# Patient Record
Sex: MALE | Race: OTHER | HISPANIC OR LATINO | Employment: FULL TIME | URBAN - METROPOLITAN AREA
[De-identification: names, ages, dates, MRNs, and addresses within clinical notes are randomized per-mention and may not be internally consistent; named-entity substitution may affect disease eponyms.]

---
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
## 2021-09-09 ENCOUNTER — HOSPITAL ENCOUNTER (INPATIENT)
Facility: HOSPITAL | Age: 21
LOS: 6 days | DRG: 473 | End: 2021-09-15
Attending: EMERGENCY MEDICINE | Admitting: SURGERY
Payer: COMMERCIAL

## 2021-09-09 ENCOUNTER — APPOINTMENT (EMERGENCY)
Dept: RADIOLOGY | Facility: HOSPITAL | Age: 21
DRG: 473 | End: 2021-09-09
Payer: COMMERCIAL

## 2021-09-09 ENCOUNTER — ANESTHESIA EVENT (INPATIENT)
Dept: PERIOP | Facility: HOSPITAL | Age: 21
DRG: 473 | End: 2021-09-09
Payer: COMMERCIAL

## 2021-09-09 ENCOUNTER — APPOINTMENT (INPATIENT)
Dept: RADIOLOGY | Facility: HOSPITAL | Age: 21
DRG: 473 | End: 2021-09-09
Payer: COMMERCIAL

## 2021-09-09 ENCOUNTER — ANESTHESIA (INPATIENT)
Dept: PERIOP | Facility: HOSPITAL | Age: 21
DRG: 473 | End: 2021-09-09
Payer: COMMERCIAL

## 2021-09-09 DIAGNOSIS — S14.129A CENTRAL CORD SYNDROME (HCC): ICD-10-CM

## 2021-09-09 DIAGNOSIS — S12.300A C4 CERVICAL FRACTURE (HCC): Primary | ICD-10-CM

## 2021-09-09 DIAGNOSIS — S14.129A CENTRAL CORD SYNDROME, INITIAL ENCOUNTER (HCC): ICD-10-CM

## 2021-09-09 DIAGNOSIS — S12.400A C5 CERVICAL FRACTURE (HCC): ICD-10-CM

## 2021-09-09 DIAGNOSIS — S12.400A CLOSED DISPLACED FRACTURE OF FIFTH CERVICAL VERTEBRA, UNSPECIFIED FRACTURE MORPHOLOGY, INITIAL ENCOUNTER (HCC): ICD-10-CM

## 2021-09-09 LAB
ABO GROUP BLD: NORMAL
ANION GAP SERPL CALCULATED.3IONS-SCNC: 5 MMOL/L (ref 4–13)
ANION GAP SERPL CALCULATED.3IONS-SCNC: 6 MMOL/L (ref 4–13)
APTT PPP: 29 SECONDS (ref 23–37)
ARTIFACT: PRESENT
BASOPHILS # BLD MANUAL: 0 THOUSAND/UL (ref 0–0.1)
BASOPHILS NFR MAR MANUAL: 0 % (ref 0–1)
BLASTS NFR BLD MANUAL: 1 %
BLD GP AB SCN SERPL QL: NEGATIVE
BUN SERPL-MCNC: 13 MG/DL (ref 5–25)
BUN SERPL-MCNC: 15 MG/DL (ref 5–25)
CA-I BLD-SCNC: 1.17 MMOL/L (ref 1.12–1.32)
CALCIUM SERPL-MCNC: 8.8 MG/DL (ref 8.3–10.1)
CALCIUM SERPL-MCNC: 9.1 MG/DL (ref 8.3–10.1)
CHLORIDE SERPL-SCNC: 109 MMOL/L (ref 100–108)
CHLORIDE SERPL-SCNC: 112 MMOL/L (ref 100–108)
CO2 SERPL-SCNC: 23 MMOL/L (ref 21–32)
CO2 SERPL-SCNC: 23 MMOL/L (ref 21–32)
CREAT SERPL-MCNC: 0.71 MG/DL (ref 0.6–1.3)
CREAT SERPL-MCNC: 0.78 MG/DL (ref 0.6–1.3)
EOSINOPHIL # BLD MANUAL: 0.21 THOUSAND/UL (ref 0–0.4)
EOSINOPHIL NFR BLD MANUAL: 4 % (ref 0–6)
ERYTHROCYTE [DISTWIDTH] IN BLOOD BY AUTOMATED COUNT: 12.6 % (ref 11.6–15.1)
ERYTHROCYTE [DISTWIDTH] IN BLOOD BY AUTOMATED COUNT: 12.6 % (ref 11.6–15.1)
GFR SERPL CREATININE-BSD FRML MDRD: 128 ML/MIN/1.73SQ M
GFR SERPL CREATININE-BSD FRML MDRD: 133 ML/MIN/1.73SQ M
GLUCOSE SERPL-MCNC: 131 MG/DL (ref 65–140)
GLUCOSE SERPL-MCNC: 90 MG/DL (ref 65–140)
HCT VFR BLD AUTO: 42.5 % (ref 36.5–49.3)
HCT VFR BLD AUTO: 44.4 % (ref 36.5–49.3)
HGB BLD-MCNC: 14.3 G/DL (ref 12–17)
HGB BLD-MCNC: 15.1 G/DL (ref 12–17)
INR PPP: 1.1 (ref 0.84–1.19)
INR PPP: 1.12 (ref 0.84–1.19)
LYMPHOCYTES # BLD AUTO: 1.55 THOUSAND/UL (ref 0.6–4.47)
LYMPHOCYTES # BLD AUTO: 29 % (ref 14–44)
MAGNESIUM SERPL-MCNC: 2.1 MG/DL (ref 1.6–2.6)
MCH RBC QN AUTO: 29.8 PG (ref 26.8–34.3)
MCH RBC QN AUTO: 30.2 PG (ref 26.8–34.3)
MCHC RBC AUTO-ENTMCNC: 33.6 G/DL (ref 31.4–37.4)
MCHC RBC AUTO-ENTMCNC: 34 G/DL (ref 31.4–37.4)
MCV RBC AUTO: 88 FL (ref 82–98)
MCV RBC AUTO: 90 FL (ref 82–98)
MONOCYTES # BLD AUTO: 0.21 THOUSAND/UL (ref 0–1.22)
MONOCYTES NFR BLD: 4 % (ref 4–12)
NEUTROPHILS # BLD MANUAL: 3.26 THOUSAND/UL (ref 1.85–7.62)
NEUTS BAND NFR BLD MANUAL: 2 % (ref 0–8)
NEUTS SEG NFR BLD AUTO: 59 % (ref 43–75)
NRBC BLD AUTO-RTO: 0 /100 WBCS
PHOSPHATE SERPL-MCNC: 2 MG/DL (ref 2.7–4.5)
PLATELET # BLD AUTO: 118 THOUSANDS/UL (ref 149–390)
PLATELET # BLD AUTO: 119 THOUSANDS/UL (ref 149–390)
PLATELET BLD QL SMEAR: ABNORMAL
PMV BLD AUTO: 10.5 FL (ref 8.9–12.7)
PMV BLD AUTO: 10.7 FL (ref 8.9–12.7)
POLYCHROMASIA BLD QL SMEAR: PRESENT
POTASSIUM SERPL-SCNC: 4 MMOL/L (ref 3.5–5.3)
POTASSIUM SERPL-SCNC: 4 MMOL/L (ref 3.5–5.3)
PROMYELOCYTES NFR BLD MANUAL: 1 % (ref 0–0)
PROTHROMBIN TIME: 13.8 SECONDS (ref 11.6–14.5)
PROTHROMBIN TIME: 14 SECONDS (ref 11.6–14.5)
RBC # BLD AUTO: 4.74 MILLION/UL (ref 3.88–5.62)
RBC # BLD AUTO: 5.06 MILLION/UL (ref 3.88–5.62)
RBC MORPH BLD: PRESENT
RH BLD: POSITIVE
SODIUM SERPL-SCNC: 138 MMOL/L (ref 136–145)
SODIUM SERPL-SCNC: 140 MMOL/L (ref 136–145)
SPECIMEN EXPIRATION DATE: NORMAL
WBC # BLD AUTO: 5.35 THOUSAND/UL (ref 4.31–10.16)
WBC # BLD AUTO: 5.58 THOUSAND/UL (ref 4.31–10.16)

## 2021-09-09 PROCEDURE — 70450 CT HEAD/BRAIN W/O DYE: CPT

## 2021-09-09 PROCEDURE — 85027 COMPLETE CBC AUTOMATED: CPT | Performed by: SURGERY

## 2021-09-09 PROCEDURE — 22842 INSERT SPINE FIXATION DEVICE: CPT | Performed by: NEUROLOGICAL SURGERY

## 2021-09-09 PROCEDURE — 4A1104G MONITORING OF PERIPHERAL NERVOUS ELECTRICAL ACTIVITY, INTRAOPERATIVE, OPEN APPROACH: ICD-10-PCS | Performed by: NEUROLOGICAL SURGERY

## 2021-09-09 PROCEDURE — 72040 X-RAY EXAM NECK SPINE 2-3 VW: CPT

## 2021-09-09 PROCEDURE — 99254 IP/OBS CNSLTJ NEW/EST MOD 60: CPT | Performed by: NEUROLOGICAL SURGERY

## 2021-09-09 PROCEDURE — 99285 EMERGENCY DEPT VISIT HI MDM: CPT

## 2021-09-09 PROCEDURE — 63015 REMOVE SPINE LAMINA >2 CRVCL: CPT | Performed by: NEUROLOGICAL SURGERY

## 2021-09-09 PROCEDURE — 72141 MRI NECK SPINE W/O DYE: CPT

## 2021-09-09 PROCEDURE — 80048 BASIC METABOLIC PNL TOTAL CA: CPT | Performed by: SURGERY

## 2021-09-09 PROCEDURE — 72125 CT NECK SPINE W/O DYE: CPT

## 2021-09-09 PROCEDURE — 85610 PROTHROMBIN TIME: CPT | Performed by: NURSE PRACTITIONER

## 2021-09-09 PROCEDURE — C1713 ANCHOR/SCREW BN/BN,TIS/BN: HCPCS | Performed by: NEUROLOGICAL SURGERY

## 2021-09-09 PROCEDURE — 20930 SP BONE ALGRFT MORSEL ADD-ON: CPT | Performed by: NEUROLOGICAL SURGERY

## 2021-09-09 PROCEDURE — 96374 THER/PROPH/DIAG INJ IV PUSH: CPT

## 2021-09-09 PROCEDURE — G1004 CDSM NDSC: HCPCS

## 2021-09-09 PROCEDURE — 84100 ASSAY OF PHOSPHORUS: CPT | Performed by: SURGERY

## 2021-09-09 PROCEDURE — 80048 BASIC METABOLIC PNL TOTAL CA: CPT | Performed by: EMERGENCY MEDICINE

## 2021-09-09 PROCEDURE — 86901 BLOOD TYPING SEROLOGIC RH(D): CPT | Performed by: EMERGENCY MEDICINE

## 2021-09-09 PROCEDURE — 99024 POSTOP FOLLOW-UP VISIT: CPT | Performed by: NEUROLOGICAL SURGERY

## 2021-09-09 PROCEDURE — 85027 COMPLETE CBC AUTOMATED: CPT | Performed by: EMERGENCY MEDICINE

## 2021-09-09 PROCEDURE — 85730 THROMBOPLASTIN TIME PARTIAL: CPT | Performed by: NURSE PRACTITIONER

## 2021-09-09 PROCEDURE — 86900 BLOOD TYPING SEROLOGIC ABO: CPT | Performed by: EMERGENCY MEDICINE

## 2021-09-09 PROCEDURE — 82330 ASSAY OF CALCIUM: CPT | Performed by: SURGERY

## 2021-09-09 PROCEDURE — 73130 X-RAY EXAM OF HAND: CPT

## 2021-09-09 PROCEDURE — 22614 ARTHRD PST TQ 1NTRSPC EA ADD: CPT | Performed by: NEUROLOGICAL SURGERY

## 2021-09-09 PROCEDURE — 99291 CRITICAL CARE FIRST HOUR: CPT | Performed by: SURGERY

## 2021-09-09 PROCEDURE — 86850 RBC ANTIBODY SCREEN: CPT | Performed by: EMERGENCY MEDICINE

## 2021-09-09 PROCEDURE — 85610 PROTHROMBIN TIME: CPT | Performed by: SURGERY

## 2021-09-09 PROCEDURE — 99285 EMERGENCY DEPT VISIT HI MDM: CPT | Performed by: EMERGENCY MEDICINE

## 2021-09-09 PROCEDURE — 96375 TX/PRO/DX INJ NEW DRUG ADDON: CPT

## 2021-09-09 PROCEDURE — 36415 COLL VENOUS BLD VENIPUNCTURE: CPT | Performed by: EMERGENCY MEDICINE

## 2021-09-09 PROCEDURE — 0RG407J FUSION OF CERVICOTHORACIC VERTEBRAL JOINT WITH AUTOLOGOUS TISSUE SUBSTITUTE, POSTERIOR APPROACH, ANTERIOR COLUMN, OPEN APPROACH: ICD-10-PCS | Performed by: NEUROLOGICAL SURGERY

## 2021-09-09 PROCEDURE — 85007 BL SMEAR W/DIFF WBC COUNT: CPT | Performed by: EMERGENCY MEDICINE

## 2021-09-09 PROCEDURE — 83735 ASSAY OF MAGNESIUM: CPT | Performed by: SURGERY

## 2021-09-09 PROCEDURE — 22600 ARTHRD PST TQ 1NTRSPC CRV: CPT | Performed by: NEUROLOGICAL SURGERY

## 2021-09-09 PROCEDURE — C1781 MESH (IMPLANTABLE): HCPCS | Performed by: NEUROLOGICAL SURGERY

## 2021-09-09 PROCEDURE — 20936 SP BONE AGRFT LOCAL ADD-ON: CPT | Performed by: NEUROLOGICAL SURGERY

## 2021-09-09 DEVICE — DBM T45010 10CC PASTE GRAFTON PLUS
Type: IMPLANTABLE DEVICE | Site: POSTERIOR CERVICAL | Status: FUNCTIONAL
Brand: GRAFTON®AND GRAFTON PLUS®DEMINERALIZED BONE MATRIX (DBM)

## 2021-09-09 DEVICE — SET SCREW 3600215 M6 SETSCREW
Type: IMPLANTABLE DEVICE | Site: SPINE THORACIC | Status: FUNCTIONAL
Brand: INFINITY™ OCCIPITOCERVICAL UPPER THORACIC SYSTEM

## 2021-09-09 DEVICE — MULTI AXIAL SCREW 3603514 3.5 X 14MM
Type: IMPLANTABLE DEVICE | Site: POSTERIOR CERVICAL | Status: FUNCTIONAL
Brand: INFINITY™ OCCIPITOCERVICAL UPPER THORACIC SYSTEM

## 2021-09-09 DEVICE — ROD 7753795 PRE-CUT 3.5MM X 95MM
Type: IMPLANTABLE DEVICE | Site: POSTERIOR CERVICAL | Status: FUNCTIONAL
Brand: VERTEX® RECONSTRUCTION SYSTEM

## 2021-09-09 RX ORDER — METHOCARBAMOL 750 MG/1
750 TABLET, FILM COATED ORAL EVERY 6 HOURS SCHEDULED
Status: DISCONTINUED | OUTPATIENT
Start: 2021-09-10 | End: 2021-09-15 | Stop reason: HOSPADM

## 2021-09-09 RX ORDER — CEFAZOLIN SODIUM 1 G/50ML
1000 SOLUTION INTRAVENOUS EVERY 8 HOURS
Status: COMPLETED | OUTPATIENT
Start: 2021-09-10 | End: 2021-09-10

## 2021-09-09 RX ORDER — SODIUM CHLORIDE, SODIUM LACTATE, POTASSIUM CHLORIDE, CALCIUM CHLORIDE 600; 310; 30; 20 MG/100ML; MG/100ML; MG/100ML; MG/100ML
INJECTION, SOLUTION INTRAVENOUS CONTINUOUS PRN
Status: DISCONTINUED | OUTPATIENT
Start: 2021-09-09 | End: 2021-09-09

## 2021-09-09 RX ORDER — MAGNESIUM HYDROXIDE 1200 MG/15ML
LIQUID ORAL AS NEEDED
Status: DISCONTINUED | OUTPATIENT
Start: 2021-09-09 | End: 2021-09-09 | Stop reason: HOSPADM

## 2021-09-09 RX ORDER — MIDAZOLAM HYDROCHLORIDE 2 MG/2ML
INJECTION, SOLUTION INTRAMUSCULAR; INTRAVENOUS AS NEEDED
Status: DISCONTINUED | OUTPATIENT
Start: 2021-09-09 | End: 2021-09-09

## 2021-09-09 RX ORDER — OXYCODONE HYDROCHLORIDE 10 MG/1
10 TABLET ORAL EVERY 4 HOURS PRN
Status: DISCONTINUED | OUTPATIENT
Start: 2021-09-09 | End: 2021-09-15 | Stop reason: HOSPADM

## 2021-09-09 RX ORDER — HYDROMORPHONE HCL/PF 1 MG/ML
1 SYRINGE (ML) INJECTION EVERY 2 HOUR PRN
Status: DISCONTINUED | OUTPATIENT
Start: 2021-09-09 | End: 2021-09-11

## 2021-09-09 RX ORDER — GLYCOPYRROLATE 0.2 MG/ML
INJECTION INTRAMUSCULAR; INTRAVENOUS AS NEEDED
Status: DISCONTINUED | OUTPATIENT
Start: 2021-09-09 | End: 2021-09-09

## 2021-09-09 RX ORDER — LABETALOL 20 MG/4 ML (5 MG/ML) INTRAVENOUS SYRINGE
10 EVERY 4 HOURS PRN
Status: DISCONTINUED | OUTPATIENT
Start: 2021-09-09 | End: 2021-09-09

## 2021-09-09 RX ORDER — OXYCODONE HYDROCHLORIDE 5 MG/1
5 TABLET ORAL EVERY 4 HOURS PRN
Status: DISCONTINUED | OUTPATIENT
Start: 2021-09-09 | End: 2021-09-15 | Stop reason: HOSPADM

## 2021-09-09 RX ORDER — SODIUM CHLORIDE 9 MG/ML
125 INJECTION, SOLUTION INTRAVENOUS CONTINUOUS
Status: DISCONTINUED | OUTPATIENT
Start: 2021-09-09 | End: 2021-09-09

## 2021-09-09 RX ORDER — CEFAZOLIN SODIUM 1 G/50ML
1000 SOLUTION INTRAVENOUS ONCE
Status: CANCELLED | OUTPATIENT
Start: 2021-09-09 | End: 2021-09-09

## 2021-09-09 RX ORDER — ACETAMINOPHEN 325 MG/1
975 TABLET ORAL EVERY 8 HOURS SCHEDULED
Status: DISCONTINUED | OUTPATIENT
Start: 2021-09-09 | End: 2021-09-15 | Stop reason: HOSPADM

## 2021-09-09 RX ORDER — HYDROMORPHONE HCL/PF 1 MG/ML
0.5 SYRINGE (ML) INJECTION EVERY 2 HOUR PRN
Status: DISCONTINUED | OUTPATIENT
Start: 2021-09-09 | End: 2021-09-11

## 2021-09-09 RX ORDER — CALCIUM CARBONATE 200(500)MG
1000 TABLET,CHEWABLE ORAL DAILY PRN
Status: DISCONTINUED | OUTPATIENT
Start: 2021-09-09 | End: 2021-09-15 | Stop reason: HOSPADM

## 2021-09-09 RX ORDER — AMOXICILLIN 250 MG
2 CAPSULE ORAL 2 TIMES DAILY
Status: DISCONTINUED | OUTPATIENT
Start: 2021-09-10 | End: 2021-09-09

## 2021-09-09 RX ORDER — LIDOCAINE HYDROCHLORIDE 10 MG/ML
INJECTION, SOLUTION EPIDURAL; INFILTRATION; INTRACAUDAL; PERINEURAL AS NEEDED
Status: DISCONTINUED | OUTPATIENT
Start: 2021-09-09 | End: 2021-09-09

## 2021-09-09 RX ORDER — HYDROMORPHONE HCL/PF 1 MG/ML
SYRINGE (ML) INJECTION AS NEEDED
Status: DISCONTINUED | OUTPATIENT
Start: 2021-09-09 | End: 2021-09-09

## 2021-09-09 RX ORDER — PROPOFOL 10 MG/ML
INJECTION, EMULSION INTRAVENOUS CONTINUOUS PRN
Status: DISCONTINUED | OUTPATIENT
Start: 2021-09-09 | End: 2021-09-09

## 2021-09-09 RX ORDER — GABAPENTIN 100 MG/1
100 CAPSULE ORAL 3 TIMES DAILY
Status: DISCONTINUED | OUTPATIENT
Start: 2021-09-09 | End: 2021-09-09

## 2021-09-09 RX ORDER — KETAMINE HCL IN NACL, ISO-OSM 100MG/10ML
SYRINGE (ML) INJECTION AS NEEDED
Status: DISCONTINUED | OUTPATIENT
Start: 2021-09-09 | End: 2021-09-09

## 2021-09-09 RX ORDER — BUPIVACAINE HYDROCHLORIDE AND EPINEPHRINE 5; 5 MG/ML; UG/ML
INJECTION, SOLUTION EPIDURAL; INTRACAUDAL; PERINEURAL AS NEEDED
Status: DISCONTINUED | OUTPATIENT
Start: 2021-09-09 | End: 2021-09-09 | Stop reason: HOSPADM

## 2021-09-09 RX ORDER — DEXAMETHASONE SODIUM PHOSPHATE 4 MG/ML
4 INJECTION, SOLUTION INTRA-ARTICULAR; INTRALESIONAL; INTRAMUSCULAR; INTRAVENOUS; SOFT TISSUE EVERY 8 HOURS SCHEDULED
Status: DISCONTINUED | OUTPATIENT
Start: 2021-09-09 | End: 2021-09-10

## 2021-09-09 RX ORDER — SODIUM CHLORIDE, SODIUM GLUCONATE, SODIUM ACETATE, POTASSIUM CHLORIDE, MAGNESIUM CHLORIDE, SODIUM PHOSPHATE, DIBASIC, AND POTASSIUM PHOSPHATE .53; .5; .37; .037; .03; .012; .00082 G/100ML; G/100ML; G/100ML; G/100ML; G/100ML; G/100ML; G/100ML
125 INJECTION, SOLUTION INTRAVENOUS CONTINUOUS
Status: DISCONTINUED | OUTPATIENT
Start: 2021-09-09 | End: 2021-09-10

## 2021-09-09 RX ORDER — DEXAMETHASONE SODIUM PHOSPHATE 10 MG/ML
INJECTION, SOLUTION INTRAMUSCULAR; INTRAVENOUS AS NEEDED
Status: DISCONTINUED | OUTPATIENT
Start: 2021-09-09 | End: 2021-09-09

## 2021-09-09 RX ORDER — AMOXICILLIN 250 MG
2 CAPSULE ORAL DAILY
Status: DISCONTINUED | OUTPATIENT
Start: 2021-09-10 | End: 2021-09-13

## 2021-09-09 RX ORDER — SODIUM CHLORIDE 9 MG/ML
INJECTION, SOLUTION INTRAVENOUS CONTINUOUS PRN
Status: DISCONTINUED | OUTPATIENT
Start: 2021-09-09 | End: 2021-09-09

## 2021-09-09 RX ORDER — ONDANSETRON 2 MG/ML
4 INJECTION INTRAMUSCULAR; INTRAVENOUS EVERY 4 HOURS PRN
Status: DISCONTINUED | OUTPATIENT
Start: 2021-09-09 | End: 2021-09-15 | Stop reason: HOSPADM

## 2021-09-09 RX ORDER — DEXAMETHASONE SODIUM PHOSPHATE 4 MG/ML
10 INJECTION, SOLUTION INTRA-ARTICULAR; INTRALESIONAL; INTRAMUSCULAR; INTRAVENOUS; SOFT TISSUE ONCE
Status: DISCONTINUED | OUTPATIENT
Start: 2021-09-09 | End: 2021-09-09

## 2021-09-09 RX ORDER — BISACODYL 10 MG
10 SUPPOSITORY, RECTAL RECTAL DAILY PRN
Status: DISCONTINUED | OUTPATIENT
Start: 2021-09-09 | End: 2021-09-10

## 2021-09-09 RX ORDER — GABAPENTIN 300 MG/1
300 CAPSULE ORAL 3 TIMES DAILY
Status: DISCONTINUED | OUTPATIENT
Start: 2021-09-10 | End: 2021-09-15 | Stop reason: HOSPADM

## 2021-09-09 RX ORDER — HYDROMORPHONE HCL/PF 1 MG/ML
1 SYRINGE (ML) INJECTION ONCE
Status: COMPLETED | OUTPATIENT
Start: 2021-09-09 | End: 2021-09-09

## 2021-09-09 RX ORDER — ONDANSETRON 2 MG/ML
INJECTION INTRAMUSCULAR; INTRAVENOUS AS NEEDED
Status: DISCONTINUED | OUTPATIENT
Start: 2021-09-09 | End: 2021-09-09

## 2021-09-09 RX ORDER — VANCOMYCIN HYDROCHLORIDE 1 G/20ML
INJECTION, POWDER, LYOPHILIZED, FOR SOLUTION INTRAVENOUS AS NEEDED
Status: DISCONTINUED | OUTPATIENT
Start: 2021-09-09 | End: 2021-09-09 | Stop reason: HOSPADM

## 2021-09-09 RX ORDER — CEFAZOLIN SODIUM 1 G/3ML
INJECTION, POWDER, FOR SOLUTION INTRAMUSCULAR; INTRAVENOUS AS NEEDED
Status: DISCONTINUED | OUTPATIENT
Start: 2021-09-09 | End: 2021-09-09

## 2021-09-09 RX ORDER — KETOROLAC TROMETHAMINE 30 MG/ML
15 INJECTION, SOLUTION INTRAMUSCULAR; INTRAVENOUS ONCE
Status: COMPLETED | OUTPATIENT
Start: 2021-09-09 | End: 2021-09-09

## 2021-09-09 RX ORDER — PROPOFOL 10 MG/ML
INJECTION, EMULSION INTRAVENOUS AS NEEDED
Status: DISCONTINUED | OUTPATIENT
Start: 2021-09-09 | End: 2021-09-09

## 2021-09-09 RX ORDER — SUCCINYLCHOLINE/SOD CL,ISO/PF 100 MG/5ML
SYRINGE (ML) INTRAVENOUS AS NEEDED
Status: DISCONTINUED | OUTPATIENT
Start: 2021-09-09 | End: 2021-09-09

## 2021-09-09 RX ORDER — SODIUM CHLORIDE 9 MG/ML
125 INJECTION, SOLUTION INTRAVENOUS CONTINUOUS
Status: CANCELLED | OUTPATIENT
Start: 2021-09-09

## 2021-09-09 RX ORDER — HYDROMORPHONE HCL/PF 1 MG/ML
0.5 SYRINGE (ML) INJECTION
Status: DISCONTINUED | OUTPATIENT
Start: 2021-09-09 | End: 2021-09-13

## 2021-09-09 RX ORDER — CHLORHEXIDINE GLUCONATE 0.12 MG/ML
15 RINSE ORAL ONCE
Status: CANCELLED | OUTPATIENT
Start: 2021-09-09 | End: 2021-09-09

## 2021-09-09 RX ORDER — LABETALOL 20 MG/4 ML (5 MG/ML) INTRAVENOUS SYRINGE
10 EVERY 4 HOURS PRN
Status: DISCONTINUED | OUTPATIENT
Start: 2021-09-09 | End: 2021-09-10

## 2021-09-09 RX ADMIN — PROPOFOL 200 MG: 10 INJECTION, EMULSION INTRAVENOUS at 16:49

## 2021-09-09 RX ADMIN — OXYCODONE HYDROCHLORIDE 5 MG: 5 TABLET ORAL at 23:47

## 2021-09-09 RX ADMIN — METHOCARBAMOL TABLETS 750 MG: 750 TABLET, COATED ORAL at 23:28

## 2021-09-09 RX ADMIN — PROPOFOL 100 MCG/KG/MIN: 10 INJECTION, EMULSION INTRAVENOUS at 16:49

## 2021-09-09 RX ADMIN — CEFAZOLIN SODIUM 1000 MG: 1 INJECTION, POWDER, FOR SOLUTION INTRAMUSCULAR; INTRAVENOUS at 21:38

## 2021-09-09 RX ADMIN — Medication 50 MG: at 16:48

## 2021-09-09 RX ADMIN — MIDAZOLAM 1 MG: 1 INJECTION INTRAMUSCULAR; INTRAVENOUS at 18:33

## 2021-09-09 RX ADMIN — HYDROMORPHONE HYDROCHLORIDE 1 MG: 1 INJECTION, SOLUTION INTRAMUSCULAR; INTRAVENOUS; SUBCUTANEOUS at 12:20

## 2021-09-09 RX ADMIN — LIDOCAINE HYDROCHLORIDE 100 MG: 10 INJECTION, SOLUTION EPIDURAL; INFILTRATION; INTRACAUDAL; PERINEURAL at 16:49

## 2021-09-09 RX ADMIN — Medication 100 MG: at 16:49

## 2021-09-09 RX ADMIN — REMIFENTANIL HYDROCHLORIDE 0.2 MCG/KG/MIN: 1 INJECTION, POWDER, LYOPHILIZED, FOR SOLUTION INTRAVENOUS at 16:49

## 2021-09-09 RX ADMIN — DEXAMETHASONE SODIUM PHOSPHATE 10 MG: 10 INJECTION, SOLUTION INTRAMUSCULAR; INTRAVENOUS at 16:59

## 2021-09-09 RX ADMIN — ACETAMINOPHEN 975 MG: 325 TABLET, FILM COATED ORAL at 23:03

## 2021-09-09 RX ADMIN — DEXAMETHASONE SODIUM PHOSPHATE 4 MG: 4 INJECTION INTRA-ARTICULAR; INTRALESIONAL; INTRAMUSCULAR; INTRAVENOUS; SOFT TISSUE at 23:03

## 2021-09-09 RX ADMIN — HYDROMORPHONE HYDROCHLORIDE 1 MG: 1 INJECTION, SOLUTION INTRAMUSCULAR; INTRAVENOUS; SUBCUTANEOUS at 17:00

## 2021-09-09 RX ADMIN — GABAPENTIN 100 MG: 100 CAPSULE ORAL at 23:03

## 2021-09-09 RX ADMIN — SODIUM CHLORIDE, SODIUM GLUCONATE, SODIUM ACETATE, POTASSIUM CHLORIDE, MAGNESIUM CHLORIDE, SODIUM PHOSPHATE, DIBASIC, AND POTASSIUM PHOSPHATE 125 ML/HR: .53; .5; .37; .037; .03; .012; .00082 INJECTION, SOLUTION INTRAVENOUS at 23:03

## 2021-09-09 RX ADMIN — SODIUM CHLORIDE, SODIUM LACTATE, POTASSIUM CHLORIDE, AND CALCIUM CHLORIDE: .6; .31; .03; .02 INJECTION, SOLUTION INTRAVENOUS at 16:41

## 2021-09-09 RX ADMIN — MIDAZOLAM 1 MG: 1 INJECTION INTRAMUSCULAR; INTRAVENOUS at 21:00

## 2021-09-09 RX ADMIN — GLYCOPYRROLATE 0.2 MG: 0.2 INJECTION, SOLUTION INTRAMUSCULAR; INTRAVENOUS at 17:48

## 2021-09-09 RX ADMIN — SODIUM CHLORIDE: 0.9 INJECTION, SOLUTION INTRAVENOUS at 16:53

## 2021-09-09 RX ADMIN — PHENYLEPHRINE HYDROCHLORIDE 50 MCG/MIN: 10 INJECTION INTRAVENOUS at 17:06

## 2021-09-09 RX ADMIN — HYDROMORPHONE HYDROCHLORIDE 1 MG: 1 INJECTION, SOLUTION INTRAMUSCULAR; INTRAVENOUS; SUBCUTANEOUS at 16:45

## 2021-09-09 RX ADMIN — KETOROLAC TROMETHAMINE 15 MG: 30 INJECTION, SOLUTION INTRAMUSCULAR; INTRAVENOUS at 11:21

## 2021-09-09 RX ADMIN — SODIUM CHLORIDE, SODIUM LACTATE, POTASSIUM CHLORIDE, AND CALCIUM CHLORIDE: .6; .31; .03; .02 INJECTION, SOLUTION INTRAVENOUS at 19:09

## 2021-09-09 RX ADMIN — ONDANSETRON 4 MG: 2 INJECTION INTRAMUSCULAR; INTRAVENOUS at 22:09

## 2021-09-09 RX ADMIN — MIDAZOLAM 4 MG: 1 INJECTION INTRAMUSCULAR; INTRAVENOUS at 16:41

## 2021-09-09 RX ADMIN — CEFAZOLIN SODIUM 1000 MG: 1 INJECTION, POWDER, FOR SOLUTION INTRAMUSCULAR; INTRAVENOUS at 17:39

## 2021-09-09 NOTE — ANESTHESIA PREPROCEDURE EVALUATION
Procedure:  C4-6 POSTERIOR CERVICAL SPINE DECOMPRESSION WITH C3-T1 FUSION   (Bilateral Spine Cervical)    Relevant Problems   No relevant active problems        1  Spinal cord injury - acute mid cervical C5 and below  2  Status post fall forward onto head coming out of shower  3  Likely hyperflexion injury  4  Immediate quadriparesis with return of function in proximal arms and legs, over 5 to 15 minutes, with continuing improvement with residual impaired motor movements of hands C7-C8 T1 dermatomes  5  C5 burst fracture with loss of height and mild retropulsion with bilateral laminar fracture of C5 left greater than right  6  C5 pedicles look intact  7  C5-6 capsular stretch diastasis left greater than right  8  Straightening of cervical lordosis  9  Reversal of lordosis C5 region with slight retropulsion inferior aspect posterior vertebral body of C5  10  Initially unable to move, lying face down  11  Then slow return of function proximal arms over 5 to 10 minutes  12  Residual weakness elbow extension 1/5 and long finger extensors and intrinsics bilaterally  13  Then improving return of function lower extremities 4/5  14  Lower extremity hyperteonoa  15  Preserved JPS fingers with good localization all fingers  16  No pinprick sensory level C 567 T1 down  17  Intact perineal sensation  Physical Exam    Airway    Mallampati score: II  TM Distance: >3 FB       Dental   No notable dental hx     Cardiovascular      Pulmonary      Other Findings  cspine in place; Anesthesia Plan  ASA Score- 1 Emergent    Anesthesia Type- general with ASA Monitors  Additional Monitors: arterial line  Airway Plan: ETT  Comment:  JASMIN Ye , have personally seen and evaluated the patient prior to anesthetic care  I have reviewed the pre-anesthetic record, and other medical records if appropriate to the anesthetic care    If a CRNA is involved in the case, I have reviewed the CRNA assessment, if present, and agree  Risks/benefits and alternatives discussed with patient including possible PONV, sore throat, and possibility of rare anesthetic and surgical emergencies  ASLEEP VIDEOSCOPIC INTUBATION WITH INLINE STABILIZATION  MAP GOAL 85  Plan Factors-Exercise tolerance (METS): >4 METS  Chart reviewed  EKG reviewed  Imaging results reviewed  Existing labs reviewed  Patient summary reviewed  Patient is not a current smoker  Patient not instructed to abstain from smoking on day of procedure  Induction- intravenous  Postoperative Plan- Plan for postoperative opioid use  Planned trial extubation    Informed Consent- Anesthetic plan and risks discussed with patient  I personally reviewed this patient with the CRNA  Discussed and agreed on the Anesthesia Plan with the CRNA  Dale Ambriz

## 2021-09-09 NOTE — CONSULTS
[address] 2000, 24 y o  male MRN: [id number]  Unit/Bed#: ED 25 Encounter: [id number]  Primary Care Provider: No primary care provider on file  Date and time admitted to hospital: 9/9/2021 11:01 AM    Consult was completed on 09/09/2021 at 12:40 p m  Inpatient consult to Neurosurgery  Consult performed by: DANGELO Thomas  Consult ordered by: Wolfgang Brand MD          C5 cervical fracture [facility])  Assessment & Plan  C5 burst fracture with mild retropulsion and bilateral laminar fractures and C5-6 capsular stretch diastasis  Possible spinal cord and ligamentous injury  · Status post slip after getting out of the shower with positive head strike, likely hyperflexion injury  Patient states he was unable to move for about 10 minutes and then was able to move slightly  No paresthesias noted  · Patient with significant bilateral tricep and  weakness  Imaging:    CT cervical spine wo 09/09/2021:C5 compression fracture with involvement of bilateral laminas  The C5 vertebral body is fractured and comminuted fashion involving both sagittal and coronal plane  4 mm retropulsion noted of the inferior aspect of the posterior vertebral body narrowing the spinal canal     Plan:  · Imaging reviewed with patient and significant other in detail  · Ordered Vista collar to be worn at all times  · Ordered stat MRI cervical spine to rule out possible cord or ligamentous injury  · Medical management pain control per primary team  · Recommend MAPs above 85  · Keep patient NPO  · Ordered updated coags  · Patient will likely need surgical intervention for spine stabilization  Will have further plan once MRI cervical spine completed  · DVT PPX: SCDs    Neurosurgery will continue to follow closely, MRI cervical spine pending, call with any further questions or concerns  History of Present Illness     HPI: Duglas Jauregui is a 24 y o  male with no significant past medical history  Patient presented to the ED after sustaining a fall after he got out of the shower landing head first on carpet  He denies LOC  He does report after he initially fell he was unable to move his extremities and felt as though there was a heavy blanket over his body  Patient states he was unable to move extremities for roughly 10 minutes and then he started to regain function in his arms first than legs  Patient was complaining of neck pain  CT cervical spine demonstrated C5 burst fracture with loss of height and mild retropulsion with bilateral laminar fractures  Also C5-6 capsular stretch diastasis bilaterally  Likely hyperflexion injury  With possible spinal cord and ligamentous injury  Patient states he continues to regain function in his legs and arms with improvement in his neck pain  He denies any paresthesias  Patient states after he slipped and fell he called out for his brother who carried him to his bed and called EMS  Patient with field collar in place, rates his neck pain 5/10 with no radiation  Patient also reports some pain in his knuckles when fingers stretched out  He denies any headaches, dizziness, blurry vision, chest pain, shortness of breath, abdominal pain, nausea, vomiting, diarrhea, no problems with bowel or bladder, no new weakness or numbness/tingling  Imaging discussed in detail by myself as well as with Dr Lacho Rivero  Ordered stat MRI cervical spine to rule out possible cord or ligamentous injury given patient's exam   Patient will likely need neurosurgical intervention for spine stabilization  Review of Systems   Constitutional: Positive for activity change  Negative for chills and fever  HENT: Negative for tinnitus and trouble swallowing  Eyes: Negative for visual disturbance  Respiratory: Negative for shortness of breath  Cardiovascular: Negative for chest pain     Gastrointestinal: Negative for abdominal pain, constipation, diarrhea, nausea and vomiting  Genitourinary: Negative for difficulty urinating  Musculoskeletal: Positive for neck pain  Negative for back pain  Neurological: Positive for weakness  Negative for dizziness, speech difficulty, light-headedness, numbness and headaches  Historical Information   History reviewed  No pertinent past medical history  History reviewed  No pertinent surgical history  Social History     Substance and Sexual Activity   Alcohol Use No     Social History     Substance and Sexual Activity   Drug Use No     Social History     Tobacco Use   Smoking Status Never Smoker   Smokeless Tobacco Never Used     History reviewed  No pertinent family history  Meds/Allergies   all current active meds have been reviewed and current meds:   No current facility-administered medications for this encounter  No Known Allergies    Objective   I/O     None          Physical Exam  Vitals reviewed  Constitutional:       General: He is awake  He is not in acute distress  Appearance: Normal appearance  He is not ill-appearing  Interventions: Cervical collar in place  Comments: Field collar in place   HENT:      Head: Normocephalic and atraumatic  Eyes:      Extraocular Movements: Extraocular movements intact and EOM normal       Conjunctiva/sclera: Conjunctivae normal       Pupils: Pupils are equal, round, and reactive to light  Neck:      Comments: Field collar in place, no tenderness to palpation  Cardiovascular:      Rate and Rhythm: Normal rate  Pulmonary:      Effort: Pulmonary effort is normal  No respiratory distress  Chest:      Chest wall: No tenderness  Abdominal:      General: There is no distension  Palpations: Abdomen is soft  Tenderness: There is no abdominal tenderness  Musculoskeletal:      Cervical back: No tenderness  No spinous process tenderness or muscular tenderness        Comments: Moving all extremities lower extremities more than upper extremities  Skin:     General: Skin is warm and dry  Neurological:      Mental Status: He is alert and oriented to person, place, and time  Deep Tendon Reflexes:      Reflex Scores:       Tricep reflexes are 2+ on the right side and 2+ on the left side  Bicep reflexes are 2+ on the right side and 2+ on the left side  Brachioradialis reflexes are 2+ on the right side and 2+ on the left side  Patellar reflexes are 2+ on the right side and 2+ on the left side  Psychiatric:         Attention and Perception: Attention and perception normal          Mood and Affect: Mood and affect normal          Speech: Speech normal          Behavior: Behavior normal  Behavior is cooperative  Thought Content: Thought content normal          Cognition and Memory: Cognition and memory normal          Judgment: Judgment normal        Neurologic Exam     Mental Status   Oriented to person, place, and time  Follows 2 step commands  Attention: normal  Concentration: normal    Speech: speech is normal   Level of consciousness: alert  Knowledge: good  Able to perform simple calculations  Able to name object  Able to repeat  Normal comprehension  Cranial Nerves     CN III, IV, VI   Pupils are equal, round, and reactive to light  Extraocular motions are normal    CN III: no CN III palsy  CN VI: no CN VI palsy  Nystagmus: none   Diplopia: none  Conjugate gaze: present    CN V   Facial sensation intact  CN VII   Facial expression full, symmetric       CN VIII   CN VIII normal    Hearing: intact    CN IX, X   CN IX normal      CN XI   CN XI normal      CN XII   CN XII normal      Motor Exam   Muscle bulk: normal  Overall muscle tone: normal  Right arm pronator drift: absent  Left arm pronator drift: absentUEs:    Bilateral deltoids 5/5    Bilateral finger extensors 0/5    Bilateral biceps 4/5    Bilateral triceps 1/5    Bilateral  3/5    LEs:    Bilateral hip flexion 4/5    Bilateral knee flexion/extension 4/5    L DF/PF 5/5 and R DF/PF 4-/5     Sensory Exam   Light touch normal    Proprioception normal    Right arm pinprick: normal  Left arm pinprick: normal  JPS and DST intact     Gait, Coordination, and Reflexes     Tremor   Resting tremor: absent  Intention tremor: absent  Action tremor: absent    Reflexes   Right brachioradialis: 2+  Left brachioradialis: 2+  Right biceps: 2+  Left biceps: 2+  Right triceps: 2+  Left triceps: 2+  Right patellar: 2+  Left patellar: 2+  Right Montgomery: absent  Left Montgomery: absent  Right ankle clonus: absent  Left ankle clonus: absent      Vitals:Blood pressure 115/56, pulse 61, temperature 97 9 °F (36 6 °C), temperature source Oral, resp  rate 16, height 5' 10" (1 778 m), weight 79 4 kg (175 lb), SpO2 98 %  ,Body mass index is 25 11 kg/m²  Lab Results:   Results from last 7 days   Lab Units 09/09/21  1159   WBC Thousand/uL 5 35   HEMOGLOBIN g/dL 15 1   HEMATOCRIT % 44 4   PLATELETS Thousands/uL 118*   MONO PCT % 4     Results from last 7 days   Lab Units 09/09/21  1159   POTASSIUM mmol/L 4 0   CHLORIDE mmol/L 109*   CO2 mmol/L 23   BUN mg/dL 15   CREATININE mg/dL 0 71   CALCIUM mg/dL 9 1             Results from last 7 days   Lab Units 09/09/21  1429   INR  1 10   PTT seconds 29     No results found for: TROPONINT  ABG:No results found for: PHART, YPZ2NCI, PO2ART, JXP5KRJ, Y4IKIRFY, BEART, SOURCE    Imaging Studies: I have personally reviewed pertinent reports  and I have personally reviewed pertinent films in PACS    EKG, Pathology, and Other Studies: I have personally reviewed pertinent reports        VTE Prophylaxis: Sequential compression device Devonte Self)     Code Status: No Order  Advance Directive and Living Will:      Power of :    POLST:

## 2021-09-09 NOTE — TREATMENT PLAN
Treatment plan    9/9/2021    Chandan Thompson    Age 24 years    Date of birth 02/05/1991    Patient location [facility] ED 25    Seen in company with the Jocelin Velasquez  Patient's girlfriend Cindy Bro with him    Diagnoses:  1  Spinal cord injury - acute mid cervical C5 and below  2  Status post fall forward onto head coming out of shower  3  Likely hyperflexion injury  4  Immediate quadriparesis with return of function in proximal arms and legs, over 5 to 15 minutes, with continuing improvement with residual impaired motor movements of hands C7-C8 T1 dermatomes  5  C5 burst fracture with loss of height and mild retropulsion with bilateral laminar fracture of C5 left greater than right  6  C5 pedicles look intact  7  C5-6 capsular stretch diastasis left greater than right  8  Straightening of cervical lordosis  9  Reversal of lordosis C5 region with slight retropulsion inferior aspect posterior vertebral body of C5  10  Initially unable to move, lying face down  11  Then slow return of function proximal arms over 5 to 10 minutes  12  Residual weakness elbow extension 1/5 and long finger extensors and intrinsics bilaterally  13  Then improving return of function lower extremities 4/5  14  Lower extremity hyperteonoa  15  Preserved JPS fingers with good localization all fingers  16  No pinprick sensory level C 567 T1 down  17  Intact perineal sensation    Plan:  1  Maintain neutral alignment cervical collar  2  Aim to maintain M AP 85 mmHg  above 85mmhg  3  (currently auto mapping 83 mm Hg)  4  MRI cervical spine and upper thoracic spine  5  Keep NPO  6  Labs including coags and type and hold  7  SCDs  8  Likely OR for ORIF decompression spine stabilization - 360 procedure versus posterior procedure T BD    PMH:  Previously well    Active in many sports in high school  Lives with his parents in their house  Fourteen stairs up to second floor  Works in [address] last 2 year s    Meds: None    Exam:  Awake alert interactive  C-collar in place  Complaining of neck pain deeply    Speech is fluent  GCS 15  EOM full  Is 3 mm 2 mm briskly  Facial sensation is normal  Facial symmetry equal some    In the upper extremities:  · Good power in shoulder movements in abduction 5/5 abduction flexion and extension  · Good pure biceps function grade 4/5 bilateral  · 3+ pronation and supination  · Grade 3 to 3+ wrist dorsiflexion  · Weak long finger extensors  0/5  · Fingers held flexed with weak flexors 2 to 3/5  · No active intrinsic movements bilaterally    In the lower extremities  · Hypertonia  ·  Good proximal power 4 to 4+/5  · Knee flexion extension grade 4/54  · Ankle dorsiflexion plantar flexion 5-out of 5 on the left and grade 4 to 4 to 4+ 5 on the right    DTRs biceps grade 1 triceps absent and supinator 1-  Brisk knee reflexes grade 2  No clonus    Sensory examination shows good appreciation of pinprick C4 down all dermatomes in the upper extremities including see 678 and T1    Intact truncal pinprick from T2 down bilaterally through all lumbar dermatomes and perineal dermatomes scrotum and base of penis sharp pinprick appreciated    Intact joint position at index fingers and helices    Preserved localization all fingers both hands    Impression:  70-year-old young man previously fit status post stumble fall after getting out of the bathroom and transitioning from bathroom into whole carpeted hallway  Says he landed on his head    Immediate neck pain andwas unable to move his arms or legs and felt numb and tingly down his whole body  No LOC no headache  Complaining of neck pain    Noticed improvement in his arms proximally shoulders then elbows but not in finger  Aware soon afterwards 05-  10 minutes return of function of movements in his legs    Doubt out for his brother who was in the building  He came and helped him turn over and by that time had some truncal function    He then set him up   And took him lifting under his arms into his over bedroom  Brother called 911  Presented to [address]    While in ER he thinks better power returning in the lower extremity    Imaging reviewed by me later with Dr Boyd Singer from neuro Radiology    Imaging as above  · CT head 9/9/2021  · No intracranial hemorrhage  · Parenchyma looks normal  · Ventricles normal and symmetrical balanced for his age    Summary  50-year-old previously fit young man who sustained spinal cord injury after likely hyperflexion mechanism falling onto his head coming out of the bathroom this morning getting ready for work    Immediately quadriparetic with loss of function in arms and legs with tingling down his whole body  Started to regain some proximal upper extremity function over the a few min  Continue to improve over 10 15 minutes with return of function in the lower extremity    Remains with residual weakness in the the forearms with week elbow extension some weakness 2 to 3/5 wrist dorsiflexion and has profound weakness in long finger extensors and intrinsics and weak  in the hand  No actual sensory level to pinprick  Retains JPS in fingers and toes and localizes all fingers bilaterally    He has sustained 3 column injury with possible ligamentous disruption and likely intrinsic spinal cord injury affecting C6-7 8 T1 myotomes    I discussed with Trauma team    I discussed need for MRI cervical spine upper thoracic spine and likely need to proceed to surgery today with Mr Hyde  himself and soon after with his mother and father  Contact number for his mother, Chirag Alberto is [address]  number for his girlfriend Shannan Merchant is [phone]    Presentation imaging discussed with my colleague Dr Annie Sanches     9/9/2021 2:00 PM    Christine Adan MD, Attending Neurological Surgeon

## 2021-09-09 NOTE — PROGRESS NOTES
09/09/21 1800   Clinical Encounter Type   Visited With Patient   Routine Visit Introduction   Surgical Visit Pre-op   Referral From Nurse   Referral To    Jew Encounters   Jew Needs Prayer

## 2021-09-09 NOTE — ANESTHESIA PROCEDURE NOTES
Arterial Line Insertion  Performed by: Sanjay Villafana MD  Authorized by: Sanjay Villafana MD   Consent: Verbal consent obtained  Risks and benefits: risks, benefits and alternatives were discussed  Consent given by: patient  Patient understanding: patient states understanding of the procedure being performed  Patient consent: the patient's understanding of the procedure matches consent given  Procedure consent: procedure consent matches procedure scheduled  Relevant documents: relevant documents present and verified  Test results: test results available and properly labeled  Required items: required blood products, implants, devices, and special equipment available  Patient identity confirmed: verbally with patient, arm band and provided demographic data  Time out: Immediately prior to procedure a "time out" was called to verify the correct patient, procedure, equipment, support staff and site/side marked as required  Preparation: Patient was prepped and draped in the usual sterile fashion    Indications: hemodynamic monitoring  Orientation:  Right  Location: radial artery  Procedure Details:  Chris's test normal: yes  Needle gauge: 20  Seldinger technique: Seldinger technique used  Number of attempts: 1    Post-procedure:  Post-procedure: dressing applied  Patient tolerance: Patient tolerated the procedure well with no immediate complications and patient tolerated the procedure well with no immediate complications

## 2021-09-09 NOTE — ASSESSMENT & PLAN NOTE
C5 burst fracture with mild retropulsion and bilateral laminar fractures and C5-6 capsular stretch diastasis  Possible spinal cord and ligamentous injury  · Status post slip after getting out of the shower with positive head strike, likely hyperflexion injury  Patient states he was unable to move for about 10 minutes and then was able to move slightly  No paresthesias noted  · Patient with significant bilateral tricep and  weakness  Imaging:    CT cervical spine wo 09/09/2021:C5 compression fracture with involvement of bilateral laminas  The C5 vertebral body is fractured and comminuted fashion involving both sagittal and coronal plane  4 mm retropulsion noted of the inferior aspect of the posterior vertebral body narrowing the spinal canal     Plan:  · Imaging reviewed with patient and significant other in detail  · Ordered Vista collar to be worn at all times  · Ordered stat MRI cervical spine to rule out possible cord or ligamentous injury  · Medical management pain control per primary team  · Recommend MAPs above 85  · Keep patient NPO  · Ordered updated coags  · Patient will likely need surgical intervention for spine stabilization  Will have further plan once MRI cervical spine completed  · DVT PPX: SCDs    Neurosurgery will continue to follow closely, MRI cervical spine pending, call with any further questions or concerns

## 2021-09-09 NOTE — RESTORATIVE TECHNICIAN NOTE
Restorative Technician Note      Patient Name: Jarod العلي     Restorative Tech Visit Date: 09/09/21  Note Type: Bracing, Initial consult  Patient Position Upon Consult: Supine  Brace Applied: [address] Set (adjusted to a level 3; calos collar and replacement pads left at bedside)  Patient Position When Brace Applied: Supine  Education Provided: Yes;Family or social support of family present for education by provider  Patient Position at End of Consult: Supine; All needs within reach  Nurse Communication: Nurse aware of consult, application of brace      Please call [facility] at extension [phone] with any questions or adjustments      MAYRA Hilliard

## 2021-09-09 NOTE — H&P
H&P Exam - [facility] Harpreet 24 y o  male MRN: [id number]  Unit/Bed#: ED 25 Encounter: [id number]      -------------------------------------------------------------------------------------------------------------  Chief Complaint: fall, cspine injury  History of Present Illness     Paige Aguilar is a 24 y o  male who presents status post fall  Patient states he fell earlier today while using the shower  States he slipped on the tile fell on his head  He states he hit the top of the head  He denies any loss of consciousness however after the fall patient was unable to move for approximately 15 minutes  He called for his brother who helped transfer him to the bed and called 911  Eventually he regained motor function is  Currently complains of neck pain  Also has pain in his distal bilateral fingers and toes  He states they feel cold and complains of tingling although denies any numbness  He denies any chest pain, did complain of mild shortness of breath and resolved  Emergency room the patient stable vital signs  Laboratory evaluation was largely unremarkable  CT head without acute intracranial abnormality  CT C-spine with comminuted C5 compression fracture involving the bilateral laminae  A 4 mm retropulsion her in the inferior aspect of the id is vertebral body narrowing the spinal canal   MRI cervical spine was also obtained demonstrating comminuted fractures of the C5 vertebrae with bony retropulsion and cord compression with cord edema/contusion  There is also edema and fractures of the posterior elements of C5      Past medical history:  Denies  Past surgical history:  Denies  Medications:  Denies    History obtained from the patient   -------------------------------------------------------------------------------------------------------------  Assessment and Plan:    Neuro:    Diagnosis:  Comminuted fracture of C5 vertebrae with bony retropulsion and cord compression  o Plan:  Neurosurgery consult  o [place] collar to warrant all times  o Maintain maps greater than 85  o NPO  o Plan to take urgently to OR with Neurosurgery team  o Hold chemical DVT prophylaxis  o Map goal greater than 85   Diagnosis:  Acute pain status post fall with C5 fracture  o Plan:  Dilaudid p r n  CV:    Keep maps above 85  o Plan:  Gianfranco-Synephrine as needed      Pulm:   Diagnosis:  No acute issues      GI:    Diagnosis:  No acute issues, NPO for OR      :    Diagnosis:  No acute issues   Trend I/O      F/E/N:    F:  No acute issues   E:  Trend electrolytes replaced as needed   N:  NPO in anticipation for OR      Heme/Onc:    Diagnosis:  No acute issues   Trend hemoglobin, transfuse for hemoglobin less than 7   SCDs for DVT prophylaxis, hold chemical prophylaxis in setting of neurosurgery intervention      Endo:    No acute issues      ID:    No acute issues      MSK/Skin:    Diagnosis:  C5 fracture  o Plan:  See above, maintain cervical collar  o PT OT when appropriate      Disposition: Admit to Critical Care   Code Status: No Order  --------------------------------------------------------------------------------------------------------------  Review of Systems   All other systems reviewed and are negative  A 12-point, complete review of systems was reviewed and negative except as stated above     Physical Exam  Constitutional:       General: He is not in acute distress  Appearance: Normal appearance  He is not ill-appearing or toxic-appearing  HENT:      Head: Normocephalic and atraumatic  Neck:      Comments: In C collar  Cardiovascular:      Rate and Rhythm: Normal rate and regular rhythm  Pulses: Normal pulses  Heart sounds: No murmur heard  Pulmonary:      Effort: Pulmonary effort is normal  No respiratory distress  Abdominal:      General: Abdomen is flat  There is no distension  Palpations: Abdomen is soft  Tenderness:  There is no abdominal tenderness  Musculoskeletal:      Cervical back: Neck supple  Comments: Normal mid range of motion, weakness to  strength bilaterally  Arm flexion extension 5/5 strength bilaterally  Decreased sensation cold temperature to toes  Questionable decreased sensation to pain at the fingertips all the patient states is decreased sensation to pain at baseline  Normal reflexes upper lower extremities    Lower extremity flexion extension of knee and hip 5/5 bilaterally  Skin:     General: Skin is warm  Capillary Refill: Capillary refill takes less than 2 seconds  Neurological:      General: No focal deficit present  Mental Status: He is alert and oriented to person, place, and time  Psychiatric:         Mood and Affect: Mood normal          Behavior: Behavior normal        --------------------------------------------------------------------------------------------------------------  Vitals:   Vitals:    09/09/21 1310 09/09/21 1415 09/09/21 1426 09/09/21 1530   BP: 117/60 115/56 115/56 129/58   BP Location:    Right arm   Pulse: 61 60 61 60   Resp: 18 18 16 16   Temp:       TempSrc:       SpO2: 98% 99% 98% 97%   Weight:       Height:         Temp  Min: 97 9 °F (36 6 °C)  Max: 97 9 °F (36 6 °C)  IBW (Ideal Body Weight): 73 kg  Height: 5' 10" (177 8 cm)  Body mass index is 25 11 kg/m²    N/A    Laboratory and Diagnostics:  Results from last 7 days   Lab Units 09/09/21  1159   WBC Thousand/uL 5 35   HEMOGLOBIN g/dL 15 1   HEMATOCRIT % 44 4   PLATELETS Thousands/uL 118*   BANDS PCT % 2   MONO PCT % 4     Results from last 7 days   Lab Units 09/09/21  1159   SODIUM mmol/L 138   POTASSIUM mmol/L 4 0   CHLORIDE mmol/L 109*   CO2 mmol/L 23   ANION GAP mmol/L 6   BUN mg/dL 15   CREATININE mg/dL 0 71   CALCIUM mg/dL 9 1   GLUCOSE RANDOM mg/dL 90          Results from last 7 days   Lab Units 09/09/21  1429   INR  1 10   PTT seconds 29              ABG:    VBG:          Micro:        EKG: n/a  Imaging: I have personally reviewed pertinent reports  Historical Information   History reviewed  No pertinent past medical history  History reviewed  No pertinent surgical history  Social History   Social History     Substance and Sexual Activity   Alcohol Use No     Social History     Substance and Sexual Activity   Drug Use No     Social History     Tobacco Use   Smoking Status Never Smoker   Smokeless Tobacco Never Used       History reviewed  No pertinent family history  I have reviewed this patient's family history and commented on sigificant items within the HPI      Medications:  No current facility-administered medications for this encounter  Home medications:  None     Allergies:  No Known Allergies  ------------------------------------------------------------------------------------------------------------  Advance Directive and Living Will:      Power of :    POLST:    ------------------------------------------------------------------------------------------------------------  Anticipated Length of Stay is > 2 midnights    Care Time Delivered:   Upon my evaluation, this patient had a high probability of imminent or life-threatening deterioration due to Spinal cord injury, which required my direct attention, intervention, and personal management  I have personally provided Thirty minutes (4:00  to 4:30) of critical care time, exclusive of procedures, teaching, family meetings, and any prior time recorded by providers other than myself  David Vann,         Portions of the record may have been created with voice recognition software  Occasional wrong word or "sound a like" substitutions may have occurred due to the inherent limitations of voice recognition software    Read the chart carefully and recognize, using context, where substitutions have occurred

## 2021-09-09 NOTE — ED PROVIDER NOTES
Emergency Department Note- Lary Mccollum 24 y o  male MRN: [id number]    Unit/Bed#: ED 25 Encounter: [id number]        History of Present Illness   HPI:  Lary Mccollum is a 24 y o  male who presents with slip and fall  Patient states he was getting ready for work and slipped outside the bathtub and landed face 1st hitting his head  Patient had no LOC but does state that he was unable to move his arms or legs and felt numb and tingly down his whole body  Patient currently complaining of some neck pain but no headache  Patient currently states he is regaining function of his legs and arms but he still has pain with movement of his left hand  Patient with no numbness tingling currently in his upper or lower extremities  Patient with no mid or low back pain no pelvic pain no chest pain or shortness of breath no abdominal pain  This was clearly a mechanical fall there was no preceding palpitations  Patient called his brother who called EMS due to the patient having difficulty moving his arms and legs  REVIEW OF SYSTEMS    Constitutional: Negative for chills, fatigue and fever  HENT: Negative for ear pain, sore throat and trouble swallowing  Eyes: Negative for photophobia, pain and visual disturbance  Respiratory: Negative for cough, chest tightness and shortness of breath  Cardiovascular: Negative for chest pain and palpitations  Gastrointestinal: Negative for abdominal pain, constipation, diarrhea, nausea and vomiting  Genitourinary: Negative for dysuria, flank pain, frequency and hematuria  Musculoskeletal: Negative for back pain and positive neck pain  Skin: Negative for color change and rash  Neurological: Negative for dizziness, weakness, light-headedness and headaches  positive weakness in arms and legs now improving  Psychiatric/Behavioral: Negative for confusion  The patient is not nervous/anxious      All systems reviewed and negative except as noted above or in HPI Historical Information   History reviewed  No pertinent past medical history  History reviewed  No pertinent surgical history  Social History   Social History     Substance and Sexual Activity   Alcohol Use No     Social History     Substance and Sexual Activity   Drug Use No     Social History     Tobacco Use   Smoking Status Never Smoker   Smokeless Tobacco Never Used     Family History: History reviewed  No pertinent family history  Meds/Allergies   (Not in a hospital admission)    No Known Allergies    Objective   Vitals: Blood pressure 123/64, pulse 65, temperature 97 9 °F (36 6 °C), temperature source Oral, resp  rate 18, height 5' 10" (1 778 m), weight 79 4 kg (175 lb), SpO2 99 %  PHYSICAL EXAM     General Appearance: alert and oriented, nad, non toxic appearing  Skin:  Warm, dry, intact  HEENT: atraumatic, normocephalic, eomi, perll   Neck: Supple, no JVD, no lymphadenopathy, trachea midline, no bruit there is cervical tenderness in the midline on palpation  Cardiac: rrr, no murmurs, rub, gallops  Pulmonary: lungs cta, no wheezes, rales, rhonchi  Gastrointestinal: abdomen soft nontender, good bs, no mass or bruits, no cva tenderness  Extremities: no pedal edema, good pulses, no calf tenderness, no clubbing, no cyanosis left hand with tenderness along the metacarpophalangeal joints and decreased movement due to pain  Neuro:  no focal motor or sensory deficits, cn intact patient currently with no neurologic deficits no sensory or motor deficits noted  Psych:  Normal mood and affect, normal judgement and insight      Lab Results: Lab Results: I have personally reviewed pertinent lab results  Imaging: I have personally reviewed pertinent reports  EKG, Pathology, and Other Studies: I have personally reviewed pertinent films in PACS    Assessment/Plan     ED Medical Decision Making:  Patient likely with a stinger type injury    His symptoms are vastly improved at this point with no neuro deficits noted  We will obtain a CT head and C-spine as well as left hand x-ray and give the patient pain medications and reexamine his neurologic status  Portions of the record may have been created with voice recognition software  Occasional wrong word or "sound a like" substitutions may have occurred due to the inherent limitations of voice recognition software  Read the chart carefully and recognize, using context, where substitutions have occurred  Tim Mcgowan MD  09/09/21 1121    Patient noted to have C5 tear drop and vertebral body fractures on CT scan  Will check lab work and preop labs  Trauma was consulted and will see the patient in the emergency department  The patient and his family were informed of my wet read       Tim Mcgowan MD  09/09/21 1146       Tim Mcgowan MD  09/09/21 1220

## 2021-09-09 NOTE — H&P
H&P Exam - Trauma   Yossi Wu 24 y o  male MRN: [id number]  Unit/Bed#: OR POOL Encounter: [id number]    Assessment/Plan   Trauma Alert: Evaluation  Model of Arrival: Ambulance  Trauma Team: Attending Dr Matheus Nolen, Residents Dr Estrada Abrams and Fellow Dr Donal Chaves  Consultants: Neurosurgery: Dr Polanco Lips:  Comminuted C5 vertebral body fracture with retropulsion    Trauma Plan:  - patient evaluated in the ER on arrival  - CT head and cervical spine, x-rays right elbow and hand obtained in ER prior to trauma team evaluation  - CT head negative for acute injury  - CT c-spine: C5 compression fracture with involvement of bilateral laminas  The C5 vertebral body is fractured and comminuted fashion involving both sagittal and coronal plane  4 mm retropulsion noted of the inferior aspect of the posterior vertebral body narrowing the spinal canal   - cervical collar in place  - neurosurgery consulted  - stat MRI cervical spine ordered  - per neurosurgery, plan to take to OR, admit to surgical ICU for MAP pushes postoperatively    Chief Complaint:  Neck pain, bilateral upper extremity weakness    History of Present Illness   HPI:  Yossi Wu is a 24 y o  male who presents with C5 vertebral body fracture with retropulsion after a slip and fall at home today  The patient was finishing showering when he stepped outside of the bathtub and slipped and fell, hitting his face and that his head  No LOC, however patient notes that he was initially unable to move his arms or legs after the fall and felt tingling paresthesias and numbness diffusely  Patient's brother had to help him the ground and they called EMS for transport to the hospital   Patient reported that he was regaining function of his legs and arms but still has difficulty with movements of his left hand  While in the ER the patient developed worsening motor dysfunction of his bilateral hands    CT cervical spine showed C5 fracture with retropulsion  Neurosurgery consulted and patient was sent for stat MRI  Mechanism:Fall    Review of Systems   Constitutional: Negative for chills and fever  HENT: Negative for congestion, rhinorrhea and sore throat  Respiratory: Negative for cough and shortness of breath  Cardiovascular: Negative for chest pain and palpitations  Gastrointestinal: Negative for abdominal pain, diarrhea, nausea and vomiting  Genitourinary: Negative for dysuria and hematuria  Musculoskeletal: Positive for neck pain  Negative for back pain  Neurological: Positive for weakness  Negative for light-headedness, numbness and headaches  All other systems reviewed and are negative  12-point, complete review of systems was reviewed and negative except as stated above  Historical Information   History is unobtainable from the patient due to N/A  Efforts to obtain history included the following sources: Patient and medical records    History reviewed  No pertinent past medical history  History reviewed  No pertinent surgical history    Social History   Social History     Substance and Sexual Activity   Alcohol Use No     Social History     Substance and Sexual Activity   Drug Use No     Social History     Tobacco Use   Smoking Status Never Smoker   Smokeless Tobacco Never Used     E-Cigarette/Vaping    E-Cigarette Use Never User      E-Cigarette/Vaping Substances     Immunization History   Administered Date(s) Administered    DTaP 5 1999, 1999, 1999, 08/24/2000, 10/17/2003    Hep B, adult 1999, 1999, 1999    Hib (PRP-OMP) 1999, 1999, 1999, 08/24/2000    IPV 08/24/2000, 10/17/2003    MMR 02/17/2000, 10/17/2003    Meningococcal, Unknown Serogroups 08/16/2010, 02/25/2015    OPV 1999, 1999    Pneumococcal Conjugate PCV 7 08/24/2000    Rotavirus Monovalent 1999    Tdap 08/16/2010    Tuberculin Skin Test-PPD Intradermal 08/16/2010    Varicella 02/17/2000, 07/09/2008     Last Tetanus: 2010  Family History: Non-contributory  Unable to obtain/limited by N/A      Meds/Allergies   all current active meds have been reviewed, current meds:   Current Facility-Administered Medications   Medication Dose Route Frequency    bupivacaine-epinephrine (PF) (MARCAINE/EPINEPHRINE PF) 0 5 %-1:200000 injection    PRN    [MAR Hold] dexamethasone (DECADRON) injection 10 mg  10 mg Intravenous Once    HYDROmorphone (DILAUDID) injection 0 5 mg  0 5 mg Intravenous Q2H PRN    Or    HYDROmorphone (DILAUDID) injection 1 mg  1 mg Intravenous Q2H PRN     Facility-Administered Medications Ordered in Other Encounters   Medication Dose Route Frequency    ceFAZolin (ANCEF) injection   Intravenous PRN    dexamethasone (PF) (DECADRON) injection   Intravenous PRN    glycopyrrolate (ROBINUL) injection   Intravenous PRN    HYDROmorphone (DILAUDID) injection   Intravenous PRN    Ketamine HCl   Intravenous PRN    lactated ringers infusion   Intravenous Continuous PRN    lidocaine (PF) (XYLOCAINE-MPF) 1 % injection   Intravenous PRN    midazolam (VERSED) injection   Intravenous PRN    phenylephrine (PAULA-SYNEPHRINE) 50 mg (STANDARD CONCENTRATION) in sodium chloride 0 9% 250 mL   Intravenous Continuous PRN    phenylephrine bolus from bag   Intravenous PRN    propofol (DIPRIVAN) 1000 mg in 100 mL infusion (premix)   Intravenous Continuous PRN    propofol (DIPRIVAN) 200 MG/20ML bolus injection   Intravenous PRN    remifentanil (ULTIVA) 50 mcg/mL in sodium chloride 0 9 % 20 mL   Intravenous Continuous PRN    sodium chloride 0 9 % infusion   Intravenous Continuous PRN    Succinylcholine Chloride 100 mg/5 mL syringe   Intravenous PRN    and PTA meds:   None       No Known Allergies      PHYSICAL EXAM    PE limited by: N/A    Objective   Vitals:   First set: Temperature: 97 9 °F (36 6 °C) (09/09/21 1106)  Pulse: 65 (09/09/21 1106)  Respirations: 18 (09/09/21 1106)  Blood Pressure: 123/64 (09/09/21 1106)    Primary Survey:   (A) Airway:  Intact, patent  (B) Breathing:  Equal breath sounds bilaterally, symmetric chest rise  (C) Circulation: Pulses:   normal  (D) Disabliity:  GCS Total:  15  (E) Expose:  Completed    Secondary Survey: (Click on Physical Exam tab above)  Physical Exam  Vitals and nursing note reviewed  Constitutional:       General: He is not in acute distress  Appearance: Normal appearance  He is normal weight  HENT:      Head: Normocephalic and atraumatic  Right Ear: External ear normal       Left Ear: External ear normal       Nose: Nose normal       Mouth/Throat:      Mouth: Mucous membranes are moist       Pharynx: Oropharynx is clear  No oropharyngeal exudate or posterior oropharyngeal erythema  Eyes:      Extraocular Movements: Extraocular movements intact  Conjunctiva/sclera: Conjunctivae normal       Pupils: Pupils are equal, round, and reactive to light  Neck:      Comments: C-collar in place  Midline cervical spine tenderness to palpation  Cardiovascular:      Rate and Rhythm: Normal rate and regular rhythm  Pulses: Normal pulses  Heart sounds: Normal heart sounds  Pulmonary:      Effort: Pulmonary effort is normal  No respiratory distress  Breath sounds: Normal breath sounds  No wheezing or rales  Abdominal:      General: Abdomen is flat  Bowel sounds are normal  There is no distension  Palpations: Abdomen is soft  Tenderness: There is no abdominal tenderness  There is no right CVA tenderness, left CVA tenderness or guarding  Musculoskeletal:         General: No swelling  Comments: Midline cervical spine tenderness to palpation  No thoracic, lumbar, sacral spine tenderness to palpation  No step-offs  Pelvis stable to compression bilaterally  Skin:     General: Skin is warm and dry  Neurological:      Mental Status: He is alert and oriented to person, place, and time  Cranial Nerves:  No cranial nerve deficit  Sensory: No sensory deficit  Comments: Awake, A&O x3  No facial asymmetry  Decreased  strength and finger extension in the bilateral upper extremities, 3/5 in the left upper extremity and 4/5 in the right upper extremity  Bilateral lower extremity strength 5/5 dorsiflexion pedal flexion  4/5 strength biceps extension and flexion bilaterally  Sensation grossly intact and symmetric in bilateral upper and lower distal extremities  Invasive Devices     Peripheral Intravenous Line            Peripheral IV 09/09/21 Left Antecubital <1 day    Peripheral IV 09/09/21 Right Forearm <1 day          Arterial Line            Arterial Line 09/09/21 Right Radial <1 day          Drain            Urethral Catheter Latex 16 Fr  <1 day          Airway            ETT  Hi-Lo; Cuffed;Oral 8 mm <1 day                Lab Results:   BMP/CMP:   Lab Results   Component Value Date    SODIUM 138 09/09/2021    K 4 0 09/09/2021     (H) 09/09/2021    CO2 23 09/09/2021    BUN 15 09/09/2021    CREATININE 0 71 09/09/2021    CALCIUM 9 1 09/09/2021    EGFR 133 09/09/2021   , CBC:   Lab Results   Component Value Date    WBC 5 35 09/09/2021    HGB 15 1 09/09/2021    HCT 44 4 09/09/2021    MCV 88 09/09/2021     (L) 09/09/2021    MCH 29 8 09/09/2021    MCHC 34 0 09/09/2021    RDW 12 6 09/09/2021    MPV 10 7 09/09/2021    NRBC 0 09/09/2021    and Coagulation:   Lab Results   Component Value Date    INR 1 10 09/09/2021     Imaging/EKG Studies: Results: I have personally reviewed pertinent reports      Other Studies: N/A    Code Status: No Order  Advance Directive and Living Will:      Power of :    POLST:

## 2021-09-10 LAB
ANION GAP SERPL CALCULATED.3IONS-SCNC: 5 MMOL/L (ref 4–13)
BASOPHILS # BLD AUTO: 0 THOUSANDS/ΜL (ref 0–0.1)
BASOPHILS NFR BLD AUTO: 0 % (ref 0–1)
BUN SERPL-MCNC: 12 MG/DL (ref 5–25)
CALCIUM SERPL-MCNC: 8.3 MG/DL (ref 8.3–10.1)
CHLORIDE SERPL-SCNC: 109 MMOL/L (ref 100–108)
CO2 SERPL-SCNC: 24 MMOL/L (ref 21–32)
CREAT SERPL-MCNC: 0.59 MG/DL (ref 0.6–1.3)
EOSINOPHIL # BLD AUTO: 0 THOUSAND/ΜL (ref 0–0.61)
EOSINOPHIL NFR BLD AUTO: 0 % (ref 0–6)
ERYTHROCYTE [DISTWIDTH] IN BLOOD BY AUTOMATED COUNT: 12.7 % (ref 11.6–15.1)
GFR SERPL CREATININE-BSD FRML MDRD: 144 ML/MIN/1.73SQ M
GLUCOSE SERPL-MCNC: 121 MG/DL (ref 65–140)
HCT VFR BLD AUTO: 39 % (ref 36.5–49.3)
HGB BLD-MCNC: 13.1 G/DL (ref 12–17)
IMM GRANULOCYTES # BLD AUTO: 0.05 THOUSAND/UL (ref 0–0.2)
IMM GRANULOCYTES NFR BLD AUTO: 1 % (ref 0–2)
INR PPP: 1.18 (ref 0.84–1.19)
LYMPHOCYTES # BLD AUTO: 0.73 THOUSANDS/ΜL (ref 0.6–4.47)
LYMPHOCYTES NFR BLD AUTO: 8 % (ref 14–44)
MAGNESIUM SERPL-MCNC: 2.3 MG/DL (ref 1.6–2.6)
MCH RBC QN AUTO: 30 PG (ref 26.8–34.3)
MCHC RBC AUTO-ENTMCNC: 33.6 G/DL (ref 31.4–37.4)
MCV RBC AUTO: 89 FL (ref 82–98)
MONOCYTES # BLD AUTO: 0.26 THOUSAND/ΜL (ref 0.17–1.22)
MONOCYTES NFR BLD AUTO: 3 % (ref 4–12)
NEUTROPHILS # BLD AUTO: 8.27 THOUSANDS/ΜL (ref 1.85–7.62)
NEUTS SEG NFR BLD AUTO: 88 % (ref 43–75)
NRBC BLD AUTO-RTO: 0 /100 WBCS
PHOSPHATE SERPL-MCNC: 3.5 MG/DL (ref 2.7–4.5)
PLATELET # BLD AUTO: 117 THOUSANDS/UL (ref 149–390)
PMV BLD AUTO: 10.7 FL (ref 8.9–12.7)
POTASSIUM SERPL-SCNC: 3.9 MMOL/L (ref 3.5–5.3)
PROTHROMBIN TIME: 14.5 SECONDS (ref 11.6–14.5)
RBC # BLD AUTO: 4.37 MILLION/UL (ref 3.88–5.62)
SODIUM SERPL-SCNC: 138 MMOL/L (ref 136–145)
WBC # BLD AUTO: 9.31 THOUSAND/UL (ref 4.31–10.16)

## 2021-09-10 PROCEDURE — 97163 PT EVAL HIGH COMPLEX 45 MIN: CPT

## 2021-09-10 PROCEDURE — 94760 N-INVAS EAR/PLS OXIMETRY 1: CPT

## 2021-09-10 PROCEDURE — 83735 ASSAY OF MAGNESIUM: CPT | Performed by: PHYSICIAN ASSISTANT

## 2021-09-10 PROCEDURE — 85610 PROTHROMBIN TIME: CPT | Performed by: PHYSICIAN ASSISTANT

## 2021-09-10 PROCEDURE — NC001 PR NO CHARGE: Performed by: STUDENT IN AN ORGANIZED HEALTH CARE EDUCATION/TRAINING PROGRAM

## 2021-09-10 PROCEDURE — 99291 CRITICAL CARE FIRST HOUR: CPT | Performed by: EMERGENCY MEDICINE

## 2021-09-10 PROCEDURE — 85025 COMPLETE CBC W/AUTO DIFF WBC: CPT | Performed by: PHYSICIAN ASSISTANT

## 2021-09-10 PROCEDURE — 84100 ASSAY OF PHOSPHORUS: CPT | Performed by: PHYSICIAN ASSISTANT

## 2021-09-10 PROCEDURE — 90715 TDAP VACCINE 7 YRS/> IM: CPT

## 2021-09-10 PROCEDURE — 80048 BASIC METABOLIC PNL TOTAL CA: CPT | Performed by: PHYSICIAN ASSISTANT

## 2021-09-10 PROCEDURE — 99024 POSTOP FOLLOW-UP VISIT: CPT | Performed by: PHYSICIAN ASSISTANT

## 2021-09-10 PROCEDURE — 97167 OT EVAL HIGH COMPLEX 60 MIN: CPT

## 2021-09-10 RX ORDER — LANOLIN ALCOHOL/MO/W.PET/CERES
3 CREAM (GRAM) TOPICAL
Status: DISCONTINUED | OUTPATIENT
Start: 2021-09-10 | End: 2021-09-15 | Stop reason: HOSPADM

## 2021-09-10 RX ORDER — BISACODYL 10 MG
10 SUPPOSITORY, RECTAL RECTAL DAILY PRN
Status: DISCONTINUED | OUTPATIENT
Start: 2021-09-10 | End: 2021-09-15 | Stop reason: HOSPADM

## 2021-09-10 RX ADMIN — ACETAMINOPHEN 975 MG: 325 TABLET, FILM COATED ORAL at 14:31

## 2021-09-10 RX ADMIN — Medication 3 MG: at 01:46

## 2021-09-10 RX ADMIN — GABAPENTIN 300 MG: 300 CAPSULE ORAL at 09:35

## 2021-09-10 RX ADMIN — CEFAZOLIN SODIUM 1000 MG: 1 SOLUTION INTRAVENOUS at 12:43

## 2021-09-10 RX ADMIN — ACETAMINOPHEN 975 MG: 325 TABLET, FILM COATED ORAL at 05:00

## 2021-09-10 RX ADMIN — ACETAMINOPHEN 975 MG: 325 TABLET, FILM COATED ORAL at 21:31

## 2021-09-10 RX ADMIN — DEXAMETHASONE SODIUM PHOSPHATE 4 MG: 4 INJECTION INTRA-ARTICULAR; INTRALESIONAL; INTRAMUSCULAR; INTRAVENOUS; SOFT TISSUE at 05:00

## 2021-09-10 RX ADMIN — GABAPENTIN 300 MG: 300 CAPSULE ORAL at 21:31

## 2021-09-10 RX ADMIN — OXYCODONE HYDROCHLORIDE 5 MG: 5 TABLET ORAL at 11:00

## 2021-09-10 RX ADMIN — SODIUM CHLORIDE, SODIUM GLUCONATE, SODIUM ACETATE, POTASSIUM CHLORIDE, MAGNESIUM CHLORIDE, SODIUM PHOSPHATE, DIBASIC, AND POTASSIUM PHOSPHATE 125 ML/HR: .53; .5; .37; .037; .03; .012; .00082 INJECTION, SOLUTION INTRAVENOUS at 05:40

## 2021-09-10 RX ADMIN — TETANUS TOXOID, REDUCED DIPHTHERIA TOXOID AND ACELLULAR PERTUSSIS VACCINE, ADSORBED 0.5 ML: 5; 2.5; 8; 8; 2.5 SUSPENSION INTRAMUSCULAR at 16:17

## 2021-09-10 RX ADMIN — METHOCARBAMOL TABLETS 750 MG: 750 TABLET, COATED ORAL at 05:00

## 2021-09-10 RX ADMIN — METHOCARBAMOL TABLETS 750 MG: 750 TABLET, COATED ORAL at 12:39

## 2021-09-10 RX ADMIN — CEFAZOLIN SODIUM 1000 MG: 1 SOLUTION INTRAVENOUS at 03:13

## 2021-09-10 RX ADMIN — GABAPENTIN 300 MG: 300 CAPSULE ORAL at 16:16

## 2021-09-10 RX ADMIN — DOCUSATE SODIUM AND SENNOSIDES 2 TABLET: 8.6; 5 TABLET ORAL at 09:35

## 2021-09-10 RX ADMIN — METHOCARBAMOL TABLETS 750 MG: 750 TABLET, COATED ORAL at 17:09

## 2021-09-10 RX ADMIN — OXYCODONE HYDROCHLORIDE 5 MG: 5 TABLET ORAL at 21:31

## 2021-09-10 RX ADMIN — HYDROMORPHONE HYDROCHLORIDE 0.5 MG: 1 INJECTION, SOLUTION INTRAMUSCULAR; INTRAVENOUS; SUBCUTANEOUS at 00:37

## 2021-09-10 NOTE — ASSESSMENT & PLAN NOTE
C5 burst fracture with mild retropulsion and bilateral laminar fractures and C5-6 capsular stretch diastasis  Possible spinal cord and ligamentous injury  · Status post slip after getting out of the shower with positive head strike, likely hyperflexion injury  Patient states he was unable to move for about 10 minutes and then was able to move slightly  No paresthesias noted  · Patient with significant bilateral tricep and  weakness  Imaging:  · MRI cervical spine 09/09/2021:  Comminuted fracture of the C5 vertebrae with bony retropulsion and cord compression cord edema/contusion  Edema in fracture of posterior elements of C5 indicative of a 3 column injury  Cord edema extending from C4-6 without large epidural hematoma her obvious cord hemorrhage  Plan:  · Imaging reviewed with patient and significant other in detail  · Ordered [place] collar to be worn at all times x6 weeks  · Medical management pain control per primary team  · Recommend MAPs above 85 >7 days   · Patient should be automapping >24 hours prior to leaving the ICU  · Steroids dc'ed per trauma  · DVT PPX: SCDs  Cleared for DVT ppx tomorrow morning  · PENELOPE drain to full suction  maintain at this time  175cc/24 hours  · PT/OT evaluation  · Pain control and medical management per trauma team      Neurosurgery will continue to follow  Call with any further questions or concerns

## 2021-09-10 NOTE — RESPIRATORY THERAPY NOTE
RT Protocol Note  Mattie Otto 24 y o  male MRN: [id number]  Unit/Bed#: ICU 05 Encounter: [id number]    Assessment    Principal Problem:    C5 cervical fracture ([address] )  Active Problems:    Central cord syndrome [facility])      Home Pulmonary Medications:  NA       History reviewed  No pertinent past medical history  Social History     Socioeconomic History    Marital status: Single     Spouse name: None    Number of children: None    Years of education: None    Highest education level: None   Occupational History    None   Tobacco Use    Smoking status: Never Smoker    Smokeless tobacco: Never Used   Vaping Use    Vaping Use: Never used   Substance and Sexual Activity    Alcohol use: No    Drug use: No    Sexual activity: None   Other Topics Concern    None   Social History Narrative    None     Social Determinants of Health     Financial Resource Strain:     Difficulty of Paying Living Expenses:    Food Insecurity:     Worried About Running Out of Food in the Last Year:     Ran Out of Food in the Last Year:    Transportation Needs:     Lack of Transportation (Medical):      Lack of Transportation (Non-Medical):    Physical Activity:     Days of Exercise per Week:     Minutes of Exercise per Session:    Stress:     Feeling of Stress :    Social Connections:     Frequency of Communication with Friends and Family:     Frequency of Social Gatherings with Friends and Family:     Attends [employer] Services:     Active Member of Clubs or Organizations:     Attends Club or Organization Meetings:     Marital Status:    Intimate Partner Violence:     Fear of Current or Ex-Partner:     Emotionally Abused:     Physically Abused:     Sexually Abused:        Subjective         Objective    Physical Exam:   Assessment Type: (P) Assess only  General Appearance: (P) Awake, Alert  Respiratory Pattern: (P) Normal  Chest Assessment: (P) Chest expansion symmetrical  Bilateral Breath Sounds: (P) Clear    Vitals:  Blood pressure 119/62, pulse 88, temperature 98 6 °F (37 °C), temperature source Oral, resp  rate 22, height 5' 10" (1 778 m), weight 76 4 kg (168 lb 6 9 oz), SpO2 (P) 98 %  Imaging and other studies: I have personally reviewed pertinent reports  Plan    Respiratory Plan: No distress/Pulmonary history, Discontinue Protocol        Resp Comments: (P) Ptis a 24 yr old admitted s/p fall with C5 fx  Pt was evalualted per respiratory protocol  Breathe sounds are clear  No respiratory distress noted at this time  Pt denies any pulmonary history  No home respiratory medications used  Respiratory protocol discontinued

## 2021-09-10 NOTE — PROGRESS NOTES
[address] [facility]  Progress Note - Rebeca Sandifer 2000, 24 y o  male MRN: [id number]  Unit/Bed#: ICU 05 Encounter: [id number]  Primary Care Provider: No primary care provider on file  Date and time admitted to hospital: 9/9/2021 11:01 AM    * C5 cervical fracture (HCC)  Assessment & Plan  C5 burst fracture with mild retropulsion and bilateral laminar fractures and C5-6 capsular stretch diastasis  Possible spinal cord and ligamentous injury  · Status post slip after getting out of the shower with positive head strike, likely hyperflexion injury  Patient states he was unable to move for about 10 minutes and then was able to move slightly  No paresthesias noted  · Patient with significant bilateral tricep and  weakness  Imaging:  · MRI cervical spine 09/09/2021:  Comminuted fracture of the C5 vertebrae with bony retropulsion and cord compression cord edema/contusion  Edema in fracture of posterior elements of C5 indicative of a 3 column injury  Cord edema extending from C4-6 without large epidural hematoma her obvious cord hemorrhage  Plan:  · Imaging reviewed with patient and significant other in detail  · Ordered Vista collar to be worn at all times x6 weeks  · Medical management pain control per primary team  · Recommend MAPs above 85 >7 days   · Patient should be automapping >24 hours prior to leaving the ICU  · Steroids dc'ed per trauma  · DVT PPX: SCDs  Cleared for DVT ppx tomorrow morning  · PENELOPE drain to full suction  maintain at this time  175cc/24 hours  · PT/OT evaluation  · Pain control and medical management per trauma team      Neurosurgery will continue to follow  Call with any further questions or concerns  Central cord syndrome [facility])  Assessment & Plan  · See plan above       Subjective/Objective   Chief Complaint:  Weakness    Subjective:  Patient states overall he feels like he is doing this morning    He states that his strength is marginally better than it was yesterday when he presented  He still has some paresthesias/dysesthetic pains  He continues to move in uses arms as possible to try to regain some strength  He is eager to get out of bed with therapy  Arce was removed this morning  Rounds completed with RN Mitchell Trujillo  Objective:  Lying in bed in no acute distress  I/O       09/08 0701 - 09/09 0700 09/09 0701 - 09/10 0700 09/10 0701 - 09/11 0700    P  O   460 300    I V  (mL/kg)  3268 8 (42 8) 431 3 (5 6)    IV Piggyback  50     Total Intake(mL/kg)  3778 8 (49 5) 731 3 (9 6)    Urine (mL/kg/hr)  2550 1100 (3 9)    Drains  175     Blood  150     Total Output  2875 1100    Net  +903 8 -368 8                 Invasive Devices     Peripheral Intravenous Line            Peripheral IV 09/09/21 Left Antecubital 1 day    Peripheral IV 09/09/21 Right Forearm <1 day          Arterial Line            Arterial Line 09/09/21 Right Radial <1 day          Drain            Closed/Suction Drain Posterior; Left Neck Bulb <1 day                Physical Exam:  Vitals: Blood pressure 120/65, pulse 62, temperature 98 6 °F (37 °C), temperature source Oral, resp  rate 20, height 5' 10" (1 778 m), weight 76 4 kg (168 lb 6 9 oz), SpO2 99 %  ,Body mass index is 24 17 kg/m²  Hemodynamic Monitoring: MAP: Arterial Line MAP (mmHg): 90 mmHg     General appearance: alert, appears stated age, cooperative and no distress  Head: Normocephalic, without obvious abnormality, atraumatic  Eyes:  Tracking appropriately across room  Neck: supple, symmetrical, trachea midline  Mild tenderness in the paraspinal musculature  PENELOPE drain in place  Dressing intact    Back: no kyphosis present  Lungs: non labored breathing  Heart: regular heart rate  Neurologic:   Mental status: Alert, oriented x3, thought content appropriate  Cranial nerves: grossly intact (Cranial nerves II-XII)  Sensory: normal to light touch in bilateral upper extremities and bilateral lower extremities  Subjective paresthesias/dysesthetic pains  Motor:     Motor Findings  Strength: Right  Strength: Left    Deltoid  4+/5  4+/5    Biceps  4+/5  4+/5    Triceps  4-/5  4-/5    Wrist Extensors  4+/5  4+/5    Hand Intrinsics  2/5  3/5    Iliospoas  4+/5  4+/5    Quadriceps  4+/5  4+/5    Tibialis anterior  4+/5  4+/5    Gastroc soleus  4+/5  4+/5    WF 2/5  Reflexes: 2+ and symmetric  No mann's  Coordination: finger to nose normal bilaterally, no drift bilaterally    Lab Results:  Results from last 7 days   Lab Units 09/10/21  0451 09/09/21  2253 09/09/21  1159   WBC Thousand/uL 9 31 5 58 5 35   HEMOGLOBIN g/dL 13 1 14 3 15 1   HEMATOCRIT % 39 0 42 5 44 4   PLATELETS Thousands/uL 117* 119* 118*   NEUTROS PCT % 88*  --   --    MONOS PCT % 3*  --   --    MONO PCT %  --   --  4     Results from last 7 days   Lab Units 09/10/21  0451 09/09/21  2253 09/09/21  1159   POTASSIUM mmol/L 3 9 4 0 4 0   CHLORIDE mmol/L 109* 112* 109*   CO2 mmol/L 24 23 23   BUN mg/dL 12 13 15   CREATININE mg/dL 0 59* 0 78 0 71   CALCIUM mg/dL 8 3 8 8 9 1     Results from last 7 days   Lab Units 09/10/21  0451 09/09/21  2253   MAGNESIUM mg/dL 2 3 2 1     Results from last 7 days   Lab Units 09/10/21  0451 09/09/21  2253   PHOSPHORUS mg/dL 3 5 2 0*     Results from last 7 days   Lab Units 09/10/21  0451 09/09/21 2253 09/09/21  1429   INR  1 18 1 12 1 10   PTT seconds  --   --  29     No results found for: TROPONINT  ABG:No results found for: PHART, ADW7FDG, PO2ART, WOD1HZB, N5HKJFWS, BEART, SOURCE    Imaging Studies: I have personally reviewed pertinent reports  and I have personally reviewed pertinent films in PACS  XR hand 3+ views LEFT    Result Date: 9/9/2021  Impression: No acute osseous abnormality  Workstation performed: [id number]     CT head without contrast    Result Date: 9/9/2021  Impression: No acute intracranial abnormality   Workstation performed: [id number]     CT spine cervical without contrast    Result Date: 9/9/2021  Impression: C5 compression fracture with involvement of bilateral laminas  The C5 vertebral body is fractured and comminuted fashion involving both sagittal and coronal plane  4 mm retropulsion noted of the inferior aspect of the posterior vertebral body narrowing  the spinal canal   I personally discussed this study with Rima Baca on 9/9/2021 at 12:19 PM   Workstation performed: [id number]     MRI cervical spine wo contrast    Result Date: 9/9/2021  Impression: 1  Comminuted fractures of the C5 vertebra with bony retropulsion and cord compression with cord edema/cord contusion  There is also edema and fractures of the posterior elements at the C5 level as described on the recent CT scan indicative of a 3 column injury  2   Cord edema extending from the C4-C6 levels without large epidural hematoma or obvious cord hemorrhage  I personally discussed this study with Dr Arpit Gallardo on 9/9/2021 at 3:27 PM  Workstation performed: [id number]       EKG, Pathology, and Other Studies: I have personally reviewed pertinent reports        VTE Pharmacologic Prophylaxis: hold until tomorrow am     VTE Mechanical Prophylaxis: sequential compression device

## 2021-09-10 NOTE — PROGRESS NOTES
Postop check  Patient is now s/p posterior C4-6 decompression with C3-T1 fusion, recovering well and neurologically stable (patient reports his hands "feel better")  Vitals:    09/09/21 1415 09/09/21 1426 09/09/21 1530 09/09/21 2300   BP: 115/56 115/56 129/58 140/68   BP Location:   Right arm    Pulse: 60 61 60 74   Resp: 18 16 16 20   Temp:    98 6 °F (37 °C)   TempSrc:    Oral   SpO2: 99% 98% 97% 98%   Weight:       Height:           Neurologic exam  Alert, waking up from anesthesia, oriented X 3  5/5 in all extremities except bilateral triceps 3/5, hand  and wrist flexion/extension 2/5  Numbness/tingling in bilateral hands unchanged per patient   Dressing CDI  Drain on full suction with bloody drainage  Rigid c-collar in place     Assessment  Patient is a 24 y o  male with h/o traumatic C5 fracture with 3-column injury and cervical spinal cord edema, predominantly at C4-6, is now s/p posterior C4-6 decompression with C3-T1 fusion  Plan  -Neurologically stable, continue to monitor in ICU  -SBP<160, normal MAP goal since s/p decompression   -Postop labs pending  -Decadron 4TID for cord edema, with SSI and PPI   -Remain in rigid c-collar for 6 weeks  -Upright XR when able  -Monitor drain output (may be high initially due to Vancomycin powder)   -Advance diet as tolerated with IV fluids for now  -Arce in place, may remove on POD 1  -Okay for DVT prophylaxis on POD 2  -PT/OT  -Appreciate ICU care     Patient's mother was updated via telephone  Kelle COOPER    Neurosurgeon

## 2021-09-10 NOTE — PROGRESS NOTES
Daily Progress Note - Critical Care   Zhao Thompson 24 y o  male MRN: [id number]  Unit/Bed#: ICU 05 Encounter: [id number]        ----------------------------------------------------------------------------------------  HPI/24hr events:  OR yesterday with Neurosurgery for posterior cervical decompression and fusion    ---------------------------------------------------------------------------------------  SUBJECTIVE  C/o neck discomfort, tingling b/l fingers     Review of Systems  Review of systems was reviewed and negative unless stated above in HPI/24-hour events   ---------------------------------------------------------------------------------------  Assessment and Plan:    Neuro:    Diagnosis: C5 fracture with cord compression  o Plan: Status post posterior C4-6 decompression with C3-T1 fusion  o PENELOPE to full suction  o Ancef for surgical prophylaxis  o Follow-up upright x-rays when able  o Decadron per Neurosurgery  o Neurosurgery following  o Q 1 hour neuro assessments  o Maintain cervical collar   Diagnosis:  Acute pain  o Plan:  Scheduled Tylenol, gabapentin, Robaxin  o P r n  Oxycodone and Dilaudid      CV:    Diagnosis:  No acute issues  o Plan:  Monitor on tele    Pulm:   Diagnosis:  Respiratory insufficiency  o Plan: Tolerating 2 L nasal cannula postop, wean as tolerated  o Encourage IS        GI:    Diagnosis:  Risk for opiate induced constipation  o Plan:  Maintain bowel regimen      :    Diagnosis:  No acute issues  o Plan:   Arce in place, consider voiding trial today  o Strict I&Os      F/E/N:    Plan: isolyte, D/C today when tolerating oral diet   Replete lytes as needed   NPO      Heme/Onc:    Diagnosis:  DVT prophylaxis  o Plan:  Hold chemical prophylaxis until cleared by Neurosurgery  o Maintain SCDs      Endo:    Diagnosis:  No acute issues      ID:    Diagnosis:  No acute issues  o Plan:  Trend fever curve and WBC      MSK/Skin:    Diagnosis:  Ambulatory dysfunction  o Plan: PT/OT when able    Patient appropriate for transfer out of the ICU today?: No  Disposition: Continue Critical Care   Code Status: Level 1 - Full Code  ---------------------------------------------------------------------------------------  ICU CORE MEASURES    Prophylaxis   VTE Pharmacologic Prophylaxis: Pharmacologic VTE Prophylaxis contraindicated due to neurosurg   VTE Mechanical Prophylaxis: sequential compression device  Stress Ulcer Prophylaxis: Prophylaxis Not Indicated     ABCDE Protocol (if indicated)  Plan to perform spontaneous awakening trial today? Not applicable  Plan to perform spontaneous breathing trial today? Not applicable  Obvious barriers to extubation? Not applicable  CAM-ICU: Negative    Invasive Devices Review  Invasive Devices     Peripheral Intravenous Line            Peripheral IV 21 Left Antecubital <1 day    Peripheral IV 21 Right Forearm <1 day          Arterial Line            Arterial Line 21 Right Radial <1 day          Drain            Closed/Suction Drain Posterior; Left Neck Bulb <1 day    Urethral Catheter Latex 16 Fr  <1 day              Can any invasive devices be discontinued today? No  ---------------------------------------------------------------------------------------  OBJECTIVE    Vitals   Vitals:    09/10/21 0034 09/10/21 0106 09/10/21 0206 09/10/21 0306   BP:  137/86 118/66 125/61   Pulse:  94 68 64   Resp:  21 19 13   Temp:       TempSrc:       SpO2: 98% 99% 98% 97%   Weight:       Height:         Temp (24hrs), Av 3 °F (36 8 °C), Min:97 9 °F (36 6 °C), Max:98 6 °F (37 °C)  Current: Temperature: 98 6 °F (37 °C)  HR: 64  BP: 125/61  RR: 13  SpO2: 97    Respiratory:  SpO2: SpO2: 97 %       Invasive/non-invasive ventilation settings   Respiratory    Lab Data (Last 4 hours)    None         O2/Vent Data (Last 4 hours)    None                Physical Exam  HENT:      Head: Normocephalic        Right Ear: External ear normal       Left Ear: External ear normal       Nose: Nose normal       Mouth/Throat:      Mouth: Mucous membranes are moist    Eyes:      Pupils: Pupils are equal, round, and reactive to light  Neck:      Comments: Cervical collar in place  Dressing CDI   PENELOPE in place   Cardiovascular:      Rate and Rhythm: Normal rate  Pulses: Normal pulses  Pulmonary:      Effort: Pulmonary effort is normal    Abdominal:      General: Bowel sounds are normal  There is no distension  Palpations: Abdomen is soft  Tenderness: There is no abdominal tenderness  Genitourinary:     Comments: Arce in place  Musculoskeletal:      Right lower leg: No edema  Left lower leg: No edema  Comments: Bilateral deltoids 5/5  Left finger extensors 2/5  Right finger extensors 0/5  BLE 5/5, sensation intact    Skin:     General: Skin is warm and dry  Capillary Refill: Capillary refill takes less than 2 seconds  Neurological:      Mental Status: He is oriented to person, place, and time  Sensory: Sensory deficit present  Motor: Weakness present     Psychiatric:      Comments: Appears anxious         Laboratory and Diagnostics:  Results from last 7 days   Lab Units 09/09/21 2253 09/09/21  1159   WBC Thousand/uL 5 58 5 35   HEMOGLOBIN g/dL 14 3 15 1   HEMATOCRIT % 42 5 44 4   PLATELETS Thousands/uL 119* 118*   BANDS PCT %  --  2   MONO PCT %  --  4     Results from last 7 days   Lab Units 09/09/21  2253 09/09/21  1159   SODIUM mmol/L 140 138   POTASSIUM mmol/L 4 0 4 0   CHLORIDE mmol/L 112* 109*   CO2 mmol/L 23 23   ANION GAP mmol/L 5 6   BUN mg/dL 13 15   CREATININE mg/dL 0 78 0 71   CALCIUM mg/dL 8 8 9 1   GLUCOSE RANDOM mg/dL 131 90     Results from last 7 days   Lab Units 09/09/21  2253   MAGNESIUM mg/dL 2 1   PHOSPHORUS mg/dL 2 0*      Results from last 7 days   Lab Units 09/09/21  2253 09/09/21  1429   INR  1 12 1 10   PTT seconds  --  29              ABG:    VBG:          Micro        EKG:   Imaging:  I have personally reviewed pertinent reports  Intake and Output  I/O       09/08 0701 - 09/09 0700 09/09 0701 - 09/10 0700    P  O   460    I V  (mL/kg)  2768 8 (36 2)    Total Intake(mL/kg)  3228 8 (42 3)    Urine (mL/kg/hr)  2000    Drains  140    Blood  150    Total Output  2290    Net  +938 8                Height and Weights   Height: 5' 10" (177 8 cm)  IBW (Ideal Body Weight): 73 kg  Body mass index is 24 17 kg/m²  Weight (last 2 days)     Date/Time   Weight    09/09/21 2300   76 4 (168 43)    09/09/21 1106   79 4 (175)                Nutrition       Diet Orders   (From admission, onward)             Start     Ordered    09/09/21 2248  Diet Regular; Regular House  Diet effective now     Question Answer Comment   Diet Type Regular    Regular Regular House    RD to adjust diet per protocol?  Yes        09/09/21 2247                  Active Medications  Scheduled Meds:  Current Facility-Administered Medications   Medication Dose Route Frequency Provider Last Rate    acetaminophen  975 mg Oral Q8H [facility] Cynthia Gio, PA-C      bisacodyl  10 mg Rectal Daily PRN [place] Ply, PA-C      calcium carbonate  1,000 mg Oral Daily PRN Cynthia Bimarvelo, PA-C      cefazolin  1,000 mg Intravenous Q8H Cynthia ROBB Powers-C Stopped (09/10/21 0345)    dexamethasone  4 mg Intravenous Q8H [facility] Cynthia Biundo, PA-C      gabapentin  300 mg Oral TID DANGELO Low      HYDROmorphone  0 5 mg Intravenous Q2H PRN [place] Ply, PA-C      Or    HYDROmorphone  1 mg Intravenous Q2H PRN [place] Ply, PA-C      HYDROmorphone  0 5 mg Intravenous Q3H PRN Cynthia Binisreen, PA-C      Labetalol HCl  10 mg Intravenous Q4H PRN DANGELO Low      magnesium hydroxide  30 mL Oral Daily PRN Cynthia Bimarvelo, PA-C      melatonin  3 mg Oral HS DANGELO Low      methocarbamol  750 mg Oral Q6H [facility] Cynthia Bimarvelo, PA-C      multi-electrolyte  125 mL/hr Intravenous Continuous [place] Ply, PA-C 125 mL/hr (09/09/21 2303)    ondansetron  4 mg Intravenous Q4H PRN Cynthia Powers PA-C      oxyCODONE  10 mg Oral Q4H PRN Cynthia ANDRY Powers      oxyCODONE  5 mg Oral Q4H PRN Cynthia ANDRY Powers      senna-docusate sodium  2 tablet Oral Daily Cynthia Powers PA-C       Continuous Infusions:  multi-electrolyte, 125 mL/hr, Last Rate: 125 mL/hr (09/09/21 2303)      PRN Meds:   bisacodyl, 10 mg, Daily PRN  calcium carbonate, 1,000 mg, Daily PRN  HYDROmorphone, 0 5 mg, Q2H PRN   Or  HYDROmorphone, 1 mg, Q2H PRN  HYDROmorphone, 0 5 mg, Q3H PRN  Labetalol HCl, 10 mg, Q4H PRN  magnesium hydroxide, 30 mL, Daily PRN  ondansetron, 4 mg, Q4H PRN  oxyCODONE, 10 mg, Q4H PRN  oxyCODONE, 5 mg, Q4H PRN        Allergies   No Known Allergies  ---------------------------------------------------------------------------------------  Advance Directive and Living Will:      Power of :    POLST:    ---------------------------------------------------------------------------------------  Care Time Delivered:   No Critical Care time spent     Rafael DANGELO Simons      Portions of the record may have been created with voice recognition software  Occasional wrong word or "sound a like" substitutions may have occurred due to the inherent limitations of voice recognition software    Read the chart carefully and recognize, using context, where substitutions have occurred

## 2021-09-10 NOTE — PLAN OF CARE
Problem: Potential for Falls  Goal: Patient will remain free of falls  Description: INTERVENTIONS:  - Educate patient/family on patient safety including physical limitations  - Instruct patient to call for assistance with activity   - Consult OT/PT to assist with strengthening/mobility   - Keep Call bell within reach  - Keep bed low and locked with side rails adjusted as appropriate  - Keep care items and personal belongings within reach  - Initiate and maintain comfort rounds  - Make Fall Risk Sign visible to staff  - Offer Toileting every 2 Hours, in advance of need  - Initiate/Maintain bed/chair   alarm  Problem: MOBILITY - ADULT  Goal: Maintain or return to baseline ADL function  Description: INTERVENTIONS:  -  Assess patient's ability to carry out ADLs; assess patient's baseline for ADL function and identify physical deficits which impact ability to perform ADLs (bathing, care of mouth/teeth, toileting, grooming, dressing, etc )  - Assess/evaluate cause of self-care deficits   - Assess range of motion  - Assess patient's mobility; develop plan if impaired  - Assess patient's need for assistive devices and provide as appropriate  - Encourage maximum independence but intervene and supervise when necessary  - Involve family in performance of ADLs  - Assess for home care needs following discharge   - Consider OT consult to assist with ADL evaluation and planning for discharge  - Provide patient education as appropriate  Outcome: Progressing  Goal: Maintains/Returns to pre admission functional level  Description: INTERVENTIONS:  - Perform BMAT or MOVE assessment daily    - Set and communicate daily mobility goal to care team and patient/family/caregiver     - Collaborate with rehabilitation services on mobility goals if consulted  Problem: Prexisting or High Potential for Compromised Skin Integrity  Goal: Skin integrity is maintained or improved  Description: INTERVENTIONS:  - Identify patients at risk for skin breakdown  - Assess and monitor skin integrity  - Assess and monitor nutrition and hydration status  - Monitor labs   - Assess for incontinence   - Turn and reposition patient  - Assist with mobility/ambulation  - Relieve pressure over bony prominences  - Avoid friction and shearing  - Provide appropriate hygiene as needed including keeping skin clean and dry  - Evaluate need for skin moisturizer/barrier cream  - Collaborate with interdisciplinary team   - Patient/family teaching  - Consider wound care consult   Outcome: Progressing     Problem: PAIN - ADULT  Goal: Verbalizes/displays adequate comfort level or baseline comfort level  Description: Interventions:  - Encourage patient to monitor pain and request assistance  - Assess pain using appropriate pain scale  - Administer analgesics based on type and severity of pain and evaluate response  - Implement non-pharmacological measures as appropriate and evaluate response  - Consider cultural and social influences on pain and pain management  - Notify physician/advanced practitioner if interventions unsuccessful or patient reports new pain  Outcome: Progressing     Problem: INFECTION - ADULT  Goal: Absence or prevention of progression during hospitalization  Description: INTERVENTIONS:  - Assess and monitor for signs and symptoms of infection  - Monitor lab/diagnostic results  - Monitor all insertion sites, i e  indwelling lines, tubes, and drains  - Monitor endotracheal if appropriate and nasal secretions for changes in amount and color  - [place] appropriate cooling/warming therapies per order  - Administer medications as ordered  - Instruct and encourage patient and family to use good hand hygiene technique  - Identify and instruct in appropriate isolation precautions for identified infection/condition  Outcome: Progressing  Goal: Absence of fever/infection during neutropenic period  Description: INTERVENTIONS:  - Monitor WBC    Outcome: Progressing     Problem: SAFETY ADULT  Goal: Patient will remain free of falls  Description: INTERVENTIONS:  - Educate patient/family on patient safety including physical limitations  - Instruct patient to call for assistance with activity   - Consult OT/PT to assist with strengthening/mobility   - Keep Call bell within reach  - Keep bed low and locked with side rails adjusted as appropriate  - Keep care items and personal belongings within reach  - Initiate and maintain comfort rounds  - Make Fall Risk Sign visible to staff  - Apply yellow socks and bracelet for high fall risk patients  - Consider moving patient to room near nurses station  Outcome: Progressing  Goal: Maintain or return to baseline ADL function  Description: INTERVENTIONS:  -  Assess patient's ability to carry out ADLs; assess patient's baseline for ADL function and identify physical deficits which impact ability to perform ADLs (bathing, care of mouth/teeth, toileting, grooming, dressing, etc )  - Assess/evaluate cause of self-care deficits   - Assess range of motion  - Assess patient's mobility; develop plan if impaired  - Assess patient's need for assistive devices and provide as appropriate  - Encourage maximum independence but intervene and supervise when necessary  - Involve family in performance of ADLs  - Assess for home care needs following discharge   - Consider OT consult to assist with ADL evaluation and planning for discharge  - Provide patient education as appropriate  Outcome: Progressing  Problem: DISCHARGE PLANNING  Goal: Discharge to home or other facility with appropriate resources  Description: INTERVENTIONS:  - Identify barriers to discharge w/patient and caregiver  - Arrange for needed discharge resources and transportation as appropriate  - Identify discharge learning needs (meds, wound care, etc )  - Arrange for interpretive services to assist at discharge as needed  - Refer to Case Management Department for coordinating discharge planning if the patient needs post-hospital services based on physician/advanced practitioner order or complex needs related to functional status, cognitive ability, or social support system  Outcome: Progressing     Problem: Knowledge Deficit  Goal: Patient/family/caregiver demonstrates understanding of disease process, treatment plan, medications, and discharge instructions  Description: Complete learning assessment and assess knowledge base    Interventions:  - Provide teaching at level of understanding  - Provide teaching via preferred learning methods  Outcome: Progressing        - Record patient progress and toleration of activity level   Outcome: Progressing     - Apply yellow socks and bracelet for high fall risk patients  - Consider moving patient to room near nurses station  Outcome: Progressing

## 2021-09-10 NOTE — ANESTHESIA POSTPROCEDURE EVALUATION
Post-Op Assessment Note    CV Status:  Stable    Pain management: adequate     Mental Status:  Alert and awake   Hydration Status:  Stable   PONV Controlled:  Controlled   Airway Patency:  Patent      Post Op Vitals Reviewed: Yes      Staff: Anesthesiologist, CRNA         No complications documented      BP   172/78   Temp      Pulse  89   Resp   18   SpO2   98

## 2021-09-10 NOTE — CASE MANAGEMENT
Pt is not a bundle  Pt is not a 30 Day Readmission    CM met with pt and his parents to discuss the role of CM  Pt lives with his mother and father (both work) in a 2 story home which has 3STE and 14 steps inside  Pt's bedroom and bathroom (tub/shower with standard toilet) are on the 2nd floor  Pt drives, works as a , and reports being fully independent for all ADL/IADLs  Pt's had 1 fall in the last 6 months  Pt enjoys writing music, playing video games, and drawing  Pt owns no DME  Pt doesn't have a living will  Pt's pharmacy is [facility] [address]  Pt reports no hx of mental health, substance abuse, IP rehab, or VNA  CM will appreciate therapy recommendations when appropriate  CM reviewed d/c planning process including the following: identifying help at home, patient preference for d/c planning needs, Discharge Lounge, [facility] Meds to Bed program, availability of treatment team to discuss questions or concerns patient and/or family may have regarding understanding medications and recognizing signs and symptoms once discharged  CM also encouraged patient to follow up with all recommended appointments after discharge  Patient advised of importance for patient and family to participate in managing patients medical well being

## 2021-09-10 NOTE — OP NOTE
PATIENT NAME: Lary Mccollum  MEDICAL RECORD NUMBER: [id number]    DATE: 9/9/2021    Incision/Procedure Start Time: 6PM  Incision Close/Procedure End Time: 10PM    Surgeon(s)/Proceduralist(s) and Assistant(s):      * Soto Dubose MD - Primary    OPERATION:   1  Posterior C4-6 decompressive laminectomies  2  Posterior C3-T1 instrumented fusion with autograft and allograft bone  3  Use of neuromonitoring    ANESTHESIA: General endotracheal anesthesia     PRE-OPERATIVE DIAGNOSIS: Traumatic C5 fracture    POST-OPERATIVE DIAGNOSIS: Traumatic C5 fracture    CONSENT: The patient provided full informed consent after discussion of the risks, benefits, alternatives, indications, and contraindications and agreed to proceed  Consent is documented separately  Parents also agreed to proceed  OPERATIVE INDICATION: The patient is a 19-year-old male with no past medical history who slipped and fell when getting out of the shower, sustaining a 3 column injury at C5 and instability  Immediately after the fall, he could not move any of his limbs  Gradually upon arrival in the ED, he regained full strength of his legs and proximal arms but remained weak (2/5) in wrist flexion/extension and hand  with numbness/tingling  MRI/CT showed C5 fracture with posterior ligamentous injury and bilateral laminar fractures and significant spinal cord edema predominantly at C4-6 levels  After discussion with the patient and his parents, we decided to proceed with emergent neurosurgical intervention - posterior decompression and fixation  OPERATIVE FINDINGS:  1  C5 (as well as adjacent levels C6 and C7) unstable with posterior element injuries and instability, added complexity of placement of instrumentation due to post-traumatic inflammation   2  Dura underlying C4-6 levels appeared inflamed and prone to bleed with engorged veins diffusely; dura remained intact   3   Placement and trajectories of instrumentation satisfactory per intraoperative fluoroscopy  4  No change from baseline (pre-flip) neuromonitoring during the entire case including at the end     OPERATIVE PROCEDURE:  Prior to induction of anesthesia, an audible huddle was performed confirming site of operation, planned operation, need for antibiotics, and other anticipated needs with the patient, surgical, nursing, and anesthesia teams  All agreed to proceed  Prior to flipping the patient into prone position, baseline neuromonitoring signals were obtained  Patient was pinned with Rivas pins and positioned prone, maintaining spine precautions  All pressure points were verified to be padded appropriately  Prior to starting the operation the surgical team paused and performed an audible timeout  The surgical, nursing, and anesthesia personnel agreed with the procedure to be performed  The surgical region was prepped and draped in the usual sterile fashion  A midline incision along the cervical spine was made using a # 10 blade scalpel  Hemostasis was achieved using electrocautery  Using a combination of monopolar electrocautery and blunt dissection, the subcutaneous tissue and fascia were incised  The musculture was dissected free of the osseous structures using blunt dissection and monopolar electrocautery  Hemostasis was achieved  C5 lamina and spinous process were identified with two instruments and confirmed utilizing intraoperative x-ray  This level was confirmed with a radiologist     First we proceeded with placement of cervical spine lateral mass screws  Using [employer] high-speed air drill, I placed 3 5 X 14 mm screws at C3, C4, C6 levels bilaterally  The level of fracture (C5) did not have ideal bone quality and integrity for placement of lateral mass screws, so this level was skipped  The  holes that were drilled were further advanced with the powered drill  The holes were sounded, I encountered no breach of bone on all sides   These lateral mass screws were all placed and confirmed to be in satisfactory locations with intraoperative fluoroscopy  Then using anatomical landmarks, I placed pedicle screws (5 5 X 25 mm) bilaterally at T1, with adequate bony purchase  Neuromonitoring remained at baseline throughout the instrumentation  The following implants were utilized:  Implant Name Type Inv  Item Serial No   Lot No  LRB No  Used Action   SCREW MULTI-AXLE 5 5 X 25MM INFINITY - [id number]  SCREW MULTI-AXLE 5 5 X 25MM INFINITY  [employer] [id number]  2 Implanted   SCREW 3 5 X 14MM INFINITY - [id number]  SCREW 3 5 X 14MM INFINITY  [employer]   6 Implanted   SCREW SET M6 - [id number]  SCREW SET M6  [employer]   6 Implanted   SCREW SET M6 - [id number]  SCREW SET M6  [employer]   2 Implanted   CLAU PLUS DBM 10ML - [id number]  CLAU PLUS DBM 10ML [id number] [employer]   1 Implanted   NEW SPINAL 3 5 X 95MM - [id number]  NEW SPINAL 3 5 X 95MM  [employer]   2 Implanted   CLAU STRIP 8MM X 1 X 10CM - [id number]  CLAU STRIP 8MM X 1 X 10CM [id number] [employer]   1 Implanted       At this time, I proceeded with decompressive laminectomies at C4-6 levels- using Leksell rongeurs, Kerrison rongeurs, and high-speed Midas drill  The decompression was challenging due to increased inflammation and presence of copious amount of engorged veins diffusely at/near the level of trauma/injury  Extreme caution was taken to make sure the dura and spinal cord remained intact and not manipulated aggressively  Neuromonitoring remained at baseline throughout the decompression  After adequate decompression, confirmed with the [facility] probe repeatedly, I placed the rods bilaterally, placed the screw caps, and final tightened the screws without difficulty   Final x-ray confirmed satisfactory instrumentation with adequate restoration of lordosis  Prior to closure, hemostasis was achieved  The wound was copiously irrigated  The facet joints were decorticated, and autograft and allograft bone/putty matrix was placed to promote bony arthrodesis  All retracting devices were removed from the wound  All temporary implants were removed from the wound  Vancomycin powder and subfascial drain were placed in the wound  Prior to closure, surgical count was correct and verified x 1  The wound was closed in the usual three-layer fashion  Fascia and deep dermal sutures were placed  Skin was reapproximated using staples  Drain and "naomie" (for future removal) sutures were placed at the drain exit site on the left shoulder region  The wound was then dressed with Silver-impregnated dressing  At the end of the case, a sign out was performed whereby the anesthesia, surgical, and nursing teams re-confirmed the operation performed, any blood products to be distributed, specimens to be sent, or equipment issues  All parties agreed  CONTRIBUTION:   Dr Oscar Mccullough performed the entire surgery  POSITION: Prone     COUNT:   Prior to closure, surgical count was correct and verified x 1  At the end of the case, surgical count was correct and verified x 2      ESTIMATED BLOOD LOSS: 150 cc    ANTIBIOTICS: Administered within 1 hour prior to incision and re-dosed after 3 hours     COMPLICATIONS: None    DRAINS: 1 subfascial drain    SPECIMENS/BIOPSY: None    CULTURES: None     Ana COOPER

## 2021-09-10 NOTE — OCCUPATIONAL THERAPY NOTE
Occupational Therapy Evaluation     Patient Name: Alexey Crawford  KTEGI'X Date: 9/10/2021  Problem List  Principal Problem:    C5 cervical fracture [facility])  Active Problems:    Central cord syndrome [facility])    Past Medical History  History reviewed  No pertinent past medical history  Past Surgical History  Past Surgical History:   Procedure Laterality Date    DECOMPRESSION SPINE CERVICAL POSTERIOR Bilateral 9/9/2021    Procedure: C4-6 POSTERIOR CERVICAL SPINE DECOMPRESSION WITH C3-T1 FUSION  ;  Surgeon: Kell Mcgowan MD;  [facility];  Service: Neurosurgery             09/10/21 0954   OT Last Visit   OT Visit Date 09/10/21   Note Type   Note type Evaluation   Restrictions/Precautions   Weight Bearing Precautions Per Order No   Braces or Orthoses C/S Collar   Other Precautions Bed Alarm; Chair Alarm;Multiple lines;Telemetry; Fall Risk;Pain;Spinal precautions   Pain Assessment   Pain Assessment Tool FLACC   Pain Score   (said no pain; but grimaced w/ mvmt)   Pain Location/Orientation Location: Neck   Pain Rating: FLACC (Rest) - Face 1   Pain Rating: FLACC (Rest) - Legs 0   Pain Rating: FLACC (Rest) - Activity 0   Pain Rating: FLACC (Rest) - Cry 0   Pain Rating: FLACC (Rest) - Consolability 0   Score: FLACC (Rest) 1   Pain Rating: FLACC (Activity) - Face 1   Pain Rating: FLACC (Activity) - Legs 0   Pain Rating: FLACC (Activity) - Activity 0   Pain Rating: FLACC (Activity) - Cry 1   Pain Rating: FLACC (Activity) - Consolability 0   Score: FLACC (Activity) 2   Home Living   Type of Home House   Home Layout Two level;Bed/bath upstairs  (2 AURELIO; 14 steps to 2nd floor)   Bathroom Shower/Tub Tub/shower unit   Bathroom Toilet Standard   Bathroom Equipment Other (Comment)  (denies any)   Home Equipment Other (Comment)  (denies any)   Prior Function   Level of [place] Independent with ADLs and functional mobility   Lives With Other (Comment)  (parents)   Receives Help From Family   ADL Assistance Independent IADLs Independent   Falls in the last 6 months 0   Vocational Full time employment   Lifestyle   Autonomy I w/ ADLS/IADLS, transfers and functional mobility PTA   Reciprocal Relationships Pt lives w/ his parents who both work   Service to [employer] Pt works full time as a  @ [address] Enjoys video games, writing music and drawing   Psychosocial   Psychosocial (WDL) [address]  4  Minimal Assistance   Grooming Assistance 3  Moderate Assistance   [address] 3  Moderate Assistance   LB [address] 4  [address] 3  Moderate Assistance   [address] 4  [address]  3  Moderate Assistance   Functional Assistance 3  Moderate Assistance   Functional Deficit Steadying;Verbal cueing;Supervision/safety; Increased time to complete  (Ax2)   Bed Mobility   Supine to Sit 4  Minimal assistance   Additional items Assist x 1;HOB elevated; Increased time required;Verbal cues;LE management   Sit to Supine Unable to assess   Additional Comments Pt sat EOB w/ Fair sitting balance/trunk control   Transfers   Sit to Stand 4  Minimal assistance   Additional items Assist x 2; Increased time required;Verbal cues   Stand to Sit 4  Minimal assistance   Additional items Assist x 2; Increased time required;Verbal cues   Additional Comments HHA used into standing, B/L knee block provided; VC for safety/proper technique   Functional Mobility   Functional Mobility 3  Moderate assistance   Additional Comments Pt took few small steps from EOB to chair w/ Mod A x2; HHA used   B/L Knee buckling occured; pt needs VC for safety and assist for steadying balance   Additional items Hand hold assistance   Balance   Static Sitting Fair   Dynamic Sitting Fair -   Static Standing Poor   Dynamic Standing Poor   Ambulatory Poor   Activity Tolerance   Activity Tolerance Patient limited by fatigue;Patient limited by pain Medical Staff Made Aware PT, Herbert Montoya   Nurse Made Aware yes, Starr   RUE Assessment   RUE Assessment X   RUE Strength   RUE Overall Strength Deficits; Other (Comment)  (grossly 3/5)   R Shoulder Flexion 3/5   R Elbow Flexion 4-/5   R Elbow Extension 2+/5   R Wrist Flexion 3+/5   R Wrist Extension 3-/5   LUE Assessment   LUE Assessment X   LUE Strength   LUE Overall Strength Deficits  (grossly 3/5)   L Shoulder Flexion 3/5   L Elbow Flexion 4/5   L Elbow Extension 3-/5   L Wrist Flexion 4-/5   L Wrist Extension 3/5   Hand Function   Gross Motor Coordination Impaired   Fine Motor Coordination Impaired   Sensation   Light Touch Partial deficits in the RUE;Partial deficits in the LUE;Partial deficits in the RLE;Partial deficits in the LLE  (numbness in hands and feet)   Proprioception   Proprioception Partial deficits in the RUE;Partial deficits in the LUE;Partial deficits in the RLE;Partial deficits in the LLE   Vision-Basic Assessment   Current Vision No visual deficits   Vision - Complex Assessment   Ocular Range of Motion [facility]   Perception   Inattention/Neglect Appears intact   Cognition   Overall Cognitive Status WFL   Arousal/Participation Responsive; Cooperative   Attention Within functional limits   Orientation Level Oriented X4   Memory Within functional limits   Following Commands Follows all commands and directions without difficulty   Comments Pt is pleasant and cooperative   Assessment   Limitation Decreased ADL status; Decreased UE strength;Decreased UE ROM; Decreased Safe judgement during ADL;Decreased cognition;Decreased endurance;Decreased sensation;Decreased fine motor control;Decreased self-care trans;Decreased high-level ADLs   Prognosis Fair   Assessment Pt is a 23 y/o male seen for OT eval s/p adm to [facility] w/ C5 vertebral body fx w/ retropulsion s/p slip in fall @ home  Pt is s/p C4-6 posterior cervical spine decompression w/ C3-T1 fusion  Pt has a C/S collar and active spinal precautions   Pt  has no past medical history on file  Pt with active OT orders and up with assistance  orders  Pt lives with his parents in 2 SH, 2 AURELIO, 14 steps inside to 2nd floor bed/bath  Pt was I w/  ADLS and IADLS, drove, & required no use of DME PTA  Pt is currently demonstrating the following occupational deficits: Min A UB ADLS, Mod A LB ADLS, Min A bed mobility, Min A x2 STS transfers and Mod A x2 functional mobility w/ HHA, B/L knee block  These deficits that are impacting pt's baseline areas of occupation are a result of the following impairments: pain, endurance, activity tolerance, functional mobility, forward functional reach, balance, trunk control, functional standing tolerance, decreased I w/ ADLS/IADLS, strength, ROM and sensation deficits  The following Occupational Performance Areas to address include: eating, grooming, bathing/shower, toilet hygiene, dressing, health maintenance, functional mobility, community mobility, clothing management, household maintenance and job performance/volunteering  Recommend inpatient rehab  upon D/C  Pt to continue to benefit from acute immediate OT services to address the following goals 3-5x/week to  w/in 10-14 days:    Goals   Patient Goals to get stronger and return to work   LTG Time Frame 10-14   Long Term Goal #1 see below listed goals   Plan   Treatment Interventions ADL retraining;Functional transfer training;UE strengthening/ROM; Endurance training;Cognitive reorientation;Patient/family training;Equipment evaluation/education; Compensatory technique education; Fine motor coordination activities;Continued evaluation; Energy conservation; Activityengagement   Goal Expiration Date 21   OT Frequency 3-5x/wk   Recommendation   Recommendation Physiatry Consult   OT Discharge Recommendation Post acute rehabilitation services   OT - OK to Discharge Yes  (when medically stable)   Additional Comments  The patient's raw score on the AM-PAC Daily Activity inpatient short form is 13, standardized score is 32 03, less than 39 4  Patients at this level are likely to benefit from discharge to post-acute rehabilitation services  Please refer to the recommendation of the Occupational Therapist for safe discharge planning  Additional Comments 2 Pt seen as a co-evaluation due to the patient's co-morbidities, clinically unstable presentation, and present impairments which are a regression from the patient's baseline     AM-PAC Daily Activity Inpatient   Lower Body Dressing 2   Bathing 2   Toileting 2   Upper Body Dressing 2   Grooming 2   Eating 3   Daily Activity Raw Score 13   Daily Activity Standardized Score (Calc for Raw Score >=11) 32 03   AM-PAC Applied Cognition Inpatient   Following a Speech/Presentation 4   Understanding Ordinary Conversation 4   Taking Medications 4   Remembering Where Things Are Placed or Put Away 4   Remembering List of 4-5 Errands 4   Taking Care of Complicated Tasks 4   Applied Cognition Raw Score 24   Applied Cognition Standardized Score 62 21          GOALS    1) Pt will improve functional mobility to Min A w/ use of DME as needed to increase engagement in meaningful tasks  2) Pt will complete supine to sit transfer with S using B/L UEs to initiate bed mobility   3) Pt will tolerate sitting at EOB 20 minutes with S assist and stable vital signs, as prerequisite for further engagement in ADLS   4) Pt will complete grooming task with S assist and increased time to increase independence in functional tasks  5) Pt will increase B/L UE ROM 1/2 MMT to increase functional UB use during ADLS   6) Pt will complete UB ADLS with S and use of AD/DME as needed to increase independence in functional tasks  7) Pt will complete LB ADLS with Min A and use of AD/DME as needed to increase independence in functional tasks  8) Pt will complete sit to stand transfer / sit pivot transfer / stand pivot transfer with S assist on/off all ADL surfaces   9) Pt will follow 100% simple one step verbal commands and be A/Ox4 consistently t/o use of external environmental cues to increase awareness for functional tasks  10) Pt will demonstrate 100% carryover of spinal precautions and E C  techniques t/o fx'l I/ADL/ leisure tasks w/o cues s/p skilled education  11) Pt will improve dynamic standing balance to G for 20 mins w/ S assist, DME as needed, and no more than 1 loss of balance, to increase engagement in functional standing ADL/IADL tasks    Page Eugenio MS, OTR/L

## 2021-09-10 NOTE — PHYSICAL THERAPY NOTE
Physical Therapy Evaluation     Patient's Name: Nhi Collazo    Admitting Diagnosis  Injury [T14 90XA]  C4 cervical fracture ([facility] ) [R53 [id number]]  Closed displaced fracture of fifth cervical vertebra, unspecified fracture morphology, initial encounter ([facility] ) [S12 400A]    Problem List  Patient Active Problem List   Diagnosis    [facility]    [facility])       Past Medical History  History reviewed  No pertinent past medical history      Past Surgical History  Past Surgical History:   Procedure Laterality Date    DECOMPRESSION SPINE CERVICAL POSTERIOR Bilateral 9/9/2021    Procedure: C4-6 POSTERIOR CERVICAL SPINE DECOMPRESSION WITH C3-T1 FUSION  ;  Surgeon: Royal Mack MD;  [facility];  Service: Neurosurgery        09/10/21 0953   PT Last Visit   PT Visit Date 09/10/21   Note Type   Note type Evaluation   Pain Assessment   Pain Assessment Tool FLACC   Pain Rating: FLACC (Rest) - Face 1   Pain Rating: FLACC (Rest) - Legs 0   Pain Rating: FLACC (Rest) - Activity 0   Pain Rating: FLACC (Rest) - Cry 0   Pain Rating: FLACC (Rest) - Consolability 0   Score: FLACC (Rest) 1   Pain Rating: FLACC (Activity) - Face 1   Pain Rating: FLACC (Activity) - Legs 0   Pain Rating: FLACC (Activity) - Activity 0   Pain Rating: FLACC (Activity) - Cry 1   Pain Rating: FLACC (Activity) - Consolability 0   Score: FLACC (Activity) 2   Home Living   Type of Home House   Home Layout Two level;Bed/bath upstairs   Bathroom Shower/Tub Tub/shower unit   Bathroom Toilet Standard   Prior Function   Level of [place] Independent with ADLs and functional mobility   Lives With Other (Comment)  (parents and brother)   Receives Help From Family   ADL Assistance Independent   IADLs Independent   Falls in the last 6 months 0   Vocational Full time employment  ( for [employer])   Restrictions/Precautions   [place] Bearing Precautions Per Order No   Braces or Orthoses C/S Collar   Other Precautions Chair Alarm; Bed Alarm;Multiple lines;Telemetry;O2;Fall Risk;Pain;Spinal precautions   Cognition   Orientation Level Oriented X4   RLE Assessment   RLE Assessment WFL   LLE Assessment   LLE Assessment WFL   Coordination   Movements are Fluid and Coordinated 0   Coordination and Movement Description noted knee buckling with decreased sensation and proproception   Bed Mobility   Supine to Sit 4  Minimal assistance   Additional items Assist x 1;HOB elevated   Sit to Supine Unable to assess   Transfers   Sit to Stand 4  Minimal assistance   Additional items Assist x 2; Increased time required   Stand to Sit 4  Minimal assistance   Additional items Assist x 2; Increased time required   Ambulation/Elevation   Gait pattern Excessively slow; Shuffling;Decreased foot clearance;R Knee Monika;L Knee Monika   Gait Assistance 4  Minimal assist   Additional items Assist x 1   Assistive Device Other (Comment)  (HHA)   Distance 4'   Balance   Static Sitting Fair   Dynamic Sitting Fair -   Static Standing Poor   Dynamic Standing Poor   Ambulatory Poor   Endurance Deficit   Endurance Deficit Yes   Endurance Deficit Description limited by fatigue, sensation, knee buckling   Activity Tolerance   Activity Tolerance Patient limited by fatigue;Patient limited by pain   Medical Staff Made Aware OT   Nurse Made Aware yes   Assessment   Prognosis Guarded   Problem List Decreased strength;Decreased range of motion;Decreased endurance; Impaired balance;Decreased mobility; Decreased coordination;Decreased safety awareness;Decreased cognition;Pain;Orthopedic restrictions; Impaired sensation   Assessment Pt is 24 y o  male seen for PT evaluation s/p admit to [facility] on 9/9/2021 w/ C5 cervical fracture (HCC) s/p C3-T1 PCDF  PT consulted to assess pt's functional mobility and d/c needs  Order placed for PT eval and tx, w/ up w/ A order   PTA, pt was requiring A for mobility, ambulates community distances and elevations, lives in multi-level home and works full time  Personal factors affecting pt at time of IE include: inaccessible home environment, ambulating w/ assistive device, inability to ambulate household distances, limited home support, decreased initiation and engagement, impulsivity, inability to perform current job functions, unable to perform physical activity, inability to perform IADLs and inability to perform ADLs  Please find objective findings from PT assessment regarding body systems outlined above with impairments and limitations including weakness, impaired balance, decreased endurance, impaired coordination, gait deviations, pain, decreased activity tolerance, decreased functional mobility tolerance, altered sensation, decreased safety awareness, fall risk and orthopedic restrictions  Noted good B/L LE strength  B/L knee buckling in standing with decreased strength and proprioception  Poor ambulation tolerance with increased fatigue  The following objective measures performed on IE also reveal limitations: The patient's AM-PAC Basic Mobility Inpatient Short Form Raw Score is 12, Standardized Score is 32 23  A standardized score less than 42 9 suggests the patient may benefit from discharge to post-acute rehabilitation services  Please also refer to the recommendation of the Physical Therapist for safe discharge planning  Pt's clinical presentation is currently unstable/unpredictable seen in pt's presentation of critical care monitoring  Pt to benefit from continued PT tx to address deficits as defined above and maximize level of functional independent mobility and consistency  From PT/mobility standpoint, recommendation at time of d/c would be post acute rehabilitation services pending progress in order to facilitate return to PLOF  Barriers to Discharge Inaccessible home environment;Decreased caregiver support   Goals   Patient Goals To get stronger   STG Expiration Date 09/22/21   Short Term Goal #1 1   Complete bed mobility and transfers I to decrease need for caregiver in home  2  Ambulate 300' I to complete household and community mobility without A  3  Improve dynamic balance to good to decrease need for UE support during ambulation  4  Be educated & demonstate 12 steps to be able to enter home without A  5  I with spinal precautions   Plan   Treatment/Interventions OT; Spoke to nursing;Spoke to case management;Gait training;Bed mobility; Patient/family training; Endurance training;LE strengthening/ROM; Functional transfer training   PT Frequency Other (Comment)  (3-6x/wk)   Recommendation   PT Discharge Recommendation Post acute rehabilitation services   PT - OK to Discharge Yes   AM-PAC Basic Mobility Inpatient   Turning in Bed Without Bedrails 3   Lying on Back to Sitting on Edge of Flat Bed 3   Moving Bed to Chair 2   Standing Up From Chair 2   Walk in Room 1   Climb 3-5 Stairs 1   Basic Mobility Inpatient Raw Score 12   Basic Mobility Standardized Score 32 23           Gennaro Victor, PT

## 2021-09-10 NOTE — UTILIZATION REVIEW
Initial Clinical Review    Admission: Date/Time/Statement:   Admission Orders (From admission, onward)     Ordered        09/09/21 1536  Inpatient Admission  Once                   Orders Placed This Encounter   Procedures    Inpatient Admission     Standing Status:   Standing     Number of Occurrences:   1     Order Specific Question:   Level of Care     Answer:   Critical Care [15]     Order Specific Question:   Bed Type     Answer:   Trauma [7]     Order Specific Question:   Estimated length of stay     Answer:   More than 2 Midnights     Order Specific Question:   Certification     Answer:   I certify that inpatient services are medically necessary for this patient for a duration of greater than two midnights  See H&P and MD Progress Notes for additional information about the patient's course of treatment  ED Arrival Information     Expected Arrival Acuity    - 9/9/2021 10:59 Urgent         Means of arrival Escorted by Service Admission type    Ambulance [facility] Trauma Urgent         Arrival complaint    injury from fall        Chief Complaint   Patient presents with   Macel Jax Fall     Patient reports that he slipped and tripped coming out of the shower today; denies LOC but did strike his head; at first states that he was having trouble moving his arms and legs but is now having an easier time with movement and       Initial Presentation:   24 y o  male who presents with slip and fall  Patient states he was getting ready for work and slipped outside the bathtub and landed face 1st hitting his head  Pt with immediate quadriparesis with return of function in proximal arms and legs, over 5 to 15 minutes, with continuing improvement with residual impaired motor movements of hands C7-C8 T1 dermatomes   Remains with residual weakness in the the forearms with week elbow extension some weakness 2 to 3/5 wrist dorsiflexion and has profound weakness in long finger extensors and intrinsics and weak  in the hand  Admitted to inpatient status for C5 cervical fx  Neurosurgery consulted  Per neurosurgery:  MRI cervical spine which demonstrates comminuted fractures of C5 vertebra with bony retropulsion and cord compression with cord edema/cord contusion  Also edema and fractures of posterior elements of C5 indicative of a 3 column injury  Cord edema extending from C4-6  Plan for C4-6 decompression and C3-T1 fusion posteriorly  To OR for:  1  Posterior C4-6 decompressive laminectomies  2  Posterior C3-T1 instrumented fusion with autograft and allograft bone  3  Use of neuromonitoring    OPERATIVE FINDINGS:  1  C5 (as well as adjacent levels C6 and C7) unstable with posterior element injuries and instability, added complexity of placement of instrumentation due to post-traumatic inflammation   2  Dura underlying C4-6 levels appeared inflamed and prone to bleed with engorged veins diffusely; dura remained intact   3  Placement and trajectories of instrumentation satisfactory per intraoperative fluoroscopy  4  No change from baseline (pre-flip) neuromonitoring during the entire case including at the end     Date: 9/10/21 Day 2:   Hemodynamic Monitoring: MAP: Arterial Line MAP (mmHg): 90 mmHg  POD#1: posterior C4-6 decompression with C3-T1 fusion  Pt states that his strength is marginally better than it was yesterday, still has some paresthesias/dysesthetic pains  Arce was removed this morning  Recommend MAPs above 85 >7 days: Patient should be automapping >24 hours prior to leaving the ICU  Steroids dc'ed per trauma  DVT PPX: SCDs  Cleared for DVT ppx tomorrow morning  PENELOPE drain to full suction  maintain at this time  175cc/24 hours  PT/OT evaluation    Pain control and medical management per trauma team    Neurologic:   Mental status: Alert, oriented x3, thought content appropriate  Cranial nerves: grossly intact (Cranial nerves II-XII)  Sensory: normal to light touch in bilateral upper extremities and bilateral lower extremities  Subjective paresthesias/dysesthetic pains  Motor:      Motor Findings  Strength: Right  Strength: Left    Deltoid  4+/5  4+/5    Biceps  4+/5  4+/5    Triceps  4-/5  4-/5    Wrist Extensors  4+/5  4+/5    Hand Intrinsics  2/5  3/5    Iliospoas  4+/5  4+/5    Quadriceps  4+/5  4+/5    Tibialis anterior  4+/5  4+/5    Gastroc soleus  4+/5  4+/5    WF 2/5  Reflexes: 2+ and symmetric  No mann's     Coordination: finger to nose normal bilaterally, no drift bilaterally      ED Triage Vitals [09/09/21 1106]   Temperature Pulse Respirations Blood Pressure SpO2   97 9 °F (36 6 °C) 65 18 123/64 99 %      Temp Source Heart Rate Source Patient Position - Orthostatic VS BP Location FiO2 (%)   Oral Monitor Lying Right arm --      Pain Score       6          Wt Readings from Last 1 Encounters:   09/09/21 76 4 kg (168 lb 6 9 oz)     Additional Vital Signs:   09/10/21 0206  --  68  19  118/66  83  142/66  86 mmHg  98 %  --  --   09/10/21 0106  --  94  21  137/86  111  152/70  92 mmHg  99 %  --  --   09/10/21 0034  --  --  --  --  --  --  --  98 %  --  --   09/10/21 0006  --  60  16  125/64  92  148/62  82 mmHg  99 %  --  --   09/09/21 2330  --  88  22  119/62  80  162/72  94 mmHg  98 %  --  --   09/09/21 2300  98 6 °F (37 °C)  74  20  140/68  91  168/64   90 mmHg  98 %  --  --   Arterial Line BP: using cuff pressure at 09/09/21 2300   09/09/21 1530  --  60  16  129/58  83  --  --  97 %  None (Room air)  Lying       Pertinent Labs/Diagnostic Test Results:   Results from last 7 days   Lab Units 09/10/21  0451 09/09/21  2253 09/09/21  1159   WBC Thousand/uL 9 31 5 58 5 35   HEMOGLOBIN g/dL 13 1 14 3 15 1   HEMATOCRIT % 39 0 42 5 44 4   PLATELETS Thousands/uL 117* 119* 118*   NEUTROS ABS Thousands/µL 8 27*  --   --    BANDS PCT %  --   --  2     Results from last 7 days   Lab Units 09/10/21  0451 09/09/21  2253 09/09/21  1159   SODIUM mmol/L 138 140 138   POTASSIUM mmol/L 3 9 4 0 4 0   CHLORIDE mmol/L 109* 112* 109*   CO2 mmol/L 24 23 23   ANION GAP mmol/L 5 5 6   BUN mg/dL 12 13 15   CREATININE mg/dL 0 59* 0 78 0 71   EGFR ml/min/1 73sq m 144 128 133   CALCIUM mg/dL 8 3 8 8 9 1   CALCIUM, IONIZED mmol/L  --  1 17  --    MAGNESIUM mg/dL 2 3 2 1  --    PHOSPHORUS mg/dL 3 5 2 0*  --      Results from last 7 days   Lab Units 09/10/21  0451 09/09/21  2253 09/09/21  1159   GLUCOSE RANDOM mg/dL 121 131 90     Results from last 7 days   Lab Units 09/10/21  0451 09/09/21  2253 09/09/21  1429   PROTIME seconds 14 5 14 0 13 8   INR  1 18 1 12 1 10   PTT seconds  --   --  29 9/9  CT head=  No acute intracranial abnormality  CT cervical spine=  C5 compression fracture with involvement of bilateral laminas  The C5 vertebral body is fractured and comminuted fashion involving both sagittal and coronal plane  4 mm retropulsion noted of the inferior aspect of the posterior vertebral body narrowing   the spinal canal   Xray L hand=  No acute osseous abnormality  MRI cervical spine=  1  Comminuted fractures of the C5 vertebra with bony retropulsion and cord compression with cord edema/cord contusion  There is also edema and fractures of the posterior elements at the C5 level as described on the recent CT scan indicative of a 3   column injury  2   Cord edema extending from the C4-C6 levels without large epidural hematoma or obvious cord hemorrhage  ED Treatment:   Medication Administration from 09/09/2021 1059 to 09/09/2021 1604       Date/Time Order Dose Route Action     09/09/2021 1121 ketorolac (TORADOL) injection 15 mg 15 mg Intravenous Given     09/09/2021 1220 HYDROmorphone (DILAUDID) injection 1 mg 1 mg Intravenous Given        History reviewed  No pertinent past medical history    Present on Admission:   C5 cervical fracture ([facility] [address] )   Central cord syndrome [facility])    Admitting Diagnosis: Injury [T14 90XA]  C4 cervical fracture ([facility] [address] ) [S12 300A]  Closed displaced fracture of fifth cervical vertebra, unspecified fracture morphology, initial encounter ([facility] [address] ) [S12 400A]  Age/Sex: 24 y o  male  Admission Orders:  Consult neurosurgery  Arce cath  Nursing dysphagia assessment  Scd/foot pumps  Neuro checks q2h  Pt/ot eval & tx  Drain to full suction  A-line care & monitoring    Scheduled Medications:  acetaminophen, 975 mg, Oral, Q8H ROCÍO  cefazolin, 1,000 mg, Intravenous, Q8H  gabapentin, 300 mg, Oral, TID  melatonin, 3 mg, Oral, HS  methocarbamol, 750 mg, Oral, Q6H ROCÍO  senna-docusate sodium, 2 tablet, Oral, Daily    Continuous IV Infusions:  multi-electrolyte, 125 mL/hr, Intravenous, Continuous    PRN Meds:  bisacodyl, 10 mg, Rectal, Daily PRN  calcium carbonate, 1,000 mg, Oral, Daily PRN  HYDROmorphone, 0 5 mg, Intravenous, Q2H PRN   Or  HYDROmorphone, 1 mg, Intravenous, Q2H PRN  HYDROmorphone, 0 5 mg, Intravenous, Q3H PRN  Labetalol HCl, 10 mg, Intravenous, Q4H PRN  magnesium hydroxide, 30 mL, Oral, Daily PRN  ondansetron, 4 mg, Intravenous, Q4H PRN  oxyCODONE, 10 mg, Oral, Q4H PRN  oxyCODONE, 5 mg, Oral, Q4H PRN    Network Utilization Review Department  ATTENTION: Please call with any questions or concerns to [phone] and carefully listen to the prompts so that you are directed to the right person  All voicemails are confidential   Alva Sutton all requests for admission clinical reviews, approved or denied determinations and any other requests to dedicated fax number below belonging to the campus where the patient is receiving treatment   List of dedicated fax numbers for the Facilities:  [address] DENIALS (Administrative/Medical Necessity) [phone]   [address] [place] [address] (Maternity/NICU/Pediatrics) 261 [facility] [address] [address] [facility] Yvette Alvarez [phone] [id number] [facility] [place] [address] [address]al [address] [place] [address] [phone]

## 2021-09-10 NOTE — RESTORATIVE TECHNICIAN NOTE
Restorative Technician Note      Patient Name: Jackelin Lim     Note Type: Bracing, Follow-up fitting  Patient Position Upon Consult: Supine  Brace Applied: [place] Collar Set (Molded B/L sides and flares and adjusted anterior chin plate to level 3 for optimal fit/comfort )  Additional Brace Ordered: No  Patient Position When Brace Applied: Supine  Bracing Recommendations: None  Education Provided: Yes (Provided and reviewed all instructions for [employer] [facility] [employer]  Pt states he has no further questions at this time  Please contact [phone] regarding bracing instructions/adjustments  Thank you!)  Patient Position at End of Consult: Supine; All needs within reach  Nurse Communication: Nurse aware of consult, application of brace    Philip BLACKBURN, Restorative Technician, [employer]

## 2021-09-10 NOTE — PLAN OF CARE
Problem: PHYSICAL THERAPY ADULT  Goal: Performs mobility at highest level of function for planned discharge setting  See evaluation for individualized goals  Description: Treatment/Interventions: OT, Spoke to nursing, Spoke to case management, Gait training, Bed mobility, Patient/family training, Endurance training, LE strengthening/ROM, Functional transfer training          See flowsheet documentation for full assessment, interventions and recommendations  Note: Prognosis: Guarded  Problem List: Decreased strength, Decreased range of motion, Decreased endurance, Impaired balance, Decreased mobility, Decreased coordination, Decreased safety awareness, Decreased cognition, Pain, Orthopedic restrictions, Impaired sensation  Assessment: Pt is 24 y o  male seen for PT evaluation s/p admit to [facility] on 9/9/2021 w/ C5 cervical fracture (HCC) s/p C3-T1 PCDF  PT consulted to assess pt's functional mobility and d/c needs  Order placed for PT eval and tx, w/ up w/ A order  PTA, pt was requiring A for mobility, ambulates community distances and elevations, lives in multi-level home and works full time  Personal factors affecting pt at time of IE include: inaccessible home environment, ambulating w/ assistive device, inability to ambulate household distances, limited home support, decreased initiation and engagement, impulsivity, inability to perform current job functions, unable to perform physical activity, inability to perform IADLs and inability to perform ADLs  Please find objective findings from PT assessment regarding body systems outlined above with impairments and limitations including weakness, impaired balance, decreased endurance, impaired coordination, gait deviations, pain, decreased activity tolerance, decreased functional mobility tolerance, altered sensation, decreased safety awareness, fall risk and orthopedic restrictions  Noted good B/L LE strength   B/L knee buckling in standing with decreased strength and proprioception  Poor ambulation tolerance with increased fatigue  The following objective measures performed on IE also reveal limitations: The patient's AM-PAC Basic Mobility Inpatient Short Form Raw Score is 12, Standardized Score is 32 23  A standardized score less than 42 9 suggests the patient may benefit from discharge to post-acute rehabilitation services  Please also refer to the recommendation of the Physical Therapist for safe discharge planning  Pt's clinical presentation is currently unstable/unpredictable seen in pt's presentation of critical care monitoring  Pt to benefit from continued PT tx to address deficits as defined above and maximize level of functional independent mobility and consistency  From PT/mobility standpoint, recommendation at time of d/c would be post acute rehabilitation services pending progress in order to facilitate return to PLOF  Barriers to Discharge: Inaccessible home environment, Decreased caregiver support        PT Discharge Recommendation: Post acute rehabilitation services     PT - OK to Discharge: Yes    See flowsheet documentation for full assessment

## 2021-09-10 NOTE — PLAN OF CARE
Problem: OCCUPATIONAL THERAPY ADULT  Goal: Performs self-care activities at highest level of function for planned discharge setting  See evaluation for individualized goals  Description: Treatment Interventions: ADL retraining, Functional transfer training, UE strengthening/ROM, Endurance training, Cognitive reorientation, Patient/family training, Equipment evaluation/education, Compensatory technique education, Fine motor coordination activities, Continued evaluation, Energy conservation, Activityengagement          See flowsheet documentation for full assessment, interventions and recommendations  9/10/2021 1435 by Russel Sparrow OT  Note: Limitation: Decreased ADL status, Decreased UE strength, Decreased UE ROM, Decreased Safe judgement during ADL, Decreased cognition, Decreased endurance, Decreased sensation, Decreased fine motor control, Decreased self-care trans, Decreased high-level ADLs  Prognosis: Fair  Assessment: Pt is a 23 y/o male seen for OT eval s/p adm to [facility] w/ C5 vertebral body fx w/ retropulsion s/p slip in fall @ home  Pt is s/p C4-6 posterior cervical spine decompression w/ C3-T1 fusion  Pt has a C/S collar and active spinal precautions  Pt  has no past medical history on file  Pt with active OT orders and up with assistance  orders  Pt lives with his parents in 2 SH, 2 [address], 14 steps inside to 2nd floor bed/bath  Pt was I w/  ADLS and IADLS, drove, & required no use of DME PTA  Pt is currently demonstrating the following occupational deficits: Min A UB ADLS, Mod A LB ADLS, Min A bed mobility, Min A x2 STS transfers and Mod A x2 functional mobility w/ HHA, B/L knee block  These deficits that are impacting pt's baseline areas of occupation are a result of the following impairments: pain, endurance, activity tolerance, functional mobility, forward functional reach, balance, trunk control, functional standing tolerance, decreased I w/ ADLS/IADLS, strength, ROM and sensation deficits  The following Occupational Performance Areas to address include: eating, grooming, bathing/shower, toilet hygiene, dressing, health maintenance, functional mobility, community mobility, clothing management, household maintenance and job performance/volunteering  Recommend inpatient rehab  upon D/C  Pt to continue to benefit from acute immediate OT services to address the following goals 3-5x/week to  w/in 10-14 days:   Recommendation: Physiatry Consult  OT Discharge Recommendation: Post acute rehabilitation services  OT - OK to Discharge: Yes (when medically stable)    9/10/2021 1435 by Kailash Head OT  Note: Limitation: Decreased ADL status, Decreased UE strength, Decreased UE ROM, Decreased Safe judgement during ADL, Decreased cognition, Decreased endurance, Decreased sensation, Decreased fine motor control, Decreased self-care trans, Decreased high-level ADLs  Prognosis: Fair  Assessment: Pt is a 23 y/o male seen for OT eval s/p adm to [facility] w/ C5 vertebral body fx w/ retropulsion s/p slip in fall @ home  Pt is s/p C4-6 posterior cervical spine decompression w/ C3-T1 fusion  Pt has a C/S collar and active spinal precautions  Pt  has no past medical history on file  Pt with active OT orders and up with assistance  orders  Pt lives with his parents in 2 [address], 2 [address], 14 steps inside to 2nd floor bed/bath  Pt was I w/  ADLS and IADLS, drove, & required no use of DME PTA  Pt is currently demonstrating the following occupational deficits: Min A UB ADLS, Mod A LB ADLS, Min A bed mobility, Min A x2 STS transfers and Mod A x2 functional mobility w/ HHA, B/L knee block  These deficits that are impacting pt's baseline areas of occupation are a result of the following impairments: pain, endurance, activity tolerance, functional mobility, forward functional reach, balance, trunk control, functional standing tolerance, decreased I w/ ADLS/IADLS, strength, ROM and sensation deficits  The following Occupational Performance Areas to address include: eating, grooming, bathing/shower, toilet hygiene, dressing, health maintenance, functional mobility, community mobility, clothing management, household maintenance and job performance/volunteering  Recommend inpatient rehab  upon D/C   Pt to continue to benefit from acute immediate OT services to address the following goals 3-5x/week to  w/in 10-14 days:   Recommendation: [address]  OT Discharge Recommendation: Post acute rehabilitation services  OT - OK to Discharge: Yes (when medically stable)     Laura Charles MS, OTR/L

## 2021-09-11 ENCOUNTER — APPOINTMENT (INPATIENT)
Dept: RADIOLOGY | Facility: HOSPITAL | Age: 21
DRG: 473 | End: 2021-09-11
Payer: COMMERCIAL

## 2021-09-11 PROBLEM — G89.11 ACUTE PAIN DUE TO TRAUMA: Status: ACTIVE | Noted: 2021-09-11
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
## 2021-09-11 LAB
ANION GAP SERPL CALCULATED.3IONS-SCNC: 4 MMOL/L (ref 4–13)
BUN SERPL-MCNC: 15 MG/DL (ref 5–25)
CALCIUM SERPL-MCNC: 8.5 MG/DL (ref 8.3–10.1)
CHLORIDE SERPL-SCNC: 107 MMOL/L (ref 100–108)
CO2 SERPL-SCNC: 29 MMOL/L (ref 21–32)
CREAT SERPL-MCNC: 0.69 MG/DL (ref 0.6–1.3)
ERYTHROCYTE [DISTWIDTH] IN BLOOD BY AUTOMATED COUNT: 13.1 % (ref 11.6–15.1)
GFR SERPL CREATININE-BSD FRML MDRD: 135 ML/MIN/1.73SQ M
GLUCOSE SERPL-MCNC: 88 MG/DL (ref 65–140)
HCT VFR BLD AUTO: 38.2 % (ref 36.5–49.3)
HGB BLD-MCNC: 12.8 G/DL (ref 12–17)
MCH RBC QN AUTO: 30.4 PG (ref 26.8–34.3)
MCHC RBC AUTO-ENTMCNC: 33.5 G/DL (ref 31.4–37.4)
MCV RBC AUTO: 91 FL (ref 82–98)
PLATELET # BLD AUTO: 108 THOUSANDS/UL (ref 149–390)
PMV BLD AUTO: 10.7 FL (ref 8.9–12.7)
POTASSIUM SERPL-SCNC: 3.7 MMOL/L (ref 3.5–5.3)
RBC # BLD AUTO: 4.21 MILLION/UL (ref 3.88–5.62)
SODIUM SERPL-SCNC: 140 MMOL/L (ref 136–145)
WBC # BLD AUTO: 8.62 THOUSAND/UL (ref 4.31–10.16)

## 2021-09-11 PROCEDURE — 80048 BASIC METABOLIC PNL TOTAL CA: CPT | Performed by: REGISTERED NURSE

## 2021-09-11 PROCEDURE — 99233 SBSQ HOSP IP/OBS HIGH 50: CPT | Performed by: SURGERY

## 2021-09-11 PROCEDURE — 94760 N-INVAS EAR/PLS OXIMETRY 1: CPT

## 2021-09-11 PROCEDURE — 72040 X-RAY EXAM NECK SPINE 2-3 VW: CPT

## 2021-09-11 PROCEDURE — 99024 POSTOP FOLLOW-UP VISIT: CPT | Performed by: PHYSICIAN ASSISTANT

## 2021-09-11 PROCEDURE — 85027 COMPLETE CBC AUTOMATED: CPT | Performed by: REGISTERED NURSE

## 2021-09-11 RX ORDER — HEPARIN SODIUM 5000 [USP'U]/ML
5000 INJECTION, SOLUTION INTRAVENOUS; SUBCUTANEOUS EVERY 8 HOURS SCHEDULED
Status: DISCONTINUED | OUTPATIENT
Start: 2021-09-11 | End: 2021-09-11

## 2021-09-11 RX ORDER — PANTOPRAZOLE SODIUM 40 MG/1
40 TABLET, DELAYED RELEASE ORAL
Status: DISCONTINUED | OUTPATIENT
Start: 2021-09-12 | End: 2021-09-11

## 2021-09-11 RX ADMIN — DOCUSATE SODIUM AND SENNOSIDES 2 TABLET: 8.6; 5 TABLET ORAL at 08:27

## 2021-09-11 RX ADMIN — GABAPENTIN 300 MG: 300 CAPSULE ORAL at 20:57

## 2021-09-11 RX ADMIN — ENOXAPARIN SODIUM 30 MG: 30 INJECTION SUBCUTANEOUS at 17:17

## 2021-09-11 RX ADMIN — OXYCODONE HYDROCHLORIDE 10 MG: 10 TABLET ORAL at 15:27

## 2021-09-11 RX ADMIN — Medication 3 MG: at 20:58

## 2021-09-11 RX ADMIN — METHOCARBAMOL TABLETS 750 MG: 750 TABLET, COATED ORAL at 00:41

## 2021-09-11 RX ADMIN — ACETAMINOPHEN 975 MG: 325 TABLET, FILM COATED ORAL at 13:00

## 2021-09-11 RX ADMIN — METHOCARBAMOL TABLETS 750 MG: 750 TABLET, COATED ORAL at 11:25

## 2021-09-11 RX ADMIN — HYDROMORPHONE HYDROCHLORIDE 1 MG: 1 INJECTION, SOLUTION INTRAMUSCULAR; INTRAVENOUS; SUBCUTANEOUS at 05:53

## 2021-09-11 RX ADMIN — GABAPENTIN 300 MG: 300 CAPSULE ORAL at 15:27

## 2021-09-11 RX ADMIN — HYDROMORPHONE HYDROCHLORIDE 0.5 MG: 1 INJECTION, SOLUTION INTRAMUSCULAR; INTRAVENOUS; SUBCUTANEOUS at 17:17

## 2021-09-11 RX ADMIN — GABAPENTIN 300 MG: 300 CAPSULE ORAL at 08:27

## 2021-09-11 RX ADMIN — ACETAMINOPHEN 975 MG: 325 TABLET, FILM COATED ORAL at 05:39

## 2021-09-11 RX ADMIN — OXYCODONE HYDROCHLORIDE 10 MG: 10 TABLET ORAL at 05:40

## 2021-09-11 RX ADMIN — ACETAMINOPHEN 975 MG: 325 TABLET, FILM COATED ORAL at 20:58

## 2021-09-11 RX ADMIN — HYDROMORPHONE HYDROCHLORIDE 1 MG: 1 INJECTION, SOLUTION INTRAMUSCULAR; INTRAVENOUS; SUBCUTANEOUS at 09:16

## 2021-09-11 RX ADMIN — HYDROMORPHONE HYDROCHLORIDE 0.5 MG: 1 INJECTION, SOLUTION INTRAMUSCULAR; INTRAVENOUS; SUBCUTANEOUS at 13:34

## 2021-09-11 RX ADMIN — OXYCODONE HYDROCHLORIDE 10 MG: 10 TABLET ORAL at 11:25

## 2021-09-11 RX ADMIN — METHOCARBAMOL TABLETS 750 MG: 750 TABLET, COATED ORAL at 17:17

## 2021-09-11 NOTE — ASSESSMENT & PLAN NOTE
- sustaining the below stated injuries  - PT/OT recommending acute spinal cord rehab, patient in agreement  - CM following to find accepting facility

## 2021-09-11 NOTE — ASSESSMENT & PLAN NOTE
· Imaging reviewed with patient and significant other in detail  · Ordered Vista collar to be worn at all times x6 weeks  · Medical management pain control per primary team  · Recommend MAPs above 85 >7 days   ? Patient should be automapping >24 hours prior to leaving the ICU  · Steroids dc'ed per trauma  · DVT PPX: SCDs  Cleared for DVT ppx tomorrow morning  · PENELOPE drain to full suction  maintain at this time  175cc/24 hours  · PT/OT evaluation      · Pain control and medical management per primary team

## 2021-09-11 NOTE — ASSESSMENT & PLAN NOTE
- Acute pain secondary to traumatic injuries  - Continue multi modal analgesic regimen  - Bowel regimen as long as using opioids   - Continue to monitor pain and adjust regimen as indicated

## 2021-09-11 NOTE — PROGRESS NOTES
[address] [facility]  Progress Note - Jarod العلي 2000, 24 y o  male MRN: [id number]  Unit/Bed#: [facility] 631-01 Encounter: [id number]  Primary Care Provider: No primary care provider on file  Date and time admitted to hospital: 9/9/2021 11:01 AM    Fall  Assessment & Plan  - sustaining the below stated injuries  - PT/OT recommending acute spinal cord rehab, patient in agreement  - CM following to find accepting facility    * C5 cervical fracture [facility])  Assessment & Plan  · Patient is s/p C4-C6 decompressive laminectomies, C3-T1 fusion on 9/9/21  · Upright x-rays completed in VISTA collar on 9/11  · Maintain VISTA collar and cervical spine precautions  · Multi modal analgesic regimen  · Continue neuro checks  Exam improving, continues to have exam findings c/w central cord syndrome, bilateral  and tricep strength weakness and decreased sensation in the C8 dermatome bilaterally  · Recommend MAPs above 85 >7 days  Patient is auto- mapping at this time  · DVT PPX: SCDs  Lovenox started on 9/11/21  · PENELOPE drain per neurosurgery, at full suction and to remain at this time  · PT/OT evaluation appreciated, recommending SCI rehab which patient and family are in agreement with  CM assisting with referrals  · Patient will require outpatient follow-up with Neurosurgery in 2 weeks for postop check  Central cord syndrome [facility])  Assessment & Plan  See plan for C5 cervical fracture  Acute pain due to trauma  Assessment & Plan  - Acute pain secondary to traumatic injuries  - Continue multi modal analgesic regimen  - Bowel regimen as long as using opioids   - Continue to monitor pain and adjust regimen as indicated  Disposition: continue stepdown 2 level care, placement pending at SCI rehab      SUBJECTIVE:  Chief Complaint: "I'm feeling well"    Subjective:   Patient is very pleasant and has a positive attitude    He states that he is feeling very well and has been trying to exercise his arms and hands to regain function as much as possible  He states that he has no pain unless does not receive the pain medications for very long time  He does feel that they are working well  He is very motivated to go to rehab to regain strength and function  He is tolerating a diet and sleeping well  He has no new complaints at this time  Both parents are at bedside and updated        OBJECTIVE:     Meds/Allergies     Current Facility-Administered Medications:     acetaminophen (TYLENOL) tablet 975 mg, 975 mg, Oral, Q8H ROCÍO, DANGELO Bond, 975 mg at 09/11/21 1300    bisacodyl (DULCOLAX) rectal suppository 10 mg, 10 mg, Rectal, Daily PRN, DANGELO Brown    calcium carbonate (TUMS) chewable tablet 1,000 mg, 1,000 mg, Oral, Daily PRN, DANGELO Bond    enoxaparin (LOVENOX) subcutaneous injection 30 mg, 30 mg, Subcutaneous, Q12H, Nimo Rodrigues PA-C    gabapentin (NEURONTIN) capsule 300 mg, 300 mg, Oral, TID, Lisa Sierra Medal DREW PadronNP, 300 mg at 09/11/21 1527    HYDROmorphone (DILAUDID) injection 0 5 mg, 0 5 mg, Intravenous, Q3H PRN, DANGELO Brown, 0 5 mg at 09/11/21 1334    magnesium hydroxide (MILK OF MAGNESIA) oral suspension 30 mL, 30 mL, Oral, Daily PRN, DANGELO Bond    melatonin tablet 3 mg, 3 mg, Oral, HS, DREW BondNP, 3 mg at 09/10/21 0146    methocarbamol (ROBAXIN) tablet 750 mg, 750 mg, Oral, Q6H ROCÍO, DREW BondNP, 750 mg at 09/11/21 1125    ondansetron (ZOFRAN) injection 4 mg, 4 mg, Intravenous, Q4H PRN, Lisa Sierra Medal DANGELO Padron    oxyCODONE (ROXICODONE) immediate release tablet 10 mg, 10 mg, Oral, Q4H PRN, DANGELO Bond, 10 mg at 09/11/21 1527    oxyCODONE (ROXICODONE) IR tablet 5 mg, 5 mg, Oral, Q4H PRN, DANGELO Brown, 5 mg at 09/10/21 2131    senna-docusate sodium (SENOKOT S) 8 6-50 mg per tablet 2 tablet, 2 tablet, Oral, Daily, Bertell Quant Bekesy, CRNP, 2 tablet at 09/11/21 0827     Vitals:   Vitals:    09/11/21 1521   BP: 122/77   Pulse: 71   Resp: 18   Temp: 99 3 °F (37 4 °C)   SpO2: 99%       Intake/Output:  I/O       09/09 0701 - 09/10 0700 09/10 0701 - 09/11 0700 09/11 0701 - 09/12 0700    P  O  460 540 360    I V  (mL/kg) 3268 8 (42 8) 431 3 (5 6)     NG/GT  0     IV Piggyback 50 50     Total Intake(mL/kg) 3778 8 (49 5) 1021 3 (13 4) 360 (4 7)    Urine (mL/kg/hr) 2550 2650 (1 4) 0 (0)    Drains 175 200 60    Blood 150      Total Output 2875 2850 60    Net +903 8 -1828 8 +300           Unmeasured Urine Occurrence   3 x           Nutrition/GI Proph/Bowel Reg:   Regular    Physical Exam:   GENERAL APPEARANCE:  No acute distress  NEURO:  GCS 15; + strengths 4/5 bilaterally with decreased strength in the hand in the median and ulnar nerve distribution,  Bilateral triceps strength 4/5, 4+ out of 5 strength bilateral biceps and 5/5 strength triceps  He has decreased sensation in the C8 dermatome in the bilateral hands  Strength is 5/5 throughout in bilateral lower extremities  He does have decreased sensation in the left foot up to the ankle laterally  Sensation is intact throughout the bilateral lower extremities otherwise  HEENT:   Normocephalic, atraumatic  CV:  Regular rate and rhythm, no murmurs gallops or rubs  LUNGS:  Clear auscultation bilaterally  GI:  Soft, nontender, nondistended  :  +voiding  MSK:  No edema, contusions or deformity; +[place] collar is in place  SKIN:  Pink, warm, dry    Invasive Devices     Peripheral Intravenous Line            Peripheral IV 09/11/21 Right Forearm <1 day          Drain            Closed/Suction Drain Posterior; Left Neck Bulb 1 day                 Lab Results:   Results: I have personally reviewed pertinent reports   , BMP/CMP:   Lab Results   Component Value Date    SODIUM 140 09/11/2021    K 3 7 09/11/2021     09/11/2021    CO2 29 09/11/2021    BUN 15 09/11/2021 CREATININE 0 69 09/11/2021    CALCIUM 8 5 09/11/2021    EGFR 135 09/11/2021    and CBC:   Lab Results   Component Value Date    WBC 8 62 09/11/2021    HGB 12 8 09/11/2021    HCT 38 2 09/11/2021    MCV 91 09/11/2021     (L) 09/11/2021    MCH 30 4 09/11/2021    MCHC 33 5 09/11/2021    RDW 13 1 09/11/2021    MPV 10 7 09/11/2021     Imaging/EKG Studies: Results: I have personally reviewed pertinent reports      Other Studies: no new  VTE Prophylaxis: Sequential compression device (Venodyne)  and Enoxaparin (Lovenox)

## 2021-09-11 NOTE — RESPIRATORY THERAPY NOTE
RT Protocol Note  Altagracia Thompson 24 y o  male MRN: [id number]  Unit/Bed#: [facility] 631-01 Encounter: [id number]    Assessment    Principal Problem:    C5 cervical fracture [facility])  Active Problems:    Central cord syndrome [facility])    Fall    Acute pain due to trauma      Home Pulmonary Medications:  na  History reviewed  No pertinent past medical history  Social History     Socioeconomic History    Marital status: Single     Spouse name: None    Number of children: None    Years of education: None    Highest education level: None   Occupational History    None   Tobacco Use    Smoking status: Never Smoker    Smokeless tobacco: Never Used   Vaping Use    Vaping Use: Never used   Substance and Sexual Activity    Alcohol use: No    Drug use: No    Sexual activity: None   Other Topics Concern    None   Social History Narrative    None     Social Determinants of Health     Financial Resource Strain:     Difficulty of Paying Living Expenses:    Food Insecurity:     Worried About Running Out of Food in the Last Year:     Ran Out of Food in the Last Year:    Transportation Needs:     Lack of Transportation (Medical):      Lack of Transportation (Non-Medical):    Physical Activity:     Days of Exercise per Week:     Minutes of Exercise per Session:    Stress:     Feeling of Stress :    Social Connections:     Frequency of Communication with Friends and Family:     Frequency of Social Gatherings with Friends and Family:     Attends Confucianism Services:     Active Member of Clubs or Organizations:     Attends Club or Organization Meetings:     Marital Status:    Intimate Partner Violence:     Fear of Current or Ex-Partner:     Emotionally Abused:     Physically Abused:     Sexually Abused:        Subjective         Objective    Physical Exam:   Assessment Type: Assess only  General Appearance: Alert, Awake  Respiratory Pattern: Normal  Chest Assessment: Chest expansion symmetrical  Bilateral Breath Sounds: Clear  Cough: None  O2 Device: RA    Vitals:  Blood pressure 122/77, pulse 71, temperature 99 3 °F (37 4 °C), resp  rate 18, height 5' 10" (1 778 m), weight 76 4 kg (168 lb 6 9 oz), SpO2 99 %  Imaging and other studies: I have personally reviewed pertinent reports  O2 Device: RA     Plan    Respiratory Plan: No distress/Pulmonary history, Discontinue Protocol  Airway Clearance Plan: Incentive Spirometer     Resp Comments: Pt  evaluated at bedside per Airway Clearance protocol  Pt  admitted S/P Fall with C4-6 decompressive laminectomies and C3-T1 fusion  Pt  has clear breath sounds bilaterally and does not have any Pulmonary Hx  Pt  instructed on IS and demonstrated use of the device very well  Will continue IS W/A as ordered per Airway Clearance protocol

## 2021-09-11 NOTE — PROGRESS NOTES
[address] [facility]  Progress Note - Viki Aj 2000, 24 y o  male MRN: [id number]  Unit/Bed#: [facility] 631-01 Encounter: [id number]  Primary Care Provider: No primary care provider on file  Date and time admitted to hospital: 9/9/2021 11:01 AM    * C5 cervical fracture (HCC)  Assessment & Plan  POD#2 DECOMPRESSION SPINE CERVICAL POSTERIOR C4-C6 with posterolateral instrumented fusion C3-T1 and with arthrodesis    C5 burst fracture with mild retropulsion and bilateral laminar fractures and C5-6 capsular stretch diastasis  Possible spinal cord and ligamentous injury  · Status post slip after getting out of the shower with positive head strike, likely hyperflexion injury  Patient states he was unable to move for about 10 minutes and then was able to move slightly  No paresthesias noted  · Patient with significant bilateral tricep and  weakness    Imaging:  · MRI cervical spine 09/09/2021:  Comminuted fracture of the C5 vertebrae with bony retropulsion and cord compression cord edema/contusion  Edema in fracture of posterior elements of C5 indicative of a 3 column injury  Cord edema extending from C4-6 without large epidural hematoma her obvious cord hemorrhage  · Upright XR cervical spine ordered and pending to assess spinal alignment - discussed with RN to attempt these today    Plan:  · Continue to monitor neurological exam  · [place] collar at all times x 6 weeks postoperatively, [place] collar for showering  · PENELOPE drain to full suction  Serosanguineous output  200 cc in 24 hours  Remains in at full suction  · Medical management and pain control per primary team  · Recommend MAPs above 85 >7 days, patient currently on automapping on the leonila  · Steroids dc'ed per trauma  · DVT PPX: SCDs  Okay for pharmacological DVT prophylaxis from Neurosurgery standpoint  · Mobilize as tolerated with assistance, PT/OT evaluation    Neurosurgery will continue to follow   Call with any further questions or concerns  Central cord syndrome [facility])  Assessment & Plan  · See plan above       Subjective/Objective   Chief Complaint: "I am getting better"    Subjective:  Patient states overall he is doing well  Has 4-5/10 neck pain  States his hand feels sore and he would like some pain medication for this so that he may begin mobilizing his hands better  He is doing exercises in bed that he was taught by physical therapy to try and regain some of his strength as he does have a 1week-old  son and he is very motivated to get better so he may hold him  He has numbness in his bilateral toes but states the right is improving  He has some dysesthetic burning pain in the left hand  Continues with  and triceps weakness however he states his  is improving left more than right as he is left-hand dominant  He is urinating well  Passing gas but no bowel movement  Objective:  Lying in bed, collar in place, NAD    I/O       09/09 0701 - 09/10 0700 09/10 0701 - 09/11 0700 09/11 0701 - 09/12 0700    P  O  460 540     I V  (mL/kg) 3268 8 (42 8) 431 3 (5 6)     NG/GT  0     IV Piggyback 50 50     Total Intake(mL/kg) 3778 8 (49 5) 1021 3 (13 4)     Urine (mL/kg/hr) 2550 2650 (1 4)     Drains 175 200     Blood 150      Total Output 2875 2850     Net +903 8 -1828 8                  Invasive Devices     Peripheral Intravenous Line            Peripheral IV 21 Right Forearm <1 day          Drain            Closed/Suction Drain Posterior; Left Neck Bulb 1 day                Physical Exam:  Vitals: Blood pressure 122/82, pulse 58, temperature 98 9 °F (37 2 °C), resp  rate 16, height 5' 10" (1 778 m), weight 76 4 kg (168 lb 6 9 oz), SpO2 97 %  ,Body mass index is 24 17 kg/m²        General appearance: alert, appears stated age, cooperative and no distress  Head: Normocephalic, without obvious abnormality, atraumatic  Eyes:  Conjugate gaze  Neck: supple, symmetrical, trachea midline, [place] collar in place, dressing over incision intact, incision appears clean dry intact, PENELOPE drain to full suction with serosanguineous output  Lungs: non labored breathing  Heart: regular heart rate  Neurologic:   Mental status: Alert, oriented x3, follows commands, thought content appropriate  Cranial nerves: grossly intact (Cranial nerves II-XII)  Sensory:  Some dysesthetic pain in the left hand with movement, some numbness in bilateral toes more so on the left  Motor: moving all extremities   BLE:  5/5 throughout   RUE:  4/5 , 4-4+/5 bicep, 3-4-/5 tricep, 4-4+/5 deltoid   LUE:  4/5 , 4-4+/5 bicep, 3-4-/5 tricep, 4/5 deltoid  Reflexes:  No Hitesh bilaterally, few beats clonus left foot  Coordination: finger to nose normal bilaterally, no drift bilaterally      Lab Results:  Results from last 7 days   Lab Units 09/11/21  0510 09/10/21  0451 09/09/21 2253 09/09/21  1159   WBC Thousand/uL 8 62 9 31 5 58 5 35   HEMOGLOBIN g/dL 12 8 13 1 14 3 15 1   HEMATOCRIT % 38 2 39 0 42 5 44 4   PLATELETS Thousands/uL 108* 117* 119* 118*   NEUTROS PCT %  --  88*  --   --    MONOS PCT %  --  3*  --   --    MONO PCT %  --   --   --  4     Results from last 7 days   Lab Units 09/11/21  0510 09/10/21  0451 09/09/21  2253   POTASSIUM mmol/L 3 7 3 9 4 0   CHLORIDE mmol/L 107 109* 112*   CO2 mmol/L 29 24 23   BUN mg/dL 15 12 13   CREATININE mg/dL 0 69 0 59* 0 78   CALCIUM mg/dL 8 5 8 3 8 8     Results from last 7 days   Lab Units 09/10/21  0451 09/09/21  2253   MAGNESIUM mg/dL 2 3 2 1     Results from last 7 days   Lab Units 09/10/21  0451 09/09/21  2253   PHOSPHORUS mg/dL 3 5 2 0*     Results from last 7 days   Lab Units 09/10/21  0451 09/09/21 2253 09/09/21  1429   INR  1 18 1 12 1 10   PTT seconds  --   --  29       Imaging Studies: I have personally reviewed pertinent reports     and I have personally reviewed pertinent films in PACS    XR spine cervical 2 or 3 vw injury    Result Date: 9/10/2021  Impression: Fluoroscopic guidance provided for procedure guidance  Please refer to the separate procedure notes for additional details  Workstation performed: [id number]     XR hand 3+ views LEFT    Result Date: 9/9/2021  Impression: No acute osseous abnormality  Workstation performed: [id number]     CT head without contrast    Result Date: 9/9/2021  Impression: No acute intracranial abnormality  Workstation performed: [id number]     CT spine cervical without contrast    Result Date: 9/9/2021  Impression: C5 compression fracture with involvement of bilateral laminas  The C5 vertebral body is fractured and comminuted fashion involving both sagittal and coronal plane  4 mm retropulsion noted of the inferior aspect of the posterior vertebral body narrowing  the spinal canal   I personally discussed this study with Sally Estrada on 9/9/2021 at 12:19 PM   Workstation performed: [id number]     MRI cervical spine wo contrast    Result Date: 9/9/2021  Impression: 1  Comminuted fractures of the C5 vertebra with bony retropulsion and cord compression with cord edema/cord contusion  There is also edema and fractures of the posterior elements at the C5 level as described on the recent CT scan indicative of a 3 column injury  2   Cord edema extending from the C4-C6 levels without large epidural hematoma or obvious cord hemorrhage  I personally discussed this study with Dr Evan Peralta on 9/9/2021 at 3:27 PM  Workstation performed: [id number]       EKG, Pathology, and Other Studies: I have personally reviewed pertinent reports        VTE Pharmacologic Prophylaxis: none ordered    VTE Mechanical Prophylaxis: sequential compression device

## 2021-09-11 NOTE — PROGRESS NOTES
[address] [facility]  Progress Note - Nhi Collazo 2000, 24 y o  male MRN: [id number]  Unit/Bed#: ICU 05 Encounter: [id number]  Primary Care Provider: No primary care provider on file  Date and time admitted to hospital: 9/9/2021 11:01 AM    Central cord syndrome [facility])  Assessment & Plan  See plan for C5 cervical fracture  * C5 cervical fracture [facility])  Assessment & Plan  · Imaging reviewed with patient and significant other in detail  · Ordered [place] collar to be worn at all times x6 weeks  · Medical management pain control per primary team  · Recommend MAPs above 85 >7 days   ? Patient should be automapping >24 hours prior to leaving the ICU  · Steroids dc'ed per trauma  · DVT PPX: SCDs  Cleared for DVT ppx tomorrow morning  · PENELOPE drain to full suction  maintain at this time  175cc/24 hours  · PT/OT evaluation  · Pain control and medical management per primary team      Transfer Note - Francesco Mullins 24 y o  male MRN: [id number]  Unit/Bed#: ICU 05 Encounter: [id number]        ----------------------------------------------------------------------------------------  HPI/24hr events: Nhi Collazo is a 24 y o  male who presents status post fall  Patient states he fell earlier today while using the shower, hit his head on the tile  No LOC, however after the fall patient was unable to move for approximately 15 minutes  He called for his brother who helped transfer him to the bed and called 911  Eventually he regained motor function is  Also has pain in his distal bilateral fingers and toes  He states they feel cold and complains of tingling although denies any numbness      He denies any chest pain, did complain of mild shortness of breath and resolved      Emergency room the patient stable vital signs  Laboratory evaluation was largely unremarkable  CT head without acute intracranial abnormality    CT C-spine with comminuted C5 compression fracture involving the bilateral laminae  A 4 mm retropulsion her in the inferior aspect of the vertebral body narrowing the spinal canal   MRI cervical spine was also obtained demonstrating comminuted fractures of the C5 vertebrae with bony retropulsion and cord compression with cord edema/contusion  There was also edema and fractures of the posterior elements of C5  C4-6 decompressive laminectomies and C3-Q1 fusion was performed on 9/9  His care was continued in the ICU for frequent neuro checks  He auto-MAP-ed to >85 for 24 hours  At the time of transfer, his MAP was 87 on blood pressure cuff     ---------------------------------------------------------------------------------------  SUBJECTIVE  "I'm fine "     Review of Systems  Review of systems was reviewed and negative unless stated above in HPI/24-hour events   ---------------------------------------------------------------------------------------  Assessment and Plan:    Neuro:   · C5 fracture with cord compression  ? Status post posterior C4-6 decompression with C3-T1 fusion on 9/9  ? PENELOPE to full suction  ? Ancef for surgical prophylaxis  ? Follow-up upright x-rays when able  ? Decadron stopped  ? Neurosurgery following  ? Q4 hr neuro checks, or per unit protocol  ? Maintain cervical collar  · Acute pain  ? Scheduled Tylenol, gabapentin, Robaxin  ? P r n  Oxycodone and Dilaudid        CV:   · Maintain MAP>85 per neurosurg  ? Maintain MAP>85 for 7 days for neuro surgery     Pulm:  · No active concerns           GI:   · Diagnosis:  Risk for opiate induced constipation  ? Plan:  Maintain bowel regimen        :   · No active concerns         F/E/N:   · Plan: isolyte, D/C today when tolerating oral diet  · Replete lytes as needed  · NPO        Heme/Onc:   · DVT prophylaxis  ? Plan:  Hold chemical prophylaxis until cleared by Neurosurgery  ?  Maintain SCDs        Endo:   · No acute issues        ID:   · No acute issues          MSK/Skin: · Ambulatory dysfunction  ? PT/OT when able    Patient appropriate for transfer out of the ICU today?: Patient does not meet criteria for referral to the ICU Follow-Up Clinic; referral has not been made  Disposition: Transfer to [facility]  Code Status: Level 1 - Full Code  ---------------------------------------------------------------------------------------  ICU CORE MEASURES    Prophylaxis   VTE Pharmacologic Prophylaxis: Pharmacologic VTE Prophylaxis contraindicated due to spinal surgery  VTE Mechanical Prophylaxis: sequential compression device  Stress Ulcer Prophylaxis: Prophylaxis Not Indicated     ABCDE Protocol (if indicated)  Plan to perform spontaneous awakening trial today? Not applicable  Plan to perform spontaneous breathing trial today? Not applicable  Obvious barriers to extubation? Not applicable  CAM-ICU: Negative    Invasive Devices Review  Invasive Devices     Peripheral Intravenous Line            Peripheral IV 21 Right Forearm 1 day          Arterial Line            Arterial Line 21 Right Radial 1 day          Drain            Closed/Suction Drain Posterior; Left Neck Bulb 1 day              Can any invasive devices be discontinued today? Not applicable  ---------------------------------------------------------------------------------------  OBJECTIVE    Vitals   Vitals:    09/10/21 1700 09/10/21 1800 09/10/21 1900 09/10/21 2000   BP:       Pulse: 60 68 72 64   Resp: 21 (!) 25 22 17   Temp:    98 5 °F (36 9 °C)   TempSrc:    Oral   SpO2: 97%   98%   Weight:       Height:         Temp (24hrs), Av 6 °F (37 °C), Min:98 5 °F (36 9 °C), Max:98 6 °F (37 °C)  Current: Temperature: 98 5 °F (36 9 °C)  HR: 59  BP: 126/61  RR: 14  SpO2: 100         Invasive/non-invasive ventilation settings   Respiratory    Lab Data (Last 4 hours)    None         O2/Vent Data (Last 4 hours)    None                Physical Exam  Vitals and nursing note reviewed     Constitutional:       General: He is not in acute distress  HENT:      Head: Normocephalic and atraumatic  Eyes:      Pupils: Pupils are equal, round, and reactive to light  Neck:      Comments: In cervical spine collar  Cardiovascular:      Rate and Rhythm: Normal rate and regular rhythm  Pulmonary:      Effort: Pulmonary effort is normal  No respiratory distress  Abdominal:      General: There is no distension  Musculoskeletal:         General: No deformity  Right lower leg: No edema  Left lower leg: No edema  Skin:     General: Skin is warm and dry  Capillary Refill: Capillary refill takes less than 2 seconds  Neurological:      General: No focal deficit present  Mental Status: He is alert  Comments: AOx3  Cranial nerves grossly intact  RUE hand  2/5, otherwise extremities 5/5 strength  Paresthesias in right foot  Sensation intact otherwise  Laboratory and Diagnostics:  Results from last 7 days   Lab Units 09/10/21  0451 09/09/21  2253 09/09/21  1159   WBC Thousand/uL 9 31 5 58 5 35   HEMOGLOBIN g/dL 13 1 14 3 15 1   HEMATOCRIT % 39 0 42 5 44 4   PLATELETS Thousands/uL 117* 119* 118*   NEUTROS PCT % 88*  --   --    BANDS PCT %  --   --  2   MONOS PCT % 3*  --   --    MONO PCT %  --   --  4     Results from last 7 days   Lab Units 09/10/21  0451 09/09/21  2253 09/09/21  1159   SODIUM mmol/L 138 140 138   POTASSIUM mmol/L 3 9 4 0 4 0   CHLORIDE mmol/L 109* 112* 109*   CO2 mmol/L 24 23 23   ANION GAP mmol/L 5 5 6   BUN mg/dL 12 13 15   CREATININE mg/dL 0 59* 0 78 0 71   CALCIUM mg/dL 8 3 8 8 9 1   GLUCOSE RANDOM mg/dL 121 131 90     Results from last 7 days   Lab Units 09/10/21  0451 09/09/21  2253   MAGNESIUM mg/dL 2 3 2 1   PHOSPHORUS mg/dL 3 5 2 0*      Results from last 7 days   Lab Units 09/10/21  0451 09/09/21  2253 09/09/21  1429   INR  1 18 1 12 1 10   PTT seconds  --   --  29        Intake and Output  I/O       09/09 0701 - 09/10 0700 09/10 0701 - 09/11 0700    P  O  460 540    I V  (mL/kg) 3268 8 (42 8) 431 3 (5 6)    NG/GT  0    IV Piggyback 50 50    Total Intake(mL/kg) 3778 8 (49 5) 1021 3 (13 4)    Urine (mL/kg/hr) 2550 2650 (2 2)    Drains 175 150    Blood 150     Total Output 2875 2800    Net +903 8 -1778 8                Height and Weights   Height: 5' 10" (177 8 cm)  IBW (Ideal Body Weight): 73 kg  Body mass index is 24 17 kg/m²  Weight (last 2 days)     Date/Time   Weight    09/09/21 2300   76 4 (168 43)    09/09/21 1106   79 4 (175)                Nutrition       Diet Orders   (From admission, onward)             Start     Ordered    09/09/21 2248  Diet Regular; Regular House  Diet effective now     Question Answer Comment   Diet Type Regular    Regular Regular House    RD to adjust diet per protocol?  Yes        09/09/21 2247                  Active Medications  Scheduled Meds:  Current Facility-Administered Medications   Medication Dose Route Frequency Provider Last Rate    acetaminophen  975 mg Oral Q8H [address] Cynthia Biundo, PA-C      bisacodyl  10 mg Rectal Daily PRN Melvia Larger, PA-C      calcium carbonate  1,000 mg Oral Daily PRN Cynthia Biundo, PA-C      gabapentin  300 mg Oral TID DANGELO King      HYDROmorphone  0 5 mg Intravenous Q2H PRN Melvia Larger, PA-C      Or    HYDROmorphone  1 mg Intravenous Q2H PRN Melvia Larger, PA-C      HYDROmorphone  0 5 mg Intravenous Q3H PRN Cynthia Biundo, PA-C      magnesium hydroxide  30 mL Oral Daily PRN Cynthia Biundo, PA-C      melatonin  3 mg Oral HS DANGELO King      methocarbamol  750 mg Oral Q6H [address] Cynthia Biundo, PA-C      ondansetron  4 mg Intravenous Q4H PRN Cynthia Biundo, PA-C      oxyCODONE  10 mg Oral Q4H PRN Cynthia Biundo, PA-C      oxyCODONE  5 mg Oral Q4H PRN Cynthia Biundo, PA-C      senna-docusate sodium  2 tablet Oral Daily Cynthia Biundo, PA-C       Continuous Infusions:     PRN Meds:   bisacodyl, 10 mg, Daily PRN  calcium carbonate, 1,000 mg, Daily PRN  HYDROmorphone, 0 5 mg, Q2H PRN   Or  HYDROmorphone, 1 mg, Q2H PRN  HYDROmorphone, 0 5 mg, Q3H PRN  magnesium hydroxide, 30 mL, Daily PRN  ondansetron, 4 mg, Q4H PRN  oxyCODONE, 10 mg, Q4H PRN  oxyCODONE, 5 mg, Q4H PRN        Allergies   No Known Allergies  ---------------------------------------------------------------------------------------  Advance Directive and Living Will:      Power of :    POLST:    ---------------------------------------------------------------------------------------  Care Time Delivered:   No Critical Care time spent     Gary Strong MD      Portions of the record may have been created with voice recognition software  Occasional wrong word or "sound a like" substitutions may have occurred due to the inherent limitations of voice recognition software    Read the chart carefully and recognize, using context, where substitutions have occurred

## 2021-09-11 NOTE — ASSESSMENT & PLAN NOTE
POD#2 DECOMPRESSION SPINE CERVICAL POSTERIOR C4-C6 with posterolateral instrumented fusion C3-T1 and with arthrodesis    C5 burst fracture with mild retropulsion and bilateral laminar fractures and C5-6 capsular stretch diastasis  Possible spinal cord and ligamentous injury  · Status post slip after getting out of the shower with positive head strike, likely hyperflexion injury  Patient states he was unable to move for about 10 minutes and then was able to move slightly  No paresthesias noted  · Patient with significant bilateral tricep and  weakness    Imaging:  · MRI cervical spine 09/09/2021:  Comminuted fracture of the C5 vertebrae with bony retropulsion and cord compression cord edema/contusion  Edema in fracture of posterior elements of C5 indicative of a 3 column injury  Cord edema extending from C4-6 without large epidural hematoma her obvious cord hemorrhage  · Upright XR cervical spine ordered and pending to assess spinal alignment - discussed with RN to attempt these today    Plan:  · Continue to monitor neurological exam  · [place] collar at all times x 6 weeks postoperatively, [place] collar for showering  · PENELOPE drain to full suction  Serosanguineous output  200 cc in 24 hours  Remains in at full suction  · Medical management and pain control per primary team  · Recommend MAPs above 85 >7 days, patient currently on automapping on the leonila  · Steroids dc'ed per trauma  · DVT PPX: SCDs  Okay for pharmacological DVT prophylaxis from Neurosurgery standpoint  · Mobilize as tolerated with assistance, PT/OT evaluation    Neurosurgery will continue to follow  Call with any further questions or concerns

## 2021-09-11 NOTE — PLAN OF CARE
Problem: Potential for Falls  Goal: Patient will remain free of falls  Description: INTERVENTIONS:  - Educate patient/family on patient safety including physical limitations  - Instruct patient to call for assistance with activity   - Consult OT/PT to assist with strengthening/mobility   - Keep Call bell within reach  - Keep bed low and locked with side rails adjusted as appropriate  - Keep care items and personal belongings within reach  - Initiate and maintain comfort rounds  - Make Fall Risk Sign visible to staff  - Offer Toileting every  Hours, in advance of need  - Initiate/Maintain alarm  - Obtain necessary fall risk management equipment:   - Apply yellow socks and bracelet for high fall risk patients  - Consider moving patient to room near nurses station  Outcome: Progressing     Problem: MOBILITY - ADULT  Goal: Maintain or return to baseline ADL function  Description: INTERVENTIONS:  -  Assess patient's ability to carry out ADLs; assess patient's baseline for ADL function and identify physical deficits which impact ability to perform ADLs (bathing, care of mouth/teeth, toileting, grooming, dressing, etc )  - Assess/evaluate cause of self-care deficits   - Assess range of motion  - Assess patient's mobility; develop plan if impaired  - Assess patient's need for assistive devices and provide as appropriate  - Encourage maximum independence but intervene and supervise when necessary  - Involve family in performance of ADLs  - Assess for home care needs following discharge   - Consider OT consult to assist with ADL evaluation and planning for discharge  - Provide patient education as appropriate  Outcome: Progressing  Goal: Maintains/Returns to pre admission functional level  Description: INTERVENTIONS:  - Perform BMAT or MOVE assessment daily    - Set and communicate daily mobility goal to care team and patient/family/caregiver     - Collaborate with rehabilitation services on mobility goals if consulted  - Perform Range of Motion  times a day  - Reposition patient every  hours    - Dangle patient  times a day  - Stand patient  times a day  - Ambulate patient  times a day  - Out of bed to chair  times a day   - Out of bed for meals  times a day  - Out of bed for toileting  - Record patient progress and toleration of activity level   Outcome: Progressing     Problem: Prexisting or High Potential for Compromised Skin Integrity  Goal: Skin integrity is maintained or improved  Description: INTERVENTIONS:  - Identify patients at risk for skin breakdown  - Assess and monitor skin integrity  - Assess and monitor nutrition and hydration status  - Monitor labs   - Assess for incontinence   - Turn and reposition patient  - Assist with mobility/ambulation  - Relieve pressure over bony prominences  - Avoid friction and shearing  - Provide appropriate hygiene as needed including keeping skin clean and dry  - Evaluate need for skin moisturizer/barrier cream  - Collaborate with interdisciplinary team   - Patient/family teaching  - Consider wound care consult   Outcome: Progressing     Problem: PAIN - ADULT  Goal: Verbalizes/displays adequate comfort level or baseline comfort level  Description: Interventions:  - Encourage patient to monitor pain and request assistance  - Assess pain using appropriate pain scale  - Administer analgesics based on type and severity of pain and evaluate response  - Implement non-pharmacological measures as appropriate and evaluate response  - Consider cultural and social influences on pain and pain management  - Notify physician/advanced practitioner if interventions unsuccessful or patient reports new pain  Outcome: Progressing     Problem: INFECTION - ADULT  Goal: Absence or prevention of progression during hospitalization  Description: INTERVENTIONS:  - Assess and monitor for signs and symptoms of infection  - Monitor lab/diagnostic results  - Monitor all insertion sites, i e  indwelling lines, tubes, and drains  - Monitor endotracheal if appropriate and nasal secretions for changes in amount and color  - [place] appropriate cooling/warming therapies per order  - Administer medications as ordered  - Instruct and encourage patient and family to use good hand hygiene technique  - Identify and instruct in appropriate isolation precautions for identified infection/condition  Outcome: Progressing  Goal: Absence of fever/infection during neutropenic period  Description: INTERVENTIONS:  - Monitor WBC    Outcome: Progressing     Problem: SAFETY ADULT  Goal: Patient will remain free of falls  Description: INTERVENTIONS:  - Educate patient/family on patient safety including physical limitations  - Instruct patient to call for assistance with activity   - Consult OT/PT to assist with strengthening/mobility   - Keep Call bell within reach  - Keep bed low and locked with side rails adjusted as appropriate  - Keep care items and personal belongings within reach  - Initiate and maintain comfort rounds  - Make Fall Risk Sign visible to staff  - Offer Toileting every  Hours, in advance of need  - Initiate/Maintain alarm  - Obtain necessary fall risk management equipment:   - Apply yellow socks and bracelet for high fall risk patients  - Consider moving patient to room near nurses station  Outcome: Progressing  Goal: Maintain or return to baseline ADL function  Description: INTERVENTIONS:  -  Assess patient's ability to carry out ADLs; assess patient's baseline for ADL function and identify physical deficits which impact ability to perform ADLs (bathing, care of mouth/teeth, toileting, grooming, dressing, etc )  - Assess/evaluate cause of self-care deficits   - Assess range of motion  - Assess patient's mobility; develop plan if impaired  - Assess patient's need for assistive devices and provide as appropriate  - Encourage maximum independence but intervene and supervise when necessary  - Involve family in performance of ADLs  - Assess for home care needs following discharge   - Consider OT consult to assist with ADL evaluation and planning for discharge  - Provide patient education as appropriate  Outcome: Progressing  Goal: Maintains/Returns to pre admission functional level  Description: INTERVENTIONS:  - Perform BMAT or MOVE assessment daily    - Set and communicate daily mobility goal to care team and patient/family/caregiver  - Collaborate with rehabilitation services on mobility goals if consulted  - Perform Range of Motion  times a day  - Reposition patient every  hours  - Dangle patient  times a day  - Stand patient  times a day  - Ambulate patient  times a day  - Out of bed to chair  times a day   - Out of bed for meals times a day  - Out of bed for toileting  - Record patient progress and toleration of activity level   Outcome: Progressing     Problem: DISCHARGE PLANNING  Goal: Discharge to home or other facility with appropriate resources  Description: INTERVENTIONS:  - Identify barriers to discharge w/patient and caregiver  - Arrange for needed discharge resources and transportation as appropriate  - Identify discharge learning needs (meds, wound care, etc )  - Arrange for interpretive services to assist at discharge as needed  - Refer to Case Management Department for coordinating discharge planning if the patient needs post-hospital services based on physician/advanced practitioner order or complex needs related to functional status, cognitive ability, or social support system  Outcome: Progressing     Problem: Knowledge Deficit  Goal: Patient/family/caregiver demonstrates understanding of disease process, treatment plan, medications, and discharge instructions  Description: Complete learning assessment and assess knowledge base    Interventions:  - Provide teaching at level of understanding  - Provide teaching via preferred learning methods  Outcome: Progressing

## 2021-09-12 PROCEDURE — 99024 POSTOP FOLLOW-UP VISIT: CPT | Performed by: PHYSICIAN ASSISTANT

## 2021-09-12 PROCEDURE — 99232 SBSQ HOSP IP/OBS MODERATE 35: CPT | Performed by: SURGERY

## 2021-09-12 RX ADMIN — HYDROMORPHONE HYDROCHLORIDE 0.5 MG: 1 INJECTION, SOLUTION INTRAMUSCULAR; INTRAVENOUS; SUBCUTANEOUS at 13:21

## 2021-09-12 RX ADMIN — ACETAMINOPHEN 975 MG: 325 TABLET, FILM COATED ORAL at 21:38

## 2021-09-12 RX ADMIN — OXYCODONE HYDROCHLORIDE 5 MG: 5 TABLET ORAL at 09:33

## 2021-09-12 RX ADMIN — METHOCARBAMOL TABLETS 750 MG: 750 TABLET, COATED ORAL at 00:11

## 2021-09-12 RX ADMIN — METHOCARBAMOL TABLETS 750 MG: 750 TABLET, COATED ORAL at 13:21

## 2021-09-12 RX ADMIN — OXYCODONE HYDROCHLORIDE 10 MG: 10 TABLET ORAL at 16:35

## 2021-09-12 RX ADMIN — ACETAMINOPHEN 975 MG: 325 TABLET, FILM COATED ORAL at 13:21

## 2021-09-12 RX ADMIN — OXYCODONE HYDROCHLORIDE 10 MG: 10 TABLET ORAL at 21:38

## 2021-09-12 RX ADMIN — GABAPENTIN 300 MG: 300 CAPSULE ORAL at 09:35

## 2021-09-12 RX ADMIN — METHOCARBAMOL TABLETS 750 MG: 750 TABLET, COATED ORAL at 18:06

## 2021-09-12 RX ADMIN — OXYCODONE HYDROCHLORIDE 10 MG: 10 TABLET ORAL at 03:23

## 2021-09-12 RX ADMIN — HYDROMORPHONE HYDROCHLORIDE 0.5 MG: 1 INJECTION, SOLUTION INTRAMUSCULAR; INTRAVENOUS; SUBCUTANEOUS at 18:06

## 2021-09-12 RX ADMIN — GABAPENTIN 300 MG: 300 CAPSULE ORAL at 16:34

## 2021-09-12 RX ADMIN — ACETAMINOPHEN 975 MG: 325 TABLET, FILM COATED ORAL at 05:26

## 2021-09-12 RX ADMIN — ENOXAPARIN SODIUM 30 MG: 30 INJECTION SUBCUTANEOUS at 05:26

## 2021-09-12 RX ADMIN — ENOXAPARIN SODIUM 30 MG: 30 INJECTION SUBCUTANEOUS at 18:06

## 2021-09-12 RX ADMIN — METHOCARBAMOL TABLETS 750 MG: 750 TABLET, COATED ORAL at 05:26

## 2021-09-12 RX ADMIN — Medication 3 MG: at 21:38

## 2021-09-12 RX ADMIN — GABAPENTIN 300 MG: 300 CAPSULE ORAL at 21:38

## 2021-09-12 NOTE — ASSESSMENT & PLAN NOTE
POD#3 DECOMPRESSION SPINE CERVICAL POSTERIOR C4-C6 with posterolateral instrumented fusion C3-T1 and with arthrodesis    C5 burst fracture with mild retropulsion and bilateral laminar fractures and C5-6 capsular stretch diastasis  Possible spinal cord and ligamentous injury  · Status post slip after getting out of the shower with positive head strike, likely hyperflexion injury  Patient states he was unable to move for about 10 minutes and then was able to move slightly  No paresthesias noted  · Patient with significant bilateral tricep and  weakness    Imaging:  · Upright cervical spine x-rays 09/11/2021:  Official read pending however per my interpretation hardware appears intact with stable alignment    Plan:  · Continue to monitor neurological exam   Continues to make small improvements in regards to strength and sensation each day  Incision clean dry intact with PENELOPE drain still in place  · [employer] collar at all times x 6 weeks postoperatively, [place] collar for showering  · PENELOPE drain in place  Serosanguineous output  140 cc in 24 hours  Remains in but will change to thumb print for an anticipated removal tomorrow  · Medical management and pain control per primary team  · Recommend MAPs above 85 >7 days, patient currently on automapping on the floor  · Steroids dc'ed per trauma  · DVT PPX: SCDs  Lovenox  · Mobilize as tolerated with assistance, PT/OT evaluation    Neurosurgery will continue to follow  Call with any further questions or concerns

## 2021-09-12 NOTE — ASSESSMENT & PLAN NOTE
· Patient is s/p C4-C6 decompressive laminectomies, C3-T1 fusion on 9/9/21  · Upright x-rays completed in [facility] collar on 9/11  · Maintain [place] collar and cervical spine precautions  · Multi modal analgesic regimen  · Continue neuro checks  Exam improving, continues to have exam findings c/w central cord syndrome, bilateral  and tricep strength weakness and decreased sensation in the C8 dermatome bilaterally  · Recommend MAPs above 85 >7 days  Patient is auto- mapping at this time  · DVT PPX: SCDs  Lovenox started on 9/11/21  · PENELOPE drain per neurosurgery, at full suction and to remain at this time  · Bowel regimen added  Patient is voiding  · PT/OT evaluation appreciated, recommending SCI rehab which patient and family are in agreement with  CM assisting with referrals  PM&R consult placed  · Patient will require outpatient follow-up with Neurosurgery in 2 weeks for postop check

## 2021-09-12 NOTE — PROGRESS NOTES
[facility] [facility]  Progress Note - Jarod العلي 2000, 24 y o  male MRN: [id number]  Unit/Bed#: [facility] 631-01 Encounter: [id number]  Primary Care Provider: No primary care provider on file  Date and time admitted to hospital: 9/9/2021 11:01 AM    Fall  Assessment & Plan  - sustaining the below stated injuries  - PT/OT recommending acute spinal cord rehab, patient in agreement  - CM following to find accepting facility    * C5 cervical fracture [facility])  Assessment & Plan  · Patient is s/p C4-C6 decompressive laminectomies, C3-T1 fusion on 9/9/21  · Upright x-rays completed in VISTA collar on 9/11  · Maintain VISTA collar and cervical spine precautions  · Multi modal analgesic regimen  · Continue neuro checks  Exam improving, continues to have exam findings c/w central cord syndrome, bilateral  and tricep strength weakness and decreased sensation in the C8 dermatome bilaterally  · Recommend MAPs above 85 >7 days  Patient is auto- mapping at this time  · DVT PPX: SCDs  Lovenox started on 9/11/21  · PENELOPE drain per neurosurgery, at full suction and to remain at this time  · Bowel regimen added  Patient is voiding  · PT/OT evaluation appreciated, recommending SCI rehab which patient and family are in agreement with  CM assisting with referrals  PM&R consult placed  · Patient will require outpatient follow-up with Neurosurgery in 2 weeks for postop check  Central cord syndrome [facility])  Assessment & Plan  See plan for C5 cervical fracture  Acute pain due to trauma  Assessment & Plan  - Acute pain secondary to traumatic injuries  - Continue multi modal analgesic regimen  - Bowel regimen as long as using opioids   - Continue to monitor pain and adjust regimen as indicated  Disposition: continue med-surg status, placement pending at SCI rehab  PM&R consult placed         SUBJECTIVE:  Chief Complaint:  I am feeling good    Subjective:  Patient states his pain is controlled on the pain regimen he is on  He feels that the most discomfort he has is in his bilateral hands after he has been asleep all night  He states that when he wakes up his hands feel stiff because he has not been moving them  During the day when he is moving and exercising more his hands and arms feel better  He states his neck pain is controlled  He is voiding without difficulty but has not had a bowel movement yet  He is motivated to talk to Case Management about rehab placement  Father is at bedside and updated on plan        OBJECTIVE:     Meds/Allergies     Current Facility-Administered Medications:     acetaminophen (TYLENOL) tablet 975 mg, 975 mg, Oral, Q8H ROCÍO, DANGELO Bond, 975 mg at 09/12/21 0526    bisacodyl (DULCOLAX) rectal suppository 10 mg, 10 mg, Rectal, Daily PRN, DANGELO Batista    calcium carbonate (TUMS) chewable tablet 1,000 mg, 1,000 mg, Oral, Daily PRN, DANGELO Batista    enoxaparin (LOVENOX) subcutaneous injection 30 mg, 30 mg, Subcutaneous, Q12H, Nimo Da Silva PA-C, 30 mg at 09/12/21 9926    gabapentin (NEURONTIN) capsule 300 mg, 300 mg, Oral, TID, DANGELO Kulkarni, 300 mg at 09/12/21 0935    HYDROmorphone (DILAUDID) injection 0 5 mg, 0 5 mg, Intravenous, Q3H PRN, DANGELO Batista, 0 5 mg at 09/11/21 1717    magnesium hydroxide (MILK OF MAGNESIA) oral suspension 30 mL, 30 mL, Oral, Daily PRN, DANGELO Bond    melatonin tablet 3 mg, 3 mg, Oral, HS, DANGELO Bond, 3 mg at 09/11/21 2058    methocarbamol (ROBAXIN) tablet 750 mg, 750 mg, Oral, Q6H ROCÍO, DANGELO Bond, 750 mg at 09/12/21 0526    ondansetron (ZOFRAN) injection 4 mg, 4 mg, Intravenous, Q4H PRN, DANGELO Bond    oxyCODONE (ROXICODONE) immediate release tablet 10 mg, 10 mg, Oral, Q4H PRN, DANGELO Bond, 10 mg at 09/12/21 0323    oxyCODONE (ROXICODONE) IR tablet 5 mg, 5 mg, Oral, Q4H PRN, iLsa Stewart Bekesy, CRNP, 5 mg at 09/12/21 0933    senna-docusate sodium (SENOKOT S) 8 6-50 mg per tablet 2 tablet, 2 tablet, Oral, Daily, Lisa Contreras, CRNP, 2 tablet at 09/11/21 0827     Vitals:   Vitals:    09/12/21 0755   BP: 130/79   Pulse: 69   Resp: 17   Temp: 98 1 °F (36 7 °C)   SpO2: 98%       Intake/Output:  I/O       09/10 0701 - 09/11 0700 09/11 0701 - 09/12 0700 09/12 0701 - 09/13 0700    P  O  540 360     I V  (mL/kg) 431 3 (5 6)      NG/GT 0      IV Piggyback 50      Total Intake(mL/kg) 1021 3 (13 4) 360 (4 7)     Urine (mL/kg/hr) 3150 (1 7) 1700 (0 9) 300 (1)    Drains 200 140 30    Blood       Total Output 3350 1840 330    Net -2328 8 -1480 -330           Unmeasured Urine Occurrence  3 x 1 x           Nutrition/GI Proph/Bowel Reg:  Regular    Physical Exam:   GENERAL APPEARANCE:  No acute distress  NEURO:  GCS 15, + strengths 4/5 bilaterally with decreased strength in the hand in the median and ulnar nerve distribution,  Bilateral triceps strength 4/5, 4+ out of 5 strength bilateral biceps and 5/5 strength triceps  He has decreased sensation in the C8 dermatome in the bilateral hands  Strength is 5/5 throughout in bilateral lower extremities  He does have decreased sensation in the left foot up to the ankle laterally  Sensation is intact throughout the bilateral lower extremities otherwise  HEENT:  Normocephalic; +Vista collar in place; +PENELOPE drain with serosanguineous drainage intact  CV:  Regular rate and rhythm, no murmurs gallops or rubs  LUNGS:  Clear to auscultation bilaterally  GI:  Soft, nontender, nondistended  :  Voiding  MSK:  Moving all extremities equally bilaterally  SKIN:  Pink, warm, dry    Invasive Devices     Peripheral Intravenous Line            Peripheral IV 09/11/21 Right Forearm 1 day          Drain            Closed/Suction Drain Posterior; Left Neck Bulb 2 days                 Lab Results: Results: I have personally reviewed pertinent reports   , BMP/CMP: No results found for: SODIUM, K, CL, CO2, ANIONGAP, BUN, CREATININE, GLUCOSE, CALCIUM, AST, ALT, ALKPHOS, PROT, BILITOT, EGFR and CBC: No results found for: WBC, HGB, HCT, MCV, PLT, ADJUSTEDWBC, MCH, MCHC, RDW, MPV, NRBC  Imaging/EKG Studies: Results: I have personally reviewed pertinent reports      Other Studies:  No new  VTE Prophylaxis: Sequential compression device (Venodyne)  and Enoxaparin (Lovenox)

## 2021-09-12 NOTE — CASE MANAGEMENT
CM met with pt and additional family members at bedside in order to discuss therapy recommendations  Therapy recommendations discussed  A post acute care recommendation was made by your care team for Acute Rehab  Discussed [place] of Choice with patient  List of facilities given to patient via in person  patient aware the list is custom filtered for them by [facility] and that [facility] post acute providers are designated  Pt and family requested referral to [facility]-for spinal cord rehab  CM placed referral for same via [facility] as requested  Pt reports he will reivew list and provide additional back up choices to CM if [facility] cannot accept  CM department will continue to follow

## 2021-09-12 NOTE — PROGRESS NOTES
Pt bladder scanned at [facility], pt stated that he doesn't have to void at this moment and is not in the mood to get straight cath

## 2021-09-12 NOTE — PROGRESS NOTES
[address] [facility]  Progress Note - Duglas Jauregui 2000, 24 y o  male MRN: [id number]  Unit/Bed#: [facility] 631-01 Encounter: [id number]  Primary Care Provider: No primary care provider on file  Date and time admitted to hospital: 9/9/2021 11:01 AM    * C5 cervical fracture (HCC)  Assessment & Plan  POD#3 DECOMPRESSION SPINE CERVICAL POSTERIOR C4-C6 with posterolateral instrumented fusion C3-T1 and with arthrodesis    C5 burst fracture with mild retropulsion and bilateral laminar fractures and C5-6 capsular stretch diastasis  Possible spinal cord and ligamentous injury  · Status post slip after getting out of the shower with positive head strike, likely hyperflexion injury  Patient states he was unable to move for about 10 minutes and then was able to move slightly  No paresthesias noted  · Patient with significant bilateral tricep and  weakness    Imaging:  · Upright cervical spine x-rays 09/11/2021:  Official read pending however per my interpretation hardware appears intact with stable alignment    Plan:  · Continue to monitor neurological exam   Continues to make small improvements in regards to strength and sensation each day  Incision clean dry intact with PENELOPE drain still in place  · [employer] collar at all times x 6 weeks postoperatively, [place] collar for showering  · PENELOPE drain in place  Serosanguineous output  140 cc in 24 hours  Remains in but will change to thumb print for an anticipated removal tomorrow  · Medical management and pain control per primary team  · Recommend MAPs above 85 >7 days, patient currently on automapping on the floor  · Steroids dc'ed per trauma  · DVT PPX: SCDs  Lovenox  · Mobilize as tolerated with assistance, PT/OT evaluation    Neurosurgery will continue to follow  Call with any further questions or concerns      Fall  Assessment & Plan  See plan above    Central cord syndrome [facility])  Assessment & Plan  · See plan above Subjective/Objective   Chief Complaint: "I feel okay"     Subjective:  Patient reports continued small improvements in strength and sensation each day  He is very motivated to do as much as he can to regain his strength and sensation and has been working periodically through the day on mobilization activities while in bed  He continues to wear collar at all times  Has about 5/10 incisional neck pain for which medication is helping  He continues to have some weakness in his distal bilateral upper extremities but is able to open and close his hand slowly  He states he does occasionally have a burning sensation in his left hand if he does not move it for some time but he is usually able to work this out with mobilization  He has full strength in his lower extremities and states that the numbness in his toes is slowly resolving, stating he has more sensation in his big toes bilaterally  He is urinating per baseline  Passing gas but no bowel movement as he has not been eating much  He would like to have another stress ball to help with hand mobilization  No new or worsening symptoms  Objective:  Lying in bed, collar in place, NAD    I/O       09/10 0701 - 09/11 0700 09/11 0701 - 09/12 0700 09/12 0701 - 09/13 0700    P  O  540 360     I V  (mL/kg) 431 3 (5 6)      NG/GT 0      IV Piggyback 50      Total Intake(mL/kg) 1021 3 (13 4) 360 (4 7)     Urine (mL/kg/hr) 3150 (1 7) 1700 (0 9) 300 (1)    Drains 200 140 30    Blood       Total Output 3350 1840 330    Net -2328 8 -1480 -330           Unmeasured Urine Occurrence  3 x 1 x          Invasive Devices     Peripheral Intravenous Line            Peripheral IV 09/11/21 Right Forearm 1 day          Drain            Closed/Suction Drain Posterior; Left Neck Bulb 2 days                Physical Exam:  Vitals: Blood pressure 130/79, pulse 69, temperature 98 1 °F (36 7 °C), resp  rate 17, height 5' 10" (1 778 m), weight 76 4 kg (168 lb 6 9 oz), SpO2 98 %  ,Body mass index is 24 17 kg/m²        General appearance: alert, appears stated age, cooperative and no distress  Head: Normocephalic, without obvious abnormality, atraumatic  Eyes:  Conjugate gaze  Neck: supple, symmetrical, trachea midline, [place] collar in place, dressing in place is intact, incision clean dry intact, PENELOPE drain with minimal serosanguineous output noted in balled, will change to thumb print  Lungs: non labored breathing  Heart: regular heart rate  Neurologic:   Mental status: Alert, oriented x3, follows commands, thought content appropriate  Cranial nerves: grossly intact (Cranial nerves II-XII)  Sensory:  Normal to light touch in bilateral upper extremities, mild numbness to light touch in bilateral toes with better sensation today  Motor: moving all extremities with continued slight weakness in hands and triceps but slowly improving, strength 5/5 throughout except as noted:   BUE:  4/5 , 3-4-/5 I/O, 4/5 tricep, 5/5 bicep, 4+/5 deltoid due to pain  Coordination: finger to nose abnormal bilaterally with dysmetria noted but able to self correct, no drift bilaterally      Lab Results:  Results from last 7 days   Lab Units 09/11/21  0510 09/10/21  0451 09/09/21  2253 09/09/21  1159   WBC Thousand/uL 8 62 9 31 5 58 5 35   HEMOGLOBIN g/dL 12 8 13 1 14 3 15 1   HEMATOCRIT % 38 2 39 0 42 5 44 4   PLATELETS Thousands/uL 108* 117* 119* 118*   NEUTROS PCT %  --  88*  --   --    MONOS PCT %  --  3*  --   --    MONO PCT %  --   --   --  4     Results from last 7 days   Lab Units 09/11/21  0510 09/10/21  0451 09/09/21  2253   POTASSIUM mmol/L 3 7 3 9 4 0   CHLORIDE mmol/L 107 109* 112*   CO2 mmol/L 29 24 23   BUN mg/dL 15 12 13   CREATININE mg/dL 0 69 0 59* 0 78   CALCIUM mg/dL 8 5 8 3 8 8     Results from last 7 days   Lab Units 09/10/21  0451 09/09/21  2253   MAGNESIUM mg/dL 2 3 2 1     Results from last 7 days   Lab Units 09/10/21  0451 09/09/21  2253   PHOSPHORUS mg/dL 3 5 2 0*     Results from last 7 days   Lab Units 09/10/21  0451 09/09/21  2253 09/09/21  1429   INR  1 18 1 12 1 10   PTT seconds  --   --  29       Imaging Studies: I have personally reviewed pertinent reports  and I have personally reviewed pertinent films in PACS    XR spine cervical 2 or 3 vw injury    Result Date: 9/10/2021  Impression: Fluoroscopic guidance provided for procedure guidance  Please refer to the separate procedure notes for additional details  Workstation performed: [id number]     XR hand 3+ views LEFT    Result Date: 9/9/2021  Impression: No acute osseous abnormality  Workstation performed: [id number]     CT head without contrast    Result Date: 9/9/2021  Impression: No acute intracranial abnormality  Workstation performed: [id number]     CT spine cervical without contrast    Result Date: 9/9/2021  Impression: C5 compression fracture with involvement of bilateral laminas  The C5 vertebral body is fractured and comminuted fashion involving both sagittal and coronal plane  4 mm retropulsion noted of the inferior aspect of the posterior vertebral body narrowing  the spinal canal   I personally discussed this study with Tamar Marrero on 9/9/2021 at 12:19 PM   Workstation performed: [id number]     MRI cervical spine wo contrast    Result Date: 9/9/2021  Impression: 1  Comminuted fractures of the C5 vertebra with bony retropulsion and cord compression with cord edema/cord contusion  There is also edema and fractures of the posterior elements at the C5 level as described on the recent CT scan indicative of a 3 column injury  2   Cord edema extending from the C4-C6 levels without large epidural hematoma or obvious cord hemorrhage  I personally discussed this study with Dr Mortimer Christine on 9/9/2021 at 3:27 PM  Workstation performed: [id number]       EKG, Pathology, and Other Studies: I have personally reviewed pertinent reports        VTE Pharmacologic Prophylaxis: Enoxaparin (Lovenox)    VTE Mechanical Prophylaxis: sequential compression device

## 2021-09-13 ENCOUNTER — TELEPHONE (OUTPATIENT)
Dept: NEUROSURGERY | Facility: CLINIC | Age: 21
End: 2021-09-13

## 2021-09-13 PROCEDURE — 99233 SBSQ HOSP IP/OBS HIGH 50: CPT | Performed by: EMERGENCY MEDICINE

## 2021-09-13 PROCEDURE — NC001 PR NO CHARGE: Performed by: NURSE PRACTITIONER

## 2021-09-13 PROCEDURE — 99024 POSTOP FOLLOW-UP VISIT: CPT | Performed by: NEUROLOGICAL SURGERY

## 2021-09-13 PROCEDURE — 99254 IP/OBS CNSLTJ NEW/EST MOD 60: CPT | Performed by: NURSE PRACTITIONER

## 2021-09-13 PROCEDURE — 99024 POSTOP FOLLOW-UP VISIT: CPT | Performed by: PHYSICIAN ASSISTANT

## 2021-09-13 PROCEDURE — 94760 N-INVAS EAR/PLS OXIMETRY 1: CPT

## 2021-09-13 RX ORDER — DOCUSATE SODIUM 100 MG/1
100 CAPSULE, LIQUID FILLED ORAL 2 TIMES DAILY
Status: DISCONTINUED | OUTPATIENT
Start: 2021-09-13 | End: 2021-09-15 | Stop reason: HOSPADM

## 2021-09-13 RX ORDER — POLYETHYLENE GLYCOL 3350 17 G/17G
17 POWDER, FOR SOLUTION ORAL DAILY
Status: DISCONTINUED | OUTPATIENT
Start: 2021-09-13 | End: 2021-09-15 | Stop reason: HOSPADM

## 2021-09-13 RX ORDER — HYDROMORPHONE HCL/PF 1 MG/ML
0.5 SYRINGE (ML) INJECTION EVERY 6 HOURS PRN
Status: DISCONTINUED | OUTPATIENT
Start: 2021-09-13 | End: 2021-09-15 | Stop reason: HOSPADM

## 2021-09-13 RX ORDER — SENNOSIDES 8.6 MG
2 TABLET ORAL
Status: DISCONTINUED | OUTPATIENT
Start: 2021-09-13 | End: 2021-09-15 | Stop reason: HOSPADM

## 2021-09-13 RX ADMIN — GABAPENTIN 300 MG: 300 CAPSULE ORAL at 16:18

## 2021-09-13 RX ADMIN — METHOCARBAMOL TABLETS 750 MG: 750 TABLET, COATED ORAL at 17:27

## 2021-09-13 RX ADMIN — METHOCARBAMOL TABLETS 750 MG: 750 TABLET, COATED ORAL at 12:06

## 2021-09-13 RX ADMIN — STANDARDIZED SENNA CONCENTRATE 17.2 MG: 8.6 TABLET ORAL at 21:56

## 2021-09-13 RX ADMIN — OXYCODONE HYDROCHLORIDE 10 MG: 10 TABLET ORAL at 13:36

## 2021-09-13 RX ADMIN — HYDROMORPHONE HYDROCHLORIDE 0.5 MG: 1 INJECTION, SOLUTION INTRAMUSCULAR; INTRAVENOUS; SUBCUTANEOUS at 21:58

## 2021-09-13 RX ADMIN — HYDROMORPHONE HYDROCHLORIDE 0.5 MG: 1 INJECTION, SOLUTION INTRAMUSCULAR; INTRAVENOUS; SUBCUTANEOUS at 12:06

## 2021-09-13 RX ADMIN — OXYCODONE HYDROCHLORIDE 5 MG: 5 TABLET ORAL at 18:27

## 2021-09-13 RX ADMIN — GABAPENTIN 300 MG: 300 CAPSULE ORAL at 21:57

## 2021-09-13 RX ADMIN — OXYCODONE HYDROCHLORIDE 10 MG: 10 TABLET ORAL at 08:54

## 2021-09-13 RX ADMIN — DOCUSATE SODIUM 100 MG: 100 CAPSULE, LIQUID FILLED ORAL at 17:27

## 2021-09-13 RX ADMIN — Medication 3 MG: at 21:58

## 2021-09-13 RX ADMIN — ACETAMINOPHEN 975 MG: 325 TABLET, FILM COATED ORAL at 21:55

## 2021-09-13 RX ADMIN — ACETAMINOPHEN 975 MG: 325 TABLET, FILM COATED ORAL at 13:13

## 2021-09-13 RX ADMIN — METHOCARBAMOL TABLETS 750 MG: 750 TABLET, COATED ORAL at 00:02

## 2021-09-13 RX ADMIN — ACETAMINOPHEN 975 MG: 325 TABLET, FILM COATED ORAL at 05:07

## 2021-09-13 RX ADMIN — METHOCARBAMOL TABLETS 750 MG: 750 TABLET, COATED ORAL at 23:34

## 2021-09-13 RX ADMIN — GABAPENTIN 300 MG: 300 CAPSULE ORAL at 08:53

## 2021-09-13 RX ADMIN — OXYCODONE HYDROCHLORIDE 5 MG: 5 TABLET ORAL at 04:09

## 2021-09-13 RX ADMIN — POLYETHYLENE GLYCOL 3350 17 G: 17 POWDER, FOR SOLUTION ORAL at 12:06

## 2021-09-13 RX ADMIN — METHOCARBAMOL TABLETS 750 MG: 750 TABLET, COATED ORAL at 05:07

## 2021-09-13 NOTE — ASSESSMENT & PLAN NOTE
· Patient is s/p C4-C6 decompressive laminectomies, C3-T1 fusion on 9/9/21  · Upright x-rays completed in VISTA collar on 9/11  · Maintain VISTA collar and cervical spine precautions  · Multi modal analgesic regimen  · Continue neuro checks  Exam improving, continues to have exam findings c/w central cord syndrome, bilateral  and tricep strength weakness and decreased sensation in the C8 dermatome bilaterally  · Recommend MAPs above 85 >7 days  Patient is auto- mapping at this time  · DVT PPX: SCDs  Lovenox started on 9/11/21  · PENELOPE drain removed 9/13  · Bowel regimen added  Patient is voiding  · PT/OT evaluation appreciated, recommending SCI rehab which patient and family are in agreement with  CM assisting with referrals  PM&R consult placed  · Patient will require outpatient follow-up with Neurosurgery in 2 weeks for postop check

## 2021-09-13 NOTE — CONSULTS
PHYSICAL MEDICINE AND REHABILITATION CONSULT NOTE  Merissa Bedoya 24 y o  male MRN: [id number]  Unit/Bed#: [facility] 631-01 Encounter: [id number]    Requested by (Physician/Service): Marget Duane, DO  Reason for Consultation:  Assessment of rehabilitation needs    Assessment:  Rehabilitation Diagnosis:    C5 cervical fracture s/p decompression/fusion   Impaired mobility and self care    Recommendations:  Rehabilitation Plan:   Continue PT/OT while on acute care   The patient is a good candidate for acute inpatient rehabilitation  He would be a candidate for either general acute or dedicated spinal cord  [facility] is 1st choice and [facility] 2nd choice   Covid-19 Testing: [facility] rehabilitation units require testing within 48 hours of potential admission for those UNVACCINATED  *Re-testing is NOT required for patients recovering from COVID-19 infection if isolation has been discontinued per CDC criteria  Medical Co-morbidities Plan:  · C5 cervical fracture  · Central cord syndrome   · Acute traumatic pain   · Neuropathic pain  · Constipation  · DVT ppx: Lovenox and SCD    Thank you for this consultation  Do not hesitate to contact service with further questions  DANGELO Castro  PM&R    History of Present Illness: Merissa Bedoya is a 24 y o  male who presented to the [address] on 9/9/21 after falling while getting out of the shower with head strike  He initially could not move all 4 extremities for about 10 minutes  CT of the cervical spine showed C5 burst fracture with loss of height and mild retropulsion with bilateral laminar fractures  Also C5-C6 capsular stretch diastasis bilaterally likely hyperflexion injury with possible spinal cord and ligamentous injury  He underwent posterior cervical decompression C4-C6 with posterolateral instrumented fusion C3-T1 and with arthrodesis  He currently POD #3   He is to remain in an [address] collar at all times 6 weeks postoperatively, [place] collar for showering  PM& are consulted for rehabilitation recommendations  The patient was seen in his room with family at bedside  He is reporting much improvement in his strength  He occasional burning in his heel when he puts weight on it  Bilateral hand numbness and tingling are much improved since surgery  He was able to walk several feet today past 7 rooms with therapy  He is urinating and passing gas but has not has a bowel movement since Thursday  Review of Systems: 10 point ROS negative except for what is noted in HPI    Function:  Prior level of function and living situation:  The patient lives in a two level home with bed/bath upstairs  There are 2 AURELIO and 14 steps to the 2nd floor  He lives with his mother and father who both work  He was independent for ADLs and working full time  Current level of function:  Physical Therapy:  Minimal assist for bed mobility, transfers and ambulation going 4 feet  Occupational Therapy:  Minimal assistance for eating, LB bathing/dressing, moderate assistance for grooming, UB bathing/dressing    Physical Exam:  /77   Pulse 74   Temp 99 1 °F (37 3 °C)   Resp 17   Ht 5' 10" (1 778 m)   Wt 76 4 kg (168 lb 6 9 oz)   SpO2 99%   BMI 24 17 kg/m²        Intake/Output Summary (Last 24 hours) at 9/13/2021 1416  Last data filed at 9/13/2021 1130  Gross per 24 hour   Intake --   Output 1305 ml   Net -1305 ml       Body mass index is 24 17 kg/m²  Physical Exam  HENT:      Head: Normocephalic and atraumatic  Neck:      Comments: VISTA collar in place   Pulmonary:      Effort: Pulmonary effort is normal    Musculoskeletal:      Comments: RUE: SAB 5/5, EE/EF 4/5, FF 3+/5  LUE: SAB 5/5, EE/EF 4/5, FF 4/5  Bilateral LE: HF, KE/KF 5/5   Neurological:      Mental Status: He is alert and oriented to person, place, and time     Psychiatric:         Mood and Affect: Mood normal         Social History: Social History     Socioeconomic History    Marital status: Single     Spouse name: None    Number of children: None    Years of education: None    Highest education level: None   Occupational History    None   Tobacco Use    Smoking status: Never Smoker    Smokeless tobacco: Never Used   Vaping Use    Vaping Use: Never used   Substance and Sexual Activity    Alcohol use: No    Drug use: No    Sexual activity: None   Other Topics Concern    None   Social History Narrative    None     Social Determinants of Health     Financial Resource Strain:     Difficulty of Paying Living Expenses:    Food Insecurity:     Worried About Running Out of Food in the Last Year:     Ran Out of Food in the Last Year:    Transportation Needs:     Lack of Transportation (Medical):  Lack of Transportation (Non-Medical):    Physical Activity:     Days of Exercise per Week:     Minutes of Exercise per Session:    Stress:     Feeling of Stress :    Social Connections:     Frequency of Communication with Friends and Family:     Frequency of Social Gatherings with Friends and Family:     Attends Buddhism Services:     Active Member of Clubs or Organizations:     Attends Club or Organization Meetings:     Marital Status:    Intimate Partner Violence:     Fear of Current or Ex-Partner:     Emotionally Abused:     Physically Abused:     Sexually Abused:         Family History:    History reviewed  No pertinent family history        Medications:     Current Facility-Administered Medications:     acetaminophen (TYLENOL) tablet 975 mg, 975 mg, Oral, Q8H ROCÍO, DANGELO Bond, 975 mg at 09/13/21 1313    bisacodyl (DULCOLAX) rectal suppository 10 mg, 10 mg, Rectal, Daily PRN, DANGELO Champagne    calcium carbonate (TUMS) chewable tablet 1,000 mg, 1,000 mg, Oral, Daily PRN, DANGELO Champagne    gabapentin (NEURONTIN) capsule 300 mg, 300 mg, Oral, TID, DANGELO Schulte, 300 mg at 09/13/21 0853    HYDROmorphone (DILAUDID) injection 0 5 mg, 0 5 mg, Intravenous, Q6H PRN, Kenji Ashley PA-C, 0 5 mg at 09/13/21 1206    magnesium hydroxide (MILK OF MAGNESIA) oral suspension 30 mL, 30 mL, Oral, Daily PRN, Kenji Ashley PA-C    melatonin tablet 3 mg, 3 mg, Oral, HS, DANGELO Bond, 3 mg at 09/12/21 2138    methocarbamol (ROBAXIN) tablet 750 mg, 750 mg, Oral, Q6H ROCÍO, DANGELO Bond, 750 mg at 09/13/21 1206    ondansetron (ZOFRAN) injection 4 mg, 4 mg, Intravenous, Q4H PRN, DANGELO Kulkarni    oxyCODONE (ROXICODONE) immediate release tablet 10 mg, 10 mg, Oral, Q4H PRN, DANGELO Bond, 10 mg at 09/13/21 1336    oxyCODONE (ROXICODONE) IR tablet 5 mg, 5 mg, Oral, Q4H PRN, DANGELO Bond, 5 mg at 09/13/21 0409    polyethylene glycol (MIRALAX) packet 17 g, 17 g, Oral, Daily, Nimo Da Silva PA-C, 17 g at 09/13/21 1206    senna-docusate sodium (SENOKOT S) 8 6-50 mg per tablet 2 tablet, 2 tablet, Oral, Daily, DANGELO Bauman, 2 tablet at 09/11/21 0827    Past Medical History:     History reviewed  No pertinent past medical history       Past Surgical History:     Past Surgical History:   Procedure Laterality Date    DECOMPRESSION SPINE CERVICAL POSTERIOR Bilateral 9/9/2021    Procedure: C4-6 POSTERIOR CERVICAL SPINE DECOMPRESSION WITH C3-T1 FUSION  ;  Surgeon: Kendy Kwong MD;  Location: [facility] OR;  Service: Neurosurgery         Allergies:     No Known Allergies        LABORATORY RESULTS:      Lab Results   Component Value Date    HGB 12 8 09/11/2021    HCT 38 2 09/11/2021    WBC 8 62 09/11/2021     Lab Results   Component Value Date    BUN 15 09/11/2021    K 3 7 09/11/2021     09/11/2021    CREATININE 0 69 09/11/2021     Lab Results   Component Value Date    PROTIME 14 5 09/10/2021    INR 1 18 09/10/2021        DIAGNOSTIC STUDIES: Reviewed  XR cervical spine 2 or 3 views    Result Date: 9/12/2021  Impression: Expected postop appearance of C4-C6 decompressive laminectomies, and posterior cervical fusion of C3-T1 for fixation of a C5 fracture  Workstation performed: [id number]     XR spine cervical 2 or 3 vw injury    Result Date: 9/10/2021  Impression: Fluoroscopic guidance provided for procedure guidance  Please refer to the separate procedure notes for additional details  Workstation performed: [id number]     XR hand 3+ views LEFT    Result Date: 9/9/2021  Impression: No acute osseous abnormality  Workstation performed: [id number]     CT head without contrast    Result Date: 9/9/2021  Impression: No acute intracranial abnormality  Workstation performed: [id number]     CT spine cervical without contrast    Result Date: 9/9/2021  Impression: C5 compression fracture with involvement of bilateral laminas  The C5 vertebral body is fractured and comminuted fashion involving both sagittal and coronal plane  4 mm retropulsion noted of the inferior aspect of the posterior vertebral body narrowing  the spinal canal   I personally discussed this study with Johnnie Gaffney on 9/9/2021 at 12:19 PM   Workstation performed: [id number]     MRI cervical spine wo contrast    Result Date: 9/9/2021  Impression: 1  Comminuted fractures of the C5 vertebra with bony retropulsion and cord compression with cord edema/cord contusion  There is also edema and fractures of the posterior elements at the C5 level as described on the recent CT scan indicative of a 3 column injury  2   Cord edema extending from the C4-C6 levels without large epidural hematoma or obvious cord hemorrhage    I personally discussed this study with Dr Melchor Snyder on 9/9/2021 at 3:27 PM  Workstation performed: [id number]

## 2021-09-13 NOTE — PLAN OF CARE
Problem: Potential for Falls  Goal: Patient will remain free of falls  Description: INTERVENTIONS:  - Educate patient/family on patient safety including physical limitations  - Instruct patient to call for assistance with activity   - Consult OT/PT to assist with strengthening/mobility   - Keep Call bell within reach  - Keep bed low and locked with side rails adjusted as appropriate  - Keep care items and personal belongings within reach  - Initiate and maintain comfort rounds  - Make Fall Risk Sign visible to staff  - Offer Toileting   - Initiate/Maintain alarm  - Obtain necessary fall risk management equipment  - Apply yellow socks and bracelet for high fall risk patients  - Consider moving patient to room near nurses station  Outcome: Progressing     Problem: MOBILITY - ADULT  Goal: Maintain or return to baseline ADL function  Description: INTERVENTIONS:  -  Assess patient's ability to carry out ADLs; assess patient's baseline for ADL function and identify physical deficits which impact ability to perform ADLs (bathing, care of mouth/teeth, toileting, grooming, dressing, etc )  - Assess/evaluate cause of self-care deficits   - Assess range of motion  - Assess patient's mobility; develop plan if impaired  - Assess patient's need for assistive devices and provide as appropriate  - Encourage maximum independence but intervene and supervise when necessary  - Involve family in performance of ADLs  - Assess for home care needs following discharge   - Consider OT consult to assist with ADL evaluation and planning for discharge  - Provide patient education as appropriate  Outcome: Progressing  Goal: Maintains/Returns to pre admission functional level  Description: INTERVENTIONS:  - Perform BMAT or MOVE assessment daily    - Set and communicate daily mobility goal to care team and patient/family/caregiver     - Collaborate with rehabilitation services on mobility goals if consulted  - Perform Range of Motion   - Reposition patient   - Dangle patient   - Stand patient   - Ambulate patient   - Out of bed to chair    - Out of bed for meals   - Out of bed for toileting  - Record patient progress and toleration of activity level   Outcome: Progressing     Problem: Prexisting or High Potential for Compromised Skin Integrity  Goal: Skin integrity is maintained or improved  Description: INTERVENTIONS:  - Identify patients at risk for skin breakdown  - Assess and monitor skin integrity  - Assess and monitor nutrition and hydration status  - Monitor labs   - Assess for incontinence   - Turn and reposition patient  - Assist with mobility/ambulation  - Relieve pressure over bony prominences  - Avoid friction and shearing  - Provide appropriate hygiene as needed including keeping skin clean and dry  - Evaluate need for skin moisturizer/barrier cream  - Collaborate with interdisciplinary team   - Patient/family teaching  - Consider wound care consult   Outcome: Progressing     Problem: PAIN - ADULT  Goal: Verbalizes/displays adequate comfort level or baseline comfort level  Description: Interventions:  - Encourage patient to monitor pain and request assistance  - Assess pain using appropriate pain scale  - Administer analgesics based on type and severity of pain and evaluate response  - Implement non-pharmacological measures as appropriate and evaluate response  - Consider cultural and social influences on pain and pain management  - Notify physician/advanced practitioner if interventions unsuccessful or patient reports new pain  Outcome: Progressing     Problem: INFECTION - ADULT  Goal: Absence or prevention of progression during hospitalization  Description: INTERVENTIONS:  - Assess and monitor for signs and symptoms of infection  - Monitor lab/diagnostic results  - Monitor all insertion sites, i e  indwelling lines, tubes, and drains  - Monitor endotracheal if appropriate and nasal secretions for changes in amount and color  - [place] appropriate cooling/warming therapies per order  - Administer medications as ordered  - Instruct and encourage patient and family to use good hand hygiene technique  - Identify and instruct in appropriate isolation precautions for identified infection/condition  Outcome: Progressing  Goal: Absence of fever/infection during neutropenic period  Description: INTERVENTIONS:  - Monitor WBC    Outcome: Progressing     Problem: INFECTION - ADULT  Goal: Absence or prevention of progression during hospitalization  Description: INTERVENTIONS:  - Assess and monitor for signs and symptoms of infection  - Monitor lab/diagnostic results  - Monitor all insertion sites, i e  indwelling lines, tubes, and drains  - Monitor endotracheal if appropriate and nasal secretions for changes in amount and color  - [place] appropriate cooling/warming therapies per order  - Administer medications as ordered  - Instruct and encourage patient and family to use good hand hygiene technique  - Identify and instruct in appropriate isolation precautions for identified infection/condition  Outcome: Progressing  Goal: Absence of fever/infection during neutropenic period  Description: INTERVENTIONS:  - Monitor WBC    Outcome: Progressing     Problem: SAFETY ADULT  Goal: Patient will remain free of falls  Description: INTERVENTIONS:  - Educate patient/family on patient safety including physical limitations  - Instruct patient to call for assistance with activity   - Consult OT/PT to assist with strengthening/mobility   - Keep Call bell within reach  - Keep bed low and locked with side rails adjusted as appropriate  - Keep care items and personal belongings within reach  - Initiate and maintain comfort rounds  - Make Fall Risk Sign visible to staff  - Offer Toileting every 6 Hours, in advance of need  - Initiate/Maintain alarm  - Obtain necessary fall risk management equipment  - Apply yellow socks and bracelet for high fall risk patients  - Consider moving patient to room near nurses station  Outcome: Progressing  Goal: Maintain or return to baseline ADL function  Description: INTERVENTIONS:  -  Assess patient's ability to carry out ADLs; assess patient's baseline for ADL function and identify physical deficits which impact ability to perform ADLs (bathing, care of mouth/teeth, toileting, grooming, dressing, etc )  - Assess/evaluate cause of self-care deficits   - Assess range of motion  - Assess patient's mobility; develop plan if impaired  - Assess patient's need for assistive devices and provide as appropriate  - Encourage maximum independence but intervene and supervise when necessary  - Involve family in performance of ADLs  - Assess for home care needs following discharge   - Consider OT consult to assist with ADL evaluation and planning for discharge  - Provide patient education as appropriate  Outcome: Progressing  Goal: Maintains/Returns to pre admission functional level  Description: INTERVENTIONS:  - Perform BMAT or MOVE assessment daily    - Set and communicate daily mobility goal to care team and patient/family/caregiver     - Collaborate with rehabilitation services on mobility goals if consulted  - Perform Range of Motion   - Reposition patient   - Dangle patient   - Stand patient   - Ambulate patient   - Out of bed to chair    - Out of bed for meals   - Out of bed for toileting  - Record patient progress and toleration of activity level   Outcome: Progressing     Problem: DISCHARGE PLANNING  Goal: Discharge to home or other facility with appropriate resources  Description: INTERVENTIONS:  - Identify barriers to discharge w/patient and caregiver  - Arrange for needed discharge resources and transportation as appropriate  - Identify discharge learning needs (meds, wound care, etc )  - Arrange for interpretive services to assist at discharge as needed  - Refer to Case Management Department for coordinating discharge planning if the patient needs post-hospital services based on physician/advanced practitioner order or complex needs related to functional status, cognitive ability, or social support system  Outcome: Progressing     Problem: Knowledge Deficit  Goal: Patient/family/caregiver demonstrates understanding of disease process, treatment plan, medications, and discharge instructions  Description: Complete learning assessment and assess knowledge base  Interventions:  - Provide teaching at level of understanding  - Provide teaching via preferred learning methods  Outcome: Progressing     Problem: Nutrition/Hydration-ADULT  Goal: Nutrient/Hydration intake appropriate for improving, restoring or maintaining nutritional needs  Description: Monitor and assess patient's nutrition/hydration status for malnutrition  Collaborate with interdisciplinary team and initiate plan and interventions as ordered  Monitor patient's weight and dietary intake as ordered or per policy  Utilize nutrition screening tool and intervene as necessary  Determine patient's food preferences and provide high-protein, high-caloric foods as appropriate       INTERVENTIONS:  - Monitor oral intake, urinary output, labs, and treatment plans  - Assess nutrition and hydration status and recommend course of action  - Evaluate amount of meals eaten  - Assist patient with eating if necessary   - Allow adequate time for meals  - Recommend/ encourage appropriate diets, oral nutritional supplements, and vitamin/mineral supplements  - Order, calculate, and assess calorie counts as needed  - Recommend, monitor, and adjust tube feedings and TPN/PPN based on assessed needs  - Assess need for intravenous fluids  - Provide specific nutrition/hydration education as appropriate  - Include patient/family/caregiver in decisions related to nutrition  Outcome: Progressing

## 2021-09-13 NOTE — ASSESSMENT & PLAN NOTE
POD# 4 DECOMPRESSION SPINE CERVICAL POSTERIOR C4-C6 with posterolateral instrumented fusion C3-T1 and with arthrodesis    C5 burst fracture with mild retropulsion and bilateral laminar fractures and C5-6 capsular stretch diastasis  Possible spinal cord and ligamentous injury  · Status post slip after getting out of the shower with positive head strike, likely hyperflexion injury  Patient states he was unable to move for about 10 minutes and then was able to move slightly  No paresthesias noted  · Patient with significant bilateral tricep and  weakness    Imaging:  · Upright cervical spine x-rays 09/11/2021: hardware appears intact with stable alignment of posterio decompression C4-6 and hardware fixation C3-T1  Plan:  · Continue to monitor neurological exam   · [place] collar at all times x 6 weeks postoperatively, [place] collar for showering  · PENELOPE drain removed 9/13/2021  Patient tolerated well with no active drainage from drain site  Suture tied in place  · Medical management and pain control per primary team  · Recommend MAPs above 85 >7 days, patient currently on automapping on the floor  · Steroids dc'ed per trauma  · DVT PPX: SCDs  Cleared to resume pharmacologic DVT prophylaxis from neurosurgical standpoint  · Mobilize as tolerated with assistance, PT/OT evaluation    Neurosurgery will sign off and follow up in 2 weeks for incision check and 6 weeks for post operative appointment with repeat x-rays  Call with any further questions or concerns  Detail Level: Detailed X Size Of Lesion In Cm (Optional): 0 Incorporate Mauc In Note: Yes Name Of The Referring Provider For Procedure: Dr. Galarza Body Location Override (Optional - Billing Will Still Be Based On Selected Body Map Location If Applicable): Left nasal sidewall Size Of Lesion: -

## 2021-09-13 NOTE — TELEPHONE ENCOUNTER
21- PT DISCHARGED TO [facility] [phone]    9/15/21- PT IN HOSPITAL    21-  [facility]    21-  [facility]  2WK POV SCHEDULED 21  6WK POV SCHEDULED 10/22/21        ----- Message from Hadley Gomez PA-C sent at 2021  2:25 PM EDT -----  Regardin and 6 week POV  2 week nursing visit for incision check and 6 week POV with Dr Carolann Jacobson   Surgery was 21

## 2021-09-13 NOTE — PROGRESS NOTES
Patient due to void at approximately 1730: Patient has asked for extra time to try and void on his own rather than straight catheterization  Dr Dario Whiting is aware, and the patient is allowed to take some time and try and void  Following Nurse aware

## 2021-09-13 NOTE — ASSESSMENT & PLAN NOTE
- Acute pain secondary to traumatic injuries  - Continue multi modal analgesic regimen  Discontinue IV dilaudid on 9/14    - Bowel regimen as long as using opioids   - Continue to monitor pain and adjust regimen as indicated

## 2021-09-13 NOTE — PROGRESS NOTES
[facility]  Progress Note - Rebeca Sandifer 2000, 24 y o  male MRN: [id number]  Unit/Bed#: [facility] 631-01 Encounter: [id number]  Primary Care Provider: No primary care provider on file  Date and time admitted to hospital: 9/9/2021 11:01 AM    * C5 cervical fracture (HCC)  Assessment & Plan  POD# 4 DECOMPRESSION SPINE CERVICAL POSTERIOR C4-C6 with posterolateral instrumented fusion C3-T1 and with arthrodesis    C5 burst fracture with mild retropulsion and bilateral laminar fractures and C5-6 capsular stretch diastasis  Possible spinal cord and ligamentous injury  · Status post slip after getting out of the shower with positive head strike, likely hyperflexion injury  Patient states he was unable to move for about 10 minutes and then was able to move slightly  No paresthesias noted  · Patient with significant bilateral tricep and  weakness    Imaging:  · Upright cervical spine x-rays 09/11/2021: hardware appears intact with stable alignment of posterio decompression C4-6 and hardware fixation C3-T1  Plan:  · Continue to monitor neurological exam   · [place] collar at all times x 6 weeks postoperatively, [place] collar for showering  · PENELOPE drain removed 9/13/2021  Patient tolerated well with no active drainage from drain site  Suture tied in place  · Medical management and pain control per primary team  · Recommend MAPs above 85 >7 days, patient currently on automapping on the floor  · Steroids dc'ed per trauma  · DVT PPX: SCDs  Cleared to resume pharmacologic DVT prophylaxis from neurosurgical standpoint  · Mobilize as tolerated with assistance, PT/OT evaluation    Neurosurgery will sign off and follow up in 2 weeks for incision check and 6 weeks for post operative appointment with repeat x-rays  Call with any further questions or concerns      Fall  Assessment & Plan  · See plan above    Central cord syndrome [facility])  Assessment & Plan  · See plan above Subjective/Objective   Chief Complaint: "im getting better"     Subjective:  Patient continues to make subjective and objective improvements in bilateral upper extremity strength  Upon entering the room patient was seen using his phone and holding his arms antigravity  Patient is right-hand  strength continues to improve  Still with some triceps and wrist flexion weakness but overall doing  He remains in good spirits  PENELOPE drain was removed and patient tolerated well  Addressed all questions or concerns  Patient's mother at the bedside  Trauma team updated  Objective: laying in bed in NAD  I/O       09/11 0701 - 09/12 0700 09/12 0701 - 09/13 0700 09/13 0701 - 09/14 0700    P  O  360 465     I V  (mL/kg)       NG/GT       IV Piggyback       Total Intake(mL/kg) 360 (4 7) 465 (6 1)     Urine (mL/kg/hr) 1700 (0 9) 1510 (0 8) 455 (0 8)    Drains 140 70     Stool  0     Total Output 1840 1580 455    Net -1480 -1115 -455           Unmeasured Urine Occurrence 3 x 2 x     Unmeasured Stool Occurrence  0 x           Invasive Devices     Peripheral Intravenous Line            Peripheral IV 09/11/21 Right Forearm 2 days          Drain            Closed/Suction Drain Posterior; Left Neck Bulb 3 days                Physical Exam:  Vitals: Blood pressure 138/77, pulse 74, temperature 99 1 °F (37 3 °C), resp  rate 17, height 5' 10" (1 778 m), weight 76 4 kg (168 lb 6 9 oz), SpO2 99 %  ,Body mass index is 24 17 kg/m²  General appearance: alert, appears stated age, cooperative and no distress  Head: Normocephalic, without obvious abnormality, atraumatic  Eyes:  Tracking appropriately across room  Neck: supple, symmetrical, trachea midline  Cervical collar in place  Incision appears clean dry and intact  Healing well  No significant erythema or ecchymosis  PENELOPE drain removed and suture tied in place  Back: no kyphosis present     Lungs: non labored breathing  Heart: regular heart rate  Neurologic:   Mental status: Alert, oriented x3, thought content appropriate  Cranial nerves: grossly intact (Cranial nerves II-XII)  Sensory: normal to light touch  Motor:     Motor Findings  Strength: Right  Strength: Left    Deltoid  4+/5  4+/5    Biceps  5/5  5/5    Triceps  4-/5  4-/5    Wrist Extensors   Wrist Flexion 4+/5  4-/5  4+/5   4-/5   Hand Intrinsics  4-/5  4-/5    Iliospoas  5/5  5/5    Quadriceps  5/5  5/5    Tibialis anterior  5/5  5/5    Gastroc soleus  5/5  5/5      Reflexes: 2+ and symmetric  No clonus  Lab Results:  Results from last 7 days   Lab Units 09/11/21  0510 09/10/21  0451 09/09/21  2253 09/09/21  1159   WBC Thousand/uL 8 62 9 31 5 58 5 35   HEMOGLOBIN g/dL 12 8 13 1 14 3 15 1   HEMATOCRIT % 38 2 39 0 42 5 44 4   PLATELETS Thousands/uL 108* 117* 119* 118*   NEUTROS PCT %  --  88*  --   --    MONOS PCT %  --  3*  --   --    MONO PCT %  --   --   --  4     Results from last 7 days   Lab Units 09/11/21  0510 09/10/21  0451 09/09/21  2253   POTASSIUM mmol/L 3 7 3 9 4 0   CHLORIDE mmol/L 107 109* 112*   CO2 mmol/L 29 24 23   BUN mg/dL 15 12 13   CREATININE mg/dL 0 69 0 59* 0 78   CALCIUM mg/dL 8 5 8 3 8 8     Results from last 7 days   Lab Units 09/10/21  0451 09/09/21  2253   MAGNESIUM mg/dL 2 3 2 1     Results from last 7 days   Lab Units 09/10/21  0451 09/09/21  2253   PHOSPHORUS mg/dL 3 5 2 0*     Results from last 7 days   Lab Units 09/10/21  0451 09/09/21  2253 09/09/21  1429   INR  1 18 1 12 1 10   PTT seconds  --   --  29     No results found for: TROPONINT  ABG:No results found for: PHART, PUW3YZA, PO2ART, HGM9VFX, Q0GELLOT, BEART, SOURCE    Imaging Studies: I have personally reviewed pertinent reports  and I have personally reviewed pertinent films in PACS  XR cervical spine 2 or 3 views    Result Date: 9/12/2021  Impression: Expected postop appearance of C4-C6 decompressive laminectomies, and posterior cervical fusion of C3-T1 for fixation of a C5 fracture   Workstation performed: [id number] XR spine cervical 2 or 3 vw injury    Result Date: 9/10/2021  Impression: Fluoroscopic guidance provided for procedure guidance  Please refer to the separate procedure notes for additional details  Workstation performed: [id number]     XR hand 3+ views LEFT    Result Date: 9/9/2021  Impression: No acute osseous abnormality  Workstation performed: [id number]     CT head without contrast    Result Date: 9/9/2021  Impression: No acute intracranial abnormality  Workstation performed: [id number]     CT spine cervical without contrast    Result Date: 9/9/2021  Impression: C5 compression fracture with involvement of bilateral laminas  The C5 vertebral body is fractured and comminuted fashion involving both sagittal and coronal plane  4 mm retropulsion noted of the inferior aspect of the posterior vertebral body narrowing  the spinal canal   I personally discussed this study with Rebeca Lei on 9/9/2021 at 12:19 PM   Workstation performed: [id number]     MRI cervical spine wo contrast    Result Date: 9/9/2021  Impression: 1  Comminuted fractures of the C5 vertebra with bony retropulsion and cord compression with cord edema/cord contusion  There is also edema and fractures of the posterior elements at the C5 level as described on the recent CT scan indicative of a 3 column injury  2   Cord edema extending from the C4-C6 levels without large epidural hematoma or obvious cord hemorrhage  I personally discussed this study with Dr Apolinar Elizabeth on 9/9/2021 at 3:27 PM  Workstation performed: [id number]       EKG, Pathology, and Other Studies: I have personally reviewed pertinent reports  VTE Pharmacologic Prophylaxis: Cleared to resume DVT ppx at this time from neurosurgical standpoint       VTE Mechanical Prophylaxis: sequential compression device

## 2021-09-13 NOTE — PROGRESS NOTES
[address] [facility]  Progress Note - Chandan Thompson 2000, 24 y o  male MRN: [id number]  Unit/Bed#: [facility] 631-01 Encounter: [id number]  Primary Care Provider: No primary care provider on file  Date and time admitted to hospital: 9/9/2021 11:01 AM    Fall  Assessment & Plan  - sustaining the below stated injuries  - PT/OT recommending acute spinal cord rehab, patient in agreement  - CM following to find accepting facility    * C5 cervical fracture [facility])  Assessment & Plan  · Patient is s/p C4-C6 decompressive laminectomies, C3-T1 fusion on 9/9/21  · Upright x-rays completed in VISTA collar on 9/11  · Maintain [place] collar and cervical spine precautions  · Multi modal analgesic regimen  · Continue neuro checks  Exam improving, continues to have exam findings c/w central cord syndrome, bilateral  and tricep strength weakness and decreased sensation in the C8 dermatome bilaterally  · Recommend MAPs above 85 >7 days  Patient is auto- mapping at this time  · DVT PPX: SCDs  Lovenox started on 9/11/21  · PENELOPE drain removed 9/13  · Bowel regimen added  Patient is voiding  · PT/OT evaluation appreciated, recommending SCI rehab which patient and family are in agreement with  CM assisting with referrals  PM&R consult placed  · Patient will require outpatient follow-up with Neurosurgery in 2 weeks for postop check  Central cord syndrome [facility])  Assessment & Plan  See plan for C5 cervical fracture  Acute pain due to trauma  Assessment & Plan  - Acute pain secondary to traumatic injuries  - Continue multi modal analgesic regimen  Discontinue IV dilaudid on 9/14    - Bowel regimen as long as using opioids   - Continue to monitor pain and adjust regimen as indicated  Disposition: decrease level of care to med-surg, placement pending at inpatient rehab  Referral sent to [facility]         SUBJECTIVE:  Chief Complaint: "I'm feeling well"    Subjective: Patient states his pain is controlled on his current regimen  He notes that his hand does feel stiff if he is not using them overnight  He is motivated to go to inpatient rehab and is very hopeful that he can get in to [facility]  Would like to work with physical therapy today        OBJECTIVE:     Meds/Allergies     Current Facility-Administered Medications:     acetaminophen (TYLENOL) tablet 975 mg, 975 mg, Oral, Q8H ROCÍO, DANGELO Bond, 975 mg at 09/13/21 1313    bisacodyl (DULCOLAX) rectal suppository 10 mg, 10 mg, Rectal, Daily PRN, DANGELO Lloyd    calcium carbonate (TUMS) chewable tablet 1,000 mg, 1,000 mg, Oral, Daily PRN, DANGELO Lloyd    gabapentin (NEURONTIN) capsule 300 mg, 300 mg, Oral, TID, DANGELO Rausch, 300 mg at 09/13/21 0853    HYDROmorphone (DILAUDID) injection 0 5 mg, 0 5 mg, Intravenous, Q6H PRN, Ziggy Fatima PA-C, 0 5 mg at 09/13/21 1206    magnesium hydroxide (MILK OF MAGNESIA) oral suspension 30 mL, 30 mL, Oral, Daily PRN, Ziggy Fatima PA-C    melatonin tablet 3 mg, 3 mg, Oral, HS, DANGELO Bond, 3 mg at 09/12/21 2138    methocarbamol (ROBAXIN) tablet 750 mg, 750 mg, Oral, Q6H ROCÍO, DANGELO Bond, 750 mg at 09/13/21 1206    ondansetron (ZOFRAN) injection 4 mg, 4 mg, Intravenous, Q4H PRN, DANGELO Rausch    oxyCODONE (ROXICODONE) immediate release tablet 10 mg, 10 mg, Oral, Q4H PRN, DANGELO Bond, 10 mg at 09/13/21 0854    oxyCODONE (ROXICODONE) IR tablet 5 mg, 5 mg, Oral, Q4H PRN, DANGELO Lloyd, 5 mg at 09/13/21 0409    polyethylene glycol (MIRALAX) packet 17 g, 17 g, Oral, Daily, Nimo Da Silva PA-C, 17 g at 09/13/21 1206    senna-docusate sodium (SENOKOT S) 8 6-50 mg per tablet 2 tablet, 2 tablet, Oral, Daily, DANGELO Bond, 2 tablet at 09/11/21 0827     Vitals:   Vitals:    09/13/21 1116   BP: 138/77   Pulse: 74   Resp: 17 Temp: 99 1 °F (37 3 °C)   SpO2: 99%       Intake/Output:  I/O       09/11 0701 - 09/12 0700 09/12 0701 - 09/13 0700    P  O  360 465    Total Intake(mL/kg) 360 (4 7) 465 (6 1)    Urine (mL/kg/hr) 1700 (0 9) 1100 (0 6)    Drains 140 70    Stool  0    Total Output 1840 1170    Net -1480 -705          Unmeasured Urine Occurrence 3 x 2 x    Unmeasured Stool Occurrence  0 x           Nutrition/GI Proph/Bowel Reg: Regular    Physical Exam:   GENERAL APPEARANCE:  No acute distress  NEURO:  GCS 15;  + strengths 4+/5 bilaterally with decreased strength in the hand in the median and ulnar nerve distribution, however this is improving   Bilateral triceps strength 4/5, 4+ out of 5 strength bilateral biceps and 5/5 strength in the deltoids   He has decreased sensation in the C8 dermatome in the bilateral hands   Strength is 5/5 throughout in bilateral lower extremities   He does have decreased sensation in the left foot up to the ankle laterally   Sensation is intact throughout the bilateral lower   HEENT:  Normocephalic, atraumatic; +[place] collar in place  CV:  Regular rate and rhythm, no murmurs gallops or rubs  LUNGS:  Clear to auscultation bilaterally  GI:  Soft, nontender, nondistended  :  Voiding  MSK:  No edema, contusions or deformities  SKIN:  Pink, warm, dry    Invasive Devices     Peripheral Intravenous Line            Peripheral IV 09/11/21 Right Forearm 2 days          Drain            Closed/Suction Drain Posterior; Left Neck Bulb 3 days                 Lab Results: Results: I have personally reviewed pertinent reports   , BMP/CMP: No results found for: SODIUM, K, CL, CO2, ANIONGAP, BUN, CREATININE, GLUCOSE, CALCIUM, AST, ALT, ALKPHOS, PROT, BILITOT, EGFR and CBC: No results found for: WBC, HGB, HCT, MCV, PLT, ADJUSTEDWBC, MCH, MCHC, RDW, MPV, NRBC  Imaging/EKG Studies: Results: I have personally reviewed pertinent reports      Other Studies: no new  VTE Prophylaxis: Sequential compression device (Venodyne) and Enoxaparin (Lovenox)

## 2021-09-13 NOTE — PROGRESS NOTES
Neurosurgery progress note    Patient in good spirits, excited about improving BUE strength, now able to use his phone and grasp objects firmly with bilateral hands  He also reports improved gait imbalance  He is very motivated to continue PT in rehab  Vitals:    09/13/21 0302 09/13/21 0606 09/13/21 0719 09/13/21 1116   BP: 137/78 138/78  138/77   Pulse: 56 58  74   Resp:  18 17 17   Temp: 99 1 °F (37 3 °C) 99 1 °F (37 3 °C)  99 1 °F (37 3 °C)   TempSrc:       SpO2: 99% 98% 99% 99%   Weight:       Height:           Neurologic exam  Alert, engaged and conversant  5/5 in all extremities except bilateral triceps 4-/5, wrist flexion 4-/5, wrist extension 3/5, HI 4-/5 (significantly improving postop)   Numbness/tingling in bilateral hands now intermittent (not constant), improving also   Rigid c-collar in place     Assessment  Patient is a 24 y o  male with h/o traumatic C5 fracture with 3-column injury and cervical spinal cord edema, predominantly at C4-6, is now s/p posterior C4-6 decompression with C3-T1 fusion  Plan  -Neurologic exam improving significantly postop  -Adequate pain control  -Drain removed   -Postop XR reviewed, satisfactory   -Continue aggressive PT/OT  -Dispo: AR, appreciate case management and Trauma team     Benjamin COOPER    Neurosurgeon

## 2021-09-13 NOTE — PLAN OF CARE
Problem: Potential for Falls  Goal: Patient will remain free of falls  Description: INTERVENTIONS:  - Educate patient/family on patient safety including physical limitations  - Instruct patient to call for assistance with activity   - Consult OT/PT to assist with strengthening/mobility   - Keep Call bell within reach  - Keep bed low and locked with side rails adjusted as appropriate  - Keep care items and personal belongings within reach  - Initiate and maintain comfort rounds  - Make Fall Risk Sign visible to staff  - Offer Toileting every 2 Hours, in advance of need  - Initiate/Maintain bed alarm  - Apply yellow socks and bracelet for high fall risk patients  - Consider moving patient to room near nurses station  Outcome: Progressing     Problem: MOBILITY - ADULT  Goal: Maintain or return to baseline ADL function  Description: INTERVENTIONS:  -  Assess patient's ability to carry out ADLs; assess patient's baseline for ADL function and identify physical deficits which impact ability to perform ADLs (bathing, care of mouth/teeth, toileting, grooming, dressing, etc )  - Assess/evaluate cause of self-care deficits   - Assess range of motion  - Assess patient's mobility; develop plan if impaired  - Assess patient's need for assistive devices and provide as appropriate  - Encourage maximum independence but intervene and supervise when necessary  - Involve family in performance of ADLs  - Assess for home care needs following discharge   - Consider OT consult to assist with ADL evaluation and planning for discharge  - Provide patient education as appropriate  Outcome: Progressing  Goal: Maintains/Returns to pre admission functional level  Description: INTERVENTIONS:  - Perform BMAT or MOVE assessment daily    - Set and communicate daily mobility goal to care team and patient/family/caregiver  - Collaborate with rehabilitation services on mobility goals if consulted  - Perform Range of Motion 3 times a day    - Reposition patient every 2 hours    - Dangle patient 3 times a day  - Stand patient 3 times a day  - Ambulate patient 3 times a day  - Out of bed to chair 3 times a day   - Out of bed for meals 3 times a day  - Out of bed for toileting  - Record patient progress and toleration of activity level   Outcome: Progressing     Problem: Prexisting or High Potential for Compromised Skin Integrity  Goal: Skin integrity is maintained or improved  Description: INTERVENTIONS:  - Identify patients at risk for skin breakdown  - Assess and monitor skin integrity  - Assess and monitor nutrition and hydration status  - Monitor labs   - Assess for incontinence   - Turn and reposition patient  - Assist with mobility/ambulation  - Relieve pressure over bony prominences  - Avoid friction and shearing  - Provide appropriate hygiene as needed including keeping skin clean and dry  - Evaluate need for skin moisturizer/barrier cream  - Collaborate with interdisciplinary team   - Patient/family teaching  - Consider wound care consult   Outcome: Progressing     Problem: PAIN - ADULT  Goal: Verbalizes/displays adequate comfort level or baseline comfort level  Description: Interventions:  - Encourage patient to monitor pain and request assistance  - Assess pain using appropriate pain scale  - Administer analgesics based on type and severity of pain and evaluate response  - Implement non-pharmacological measures as appropriate and evaluate response  - Consider cultural and social influences on pain and pain management  - Notify physician/advanced practitioner if interventions unsuccessful or patient reports new pain  Outcome: Progressing     Problem: INFECTION - ADULT  Goal: Absence or prevention of progression during hospitalization  Description: INTERVENTIONS:  - Assess and monitor for signs and symptoms of infection  - Monitor lab/diagnostic results  - Monitor all insertion sites, i e  indwelling lines, tubes, and drains  - Monitor endotracheal if appropriate and nasal secretions for changes in amount and color  - [place] appropriate cooling/warming therapies per order  - Administer medications as ordered  - Instruct and encourage patient and family to use good hand hygiene technique  - Identify and instruct in appropriate isolation precautions for identified infection/condition  Outcome: Progressing  Goal: Absence of fever/infection during neutropenic period  Description: INTERVENTIONS:  - Monitor WBC    Outcome: Progressing     Problem: SAFETY ADULT  Goal: Patient will remain free of falls  Description: INTERVENTIONS:  - Educate patient/family on patient safety including physical limitations  - Instruct patient to call for assistance with activity   - Consult OT/PT to assist with strengthening/mobility   - Keep Call bell within reach  - Keep bed low and locked with side rails adjusted as appropriate  - Keep care items and personal belongings within reach  - Initiate and maintain comfort rounds  - Make Fall Risk Sign visible to staff  - Offer Toileting every 2 Hours, in advance of need  - Initiate/Maintain bed alarm  - Apply yellow socks and bracelet for high fall risk patients  - Consider moving patient to room near nurses station  Outcome: Progressing  Goal: Maintain or return to baseline ADL function  Description: INTERVENTIONS:  -  Assess patient's ability to carry out ADLs; assess patient's baseline for ADL function and identify physical deficits which impact ability to perform ADLs (bathing, care of mouth/teeth, toileting, grooming, dressing, etc )  - Assess/evaluate cause of self-care deficits   - Assess range of motion  - Assess patient's mobility; develop plan if impaired  - Assess patient's need for assistive devices and provide as appropriate  - Encourage maximum independence but intervene and supervise when necessary  - Involve family in performance of ADLs  - Assess for home care needs following discharge   - Consider OT consult to assist with ADL evaluation and planning for discharge  - Provide patient education as appropriate  Outcome: Progressing  Goal: Maintains/Returns to pre admission functional level  Description: INTERVENTIONS:  - Perform BMAT or MOVE assessment daily    - Set and communicate daily mobility goal to care team and patient/family/caregiver  - Collaborate with rehabilitation services on mobility goals if consulted  - Perform Range of Motion 3 times a day  - Reposition patient every 2 hours  - Dangle patient 3 times a day  - Stand patient 3 times a day  - Ambulate patient 3 times a day  - Out of bed to chair 3 times a day   - Out of bed for meals 3 times a day  - Out of bed for toileting  - Record patient progress and toleration of activity level   Outcome: Progressing     Problem: DISCHARGE PLANNING  Goal: Discharge to home or other facility with appropriate resources  Description: INTERVENTIONS:  - Identify barriers to discharge w/patient and caregiver  - Arrange for needed discharge resources and transportation as appropriate  - Identify discharge learning needs (meds, wound care, etc )  - Arrange for interpretive services to assist at discharge as needed  - Refer to Case Management Department for coordinating discharge planning if the patient needs post-hospital services based on physician/advanced practitioner order or complex needs related to functional status, cognitive ability, or social support system  Outcome: Progressing     Problem: Knowledge Deficit  Goal: Patient/family/caregiver demonstrates understanding of disease process, treatment plan, medications, and discharge instructions  Description: Complete learning assessment and assess knowledge base    Interventions:  - Provide teaching at level of understanding  - Provide teaching via preferred learning methods  Outcome: Progressing     Problem: Nutrition/Hydration-ADULT  Goal: Nutrient/Hydration intake appropriate for improving, restoring or maintaining nutritional needs  Description: Monitor and assess patient's nutrition/hydration status for malnutrition  Collaborate with interdisciplinary team and initiate plan and interventions as ordered  Monitor patient's weight and dietary intake as ordered or per policy  Utilize nutrition screening tool and intervene as necessary  Determine patient's food preferences and provide high-protein, high-caloric foods as appropriate       INTERVENTIONS:  - Monitor oral intake, urinary output, labs, and treatment plans  - Assess nutrition and hydration status and recommend course of action  - Evaluate amount of meals eaten  - Assist patient with eating if necessary   - Allow adequate time for meals  - Recommend/ encourage appropriate diets, oral nutritional supplements, and vitamin/mineral supplements  - Order, calculate, and assess calorie counts as needed  - Recommend, monitor, and adjust tube feedings and TPN/PPN based on assessed needs  - Assess need for intravenous fluids  - Provide specific nutrition/hydration education as appropriate  - Include patient/family/caregiver in decisions related to nutrition  Outcome: Progressing

## 2021-09-14 ENCOUNTER — IMMUNIZATIONS (INPATIENT)
Dept: FAMILY MEDICINE CLINIC | Facility: HOSPITAL | Age: 21
DRG: 473 | End: 2021-09-14
Payer: COMMERCIAL

## 2021-09-14 DIAGNOSIS — Z23 ENCOUNTER FOR IMMUNIZATION: Primary | ICD-10-CM

## 2021-09-14 LAB
PLATELET # BLD AUTO: 142 THOUSANDS/UL (ref 149–390)
PMV BLD AUTO: 10.3 FL (ref 8.9–12.7)
SARS-COV-2 RNA RESP QL NAA+PROBE: NEGATIVE

## 2021-09-14 PROCEDURE — 97116 GAIT TRAINING THERAPY: CPT

## 2021-09-14 PROCEDURE — 91300 SARS-COV-2 / COVID-19 MRNA VACCINE (PFIZER-BIONTECH) 30 MCG: CPT

## 2021-09-14 PROCEDURE — 0002A SARS-COV-2 / COVID-19 MRNA VACCINE (PFIZER-BIONTECH) 30 MCG: CPT

## 2021-09-14 PROCEDURE — 97535 SELF CARE MNGMENT TRAINING: CPT

## 2021-09-14 PROCEDURE — NC001 PR NO CHARGE: Performed by: NURSE PRACTITIONER

## 2021-09-14 PROCEDURE — 97530 THERAPEUTIC ACTIVITIES: CPT

## 2021-09-14 PROCEDURE — 97112 NEUROMUSCULAR REEDUCATION: CPT

## 2021-09-14 PROCEDURE — U0003 INFECTIOUS AGENT DETECTION BY NUCLEIC ACID (DNA OR RNA); SEVERE ACUTE RESPIRATORY SYNDROME CORONAVIRUS 2 (SARS-COV-2) (CORONAVIRUS DISEASE [COVID-19]), AMPLIFIED PROBE TECHNIQUE, MAKING USE OF HIGH THROUGHPUT TECHNOLOGIES AS DESCRIBED BY CMS-2020-01-R: HCPCS | Performed by: NURSE PRACTITIONER

## 2021-09-14 PROCEDURE — U0005 INFEC AGEN DETEC AMPLI PROBE: HCPCS | Performed by: NURSE PRACTITIONER

## 2021-09-14 PROCEDURE — 85049 AUTOMATED PLATELET COUNT: CPT | Performed by: NURSE PRACTITIONER

## 2021-09-14 PROCEDURE — 99232 SBSQ HOSP IP/OBS MODERATE 35: CPT | Performed by: NURSE PRACTITIONER

## 2021-09-14 RX ORDER — POLYETHYLENE GLYCOL 3350 17 G/17G
17 POWDER, FOR SOLUTION ORAL DAILY
Qty: 238 G | Refills: 0
Start: 2021-09-15 | End: 2022-03-29

## 2021-09-14 RX ORDER — OXYCODONE HYDROCHLORIDE 5 MG/1
5 TABLET ORAL EVERY 4 HOURS PRN
Qty: 30 TABLET | Refills: 0 | Status: SHIPPED | OUTPATIENT
Start: 2021-09-14 | End: 2021-09-15 | Stop reason: HOSPADM

## 2021-09-14 RX ORDER — LANOLIN ALCOHOL/MO/W.PET/CERES
3 CREAM (GRAM) TOPICAL
Qty: 14 TABLET | Refills: 0
Start: 2021-09-14 | End: 2021-09-28

## 2021-09-14 RX ORDER — BISACODYL 10 MG
10 SUPPOSITORY, RECTAL RECTAL DAILY PRN
Qty: 12 SUPPOSITORY | Refills: 0
Start: 2021-09-14 | End: 2022-03-29

## 2021-09-14 RX ORDER — SENNOSIDES 8.6 MG
17.2 TABLET ORAL
Qty: 28 TABLET | Refills: 0
Start: 2021-09-14 | End: 2022-03-29

## 2021-09-14 RX ORDER — METHOCARBAMOL 750 MG/1
750 TABLET, FILM COATED ORAL EVERY 6 HOURS SCHEDULED
Qty: 56 TABLET | Refills: 0 | Status: SHIPPED | OUTPATIENT
Start: 2021-09-14 | End: 2022-03-29

## 2021-09-14 RX ORDER — DOCUSATE SODIUM 100 MG/1
100 CAPSULE, LIQUID FILLED ORAL 2 TIMES DAILY
Qty: 28 CAPSULE | Refills: 0
Start: 2021-09-14 | End: 2022-03-29

## 2021-09-14 RX ORDER — GABAPENTIN 300 MG/1
300 CAPSULE ORAL 3 TIMES DAILY
Qty: 42 CAPSULE | Refills: 0
Start: 2021-09-14 | End: 2022-03-29

## 2021-09-14 RX ORDER — ACETAMINOPHEN 325 MG/1
975 TABLET ORAL EVERY 8 HOURS SCHEDULED
Qty: 30 TABLET | Refills: 0
Start: 2021-09-14 | End: 2021-09-28

## 2021-09-14 RX ORDER — CALCIUM CARBONATE 200(500)MG
1000 TABLET,CHEWABLE ORAL DAILY PRN
Qty: 30 TABLET | Refills: 0
Start: 2021-09-14 | End: 2021-09-28

## 2021-09-14 RX ADMIN — GABAPENTIN 300 MG: 300 CAPSULE ORAL at 08:13

## 2021-09-14 RX ADMIN — DOCUSATE SODIUM 100 MG: 100 CAPSULE, LIQUID FILLED ORAL at 08:13

## 2021-09-14 RX ADMIN — OXYCODONE HYDROCHLORIDE 10 MG: 10 TABLET ORAL at 04:43

## 2021-09-14 RX ADMIN — METHOCARBAMOL TABLETS 750 MG: 750 TABLET, COATED ORAL at 17:01

## 2021-09-14 RX ADMIN — ACETAMINOPHEN 975 MG: 325 TABLET, FILM COATED ORAL at 06:24

## 2021-09-14 RX ADMIN — DOCUSATE SODIUM 100 MG: 100 CAPSULE, LIQUID FILLED ORAL at 17:01

## 2021-09-14 RX ADMIN — OXYCODONE HYDROCHLORIDE 5 MG: 5 TABLET ORAL at 17:45

## 2021-09-14 RX ADMIN — Medication 3 MG: at 21:54

## 2021-09-14 RX ADMIN — STANDARDIZED SENNA CONCENTRATE 17.2 MG: 8.6 TABLET ORAL at 21:54

## 2021-09-14 RX ADMIN — METHOCARBAMOL TABLETS 750 MG: 750 TABLET, COATED ORAL at 06:24

## 2021-09-14 RX ADMIN — ENOXAPARIN SODIUM 30 MG: 30 INJECTION SUBCUTANEOUS at 21:54

## 2021-09-14 RX ADMIN — OXYCODONE HYDROCHLORIDE 10 MG: 10 TABLET ORAL at 21:54

## 2021-09-14 RX ADMIN — ACETAMINOPHEN 975 MG: 325 TABLET, FILM COATED ORAL at 13:00

## 2021-09-14 RX ADMIN — POLYETHYLENE GLYCOL 3350 17 G: 17 POWDER, FOR SOLUTION ORAL at 08:13

## 2021-09-14 RX ADMIN — GABAPENTIN 300 MG: 300 CAPSULE ORAL at 15:10

## 2021-09-14 RX ADMIN — GABAPENTIN 300 MG: 300 CAPSULE ORAL at 21:54

## 2021-09-14 RX ADMIN — HYDROMORPHONE HYDROCHLORIDE 0.5 MG: 1 INJECTION, SOLUTION INTRAMUSCULAR; INTRAVENOUS; SUBCUTANEOUS at 12:55

## 2021-09-14 RX ADMIN — METHOCARBAMOL TABLETS 750 MG: 750 TABLET, COATED ORAL at 23:34

## 2021-09-14 RX ADMIN — METHOCARBAMOL TABLETS 750 MG: 750 TABLET, COATED ORAL at 12:34

## 2021-09-14 RX ADMIN — ACETAMINOPHEN 975 MG: 325 TABLET, FILM COATED ORAL at 21:54

## 2021-09-14 RX ADMIN — ENOXAPARIN SODIUM 30 MG: 30 INJECTION SUBCUTANEOUS at 12:34

## 2021-09-14 RX ADMIN — OXYCODONE HYDROCHLORIDE 10 MG: 10 TABLET ORAL at 10:16

## 2021-09-14 NOTE — PLAN OF CARE
Problem: Potential for Falls  Goal: Patient will remain free of falls  Description: INTERVENTIONS:  - Educate patient/family on patient safety including physical limitations  - Instruct patient to call for assistance with activity   - Consult OT/PT to assist with strengthening/mobility   - Keep Call bell within reach  - Keep bed low and locked with side rails adjusted as appropriate  - Keep care items and personal belongings within reach  - Initiate and maintain comfort rounds  - Make Fall Risk Sign visible to staff  - Offer Toileting every 2 Hours, in advance of need  - Initiate/Maintain bed alarm  - Apply yellow socks and bracelet for high fall risk patients  - Consider moving patient to room near nurses station  9/14/2021 1031 by Martha Marley RN  Outcome: Progressing  9/14/2021 1029 by Martha Marley RN  Outcome: Progressing     Problem: MOBILITY - ADULT  Goal: Maintain or return to baseline ADL function  Description: INTERVENTIONS:  - Educate patient/family on patient safety including physical limitations  - Instruct patient to call for assistance with activity   - Consult OT/PT to assist with strengthening/mobility   - Keep Call bell within reach  - Keep bed low and locked with side rails adjusted as appropriate  - Keep care items and personal belongings within reach  - Initiate and maintain comfort rounds  - Make Fall Risk Sign visible to staff  - Offer Toileting every 2 Hours, in advance of need  - Initiate/Maintain bed alarm  - Apply yellow socks and bracelet for high fall risk patients  - Consider moving patient to room near nurses station  9/14/2021 1031 by Martha Marley RN  Outcome: Progressing  9/14/2021 1029 by Martha Marley RN  Outcome: Progressing  Goal: Maintains/Returns to pre admission functional level  Description: INTERVENTIONS:  - Perform BMAT or MOVE assessment daily    - Set and communicate daily mobility goal to care team and patient/family/caregiver     - Collaborate with rehabilitation services on mobility goals if consulted  - Perform Range of Motion 3 times a day  - Reposition patient every 2 hours    - Dangle patient 3 times a day  - Stand patient 3 times a day  - Ambulate patient 3 times a day  - Out of bed to chair 3 times a day   - Out of bed for meals 3 times a day  - Out of bed for toileting  - Record patient progress and toleration of activity level   9/14/2021 1031 by Fatmata Rojas RN  Outcome: Progressing  9/14/2021 1029 by Fatmata Rojas RN  Outcome: Progressing     Problem: Prexisting or High Potential for Compromised Skin Integrity  Goal: Skin integrity is maintained or improved  Description: INTERVENTIONS:  - Identify patients at risk for skin breakdown  - Assess and monitor skin integrity  - Assess and monitor nutrition and hydration status  - Monitor labs   - Assess for incontinence   - Turn and reposition patient  - Assist with mobility/ambulation  - Relieve pressure over bony prominences  - Avoid friction and shearing  - Provide appropriate hygiene as needed including keeping skin clean and dry  - Evaluate need for skin moisturizer/barrier cream  - Collaborate with interdisciplinary team   - Patient/family teaching  - Consider wound care consult   9/14/2021 1031 by Fatmata Rojas RN  Outcome: Progressing  9/14/2021 1029 by Fatmata Rojas RN  Outcome: Progressing     Problem: PAIN - ADULT  Goal: Verbalizes/displays adequate comfort level or baseline comfort level  Description: Interventions:  - Encourage patient to monitor pain and request assistance  - Assess pain using appropriate pain scale  - Administer analgesics based on type and severity of pain and evaluate response  - Implement non-pharmacological measures as appropriate and evaluate response  - Consider cultural and social influences on pain and pain management  - Notify physician/advanced practitioner if interventions unsuccessful or patient reports new pain  9/14/2021 1031 by Fatmata Rojas RN  Outcome: Progressing  9/14/2021 1029 by Stefany Marrero RN  Outcome: Progressing     Problem: INFECTION - ADULT  Goal: Absence or prevention of progression during hospitalization  Description: INTERVENTIONS:  - Assess and monitor for signs and symptoms of infection  - Monitor lab/diagnostic results  - Monitor all insertion sites, i e  indwelling lines, tubes, and drains  - Monitor endotracheal if appropriate and nasal secretions for changes in amount and color  - [place] appropriate cooling/warming therapies per order  - Administer medications as ordered  - Instruct and encourage patient and family to use good hand hygiene technique  - Identify and instruct in appropriate isolation precautions for identified infection/condition  9/14/2021 1031 by Stefany Marrero RN  Outcome: Progressing  9/14/2021 1029 by Stefany Marrero RN  Outcome: Progressing  Goal: Absence of fever/infection during neutropenic period  Description: INTERVENTIONS:  - Monitor WBC    9/14/2021 1031 by Stefany Marrero RN  Outcome: Progressing  9/14/2021 1029 by Stefany Marrero RN  Outcome: Progressing     Problem: SAFETY ADULT  Goal: Patient will remain free of falls  Description: INTERVENTIONS:  - Educate patient/family on patient safety including physical limitations  - Instruct patient to call for assistance with activity   - Consult OT/PT to assist with strengthening/mobility   - Keep Call bell within reach  - Keep bed low and locked with side rails adjusted as appropriate  - Keep care items and personal belongings within reach  - Initiate and maintain comfort rounds  - Make Fall Risk Sign visible to staff  - Offer Toileting every 2 Hours, in advance of need  - Initiate/Maintain bed alarm  - Apply yellow socks and bracelet for high fall risk patients  - Consider moving patient to room near nurses station  9/14/2021 1031 by Stefany Marrero RN  Outcome: Progressing  9/14/2021 1029 by Stefany Marrero RN  Outcome: Progressing  Goal: Maintain or return to baseline ADL function  Description: INTERVENTIONS:  - Educate patient/family on patient safety including physical limitations  - Instruct patient to call for assistance with activity   - Consult OT/PT to assist with strengthening/mobility   - Keep Call bell within reach  - Keep bed low and locked with side rails adjusted as appropriate  - Keep care items and personal belongings within reach  - Initiate and maintain comfort rounds  - Make Fall Risk Sign visible to staff  - Offer Toileting every 2 Hours, in advance of need  - Initiate/Maintain bed alarm  - Apply yellow socks and bracelet for high fall risk patients  - Consider moving patient to room near nurses station  9/14/2021 1031 by Lalo Floyd RN  Outcome: Progressing  9/14/2021 1029 by Lalo Floyd RN  Outcome: Progressing  Goal: Maintains/Returns to pre admission functional level  Description: INTERVENTIONS:  - Perform BMAT or MOVE assessment daily    - Set and communicate daily mobility goal to care team and patient/family/caregiver  - Collaborate with rehabilitation services on mobility goals if consulted  - Perform Range of Motion 2 times a day  - Reposition patient every 3 hours    - Dangle patient 3 times a day  - Stand patient 3 times a day  - Ambulate patient 3 times a day  - Out of bed to chair 3 times a day   - Out of bed for meals 3 times a day  - Out of bed for toileting  - Record patient progress and toleration of activity level   9/14/2021 1031 by Lalo Floyd RN  Outcome: Progressing  9/14/2021 1029 by Lalo Floyd RN  Outcome: Progressing     Problem: DISCHARGE PLANNING  Goal: Discharge to home or other facility with appropriate resources  Description: INTERVENTIONS:  - Identify barriers to discharge w/patient and caregiver  - Arrange for needed discharge resources and transportation as appropriate  - Identify discharge learning needs (meds, wound care, etc )  - Arrange for interpretive services to assist at discharge as needed  - Refer to Case Management Department for coordinating discharge planning if the patient needs post-hospital services based on physician/advanced practitioner order or complex needs related to functional status, cognitive ability, or social support system  9/14/2021 1031 by Lucy Mota RN  Outcome: Progressing  9/14/2021 1029 by Lucy Mota RN  Outcome: Progressing     Problem: Knowledge Deficit  Goal: Patient/family/caregiver demonstrates understanding of disease process, treatment plan, medications, and discharge instructions  Description: Complete learning assessment and assess knowledge base  Interventions:  - Provide teaching at level of understanding  - Provide teaching via preferred learning methods  9/14/2021 1031 by Lucy Mota RN  Outcome: Progressing  9/14/2021 1029 by Lucy Mota RN  Outcome: Progressing     Problem: Nutrition/Hydration-ADULT  Goal: Nutrient/Hydration intake appropriate for improving, restoring or maintaining nutritional needs  Description: Monitor and assess patient's nutrition/hydration status for malnutrition  Collaborate with interdisciplinary team and initiate plan and interventions as ordered  Monitor patient's weight and dietary intake as ordered or per policy  Utilize nutrition screening tool and intervene as necessary  Determine patient's food preferences and provide high-protein, high-caloric foods as appropriate       INTERVENTIONS:  - Monitor oral intake, urinary output, labs, and treatment plans  - Assess nutrition and hydration status and recommend course of action  - Evaluate amount of meals eaten  - Assist patient with eating if necessary   - Allow adequate time for meals  - Recommend/ encourage appropriate diets, oral nutritional supplements, and vitamin/mineral supplements  - Order, calculate, and assess calorie counts as needed  - Recommend, monitor, and adjust tube feedings and TPN/PPN based on assessed needs  - Assess need for intravenous fluids  - Provide specific nutrition/hydration education as appropriate  - Include patient/family/caregiver in decisions related to nutrition  9/14/2021 1031 by Martha Marley RN  Outcome: Progressing  9/14/2021 1029 by Martha Marley, RN  Outcome: Progressing

## 2021-09-14 NOTE — DISCHARGE INSTRUCTIONS
Spinal Cord Injury   WHAT YOU NEED TO KNOW:   What do I need to know about a spinal cord injury (SCI)? Your spinal cord is protected by vertebrae that make up the spinal column  An SCI can happen if your spinal column presses down on or pinches your spinal cord  This causes swelling or bruising of your spinal cord  Damage to your spinal column, disease, or infection can cause an injury to your spinal cord  An SCI is referred to by a letter and a number  The letter represents the spinal column level where the injury occurred  The number represents which vertebrae is involved  What do I need to know about the spinal column levels? The spinal column usually has about 30 vertebrae  The column is divided into levels:  · The cervical (C) region includes the 8 vertebrae (C1 to C8) that form the neck  · The thoracic (T) region includes the 12 vertebrae (T1 to T12) that form the upper to middle back  · The lumbar (L) region includes the 5 vertebrae (L1 to L5) that form the lower back  · The sacral (S) region includes the 5 vertebrae (S1 to S5) between the lumbar region and the tailbone  What are the types of SCI? An SCI may occur at any level  You have an incomplete  injury if you have some feeling or movement below the level of injury  You have a complete  injury if you have no movement or feeling below the level of injury  · Tetraplegia  usually happens at a level from C1 to T1  You may not have any feeling or movement of your arms and legs  You also may not be able to move your head and neck  This may also be called quadriplegia  · Paraplegia  usually happens at a level from T2 to S5  You may have a loss of feeling or movement in your chest, stomach, hips, legs and feet  How is an SCI diagnosed? Your healthcare provider will do tests to see if you have any movement or feeling in your arms and legs  You may also need tests such as MRI, CT, or x-ray, to show any damage to your spinal cord   You may be given contrast material to help show the damage to your spinal cord better  Tell your healthcare providers if have ever had an allergic reaction to contrast material   What is the immediate treatment for an SCI? You may have to be put in a firm brace or have traction to your spine  The brace and traction are used to prevent movement of your spinal column  Movement of your spinal column may cause more damage to your spinal cord  You may also need medicines to keep you from moving  You may need a ventilator to help you breathe if your injury affects your lungs or diaphragm  You may need surgery to remove the part of your spinal column that is damaging or blocking your spinal cord  What health problems are common with an SCI? These complications can become life-threatening:  · Pressure injuries (bedsores)    · Deep vein thrombosis    · Hyperreflexia caused by an irritant to nerves below the level of injury    · Lung conditions such as pneumonia, pulmonary blood clots, or a collapsed lung    Why is rehabilitation after an SCI important? You will begin rehabilitation after your hospitalization  The goal of rehabilitation is to help you function with an SCI  The rehabilitation team includes the following:  · A doctor who specializes in physical medicine and rehabilitation    · Physical therapists who teach you exercises to build strength and use of adaptive devices, such as a wheelchair    · Occupational therapists who teach you how to do activities of daily living, such as grooming and toileting routines    · Rehabilitation psychologists who help you cope emotionally with your SCI    · Rehabilitation nurses, dietitians, social workers, and other specialists who monitor your condition    CARE AGREEMENT:   You have the right to help plan your care  Learn about your health condition and how it may be treated  Discuss treatment options with your healthcare providers to decide what care you want to receive   You always have the right to refuse treatment  The above information is an  only  It is not intended as medical advice for individual conditions or treatments  Talk to your doctor, nurse or pharmacist before following any medical regimen to see if it is safe and effective for you  © Copyright [employer] 2021 Information is for End User's use only and may not be sold, redistributed or otherwise used for commercial purposes   All illustrations and images included in CareNotes® are the copyrighted property of A D A M , Inc  or [address] [place] [address]

## 2021-09-14 NOTE — PLAN OF CARE
Problem: OCCUPATIONAL THERAPY ADULT  Goal: Performs self-care activities at highest level of function for planned discharge setting  See evaluation for individualized goals  Description: Treatment Interventions: ADL retraining, Functional transfer training, UE strengthening/ROM, Endurance training, Cognitive reorientation, Patient/family training, Equipment evaluation/education, Compensatory technique education, Fine motor coordination activities, Continued evaluation, Energy conservation, Activityengagement          See flowsheet documentation for full assessment, interventions and recommendations  Outcome: Progressing  Note: Limitation: Decreased ADL status, Decreased UE strength, Decreased UE ROM, Decreased Safe judgement during ADL, Decreased cognition, Decreased endurance, Decreased sensation, Decreased fine motor control, Decreased self-care trans, Decreased high-level ADLs  Prognosis: Fair  Assessment: Pt was seen for AM OT session with focus on ADL (dressing), dynamic sitting balance, coordination, and transfers  Pt seated in recliner upon arrival  Pt very pleasant and cooperative, describes daily exercises he has been doing for UE/LE strengthening/ROM  Pt Min A for transfers (STS) w/ steadying and VC for safety  Pt educated on UB/LB dressing techniques w/ hopsital clothing  Pt currently req Mod A for dressing 2* to pain and limited ROM  Pt asst'd with proper body mechanics/coordination for dressing  Pt demonstrated F dynamic sitting balance while dressing  Will continue following goals listed in IE     Recommendation: Physiatry Consult  OT Discharge Recommendation: Post acute rehabilitation services  OT - OK to Discharge: Yes (When medically stable)    MYLES Alvarado

## 2021-09-14 NOTE — OCCUPATIONAL THERAPY NOTE
Occupational Therapy Treatment Note:       09/14/21 1047   OT Last Visit   OT Visit Date 09/14/21   Restrictions/Precautions   Weight Bearing Precautions Per Order No   Braces or Orthoses C/S Collar   Other Precautions Chair Alarm; Bed Alarm; Fall Risk;Pain;Spinal precautions   Pain Assessment   Pain Assessment Tool FLACC   Pain Location/Orientation Orientation: Bilateral;Location: Neck   Pain Rating: FLACC (Rest) - Face 0   Pain Rating: FLACC (Rest) - Legs 0   Pain Rating: FLACC (Rest) - Activity 0   Pain Rating: FLACC (Rest) - Cry 0   Pain Rating: FLACC (Rest) - Consolability 0   Score: FLACC (Rest) 0   Pain Rating: FLACC (Activity) - Face 1   Pain Rating: FLACC (Activity) - Legs 0   Pain Rating: FLACC (Activity) - Activity 0   Pain Rating: FLACC (Activity) - Cry 1   Pain Rating: FLACC (Activity) - Consolability 0   Score: FLACC (Activity) 2   Transfers   Sit to Stand 4  Minimal assistance   Additional items Assist x 1; Armrests; Increased time required   Stand to Sit 4  Minimal assistance   Additional items Assist x 1; Increased time required   Additional Comments Pt    Functional Mobility   Functional Mobility 4  Minimal assistance   Additional Comments Pt took a few steps from recliner to EOB  VC for safety, minimal asst for steadying  Additional items Hand hold assistance   Cognition   Arousal/Participation Alert; Responsive; Uncooperative   Attention Within functional limits   Following Commands Follows all commands and directions without difficulty   Comments Pt is very pleasant and cooperative, states he has been practicing exercises for UE/LE strengthening  Assessment   Assessment Pt was seen for AM OT session with focus on ADL (dressing), dynamic sitting balance, coordination, and transfers  Pt seated in recliner upon arrival  Pt very pleasant and cooperative, describes daily exercises he has been doing for UE/LE strengthening/ROM  Pt Min A for transfers (STS) w/ steadying and VC for safety   Pt educated on UB/LB dressing techniques w/ hopsital clothing  Pt currently req Mod A for dressing 2* to pain and limited ROM  Pt asst'd with proper body mechanics/coordination for dressing  Pt demonstrated F dynamic sitting balance while dressing  Will continue following goals listed in IE  Plan   Treatment Interventions ADL retraining;Functional transfer training;UE strengthening/ROM; Compensatory technique education; Activityengagement   Goal Expiration Date 09/24/21   OT Treatment Day 1   OT Frequency 3-5x/wk   Recommendation   OT Discharge Recommendation Post acute rehabilitation services   OT - OK to Discharge Yes  (When medically stable)     MYLES Smith

## 2021-09-14 NOTE — PHYSICAL THERAPY NOTE
Physical Therapy Progress Note     09/14/21 1121   PT Last Visit   PT Visit Date 09/14/21   Note Type   Note Type Treatment for insurance authorization   Pain Assessment   Pain Assessment Tool FLACC   Pain Rating: FLACC (Rest) - Face 1   Pain Rating: FLACC (Rest) - Legs 0   Pain Rating: FLACC (Rest) - Activity 0   Pain Rating: FLACC (Rest) - Cry 1   Pain Rating: FLACC (Rest) - Consolability 0   Score: FLACC (Rest) 2   Pain Rating: FLACC (Activity) - Face 1   Pain Rating: FLACC (Activity) - Legs 0   Pain Rating: FLACC (Activity) - Activity 0   Pain Rating: FLACC (Activity) - Cry 1   Pain Rating: FLACC (Activity) - Consolability 0   Score: FLACC (Activity) 2   Restrictions/Precautions   Braces or Orthoses C/S Collar   Other Precautions Pain; Fall Risk;Spinal precautions   Subjective   Subjective pt encountered seated in recliner, having just finished OT session  Pt pleasant and eager to participate in therapy  Reports improved BLE sensation in feet & improved UE strength  Does report some pain at incision site, but does not appear to be significantly uncomfortable at rest    Transfers   Sit to Stand 5  Supervision   Additional items Assist x 1; Armrests; Increased time required;Verbal cues   Stand to Sit 5  Supervision   Additional items Assist x 1; Armrests; Increased time required;Verbal cues   Ambulation/Elevation   Gait pattern Excessively slow; Short stride; Inconsistent fidelina;Decreased foot clearance; Improper Weight shift;Narrow AMINATA; [place]   Gait Assistance 4  Minimal assist   Additional items Assist x 1   Assistive Device Rolling walker   Distance 50' x 2   Balance   Static Sitting Fair   Static Standing Fair -   Ambulatory Poor +   Endurance Deficit   Endurance Deficit Yes   Endurance Deficit Description fatigue, LE weakness, pain   Activity Tolerance   Activity Tolerance Patient tolerated treatment well;Patient limited by fatigue;Patient limited by pain   Nurse Made Aware yes   Exercises   Hip Abduction Standing;10 reps;AROM; Bilateral   Knee AROM Long Arc Quad Sitting;10 reps;AROM; Bilateral   Ankle Pumps Sitting;15 reps;Bilateral;AROM   Marching Sitting;Standing;20 reps;AROM; Bilateral  (x10 sitting, x10 standng)   Assessment   Prognosis Good   Problem List Decreased strength;Decreased range of motion;Decreased endurance; Impaired balance;Decreased mobility; Decreased coordination;Decreased safety awareness;Decreased cognition;Pain;Orthopedic restrictions; Impaired sensation   Assessment Pt demonstrated improved balance, LE strength, and endurance this session, performing transfers with reeduced assist overall & ambulating up to household distances without overt LOB  Pt did report feeling off balance with weakness in LEs & abdominal mouscles  Palpated weakness in bilateral hips during stance phase, resulting in slight trendelenberg gait & narrow AMINATA  No knee buckling noted during session  Seated rests taken to recover between ambulatory exercises  Upon return to room, pt instructed in & performed standing exercises to improve strength, but primarily focused on balance in single leg stance while maintaining upright posture  pt remains heavily reliant on RW at this time, but was able to ease off slightly with instructions  Discussed POC, discharge plan, & role of therapy in working to maximize functional independence during acute phase of healing  Pt remains appropriate for rehab at this time to address noted deficits & ensure safe return to PLOF  Goals   Patient Goals to keep getting stronger   STG Expiration Date 09/22/21   PT Treatment Day 1   Plan   Treatment/Interventions Functional transfer training;LE strengthening/ROM; Elevations; Therapeutic exercise; Endurance training;Patient/family training;Equipment eval/education; Bed mobility;Gait training   Progress Progressing toward goals   PT Frequency Other (Comment)  (3-6x/week)   Recommendation   PT Discharge Recommendation Post acute rehabilitation services Equipment Recommended [facility] Recommended Wheeled walker   AM-PAC Basic Mobility Inpatient   Turning in Bed Without Bedrails 3   Lying on Back to Sitting on Edge of Flat Bed 3   Moving Bed to Chair 3   Standing Up From Chair 3   Walk in Room 3   Climb 3-5 Stairs 2   Basic Mobility Inpatient Raw Score 17   Basic Mobility Standardized Score 39 67   The patient's AM-PAC Basic Mobility Inpatient Short Form Raw Score is 17, Standardized Score is 39 67  A standardized score less than 42 9 suggests the patient may benefit from discharge to post-acute rehabilitation services  Please also refer to the recommendation of the Physical Therapist for safe discharge planning            Larissa Chávez, YOVANI

## 2021-09-14 NOTE — CASE MANAGEMENT
[facility] have submitted for insurance  The pt has a room available today if approved by insurance  [facility] liaison would like  to secure [facility] the [facility]  CM has tentative transport set for 1630 via [facility]  Pt will need a COVID swab and updated notes

## 2021-09-14 NOTE — PLAN OF CARE
Problem: PHYSICAL THERAPY ADULT  Goal: Performs mobility at highest level of function for planned discharge setting  See evaluation for individualized goals  Description: Treatment/Interventions: OT, Spoke to nursing, Spoke to case management, Gait training, Bed mobility, Patient/family training, Endurance training, LE strengthening/ROM, Functional transfer training          See flowsheet documentation for full assessment, interventions and recommendations  Outcome: Progressing  Note: Prognosis: Good  Problem List: Decreased strength, Decreased range of motion, Decreased endurance, Impaired balance, Decreased mobility, Decreased coordination, Decreased safety awareness, Decreased cognition, Pain, Orthopedic restrictions, Impaired sensation  Assessment: Pt demonstrated improved balance, LE strength, and endurance this session, performing transfers with reeduced assist overall & ambulating up to household distances without overt LOB  Pt did report feeling off balance with weakness in LEs & abdominal mouscles  Palpated weakness in bilateral hips during stance phase, resulting in slight trendelenberg gait & narrow AMINATA  No knee buckling noted during session  Seated rests taken to recover between ambulatory exercises  Upon return to room, pt instructed in & performed standing exercises to improve strength, but primarily focused on balance in single leg stance while maintaining upright posture  pt remains heavily reliant on RW at this time, but was able to ease off slightly with instructions  Discussed POC, discharge plan, & role of therapy in working to maximize functional independence during acute phase of healing  Pt remains appropriate for rehab at this time to address noted deficits & ensure safe return to PLOF    Barriers to Discharge: Inaccessible home environment, Decreased caregiver support        PT Discharge Recommendation: Post acute rehabilitation services     PT - OK to Discharge: Yes    See flowsheet documentation for full assessment

## 2021-09-14 NOTE — TRANSPORTATION MEDICAL NECESSITY
Section I - General Information    Name of Patient: Yossi Wu                 : 2000    Medicare #: [id number]  Transport Date: 21 (PCS is valid for round trips on this date and for all repetitive trips in the 60-day range as noted below )  Origin: [address] [facility]                                                         Destination: [facility]  Is the pt's stay covered under Medicare Part A (PPS/DRG)   []     Closest appropriate facility? If no, why is transport to more distant facility required? Yes  If hospice pt, is this transport related to pt's terminal illness? No       Section II - Medical Necessity Questionnaire  Ambulance transportation is medically necessary only if other means of transport are contraindicated or would be potentially harmful to the patient  To meet this requirement, the patient must either be "bed confined" or suffer from a condition such that transport by means other than ambulance is contraindicated by the patient's condition  The following questions must be answered by the medical professional signing below for this form to be valid:    1)  Describe the MEDICAL CONDITION (physical and/or mental) of this patient AT [address] [place] [address] that requires the patient to be transported in an ambulance and why transport by other means is contraindicated by the patient's condition: fall, C5 fx, central cord syndrome, S/P Posterior C4-6 decompressive laminectomies, S/P Posterior C3-T1 instrumented fusion with autograft and allograft bone    2) Is the patient "bed confined" as defined below? Yes  To be "be confined" the patient must satisfy all three of the following conditions: (1) unable to get up from bed without Assistance; AND (2) unable to ambulate; AND (3) unable to sit in a chair or wheelchair  3) Can this patient safely be transported by car or wheelchair van (i e , seated during transport without a medical attendant or monitoring)? No    4) In addition to completing questions 1-3 above, please check any of the following conditions that apply*:   *Note: supporting documentation for any boxes checked must be maintained in the patient's medical records  If hosp-hosp transfer, describe services needed at 2nd facility not available at 1st facility? Moderate/severe pain on movement   Unable to tolerate seated position for time needed to transport   Other(specify) Central Cord      Section III - Signature of Physician or Healthcare Professional  I certify that the above information is true and correct based on my evaluation of this patient, and represent that the patient requires transport by ambulance and that other forms of transport are contraindicated  I understand that this information will be used by the Centers for Medicare and Medicaid Services (CMS) to support the determination of medical necessity for ambulance services, and I represent that I have personal knowledge of the patient's condition at time of transport  []  If this box is checked, I also certify that the patient is physically or mentally incapable of signing the ambulance service's claim and that the institution with which I am affiliated has furnished care, services, or assistance to the patient  My signature below is made on behalf of the patient pursuant to 42 CFR §424 36(b)(4)   In accordance with 42 CFR §424 37, the specific reason(s) that the patient is physically or mentally incapable of signing the claim form is as follows:      Signature of Physician* or Healthcare Professional______________________________________________________________  Signature Date 09/14/21 (For scheduled repetitive transports, this form is not valid for transports performed more than 60 days after this date)    Printed Name & Credentials of Physician or Healthcare Professional (MD, DO, RN, etc )__Lobo VARGAS_____________________________  *Form must be signed by patient's attending physician for scheduled, repetitive transports   For non-repetitive, unscheduled ambulance transports, if unable to obtain the signature of the attending physician, any of the following may sign (choose appropriate option below)  [] Physician Assistant []  Clinical Nurse Specialist []  Registered Nurse  []  Nurse Practitioner  [x] Discharge Planner

## 2021-09-14 NOTE — DISCHARGE SUMMARY
[address] [facility]  Progress Note- Paige Aguilar 2000, 24 y o  male MRN: [id number]  Unit/Bed#: [facility] 631-01 Encounter: [id number]  Primary Care Provider: No primary care provider on file  Date and time admitted to hospital: 9/9/2021 11:01 AM    Acute pain due to trauma  Assessment & Plan  - Acute pain secondary to traumatic injuries  - Continue multi modal analgesic regimen  Discontinue IV dilaudid on 9/14    - Bowel regimen as long as using opioids   - Continue to monitor pain and adjust regimen as indicated  Fall  Assessment & Plan  - sustaining the below stated injuries  - PT/OT recommending acute spinal cord rehab, patient in agreement  - CM following to find accepting facility    Central cord syndrome [facility])  Assessment & Plan  See plan for C5 cervical fracture  * C5 cervical fracture [facility])  Assessment & Plan  · Patient is s/p C4-C6 decompressive laminectomies, C3-T1 fusion on 9/9/21  · Upright x-rays completed in VISTA collar on 9/11  · Maintain VISTA collar and cervical spine precautions  · Multi modal analgesic regimen  · Continue neuro checks  Exam improving, continues to have exam findings c/w central cord syndrome, bilateral  and tricep strength weakness and decreased sensation in the C8 dermatome bilaterally  · Recommend MAPs above 85 >7 days  Patient is auto- mapping at this time  · DVT PPX: SCDs  Lovenox started on 9/11/21  · PENELOPE drain removed 9/13  · Bowel regimen added  Patient is voiding  · PT/OT evaluation appreciated, recommending SCI rehab which patient and family are in agreement with  CM assisting with referrals  PM&R consult placed  · Patient will require outpatient follow-up with Neurosurgery in 2 weeks for postop check                Medical Problems     Resolved Problems  Date Reviewed: 9/13/2021    None                Admission Date:   Admission Orders (From admission, onward)     Ordered        09/09/21 1536  Inpatient Admission  Once                     Admitting Diagnosis: Injury [T14 90XA]  C4 cervical fracture ([facility] ) [S12 300A]  Closed displaced fracture of fifth cervical vertebra, unspecified fracture morphology, [facility] [S12 400A]    HPI: Patient is a 24year old male who slipped and fell in the shower  He did hit his head, he denied LOC but was unable to move for 15 mins after the event  His injuries:  - C5 Burst fracture with retropulsion with B/L laminar fx, ligamentous injury cord contusion C4-C6    Procedures Performed: No orders of the defined types were placed in this encounter  Summary of Hospital Course: The patient was admitted to the trauma service, Dora Jarrett was consulted and he was admitted to the ICU  He was taken to the operating room on 9/9 at which time he underwent a Posterior C4-C6 decompressive laminectomies, Posterior C3-T1 instrumented fusion with autograft and allograft bone  Post operatively he returned to the ICU  He progressed very well  He did have return of movement and sensation in his B/L Lower extremities and B/L UE's  He began working with PT/OT  He was transferred to the floor  He will continue wearing his VISTA collar, His PENELOPE drain was removed by LAURE Rubin He was recommended to be discharge to SCI rehab 9/15  Significant Findings, Care, Treatment and Services Provided: As above    Complications: None    Condition at Discharge: good     Physical Exam:  General: NAD, patient appears comfortable lying up in bed  Neuro: Patient is alert and oriented x 3, with a GCS of 15  HEENT: PERRL  COR: RRR, no murmur, no rubs auscultated  Resp: CTA B/L throughout  GI: Abdomen soft, ND, NT, +BS x 4  : Intact  MSK: The patient has 5/5 strength BLE's, sensation intact but blunted from big toe to heel, BUE's good strength, 5/5, sensation intact grossly      Diet: Regular, On senna/Colace  DVT Prophylaxis: Lovenox    Reviewed Lab/Radiology

## 2021-09-14 NOTE — DISCHARGE SUMMARY
Discharge summary        Medical Problems     Resolved Problems  Date Reviewed: 9/13/2021    None              Admission Date:   Admission Orders (From admission, onward)     Ordered        09/09/21 1536  Inpatient Admission  Once                     Admitting Diagnosis: Injury [T14 90XA]  [facility] [S12 300A]  Closed displaced fracture of fifth cervical vertebra, unspecified fracture morphology, [facility] [S12 400A]    HPI: Patient is a 24year old male who slipped and fell in the shower  He did hit his head, he denied LOC but was unable to move for 15 mins after the event  His injuries:  - C5 Burst fracture with retropulsion with B/L laminar fx, ligamentous injury cord contusion C4-C6    Procedures Performed: No orders of the defined types were placed in this encounter  Summary of Hospital Course: The patient was admitted to the trauma service, Ayanna Castillo was consulted and he was admitted to the ICU  He was taken to the operating room on 9/9 at which time he underwent a Posterior C4-C6 decompressive laminectomies, Posterior C3-T1 instrumented fusion with autograft and allograft bone  Post operatively he returned to the ICU  He progressed very well  He did have return of movement and sensation in his B/L Lower extremities and B/L UE's  He began working with PT/OT  He was transferred to the floor  He will continue wearing his VISTA collar, His PENELOPE drain was removed by LAURE Goode He was recommended to be discharge to SCI rehab 9/15  Significant Findings, Care, Treatment and Services Provided: As above    Complications: None    Condition at Discharge: good   Discharge instructions/Information to patient and family:   See after visit summary for information provided to patient and family  Provisions for Follow-Up Care:  See after visit summary for information related to follow-up care and any pertinent home health orders  PCP: No primary care provider on file      Disposition: Skilled nursing facility at [facility] Acute rehab    Planned Readmission: No    Discharge Statement   I spent 20 minutes discharging the patient  This time was spent on the day of discharge  I had direct contact with the patient on the day of discharge  Additional documentation is required if more than 30 minutes were spent on discharge  Discharge Medications:  See after visit summary for reconciled discharge medications provided to patient and family

## 2021-09-14 NOTE — PHYSICAL THERAPY NOTE
Physical Therapy Progress Note     09/14/21 1121   PT Last Visit   PT Visit Date 09/14/21   Note Type   Note Type Treatment for insurance authorization   Pain Assessment   Pain Assessment Tool FLACC   Pain Rating: FLACC (Rest) - Face 1   Pain Rating: FLACC (Rest) - Legs 0   Pain Rating: FLACC (Rest) - Activity 0   Pain Rating: FLACC (Rest) - Cry 1   Pain Rating: FLACC (Rest) - Consolability 0   Score: FLACC (Rest) 2   Pain Rating: FLACC (Activity) - Face 1   Pain Rating: FLACC (Activity) - Legs 0   Pain Rating: FLACC (Activity) - Activity 0   Pain Rating: FLACC (Activity) - Cry 1   Pain Rating: FLACC (Activity) - Consolability 0   Score: FLACC (Activity) 2   Restrictions/Precautions   Braces or Orthoses C/S Collar   Other Precautions Pain; Fall Risk;Spinal precautions   Subjective   Subjective pt encountered seated in recliner, having just finished OT session  Pt pleasant and eager to participate in therapy  Reports improved BLE sensation in feet & improved UE strength  Does report some pain at incision site, but does not appear to be significantly uncomfortable at rest    Transfers   Sit to Stand 5  Supervision   Additional items Assist x 1; Armrests; Increased time required;Verbal cues   Stand to Sit 5  Supervision   Additional items Assist x 1; Armrests; Increased time required;Verbal cues   Ambulation/Elevation   Gait pattern Excessively slow; Short stride; Inconsistent fidelina;Decreased foot clearance; Improper Weight shift;Narrow AMINATA; [place]   Gait Assistance 4  Minimal assist   Additional items Assist x 1   Assistive Device Rolling walker   Distance 50' x 2   Balance   Static Sitting Fair   Static Standing Fair -   Ambulatory Poor +   Endurance Deficit   Endurance Deficit Yes   Endurance Deficit Description fatigue, LE weakness, pain   Activity Tolerance   Activity Tolerance Patient tolerated treatment well;Patient limited by fatigue;Patient limited by pain   Nurse Made Aware yes   Assessment   Prognosis Good Problem List Decreased strength;Decreased range of motion;Decreased endurance; Impaired balance;Decreased mobility; Decreased coordination;Decreased safety awareness;Decreased cognition;Pain;Orthopedic restrictions; Impaired sensation   Assessment Pt demonstrated improved balance, LE strength, and endurance this session, performing transfers with reeduced assist overall & ambulating up to household distances without overt LOB  Pt did report feeling off balance with weakness in LEs & abdominal mouscles  Palpated weakness in bilateral hips during stance phase, resulting in slight trendelenberg gait & narrow AMINATA  No knee buckling noted during session  Seated rests taken to recover between ambulatory exercises  Upon return to room, pt instructed in & performed standing exercises to improve strength, but primarily focused on balance in single leg stance while maintaining upright posture  pt remains heavily reliant on RW at this time, but was able to ease off slightly with instructions  Discussed POC, discharge plan, & role of therapy in working to maximize functional independence during acute phase of healing  Pt remains appropriate for rehab at this time to address noted deficits & ensure safe return to PLOF  Goals   Patient Goals to keep getting stronger   STG Expiration Date 09/22/21   PT Treatment Day 1   Plan   Treatment/Interventions Functional transfer training;LE strengthening/ROM; Elevations; Therapeutic exercise; Endurance training;Patient/family training;Equipment eval/education; Bed mobility;Gait training   Progress Progressing toward goals   PT Frequency Other (Comment)  (3-6x/week)   Recommendation   PT Discharge Recommendation Post acute rehabilitation services   Equipment Recommended [facility] Recommended Wheeled walker   AM-PAC Basic Mobility Inpatient   Turning in Bed Without Bedrails 3   Lying on Back to Sitting on Edge of Flat Bed 3   Moving Bed to Chair 3   Standing Up From Chair 3 Walk in Room 3   Climb 3-5 Stairs 2   Basic Mobility Inpatient Raw Score 17   Basic Mobility Standardized Score 39 67   The patient's AM-PAC Basic Mobility Inpatient Short Form Raw Score is 17, Standardized Score is 39 67  A standardized score less than 42 9 suggests the patient may benefit from discharge to post-acute rehabilitation services  Please also refer to the recommendation of the Physical Therapist for safe discharge planning            Fátima Lauren, PTA

## 2021-09-15 VITALS
OXYGEN SATURATION: 92 % | SYSTOLIC BLOOD PRESSURE: 138 MMHG | RESPIRATION RATE: 14 BRPM | WEIGHT: 166.89 LBS | DIASTOLIC BLOOD PRESSURE: 59 MMHG | TEMPERATURE: 98.4 F | BODY MASS INDEX: 23.89 KG/M2 | HEART RATE: 92 BPM | HEIGHT: 70 IN

## 2021-09-15 PROCEDURE — 99238 HOSP IP/OBS DSCHRG MGMT 30/<: CPT | Performed by: EMERGENCY MEDICINE

## 2021-09-15 RX ORDER — OXYCODONE HYDROCHLORIDE 10 MG/1
TABLET ORAL
Qty: 21 TABLET | Refills: 0 | Status: SHIPPED | OUTPATIENT
Start: 2021-09-15 | End: 2022-03-29

## 2021-09-15 RX ADMIN — POLYETHYLENE GLYCOL 3350 17 G: 17 POWDER, FOR SOLUTION ORAL at 08:24

## 2021-09-15 RX ADMIN — ENOXAPARIN SODIUM 30 MG: 30 INJECTION SUBCUTANEOUS at 08:24

## 2021-09-15 RX ADMIN — ACETAMINOPHEN 975 MG: 325 TABLET, FILM COATED ORAL at 05:39

## 2021-09-15 RX ADMIN — GABAPENTIN 300 MG: 300 CAPSULE ORAL at 08:24

## 2021-09-15 RX ADMIN — DOCUSATE SODIUM 100 MG: 100 CAPSULE, LIQUID FILLED ORAL at 08:24

## 2021-09-15 RX ADMIN — OXYCODONE HYDROCHLORIDE 5 MG: 5 TABLET ORAL at 05:41

## 2021-09-15 RX ADMIN — METHOCARBAMOL TABLETS 750 MG: 750 TABLET, COATED ORAL at 05:39

## 2021-09-15 RX ADMIN — OXYCODONE HYDROCHLORIDE 5 MG: 5 TABLET ORAL at 09:47

## 2021-09-15 NOTE — PROGRESS NOTES
[address] [facility]  Progress Note - Franki Dos Santos 2000, 24 y o  male MRN: [id number]  Unit/Bed#: [facility] 631-01 Encounter: [id number]  Primary Care Provider: No primary care provider on file  Date and time admitted to hospital: 9/9/2021 11:01 AM    Acute pain due to trauma  Assessment & Plan  - Acute pain secondary to traumatic injuries  - Continue multi modal analgesic regimen  Discontinue IV dilaudid on 9/14    - Bowel regimen as long as using opioids   - Continue to monitor pain and adjust regimen as indicated  Fall  Assessment & Plan  - sustaining the below stated injuries  - PT/OT recommending acute spinal cord rehab, patient in agreement    Central cord syndrome [facility])  Assessment & Plan  See plan for C5 cervical fracture  * C5 cervical fracture [facility])  Assessment & Plan  · Patient is s/p C4-C6 decompressive laminectomies, C3-T1 fusion on 9/9/21  · Upright x-rays completed in VISTA collar on 9/11  · Maintain VISTA collar and cervical spine precautions  · Multi modal analgesic regimen  · Continue neuro checks  Exam improving, continues to have exam findings c/w central cord syndrome, continues to have weakness in left hand   · DVT PPX: SCDs  Lovenox started on 9/11/21  · PENELOPE drain removed 9/13  · Bowel regimen added  Patient is voiding  · PT/OT evaluation appreciated, recommending SCI rehab accepted to [facility]  · Patient will require outpatient follow-up with Neurosurgery in 2 weeks for postop check  Disposition:  Rehab today      SUBJECTIVE:  Chief Complaint:  Left hand  weakness    Subjective:  Patient states that he continues to have left hand weakness when he is gripping  He confirms that he continues to do regular  strengthening exercises  He states that he feels like he is getting stronger despite it only being a couple days from his surgery  He is looking forward to discharge today to rehab facility    A 10 point review of systems was completed is negative not otherwise mentioned above        OBJECTIVE:     Meds/Allergies     Current Facility-Administered Medications:     acetaminophen (TYLENOL) tablet 975 mg, 975 mg, Oral, Q8H ROCÍO, DANGELO Bond, 975 mg at 09/15/21 0539    bisacodyl (DULCOLAX) rectal suppository 10 mg, 10 mg, Rectal, Daily PRN, OriDANGELO De La Rosa    calcium carbonate (TUMS) chewable tablet 1,000 mg, 1,000 mg, Oral, Daily PRN, Orie Neat DANGELO Padron    docusate sodium (COLACE) capsule 100 mg, 100 mg, Oral, BID, Elizabeth Harmon MD, 100 mg at 09/15/21 0824    enoxaparin (LOVENOX) subcutaneous injection 30 mg, 30 mg, Subcutaneous, Q12H [address], Claus Adunbarin, DO, 30 mg at 09/15/21 0752    gabapentin (NEURONTIN) capsule 300 mg, 300 mg, Oral, TID, DANGELO Chavira, 300 mg at 09/15/21 2143    HYDROmorphone (DILAUDID) injection 0 5 mg, 0 5 mg, Intravenous, Q6H PRN, Brady Ngo PA-C, 0 5 mg at 09/14/21 1255    magnesium hydroxide (MILK OF MAGNESIA) oral suspension 30 mL, 30 mL, Oral, Daily PRN, Brady Ngo PA-C    melatonin tablet 3 mg, 3 mg, Oral, HS, DANGELO Bond, 3 mg at 09/14/21 2154    methocarbamol (ROBAXIN) tablet 750 mg, 750 mg, Oral, Q6H ROCÍO, DANGELO Bond, 750 mg at 09/15/21 0539    ondansetron (ZOFRAN) injection 4 mg, 4 mg, Intravenous, Q4H PRN, DANGELO Chavira    oxyCODONE (ROXICODONE) immediate release tablet 10 mg, 10 mg, Oral, Q4H PRN, DANGELO Bond, 10 mg at 09/14/21 2154    oxyCODONE (ROXICODONE) IR tablet 5 mg, 5 mg, Oral, Q4H PRN, DANGELO Bond, 5 mg at 09/15/21 0947    polyethylene glycol (MIRALAX) packet 17 g, 17 g, Oral, Daily, Nimo Da Silva PA-C, 17 g at 09/15/21 0824    senna (SENOKOT) tablet 17 2 mg, 2 tablet, Oral, HS, Elizabeth Harmon MD, 17 2 mg at 09/14/21 2154     Vitals:   Vitals:    09/15/21 0819   BP: 133/71   Pulse: 68   Resp: 17   Temp: 98 4 °F (36 9 °C)   SpO2: 100%       Intake/Output:  I/O       09/13 0701 - 09/14 0700 09/14 0701 - 09/15 0700 09/15 0701 - 09/16 0700    P  O  Total Intake(mL/kg)       Urine (mL/kg/hr) 1405 (0 8) 1235 (0 7) 260 (1 1)    Drains       Stool       Total Output 1405 1235 260    Net -1405 -1235 -260                  Nutrition/GI Proph/Bowel Reg: Continue current BM and diet    Physical Exam:   GENERAL APPEARANCE:  No acute distress  NEURO:  GCS is 15  Focal left  weakness noted  HEENT:  Normocephalic, atraumatic  Cervical collar in place  CV:  Regular rate and rhythm  No murmur, no rub, no gallop  +2 radial dorsalis pedis pulses, bilaterally  LUNGS:  Clear to auscultation, bilaterally  No wheezing, no rhonchi, no rales  No orthopnea  No tachypnea  GI:  Abdomen is soft nontender  :  Pelvis is stable  MSK:  No deformities  Extremities are nontender  SKIN:  Warm, dry, well perfused  Invasive Devices     Peripheral Intravenous Line            Peripheral IV 09/11/21 Right Forearm 4 days          Drain            Closed/Suction Drain Posterior; Left Neck Bulb 5 days                 Lab Results:   Results: I have personally reviewed pertinent reports   , BMP/CMP: No results found for: SODIUM, K, CL, CO2, ANIONGAP, BUN, CREATININE, GLUCOSE, CALCIUM, AST, ALT, ALKPHOS, PROT, BILITOT, EGFR and CBC:   Lab Results   Component Value Date     (L) 09/14/2021    MPV 10 3 09/14/2021     Imaging/EKG Studies: Results: I have personally reviewed pertinent reports      Other Studies: none  VTE Prophylaxis: Sequential compression device (Venodyne)  and Enoxaparin (Lovenox)

## 2021-09-15 NOTE — UTILIZATION REVIEW
Continued Stay Review    Date: 9/14                          Current Patient Class: inpatient  Current Level of Care: level 2 ICU stepdown    HPI:21 y o  male initially admitted on 9/9 as inpatient due to C5 burst fx w/retropulsion and C4-C6 cord contusion  Taken to OR on 9/9 by neurosurgery for decompression and fusion  Exam was consistent with central cord syndrome  Assessment/Plan: 9/14 POD#7 progressed well with return of movement and sensation to BLE and BUE, although sensation blunted from big toe to heel  C/o 5/5 neck pain fo rwhich IV dilaudid and PO oxycodone are in progress  PT/OT in progress  VISTA collar in place, drain removed  GCS 15, 5/5 strength throughout  Plan for rehab placement on 9/15       Vital Signs:   09/14/21 23:31:15  99 2 °F (37 3 °C)  74  17  126/72  90  98 %  --  --   09/14/21 2000  --  --  --  --  --  99 %  None (Room air)  --   09/14/21 19:31:46  99 1 °F (37 3 °C)  71  16  129/79  96  99 %  --  --   09/14/21 14:58:07  98 7 °F (37 1 °C)  80  17  126/81  96  100 %  --  --   09/14/21 11:44:10  97 5 °F (36 4 °C)  91  16  126/81  96  100 %  --  --   09/14/21 08:50:12  97 8 °F (36 6 °C)  69  16  127/72  90  100 %  --  --   09/14/21 0815  --  --  --  --  --  --  None (Room air)  --   09/14/21 0500  --  72  --  138/72  94  98 %  --  Lying   09/13/21 23:08:33  97 9 °F (36 6 °C)  65  18  144/74  97  98 %  --  --   09/13/21 16:15:46  98 6 °F (37 °C)  79  17  137/83  101  99 %  --  --   09/13/21 11:16:10  99 1 °F (37 3 °C)  74  17  138/77  97  99 %  --  --   09/13/21 0800  --  --  --  --  --  --  None (Room air)  --   09/13/21 0719  --  --  17  --  --  99 %  None (Room air)  --   09/13/21 06:06:10  99 1 °F (37 3 °C)  58  18  138/78  98  98 %  --  --   09/13/21 03:02:31  99 1 °F (37 3 °C)  56  --  137/78  98  99 %  --  --   09/13/21 00:05:37  99 1 °F (37 3 °C)  71  17  132/81  98  97 %  --       [email or web address]  RUE Muscle Strength 3- Movement  against gravity  only   LUE Muscle Strength 3- Movement  against gravity  only   RLE Muscle Strength 4- Movement  against gravity  and limited  resistance   LLE Muscle Strength 4- Movement  against gravity  and limited  resistance   RUE Sensation Tingling  per pt normal; comes & goes   LUE Sensation Full sensation  per pt normal; comes & goes   RLE Sensation Full sensation   LLE Sensation Full sensation       Pertinent Labs/Diagnostic Results:   9/11 C spine: Expected postop appearance of C4-C6 decompressive laminectomies, and posterior cervical fusion of C3-T1 for fixation of a C5 fracture      Results from last 7 days   Lab Units 09/14/21  1513   SARS-COV-2  Negative     Results from last 7 days   Lab Units 09/14/21  1531 09/11/21  0510 09/10/21  0451 09/09/21  2253 09/09/21  1159   WBC Thousand/uL  --  8 62 9 31 5 58 5 35   HEMOGLOBIN g/dL  --  12 8 13 1 14 3 15 1   HEMATOCRIT %  --  38 2 39 0 42 5 44 4   PLATELETS Thousands/uL 142* 108* 117* 119* 118*   NEUTROS ABS Thousands/µL  --   --  8 27*  --   --    BANDS PCT %  --   --   --   --  2         Results from last 7 days   Lab Units 09/11/21  0510 09/10/21  0451 09/09/21  2253 09/09/21  1159   SODIUM mmol/L 140 138 140 138   POTASSIUM mmol/L 3 7 3 9 4 0 4 0   CHLORIDE mmol/L 107 109* 112* 109*   CO2 mmol/L 29 24 23 23   ANION GAP mmol/L 4 5 5 6   BUN mg/dL 15 12 13 15   CREATININE mg/dL 0 69 0 59* 0 78 0 71   EGFR ml/min/1 73sq m 135 144 128 133   CALCIUM mg/dL 8 5 8 3 8 8 9 1   CALCIUM, IONIZED mmol/L  --   --  1 17  --    MAGNESIUM mg/dL  --  2 3 2 1  --    PHOSPHORUS mg/dL  --  3 5 2 0*  --              Results from last 7 days   Lab Units 09/11/21  0510 09/10/21  0451 09/09/21  2253 09/09/21  1159   GLUCOSE RANDOM mg/dL 88 121 131 90       Results from last 7 days   Lab Units 09/10/21  0451 09/09/21  2253 09/09/21  1429   PROTIME seconds 14 5 14 0 13 8   INR  1 18 1 12 1 10   PTT seconds  --   --  29         Medications:   Scheduled Medications:  acetaminophen, 975 mg, Oral, Q8H ROCÍO  docusate sodium, 100 mg, Oral, BID  enoxaparin, 30 mg, Subcutaneous, Q12H ROCÍO  gabapentin, 300 mg, Oral, TID  melatonin, 3 mg, Oral, HS  methocarbamol, 750 mg, Oral, Q6H ROCÍO  polyethylene glycol, 17 g, Oral, Daily  senna, 2 tablet, Oral, HS      Continuous IV Infusions: none     PRN Meds:  bisacodyl, 10 mg, Rectal, Daily PRN  calcium carbonate, 1,000 mg, Oral, Daily PRN  HYDROmorphone, 0 5 mg, Intravenous, Q6H PRN x 2 9/11, x 2 9/12, x 2 9/13, x 1 9/14  IV dilaudid 1mg x 2 9/11  magnesium hydroxide, 30 mL, Oral, Daily PRN  ondansetron, 4 mg, Intravenous, Q4H PRN  oxyCODONE, 10 mg, Oral, Q4H PRN using 2-3 daily doses  oxyCODONE, 5 mg, Oral, Q4H PRN using 1-2 daily doses    Discharge Plan: [facility]    Network Utilization Review Department  ATTENTION: Please call with any questions or concerns to [phone] and carefully listen to the prompts so that you are directed to the right person  All voicemails are confidential   Dottie Devin all requests for admission clinical reviews, approved or denied determinations and any other requests to dedicated fax number below belonging to the campus where the patient is receiving treatment   List of dedicated fax numbers for the Facilities:  [address] DENIALS (Administrative/Medical Necessity) [phone]   [address] (Maternity/NICU/Pediatrics) [phone]   [address] [place] [address] [place] [address] [address] [facility] [facility] [phone]

## 2021-09-15 NOTE — ASSESSMENT & PLAN NOTE
- sustaining the below stated injuries  - PT/OT recommending acute spinal cord rehab, patient in agreement

## 2021-09-15 NOTE — ASSESSMENT & PLAN NOTE
· Patient is s/p C4-C6 decompressive laminectomies, C3-T1 fusion on 9/9/21  · Upright x-rays completed in VISTA collar on 9/11  · Maintain VISTA collar and cervical spine precautions  · Multi modal analgesic regimen  · Continue neuro checks  Exam improving, continues to have exam findings c/w central cord syndrome, continues to have weakness in left hand   · DVT PPX: SCDs  Lovenox started on 9/11/21  · PENELOPE drain removed 9/13  · Bowel regimen added  Patient is voiding  · PT/OT evaluation appreciated, recommending SCI rehab accepted to [facility]  · Patient will require outpatient follow-up with Neurosurgery in 2 weeks for postop check

## 2021-09-17 ENCOUNTER — TRANSCRIBE ORDERS (OUTPATIENT)
Dept: NEUROSURGERY | Facility: CLINIC | Age: 21
End: 2021-09-17

## 2021-09-17 DIAGNOSIS — Z98.890 STATUS POST SURGERY: Primary | ICD-10-CM

## 2021-09-17 NOTE — TELEPHONE ENCOUNTER
1st attempt - Called Lisa FAIRBANKS at [facility] after surgery & recent discharge from the hospital to check in on recovery and provide post surgical instructions  Got voicemail, left message to callback  Will make another attempt if no callback is received

## 2021-09-17 NOTE — TELEPHONE ENCOUNTER
Received call back from [address] [facility] [facility]  Went over POVs-canceled the 2 week they will remove any staples/sutures  Went over date time and location of 6 week POV and faxed her the script for the x-rays that patient needs prior to the appt  She denies any issues to report at this time  Reviewed incision care instructions with her and she has no questions at this time

## 2021-09-24 ENCOUNTER — CLINICAL SUPPORT (OUTPATIENT)
Dept: NEUROSURGERY | Facility: CLINIC | Age: 21
End: 2021-09-24

## 2021-09-24 VITALS — SYSTOLIC BLOOD PRESSURE: 118 MMHG | TEMPERATURE: 99 F | DIASTOLIC BLOOD PRESSURE: 80 MMHG

## 2021-09-24 DIAGNOSIS — Z98.890 POST-OPERATIVE STATE: Primary | ICD-10-CM

## 2021-09-24 PROCEDURE — 99024 POSTOP FOLLOW-UP VISIT: CPT

## 2021-09-24 NOTE — PROGRESS NOTES
Post-Op Visit- Neurosurgery    Yossi Wu 24 y o  male MRN: [id number]    Chief Complaint:  Patient presents post: C4-6 Posterior Cervical Spine Decompression With C3-t1 Fusion   - Bilateral    History of Present Illness:  Patient presents for 2 week POV for incision check arrived accompanied by his mother and ambulated well without assistive device  [place] collar in place and being worn appropriately  He reports only mild tightness in his shoulders and arms, and sensitivity to cold in his BL index fingers         Current Outpatient Medications:     acetaminophen (TYLENOL) 325 mg tablet, Take 3 tablets (975 mg total) by mouth every 8 (eight) hours for 14 days, Disp: 30 tablet, Rfl: 0    bisacodyl (DULCOLAX) 10 mg suppository, Insert 1 suppository (10 mg total) into the rectum daily as needed (2nd line for constipation ), Disp: 12 suppository, Rfl: 0    calcium carbonate (TUMS) 500 mg chewable tablet, Chew 2 tablets (1,000 mg total) daily as needed for indigestion or heartburn for up to 14 days, Disp: 30 tablet, Rfl: 0    docusate sodium (COLACE) 100 mg capsule, Take 1 capsule (100 mg total) by mouth 2 (two) times a day for 14 days, Disp: 28 capsule, Rfl: 0    gabapentin (NEURONTIN) 300 mg capsule, Take 1 capsule (300 mg total) by mouth 3 (three) times a day for 14 days, Disp: 42 capsule, Rfl: 0    magnesium hydroxide (MILK OF MAGNESIA) 400 mg/5 mL oral suspension, Take 30 mL by mouth daily as needed for constipation for up to 14 days, Disp: 473 mL, Rfl: 0    melatonin 3 mg, Take 1 tablet (3 mg total) by mouth daily at bedtime for 14 days, Disp: 14 tablet, Rfl: 0    methocarbamol (ROBAXIN) 750 mg tablet, Take 1 tablet (750 mg total) by mouth every 6 (six) hours for 14 days, Disp: 56 tablet, Rfl: 0    oxyCODONE (ROXICODONE) 10 MG TABS, You may take 5mg (0 5 tab) for moderate paint or 10mg (1 tab) for severe pain, every 4 hours, as needed  , Disp: 21 tablet, Rfl: 0    polyethylene glycol (MIRALAX) 17 g packet, Take 17 g by mouth daily for 14 days, Disp: 238 g, Rfl: 0    senna (SENOKOT) 8 6 mg, Take 2 tablets (17 2 mg total) by mouth daily at bedtime for 14 days, Disp: 28 tablet, Rfl: 0     No Known Allergies       Assessment:    Vitals:    09/24/21 1347   BP: 118/80   Temp: 99 °F (37 2 °C)       Wound Exam: Staples removed while admitted to [facility]  Incision is well healed without visible s/s of infection  See below:         Procedure:  Surgical site assessment  Complications: None  Discussion/Summary  Doing well postoperatively  Reviewed incision care with patient including daily observation for s/s infection including: increased erythema, edema, drainage, dehiscence of incision or fever >101  Should these be observed, he understands that he is to call and/or return immediately for reassessment  Advised patient to continue cleansing area with mild soap and water and pat dry  Not to apply any lotions, creams, or ointments, & not to submerge in any water for 4  more weeks  He is to maintain activity restrictions and continue wearing cervical collar until cleared by the surgeon  Activity levels were also reviewed with the patient in detail, he is to lift no greater than 10 pounds and ambulation is encouraged as tolerated  Patient is aware that he cannot drive while still in the cervical collar  Verified date/time/location of upcoming POV and reminded him to complete x-rays prior to his visit on Visit date not found  He is to call the office with any further questions or concerns, or if any incisional issues or fevers would arise

## 2021-10-26 ENCOUNTER — HOSPITAL ENCOUNTER (OUTPATIENT)
Dept: RADIOLOGY | Facility: HOSPITAL | Age: 21
Discharge: HOME | End: 2021-10-26
Attending: NEUROLOGICAL SURGERY
Payer: COMMERCIAL

## 2021-10-26 ENCOUNTER — OFFICE VISIT (OUTPATIENT)
Dept: NEUROSURGERY | Facility: CLINIC | Age: 21
End: 2021-10-26

## 2021-10-26 VITALS
WEIGHT: 158 LBS | BODY MASS INDEX: 22.62 KG/M2 | RESPIRATION RATE: 16 BRPM | HEART RATE: 74 BPM | SYSTOLIC BLOOD PRESSURE: 106 MMHG | DIASTOLIC BLOOD PRESSURE: 80 MMHG | TEMPERATURE: 98.1 F | HEIGHT: 70 IN

## 2021-10-26 DIAGNOSIS — Z98.890 STATUS POST SURGERY: ICD-10-CM

## 2021-10-26 DIAGNOSIS — S14.126A: ICD-10-CM

## 2021-10-26 DIAGNOSIS — M54.12 CERVICAL RADICULOPATHY DUE TO TRAUMA: Primary | ICD-10-CM

## 2021-10-26 PROCEDURE — 99024 POSTOP FOLLOW-UP VISIT: CPT | Performed by: NEUROLOGICAL SURGERY

## 2021-10-26 PROCEDURE — 72040 X-RAY EXAM NECK SPINE 2-3 VW: CPT

## 2021-10-26 RX ORDER — ACETAMINOPHEN 500 MG
500 TABLET ORAL EVERY 6 HOURS PRN
COMMUNITY

## 2021-10-28 ENCOUNTER — TELEPHONE (OUTPATIENT)
Dept: NEUROSURGERY | Facility: CLINIC | Age: 21
End: 2021-10-28

## 2021-11-18 ENCOUNTER — APPOINTMENT (OUTPATIENT)
Dept: LAB | Facility: CLINIC | Age: 21
End: 2021-11-18
Payer: COMMERCIAL

## 2021-11-18 DIAGNOSIS — E55.9 AVITAMINOSIS D: ICD-10-CM

## 2021-11-18 LAB — 25(OH)D3 SERPL-MCNC: 43.7 NG/ML (ref 30–100)

## 2021-11-18 PROCEDURE — 82306 VITAMIN D 25 HYDROXY: CPT

## 2021-11-18 PROCEDURE — 36415 COLL VENOUS BLD VENIPUNCTURE: CPT

## 2022-03-29 ENCOUNTER — OFFICE VISIT (OUTPATIENT)
Dept: FAMILY MEDICINE CLINIC | Facility: CLINIC | Age: 22
End: 2022-03-29

## 2022-03-29 VITALS
TEMPERATURE: 98.3 F | HEIGHT: 68 IN | DIASTOLIC BLOOD PRESSURE: 68 MMHG | BODY MASS INDEX: 25.01 KG/M2 | HEART RATE: 70 BPM | WEIGHT: 165 LBS | RESPIRATION RATE: 21 BRPM | OXYGEN SATURATION: 97 % | SYSTOLIC BLOOD PRESSURE: 111 MMHG

## 2022-03-29 DIAGNOSIS — Z87.81 H/O CERVICAL FRACTURE: ICD-10-CM

## 2022-03-29 DIAGNOSIS — Z82.49 FAMILY HISTORY OF HEART ATTACK: ICD-10-CM

## 2022-03-29 DIAGNOSIS — Z13.31 POSITIVE DEPRESSION SCREENING: Primary | ICD-10-CM

## 2022-03-29 DIAGNOSIS — S14.129D CENTRAL CORD SYNDROME, SUBSEQUENT ENCOUNTER (HCC): ICD-10-CM

## 2022-03-29 DIAGNOSIS — Z13.6 SCREENING FOR CARDIOVASCULAR CONDITION: ICD-10-CM

## 2022-03-29 PROBLEM — G89.11 ACUTE PAIN DUE TO TRAUMA: Status: RESOLVED | Noted: 2021-09-11 | Resolved: 2022-03-29

## 2022-03-29 PROBLEM — S12.400A C5 CERVICAL FRACTURE (HCC): Status: RESOLVED | Noted: 2021-09-09 | Resolved: 2022-03-29
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
## 2022-03-29 PROCEDURE — 99203 OFFICE O/P NEW LOW 30 MIN: CPT | Performed by: PHYSICIAN ASSISTANT

## 2022-03-29 NOTE — PROGRESS NOTES
Assessment/Plan:    Family history of heart attack  Patient has significant family history of heart disease  Father and paternal uncle had MI in their early 45s  Discussed following a heart healthy diet  Will check lipid panel    Central cord syndrome ([address] )  C5 fracture caused by fall 9/2021  Pt is followed by spine specialist at [facility]  Attends neuro PT/OT several times during the week  Continue follow ups as scheduled  Encouraged to use an assistive device    Positive depression screening  Depression Screening Follow-up Plan: Patient's depression screening was positive with a PHQ-2 score of 4  Their PHQ-9 score was 18  Patient assessed for underlying major depression  They have no active suicidal ideations  Brief counseling provided and recommend additional follow-up/re-evaluation next office visit  Pt declined starting medication or referral for behavior health services at this time    H/O cervical fracture  See above      Diagnoses and all orders for this visit:    Positive depression screening    Central cord syndrome, subsequent encounter [facility])    Screening for cardiovascular condition  -     Lipid panel; Future    Family history of heart attack  -     Lipid panel; Future    H/O cervical fracture        Subjective:      Patient ID: Jesi Javier is a 20 y o  male new presents today with mom to establish care  Patient has history of C5 cervical fracture and central cord syndrome secondary to fall 9/2021  He is followed by spinal cord specialist at [facility]  Continues to attend neuro PT/OT several times/week  He takes gabapentin 300 mg 3 times daily, Robaxin 750 mg q6hrs, and Miralax  Is always in pain or discomfort  Family and girlfriend are very supportive and are helping pt adjust to the new changes in his life caused by his fall  Admits to gait being unsteady at time  Refuses to use an assistive device  Denies any recent falls         The following portions of the patient's history were reviewed and updated as appropriate: allergies, current medications, past family history, past medical history, past social history, past surgical history and problem list     Review of Systems   Constitutional: Negative for chills and fever  Respiratory: Negative for chest tightness and shortness of breath  Cardiovascular: Negative for chest pain  Gastrointestinal: Negative  Musculoskeletal: Positive for gait problem (At times), neck pain and neck stiffness  Psychiatric/Behavioral: Positive for dysphoric mood  Negative for suicidal ideas  Objective:      /68 (BP Location: Left arm, Patient Position: Sitting, Cuff Size: Standard)   Pulse 70   Temp 98 3 °F (36 8 °C)   Resp 21   Ht 5' 7 9" (1 725 m)   Wt 74 8 kg (165 lb)   SpO2 97%   BMI 25 16 kg/m²          Physical Exam  Constitutional:       General: He is not in acute distress  Appearance: Normal appearance  He is well-developed  He is not ill-appearing  Cardiovascular:      Rate and Rhythm: Normal rate and regular rhythm  Heart sounds: Normal heart sounds  No murmur heard  Pulmonary:      Effort: Pulmonary effort is normal  No respiratory distress  Breath sounds: Normal breath sounds  No wheezing  Musculoskeletal:         General: No deformity  Neurological:      Mental Status: He is alert and oriented to person, place, and time  Gait: Gait is intact  Psychiatric:         Mood and Affect: Mood normal          Behavior: Behavior normal          Thought Content:  Thought content normal          Judgment: Judgment normal

## 2022-03-29 NOTE — ASSESSMENT & PLAN NOTE
Depression Screening Follow-up Plan: Patient's depression screening was positive with a PHQ-2 score of 4  Their PHQ-9 score was 18  Patient assessed for underlying major depression  They have no active suicidal ideations  Brief counseling provided and recommend additional follow-up/re-evaluation next office visit    Pt declined starting medication or referral for behavior health services at this time

## 2022-03-29 NOTE — ASSESSMENT & PLAN NOTE
Patient has significant family history of heart disease   Father and paternal uncle had MI in their early 45s  Discussed following a heart healthy diet  Will check lipid panel

## 2022-03-29 NOTE — ASSESSMENT & PLAN NOTE
C5 fracture caused by fall 9/2021  Pt is followed by spine specialist at [facility]  Attends neuro PT/OT several times during the week  Continue follow ups as scheduled    Encouraged to use an assistive device

## 2022-04-12 ENCOUNTER — APPOINTMENT (OUTPATIENT)
Dept: LAB | Facility: HOSPITAL | Age: 22
End: 2022-04-12
Payer: COMMERCIAL

## 2022-04-12 DIAGNOSIS — S14.125A: ICD-10-CM

## 2022-04-12 DIAGNOSIS — Z13.6 SCREENING FOR CARDIOVASCULAR CONDITION: ICD-10-CM

## 2022-04-12 DIAGNOSIS — Z82.49 FAMILY HISTORY OF HEART ATTACK: ICD-10-CM

## 2022-04-12 DIAGNOSIS — E55.9 VITAMIN D DEFICIENCY, UNSPECIFIED: ICD-10-CM

## 2022-04-12 LAB
25(OH)D3 SERPL-MCNC: 32.8 NG/ML (ref 30–100)
CHOLEST SERPL-MCNC: 108 MG/DL
HDLC SERPL-MCNC: 32 MG/DL
LDLC SERPL CALC-MCNC: 56 MG/DL (ref 0–100)
NONHDLC SERPL-MCNC: 76 MG/DL
TRIGL SERPL-MCNC: 100 MG/DL

## 2022-04-12 PROCEDURE — 80061 LIPID PANEL: CPT

## 2022-04-12 PROCEDURE — 36415 COLL VENOUS BLD VENIPUNCTURE: CPT

## 2022-04-12 PROCEDURE — 82306 VITAMIN D 25 HYDROXY: CPT

## 2022-04-13 ENCOUNTER — TELEPHONE (OUTPATIENT)
Dept: FAMILY MEDICINE CLINIC | Facility: CLINIC | Age: 22
End: 2022-04-13

## 2022-04-13 NOTE — TELEPHONE ENCOUNTER
Spoke with patient's mother informed lipid panel results  Advised HDL is low  Encouraged to eat food such as avocados, fish, nuts, and use extra virgin olive oil  Unable to exercise at this time due to current condition

## 2022-05-21 ENCOUNTER — APPOINTMENT (OUTPATIENT)
Dept: RADIOLOGY | Facility: HOSPITAL | Age: 22
End: 2022-05-21
Payer: COMMERCIAL

## 2022-05-21 ENCOUNTER — HOSPITAL ENCOUNTER (OUTPATIENT)
Dept: RADIOLOGY | Facility: HOSPITAL | Age: 22
Discharge: HOME | End: 2022-05-21
Payer: COMMERCIAL

## 2022-05-21 DIAGNOSIS — G44.52 NEW DAILY PERSISTENT HEADACHE: ICD-10-CM

## 2022-05-21 PROCEDURE — A9585 GADOBUTROL INJECTION: HCPCS | Performed by: RADIOLOGY

## 2022-05-21 PROCEDURE — G1004 CDSM NDSC: HCPCS

## 2022-05-21 PROCEDURE — 70553 MRI BRAIN STEM W/O & W/DYE: CPT

## 2022-05-21 RX ADMIN — GADOBUTROL 7 ML: 604.72 INJECTION INTRAVENOUS at 15:06

## 2022-09-19 ENCOUNTER — HOSPITAL ENCOUNTER (OUTPATIENT)
Dept: MRI IMAGING | Facility: HOSPITAL | Age: 22
Discharge: HOME | End: 2022-09-19
Payer: COMMERCIAL

## 2022-09-19 DIAGNOSIS — G83.22: ICD-10-CM

## 2022-09-19 PROCEDURE — G1004 CDSM NDSC: HCPCS

## 2022-09-19 PROCEDURE — 72156 MRI NECK SPINE W/O & W/DYE: CPT

## 2022-09-19 PROCEDURE — A9585 GADOBUTROL INJECTION: HCPCS | Performed by: RADIOLOGY

## 2022-09-19 RX ADMIN — GADOBUTROL 7 ML: 604.72 INJECTION INTRAVENOUS at 19:20

## 2022-09-29 ENCOUNTER — HOSPITAL ENCOUNTER (OUTPATIENT)
Dept: RADIOLOGY | Facility: HOSPITAL | Age: 22
Discharge: HOME | End: 2022-09-29
Payer: COMMERCIAL

## 2022-09-29 DIAGNOSIS — M25.461 EFFUSION OF BOTH KNEE JOINTS: ICD-10-CM

## 2022-09-29 DIAGNOSIS — M25.462 EFFUSION OF BOTH KNEE JOINTS: ICD-10-CM

## 2022-09-29 PROCEDURE — 73562 X-RAY EXAM OF KNEE 3: CPT

## 2022-10-11 PROBLEM — Z13.6 SCREENING FOR CARDIOVASCULAR CONDITION: Status: RESOLVED | Noted: 2022-03-29 | Resolved: 2022-10-11

## 2022-11-01 ENCOUNTER — TRANSCRIBE ORDERS (OUTPATIENT)
Dept: NEUROSURGERY | Facility: CLINIC | Age: 22
End: 2022-11-01

## 2022-11-01 ENCOUNTER — TELEPHONE (OUTPATIENT)
Dept: NEUROSURGERY | Facility: CLINIC | Age: 22
End: 2022-11-01

## 2022-11-01 DIAGNOSIS — S14.129D CENTRAL CORD SYNDROME, SUBSEQUENT ENCOUNTER (HCC): Primary | ICD-10-CM

## 2022-11-01 NOTE — TELEPHONE ENCOUNTER
Spoke to patients cosme Ni and scheduled 1 year f/u on 11/3 @ 2pm with c-spine XR flexion/extension as per LH   Patients mom appreciative for coordination

## 2022-11-02 ENCOUNTER — HOSPITAL ENCOUNTER (OUTPATIENT)
Dept: RADIOLOGY | Facility: HOSPITAL | Age: 22
Discharge: HOME | End: 2022-11-02
Attending: NEUROLOGICAL SURGERY

## 2022-11-02 DIAGNOSIS — S14.129D CENTRAL CORD SYNDROME, SUBSEQUENT ENCOUNTER (HCC): ICD-10-CM

## 2022-11-03 ENCOUNTER — OFFICE VISIT (OUTPATIENT)
Dept: NEUROSURGERY | Facility: CLINIC | Age: 22
End: 2022-11-03

## 2022-11-03 VITALS
DIASTOLIC BLOOD PRESSURE: 60 MMHG | RESPIRATION RATE: 18 BRPM | BODY MASS INDEX: 23.95 KG/M2 | WEIGHT: 158 LBS | HEART RATE: 63 BPM | HEIGHT: 68 IN | SYSTOLIC BLOOD PRESSURE: 98 MMHG | TEMPERATURE: 99.1 F

## 2022-11-03 DIAGNOSIS — M47.12 OTHER SPONDYLOSIS WITH MYELOPATHY, CERVICAL REGION: Primary | ICD-10-CM

## 2022-11-03 RX ORDER — SUMATRIPTAN 25 MG/1
TABLET, FILM COATED ORAL
COMMUNITY
Start: 2022-10-18

## 2022-11-03 RX ORDER — ESCITALOPRAM OXALATE 10 MG/1
TABLET ORAL
COMMUNITY
Start: 2022-11-03

## 2022-11-03 RX ORDER — PROPRANOLOL HYDROCHLORIDE 80 MG/1
80 CAPSULE, EXTENDED RELEASE ORAL DAILY
COMMUNITY
Start: 2022-10-17

## 2022-11-03 RX ORDER — TIZANIDINE 2 MG/1
2 TABLET ORAL 3 TIMES DAILY
COMMUNITY
Start: 2022-10-17

## 2022-11-03 RX ORDER — GABAPENTIN 300 MG/1
600 CAPSULE ORAL 3 TIMES DAILY
Qty: 180 CAPSULE | Refills: 3 | Status: SHIPPED | OUTPATIENT
Start: 2022-11-03

## 2022-11-03 NOTE — PROGRESS NOTES
Neurosurgery Office Note  Ezio Ring 20 y o  male MRN: [id number]      Assessment/Plan        Problem List Items Addressed This Visit    One-year POV     Visit Diagnoses     Other spondylosis with myelopathy, cervical region    -  Primary            Discussion:  Patient was previously evaluated on 10/26/21  He returns with his mother  He is a 79-year-old male with h/o traumatic C5 fracture with 3-column injury and cervical spinal cord edema, after slipping and falling in shower, s/p posterior C4-6 decompression with C3-T1 fusion on 9/9/21  Preoperative symptoms including BUE weakness, fine motor dysfunction in bilateral hands, dyscoordination, and gait imbalance have significantly improved since surgery, particularly with PT/OT (remains in rehab)  Today, he reports 6 months of global headaches associated with intermittent blurry and double vision  He still has some neck pain, which is improving with PT/OT, tizanidine (prescribed by rehab doctor, not evaluated by Pain Medicine), and trigger point injections in rehab  He also still has paresthesias in bilateral finger tips (mostly in second digit) with hyperesthesias ("still very sensitive") but "not as bad as before surgery"  He remains full strength throughout both arms (unable to move at all preop)  No recent falls or trauma  He has intermittent constipation, which has been improving with Miralax  No urinary or fecal incontinence  Incision healed well without erythema, edema, dehiscence, drainage, or underlying fluid collection  Neck range of motion has improved since prior visits  Interval XR on 11/2/22 showed very slight increase in the degree of anterior compression of C5 otherwise stable known C5 fracture, minimal lucency around the right C4 pedicle screw but instrumentation otherwise without complication, no evidence of dynamic instability on flexion or extension      He is gradually returning to his neurologic baseline prior to surgery with some persistent symptoms  I will increase Neurontin from 300 mg to 600 mg TID for persistent bilateral hand paresthesias and place a referral for Pain Medicine  I will also place a referral to Neurology for his worsening headaches with vision changes  He is scheduled for a formal ophthalmological evaluation in the near future as well  Follow up as needed from a neurosurgical standpoint  All questions and concerns were addressed during this visit  CHIEF COMPLAINT    Chief Complaint   Patient presents with   • Follow-up     1 year f/u Cervical radiculopathy due to trauma       HISTORY    History of Present Illness     20y o  year old male     HPI    See Discussion    REVIEW OF SYSTEMS    Review of Systems   Constitutional: Positive for fatigue  Musculoskeletal: Positive for neck pain (SHOULDER TO NECK PAIN ESPECIALLY WHEN STANDING ) and neck stiffness (PT ON BRACE)  1 year f/u Cervical radiculopathy due to trauma  pt had recent MRI cspine 9/19/22    xray done     PT with [facility]    Neurological: Positive for numbness (BILATERAL FINGERTIP PRESSURE ) and headaches (cervicagic )  Psychiatric/Behavioral: The patient is nervous/anxious  All other systems reviewed and are negative  Meds/Allergies     Current Outpatient Medications   Medication Sig Dispense Refill   • acetaminophen (TYLENOL) 500 mg tablet Take 500 mg by mouth every 6 (six) hours as needed for mild pain (Patient not taking: Reported on 3/29/2022 )     • gabapentin (NEURONTIN) 300 mg capsule Take 1 capsule (300 mg total) by mouth 3 (three) times a day for 14 days 42 capsule 0   • methocarbamol (ROBAXIN) 750 mg tablet Take 1 tablet (750 mg total) by mouth every 6 (six) hours for 14 days 56 tablet 0   • polyethylene glycol (MIRALAX) 17 g packet Take 17 g by mouth daily for 14 days 238 g 0     No current facility-administered medications for this visit         No Known Allergies    PAST HISTORY    Past Medical History: Diagnosis Date   • C5 cervical fracture ([facility] [address] ) 9/9/2021       Past Surgical History:   Procedure Laterality Date   • DECOMPRESSION SPINE CERVICAL POSTERIOR Bilateral 9/9/2021    Procedure: C4-6 POSTERIOR CERVICAL SPINE DECOMPRESSION WITH C3-T1 FUSION  ;  Surgeon: Scott Herrera MD;  Location: [facility];  Service: Neurosurgery       Social History     Tobacco Use   • Smoking status: Never Smoker   • Smokeless tobacco: Never Used   Vaping Use   • Vaping Use: Never used   Substance Use Topics   • Alcohol use: Never   • Drug use: Never       Family History   Problem Relation Age of Onset   • No Known Problems Mother    • Heart disease Father    • Heart attack Father    • Diabetes Paternal Grandmother    • Heart disease Paternal Grandfather    • Stroke Paternal Grandfather    • Heart disease Paternal Uncle          The following portions of the patient's history were reviewed in this encounter and updated as appropriate: Past medical, surgical, family, and social history, as well as medications, allergies, and review of systems  EXAM    Vitals:Blood pressure 98/60, pulse 63, temperature 99 1 °F (37 3 °C), temperature source Temporal, resp  rate 18, height 5' 7 9" (1 725 m), weight 71 7 kg (158 lb)  ,There is no height or weight on file to calculate BMI  Physical Exam  Vitals and nursing note reviewed  Constitutional:       Appearance: Normal appearance  He is normal weight  HENT:      Head: Normocephalic and atraumatic  Eyes:      Extraocular Movements: Extraocular movements intact and EOM normal       Pupils: Pupils are equal, round, and reactive to light  Cardiovascular:      Rate and Rhythm: Normal rate and regular rhythm  Pulses: Normal pulses  Heart sounds: Normal heart sounds  Pulmonary:      Effort: Pulmonary effort is normal       Breath sounds: Normal breath sounds  Abdominal:      General: Abdomen is flat  Palpations: Abdomen is soft     Musculoskeletal:         General: Normal range of motion  Cervical back: Normal range of motion  Neurological:      General: No focal deficit present  Mental Status: He is alert and oriented to person, place, and time  Mental status is at baseline  GCS: GCS eye subscore is 4  GCS verbal subscore is 5  GCS motor subscore is 6  Cranial Nerves: Cranial nerves are intact  Sensory: Sensation is intact  Motor: Motor function is intact  Coordination: Coordination is intact  Gait: Gait is intact  Deep Tendon Reflexes: Strength normal and reflexes are normal and symmetric  Psychiatric:         Mood and Affect: Mood normal          Speech: Speech normal          Behavior: Behavior normal          Neurologic Exam     Mental Status   Oriented to person, place, and time  Attention: normal    Speech: speech is normal   Level of consciousness: alert    Cranial Nerves     CN II   Visual fields full to confrontation  Visual acuity: normal  Right visual field deficit: none  Left visual field deficit: none     CN III, IV, VI   Pupils are equal, round, and reactive to light  Extraocular motions are normal    CN III: no CN III palsy  CN VI: no CN VI palsy  Nystagmus: none   Diplopia: none  Ophthalmoparesis: none  Upgaze: normal  Downgaze: normal  Conjugate gaze: present    CN V   Facial sensation intact  Right facial sensation deficit: none  Left facial sensation deficit: none    CN VII   Facial expression full, symmetric     Right facial weakness: none  Left facial weakness: none    CN VIII   CN VIII normal      CN IX, X   CN IX normal    CN X normal    Palate: symmetric    CN XI   CN XI normal    Right sternocleidomastoid strength: normal  Left sternocleidomastoid strength: normal  Right trapezius strength: normal  Left trapezius strength: normal    CN XII   CN XII normal    Tongue deviation: none    Motor Exam   Muscle bulk: normal  Overall muscle tone: normal  Right arm pronator drift: absent  Left arm pronator drift: absent    Strength   Strength 5/5 throughout  Sensory Exam   Light touch normal      Gait, Coordination, and Reflexes     Gait  Gait: normal    Reflexes   Reflexes 2+ except as noted  Incision healed      MEDICAL DECISION MAKING    Imaging Studies:     XR spine cervical complete 6+ vw flex/ext/obl    Result Date: 11/3/2022  Narrative: CERVICAL SPINE INDICATION:   S14 129D: Central cord syndrome at unspecified level of cervical spinal cord, subsequent encounter  COMPARISON:  10/26/2021 VIEWS:  XR SPINE CERVICAL COMPLETE 6+ VW FLEX /EXT /OBL Images: 7 FINDINGS: Moderate to marked compression fracture of C5 as noted previously  Status post ORIF  There is slightly increased compression of the anterior aspect of this vertebral body  Stable mild retrolisthesis of C5  Moderate reversal of the normal cervical lordosis at C5  No significant change on flexion or extension  The intervertebral disc spaces are preserved  No evidence of dynamic instability seen with flexion or extension  The prevertebral soft tissues are within normal limits  The lung apices are clear  Pedicle screws from C3 through T1, skipping T5 and T7  No evidence for hardware fracture  However, slight lucency is noted around the right C4 pedicle screw, not apparent previously  Hardware otherwise without complication  Impression: Very slight increase in the degree of anterior compression of C5  Otherwise stable appearing C5 fracture  Minimal lucency around the right C4 pedicle screw  Hardware otherwise without complication  No evidence for dynamic instability on flexion or extension  The study was marked in EPIC for significant notification  Workstation performed: [id number]       I have personally reviewed pertinent films in [facility] COOPER    Neurosurgeon

## 2022-12-27 ENCOUNTER — HOSPITAL ENCOUNTER (OUTPATIENT)
Dept: ULTRASOUND IMAGING | Facility: HOSPITAL | Age: 22
Discharge: HOME | End: 2022-12-27

## 2022-12-27 ENCOUNTER — APPOINTMENT (OUTPATIENT)
Dept: LAB | Facility: CLINIC | Age: 22
End: 2022-12-27

## 2022-12-27 DIAGNOSIS — H53.8 DRUG-INDUCED DISORDER OF REFRACTION AND ACCOMMODATION: ICD-10-CM

## 2022-12-27 DIAGNOSIS — T88.7XXA DRUG-INDUCED DISORDER OF REFRACTION AND ACCOMMODATION: ICD-10-CM

## 2022-12-27 DIAGNOSIS — G47.00 PERSISTENT DISORDER OF INITIATING OR MAINTAINING SLEEP: ICD-10-CM

## 2022-12-27 DIAGNOSIS — N31.9 NEUROGENIC DYSFUNCTION OF THE URINARY BLADDER: ICD-10-CM

## 2022-12-27 DIAGNOSIS — M62.830 BACK MUSCLE SPASM: ICD-10-CM

## 2022-12-27 DIAGNOSIS — F32.A DEPRESSIVE TYPE PSYCHOSIS: ICD-10-CM

## 2022-12-27 DIAGNOSIS — M54.2 CERVICALGIA: ICD-10-CM

## 2022-12-27 DIAGNOSIS — R51.9 FACIAL PAIN: ICD-10-CM

## 2022-12-27 LAB
ALBUMIN SERPL BCP-MCNC: 4.7 G/DL (ref 3.5–5)
ALP SERPL-CCNC: 80 U/L (ref 34–104)
ALT SERPL W P-5'-P-CCNC: 18 U/L (ref 7–52)
ANION GAP SERPL CALCULATED.3IONS-SCNC: 5 MMOL/L (ref 4–13)
AST SERPL W P-5'-P-CCNC: 17 U/L (ref 13–39)
BASOPHILS # BLD AUTO: 0.06 THOUSANDS/ÂΜL (ref 0–0.1)
BASOPHILS NFR BLD AUTO: 1 % (ref 0–1)
BILIRUB SERPL-MCNC: 0.28 MG/DL (ref 0.2–1)
BUN SERPL-MCNC: 17 MG/DL (ref 5–25)
CALCIUM SERPL-MCNC: 9.3 MG/DL (ref 8.4–10.2)
CHLORIDE SERPL-SCNC: 104 MMOL/L (ref 96–108)
CO2 SERPL-SCNC: 30 MMOL/L (ref 21–32)
CREAT SERPL-MCNC: 0.81 MG/DL (ref 0.6–1.3)
EOSINOPHIL # BLD AUTO: 0.49 THOUSAND/ÂΜL (ref 0–0.61)
EOSINOPHIL NFR BLD AUTO: 10 % (ref 0–6)
ERYTHROCYTE [DISTWIDTH] IN BLOOD BY AUTOMATED COUNT: 12.8 % (ref 11.6–15.1)
GFR SERPL CREATININE-BSD FRML MDRD: 125 ML/MIN/1.73SQ M
GLUCOSE P FAST SERPL-MCNC: 93 MG/DL (ref 65–99)
HCT VFR BLD AUTO: 48.9 % (ref 36.5–49.3)
HGB BLD-MCNC: 16.3 G/DL (ref 12–17)
IMM GRANULOCYTES # BLD AUTO: 0.02 THOUSAND/UL (ref 0–0.2)
IMM GRANULOCYTES NFR BLD AUTO: 0 % (ref 0–2)
LYMPHOCYTES # BLD AUTO: 2.2 THOUSANDS/ÂΜL (ref 0.6–4.47)
LYMPHOCYTES NFR BLD AUTO: 43 % (ref 14–44)
MCH RBC QN AUTO: 30.4 PG (ref 26.8–34.3)
MCHC RBC AUTO-ENTMCNC: 33.3 G/DL (ref 31.4–37.4)
MCV RBC AUTO: 91 FL (ref 82–98)
MONOCYTES # BLD AUTO: 0.56 THOUSAND/ÂΜL (ref 0.17–1.22)
MONOCYTES NFR BLD AUTO: 11 % (ref 4–12)
NEUTROPHILS # BLD AUTO: 1.82 THOUSANDS/ÂΜL (ref 1.85–7.62)
NEUTS SEG NFR BLD AUTO: 35 % (ref 43–75)
NRBC BLD AUTO-RTO: 0 /100 WBCS
PLATELET # BLD AUTO: 125 THOUSANDS/UL (ref 149–390)
PMV BLD AUTO: 10.8 FL (ref 8.9–12.7)
POTASSIUM SERPL-SCNC: 4.8 MMOL/L (ref 3.5–5.3)
PROT SERPL-MCNC: 7.7 G/DL (ref 6.4–8.4)
RBC # BLD AUTO: 5.37 MILLION/UL (ref 3.88–5.62)
SODIUM SERPL-SCNC: 139 MMOL/L (ref 135–147)
TSH SERPL DL<=0.05 MIU/L-ACNC: 1.8 UIU/ML (ref 0.45–4.5)
WBC # BLD AUTO: 5.15 THOUSAND/UL (ref 4.31–10.16)

## 2022-12-29 ENCOUNTER — TELEPHONE (OUTPATIENT)
Dept: NEUROLOGY | Facility: CLINIC | Age: 22
End: 2022-12-29

## 2022-12-29 LAB
TESTOST FREE SERPL-MCNC: 25.3 PG/ML (ref 9.3–26.5)
TESTOST SERPL-MCNC: 685 NG/DL (ref 264–916)

## 2023-01-05 ENCOUNTER — OFFICE VISIT (OUTPATIENT)
Dept: NEUROLOGY | Facility: CLINIC | Age: 23
End: 2023-01-05

## 2023-01-05 VITALS
WEIGHT: 163.8 LBS | BODY MASS INDEX: 24.98 KG/M2 | DIASTOLIC BLOOD PRESSURE: 76 MMHG | TEMPERATURE: 98 F | HEART RATE: 60 BPM | SYSTOLIC BLOOD PRESSURE: 112 MMHG

## 2023-01-05 DIAGNOSIS — G47.9 SLEEP DIFFICULTIES: ICD-10-CM

## 2023-01-05 DIAGNOSIS — G43.109 MIGRAINE WITH AURA AND WITHOUT STATUS MIGRAINOSUS, NOT INTRACTABLE: Primary | ICD-10-CM

## 2023-01-05 DIAGNOSIS — M54.2 CERVICALGIA: ICD-10-CM

## 2023-01-05 RX ORDER — LIFITEGRAST 50 MG/ML
SOLUTION/ DROPS OPHTHALMIC
COMMUNITY
Start: 2022-11-01

## 2023-01-05 RX ORDER — BUPROPION HYDROCHLORIDE 150 MG/1
150 TABLET ORAL DAILY
COMMUNITY
Start: 2022-12-15

## 2023-01-05 RX ORDER — TAMSULOSIN HYDROCHLORIDE 0.4 MG/1
0.4 CAPSULE ORAL
COMMUNITY
Start: 2022-12-16

## 2023-01-05 RX ORDER — LOTEPREDNOL ETABONATE 3.8 MG/G
GEL OPHTHALMIC
COMMUNITY
Start: 2022-11-04

## 2023-01-05 RX ORDER — SUMATRIPTAN 50 MG/1
50 TABLET, FILM COATED ORAL ONCE AS NEEDED
Qty: 10 TABLET | Refills: 3 | Status: SHIPPED | OUTPATIENT
Start: 2023-01-05

## 2023-01-05 NOTE — PROGRESS NOTES
[facility] Consult  PATIENT:  Valerie Strong  MRN:  [id number]  :  2000  DATE OF SERVICE:  2023  REFERRED BY: Edwin Paz MD  PMD: Yvon Foss MD    Assessment/Plan:     Valerie Strong is a delightful 20 y o  male with a past medical history that includes history of cervical fracture, central cord syndrome, depression referred here for evaluation of headache  Initial evaluation 2023     Mr Kathie Casey reports a history of headaches that started last year  He was referred over from the neurosurgery clinic who had been seeing him after he had a fall while getting out of the shower and suffered a cervical spine injury  The description of his headaches sound like migraines  He reports some improvement in terms of his headaches with the start of propanolol last year, but continues to endorse nearly daily headaches, although less severe  We discussed potential treatment options, but thought that Emgality monthly injections would be a good choice given his multitude of other medications already  From an abortive standpoint, I have increased his sumatriptan to 50 mg to provide further relief  I also clarified his ROS that reports he sees things that are not there  Denies any hallucinations, either auditory or visual   Rather he describes it as a distortion of some images with some difficulty focusing  I have asked him to keep me updated throughout the process and let me know if he has any issues or concerns  Migraine with aura and without status migrainosus, not intractable  -     Galcanezumab-gnlm 120 MG/ML SOAJ; Inject 240 mg under the skin once for 1 dose For just the first month loading dose, followed by 1 injection monthly on separate prescription   -     Galcanezumab-gnlm 120 MG/ML SOAJ; Inject 120 mg under the skin every 30 (thirty) days Following the first month loading dose of 2 pens  -     SUMAtriptan (Imitrex) 50 mg tablet;  Take 1 tablet (50 mg total) by mouth once as needed for migraine Okay to repeat in 2 hours  Max 2 tablets in 24 hours  Sleep difficulties  Cervicalgia    Workup:  -MRI brain with and without contrast 5/21/2022: No acute intracranial findings  No evidence of white matter changes  No enhancement  -MRI cervical spine with and without contrast 9/19/2022: Myelomalacia at the C5 vertebral level  Related to prior trauma  Chronic C5 compression fracture noted  Postoperative changes  Preventative:  - we discussed headache hygiene and lifestyle factors that may improve headaches  - Emgality monthly injection  - Currently on through other providers: Propranolol, Gabapentin, Wellbutrin  - Past/ failed/contraindicated: Lexapro  - future options: SNRI, Topamax, CGRP med, botox    Acute:  - discussed not taking over-the-counter or prescription pain medications more than 3 days per week to prevent medication overuse/rebound headache  - Sumatriptan 50mg  - Currently on through other providers: None  - Past/ failed/contraindicated: None  - future options:  Triptan, prochlorperazine, Toradol IM or p o , could consider trial of 5 days of Depakote 500 mg nightly or dexamethasone 2 mg daily for prolonged migraine, Juju dre Marquez, marietta  Patient instructions   Headache Calendar  Please maintain a headache calendar  Consider using phone applications such as Migraine Buddy or [employer] Migraine Tracker    Headache/migraine treatment:   Acute medications (for immediate treatment of a headache): It is ok to take ibuprofen, acetaminophen or naproxen (Advil, Tylenol,  Aleve, Excedrin) if they help your headaches you should limit these to No more than 2-3 times a week to avoid medication overuse/rebound headaches  For your more moderate to severe migraines take this medication early  Imitrex (Sumatriptan) 50mg tabs - take one at the onset of headache  May repeat one time after 2 hours if pain has not resolved     (Max 2 a day and 10 a month)     Prescription preventive medications for headaches/migraines   (to take every day to help prevent headaches - not to take at the time of headache):  [x] Emgality/Galcanezumab - the 1st dose is 240 mg loading dose of 2 consecutive 120 mg injections  Thereafter, 120 mg injections every 30 days    If needed there is a coupon card for the copay at [employer]  com     READ INSTRUCTIONS that come with the medication  REFRIGERATE  Keep out of direct sunlight  Prior to administration, allow to come to room temperature for 30 minutes  Do not warm using a heat source (eg, microwave or hot water)  Do not shake  Administer in preferably abdomen (avoiding 2 inches around the navel), thigh, upper arm, or buttocks avoiding areas of skin that are tender, bruised, red or hard  Deliver entire contents of single-use prefilled pen or syringe  *Typically these types of medications take time until you see the benefit, although some may see improvement in days, often it may take weeks, especially if the medication is being titrated up to a beneficial level  Please contact us if there are any concerns or questions regarding the medication  Sleep and headache prevention:   [x] Melatonin - you may take 1-3 mg nightly for sleep or headache prevention  You should take this at sundown consistently every night for it to work  It works by gradually helping to adjust your sleep time over days to weeks, rather than immediately making you feel sleepy  Lifestyle Recommendations:  [x] SLEEP - Maintain a regular sleep schedule: Adults need at least 7-8 hours of uninterrupted a night  Maintain good sleep hygiene:  Going to bed and waking up at consistent times, avoiding excessive daytime naps, avoiding caffeinated beverages in the evening, avoid excessive stimulation in the evening and generally using bed primarily for sleeping    One hour before bedtime would recommend turning lights down lower, decreasing your activity (may read quietly, listen to music at a low volume)  When you get into bed, should eliminate all technology (no texting, emailing, playing with your phone, iPad or tablet in bed)  [x] HYDRATION - Maintain good hydration  Drink  2L of fluid a day (4 typical small water bottles)  [x] DIET - Maintain good nutrition  In particular don't skip meals and try and eat healthy balanced meals regularly  [x] TRIGGERS - Look for other triggers and avoid them: Limit caffeine to 1-2 cups a day or less  Avoid dietary triggers that you have noticed bring on your headaches (this could include aged cheese, peanuts, MSG, aspartame and nitrates)  [x] EXERCISE - physical exercise as we all know is good for you in many ways, and not only is good for your heart, but also is beneficial for your mental health, cognitive health and  chronic pain/headaches  I would encourage at the least 5 days of physical exercise weekly for at least 30 minutes  Education and Follow-up  [x] Please call with any questions or concerns  Of course if any new concerning symptoms go to the emergency department  [x] Follow up in 4-5 months  CC: We had the pleasure of evaluating Margaret Mast in neurological consultation today  Margaret Mast is a left  handed male who presents today for evaluation of headaches  History obtained from patient as well as available medical record review  History of Present Illness:   Current medical illnesses  or past medical history include cervical fracture, central cord syndrome, depression    Pertinent history:  -Seen by neurosurgery 11/3/2022  Reported history of traumatic C5 fracture with 3 column injury and cervical spinal cord edema after slipping and falling in the shower, status post posterior C4-6 decompression with C3-T1 fusion on 9/9/2021  At that visit, he reported about 6 months of global headaches associated with intermittent blurry and double vision  Headaches started at what age?  24years old  How often do the headaches occur?   - as of 1/5/2023: 30/30 (Severe: 10)  What time of the day do the headaches start? No particular time of day   How long do the headaches last? All day if severe enough  Are you ever headache free? Yes    Aura? with aura - describes black and white dots in his vision prior to the onset of headaches     Where is your headache located and pain quality? Frontal, bitemporal and behind the eyes; typically, aching and squeezing pain  What is the intensity of pain? Worst 8/10, Average: 4-5/10  Associated symptoms:   [x] Stiff or sore neck   [x] Photophobia     [x]Phonophobia   [x] Blurred vision   [x] Prefer quiet, dark room  [x] Light-headed or dizzy     [x] Tinnitus   [x] Hands or feet tingle or feel numb/paresthesias (related to neck injury)      Things that make the headache worse? Certain positions of the head/neck    Headache triggers: Blue light, LED lights    Have you seen someone else for headaches or pain? Yes, neurology at [facility]  Have you had trigger point injection performed and how often? Yes, done at [facility] (last set November 2022 without significant relief)  Have you had Botox injection performed and how often? No, but scheduled for neck stiffness   Have you had epidural injections or transforaminal injections performed? No  Have you ever had any Brain imaging? yes MRI    Last eye exam: November 2022 - normal, but significant change from prior exam    What medications do you take or have you taken for your headaches?    ABORTIVE:    OTC medications: None  Prescription: Sumatriptan (50% relief in 20 minutes)    Past/ failed/contraindicated:  OTC medications: Tylenol  Prescription: None    PREVENTIVE:   Propranolol (On it since July 2022 - helped with severity), Wellbutrin, Gabapentin    Past/ failed/contraindicated:  Lexapro      LIFESTYLE  Sleep   - averages: about 4-5 hours per night  Problems falling asleep?:   Yes  Problems staying asleep?:  Yes  - No history of snoring    Physical activity: daily home exercises with bands    Water: about 4-5 bottles per day  Caffeine: Infrequently    Mood:   History of anxiety and depression  - Prior to fall, but increased since the accident  - Managed by primary care    The following portions of the patient's history were reviewed and updated as appropriate: allergies, current medications, past family history, past medical history, past social history, past surgical history and problem list     Pertinent family history:  Family history of headaches:  no known family members with significant headaches  Any family history of aneurysms - No    Pertinent social history:  Work: No  Education: Graduated high school  Lives with family    Illicit Drugs: denies  Alcohol/tobacco: Denies alcohol use, Denies tobacco use    Past Medical History:     Past Medical History:   Diagnosis Date   • C5 cervical fracture ([facility] ) 09/09/2021   • Cervicogenic headache    • Depression    • Post concussion syndrome    • Tetraparesis ([facility] )    • Weakness        Patient Active Problem List   Diagnosis   • Central cord syndrome ([facility] )   • Family history of heart attack   • Positive depression screening   • H/O cervical fracture       Medications:      Current Outpatient Medications   Medication Sig Dispense Refill   • acetaminophen (TYLENOL) 500 mg tablet Take 500 mg by mouth every 6 (six) hours as needed for mild pain     • buPROPion (WELLBUTRIN XL) 150 mg 24 hr tablet Take 150 mg by mouth daily     • Diclofenac Sodium (VOLTAREN) 1 % APPLY 2 GRAMS TO THE AFFECTED AREA(S) BY TOPICAL ROUTE 4 TIMES PER DAY     • gabapentin (Neurontin) 300 mg capsule Take 2 capsules (600 mg total) by mouth 3 (three) times a day 180 capsule 3   • lifitegrast (Xiidra) 5 % op solution      • Lotemax SM 0 38 % GEL INSTILL 1 DROP INTO BOTH EYES TWICE A DAY     • polyethylene glycol (MIRALAX) 17 g packet Take 17 g by mouth daily for 14 days 238 g 0   • propranolol (INDERAL LA) 80 mg 24 hr capsule Take 80 mg by mouth daily     • SUMAtriptan (IMITREX) 25 mg tablet 1 TAB AT ONSET OF SEVERE HEADACHE  MAY REPEAT DOSE AFTER 2 HOURS IF NEEDED  MAXIMUM 200MG IN 24 HRS     • tamsulosin (FLOMAX) 0 4 mg Take 0 4 mg by mouth daily at bedtime     • tiZANidine (ZANAFLEX) 2 mg tablet Take 2 mg by mouth 3 (three) times a day Taking daily       No current facility-administered medications for this visit  Allergies:    No Known Allergies    Family History:     Family History   Problem Relation Age of Onset   • No Known Problems Mother    • Heart disease Father    • Heart attack Father    • Diabetes Paternal Grandmother    • Heart disease Paternal Grandfather    • Stroke Paternal Grandfather            • Heart disease Paternal Uncle        Social History:       Social History     Socioeconomic History   • Marital status: Single     Spouse name: Not on file   • Number of children: Not on file   • Years of education: Not on file   • Highest education level: Not on file   Occupational History   • Not on file   Tobacco Use   • Smoking status: Never   • Smokeless tobacco: Never   Vaping Use   • Vaping Use: Never used   Substance and Sexual Activity   • Alcohol use: Never   • Drug use: Never   • Sexual activity: Not Currently     Partners: Female     Birth control/protection: Abstinence   Other Topics Concern   • Not on file   Social History Narrative   • Not on file     Social Determinants of Health     Financial Resource Strain: Low Risk    • Difficulty of Paying Living Expenses: Not hard at all   Food Insecurity: No Food Insecurity   • Worried About Running Out of Food in the Last Year: Never true   • Ran Out of Food in the Last Year: Never true   Transportation Needs: No Transportation Needs   • Lack of Transportation (Medical): No   • Lack of Transportation (Non-Medical):  No   Physical Activity: Inactive   • Days of Exercise per Week: 0 days   • Minutes of Exercise per Session: 0 min   Stress: No Stress Concern Present   • Feeling of Stress : Not at all   Social Connections: Socially Isolated   • Frequency of Communication with Friends and Family: More than three times a week   • Frequency of Social Gatherings with Friends and Family: More than three times a week   • Attends Church Services: Never   • Active Member of Clubs or Organizations: No   • Attends Club or Organization Meetings: Never   • Marital Status: Never    Intimate Partner Violence: Not At Risk   • Fear of Current or Ex-Partner: No   • Emotionally Abused: No   • Physically Abused: No   • Sexually Abused: No   Housing Stability: Low Risk    • Unable to Pay for Housing in the Last Year: No   • Number of Places Lived in the Last Year: 1   • Unstable Housing in the Last Year: No         Objective:   Physical Exam:                                                                 Vitals:            Constitutional:    /76 (BP Location: Right arm, Patient Position: Sitting, Cuff Size: Adult)   Pulse 60   Temp 98 °F (36 7 °C) (Temporal)   Wt 74 3 kg (163 lb 12 8 oz)   BMI 24 98 kg/m²   BP Readings from Last 3 Encounters:   01/05/23 112/76   11/03/22 98/60   03/29/22 111/68     Pulse Readings from Last 3 Encounters:   01/05/23 60   11/03/22 63   03/29/22 70         Well developed, well nourished, well groomed  No dysmorphic features  HEENT:  Normocephalic atraumatic  Oropharynx is clear and moist  No oral mucosal lesions  Limited neck ROM   Chest:  Respirations regular and unlabored  Cardiovascular:  Distal extremities warm without palpable edema or tenderness, no observed significant swelling  Musculoskeletal:  (see below under neurologic exam for evaluation of motor function and gait)   Skin:  warm and dry, not diaphoretic  No apparent birthmarks or stigmata of neurocutaneous disease    Redness and swelling noted over the left medial aspect of the elbow   Psychiatric:  Normal behavior and appropriate affect       Neurological Examination:     Mental status/cognitive function:   Orientated to time, place and person  Recent and remote memory intact  Attention span and concentration as well as fund of knowledge are appropriate for age  Normal language and spontaneous speech  Cranial Nerves:  II-visual fields full  Fundi poorly visualized due to pupillary constriction  III, IV, VI-Pupils were equal, round, and reactive to light and accomodation  Extraocular movements were full and conjugate without nystagmus  Conjugate gaze, normal smooth pursuits, normal saccades   V-facial sensation symmetric  VII-facial expression symmetric, intact forehead wrinkle, strong eye closure, symmetric smile    VIII-hearing grossly intact bilaterally   IX, X-palate elevation symmetric, no dysarthria  XI-shoulder shrug strength slightly limited b/l  XII-tongue protrusion midline  Motor Exam: symmetric bulk and tone throughout, no pronator drift  Power/strength 5/5 bilateral upper and lower extremities (except 5-/5 in the LUE), no atrophy, fasciculations or abnormal movements noted  Sensory: grossly intact light touch in all extremities  Reflexes: brachioradialis 2+, biceps 2+  Mildly brisker in the LE  Coordination: Finger nose finger intact bilaterally, no apparent dysmetria, ataxia or tremor noted  Gait: steady casual and tandem gait  Pertinent lab results: None     Pertinent Imaging:   -MRI brain with and without contrast 5/21/2022: No acute intracranial findings  No evidence of white matter changes  No enhancement  -MRI cervical spine with and without contrast 9/19/2022: Myelomalacia at the C5 vertebral level  Related to prior trauma  Chronic C5 compression fracture noted  Postoperative changes  I have personally reviewed imaging and radiology read  Review of Systems:   Constitutional: Positive for fatigue  Negative for appetite change and fever  HENT: Positive for tinnitus   Negative for hearing loss, trouble swallowing and voice change  Eyes: Positive for photophobia, pain and visual disturbance (see things that aren't there)  Respiratory: Negative  Negative for shortness of breath  Cardiovascular: Negative  Negative for palpitations  Gastrointestinal: Negative  Negative for nausea and vomiting  Endocrine: Negative  Negative for cold intolerance  Genitourinary: Negative  Negative for dysuria, frequency and urgency  Musculoskeletal: Positive for gait problem (stumbling when fatigued), neck pain and neck stiffness  Negative for myalgias  Skin: Negative  Negative for rash  Allergic/Immunologic: Negative  Neurological: Positive for dizziness (upon fast movements), weakness (fingers), light-headedness (upon fast movements), numbness (fingers) and headaches  Negative for tremors, seizures, syncope, facial asymmetry and speech difficulty  Hematological: Negative  Does not bruise/bleed easily  Psychiatric/Behavioral: Positive for sleep disturbance (hard time falling asleep and staying asleep)  Negative for confusion and hallucinations  All other systems reviewed and are negative  I have spent 40 minutes with the patient today in which greater than 50% of this time was spent in counseling/coordination of care regarding Diagnostic results, Prognosis, Risks and benefits of tx options, Patient and family education and Impressions  I also spent 15 minutes non face to face for this patient the same day       Activity Minutes   Precharting/reviewing 10   Patient care/counseling 40   Postcharting/care coordination 5       Author:  Zee Alexis DO 1/5/2023 2:00 PM

## 2023-01-05 NOTE — PATIENT INSTRUCTIONS
Headache Calendar  Please maintain a headache calendar  Consider using phone applications such as Migraine Deshaun or [employer] Migraine Tracker    Headache/migraine treatment:   Acute medications (for immediate treatment of a headache): It is ok to take ibuprofen, acetaminophen or naproxen (Advil, Tylenol,  Aleve, Excedrin) if they help your headaches you should limit these to No more than 2-3 times a week to avoid medication overuse/rebound headaches  For your more moderate to severe migraines take this medication early  Imitrex (Sumatriptan) 50mg tabs - take one at the onset of headache  May repeat one time after 2 hours if pain has not resolved  (Max 2 a day and 10 a month)     Prescription preventive medications for headaches/migraines   (to take every day to help prevent headaches - not to take at the time of headache):  [x] Emgality/Galcanezumab - the 1st dose is 240 mg loading dose of 2 consecutive 120 mg injections  Thereafter, 120 mg injections every 30 days    If needed there is a coupon card for the copay at [employer]  com     READ INSTRUCTIONS that come with the medication  REFRIGERATE  Keep out of direct sunlight  Prior to administration, allow to come to room temperature for 30 minutes  Do not warm using a heat source (eg, microwave or hot water)  Do not shake  Administer in preferably abdomen (avoiding 2 inches around the navel), thigh, upper arm, or buttocks avoiding areas of skin that are tender, bruised, red or hard  Deliver entire contents of single-use prefilled pen or syringe  *Typically these types of medications take time until you see the benefit, although some may see improvement in days, often it may take weeks, especially if the medication is being titrated up to a beneficial level  Please contact us if there are any concerns or questions regarding the medication  Sleep and headache prevention:   [x] Melatonin - you may take 1-3 mg nightly for sleep or headache prevention   You should take this at sundown consistently every night for it to work  It works by gradually helping to adjust your sleep time over days to weeks, rather than immediately making you feel sleepy  Lifestyle Recommendations:  [x] SLEEP - Maintain a regular sleep schedule: Adults need at least 7-8 hours of uninterrupted a night  Maintain good sleep hygiene:  Going to bed and waking up at consistent times, avoiding excessive daytime naps, avoiding caffeinated beverages in the evening, avoid excessive stimulation in the evening and generally using bed primarily for sleeping  One hour before bedtime would recommend turning lights down lower, decreasing your activity (may read quietly, listen to music at a low volume)  When you get into bed, should eliminate all technology (no texting, emailing, playing with your phone, iPad or tablet in bed)  [x] HYDRATION - Maintain good hydration  Drink  2L of fluid a day (4 typical small water bottles)  [x] DIET - Maintain good nutrition  In particular don't skip meals and try and eat healthy balanced meals regularly  [x] TRIGGERS - Look for other triggers and avoid them: Limit caffeine to 1-2 cups a day or less  Avoid dietary triggers that you have noticed bring on your headaches (this could include aged cheese, peanuts, MSG, aspartame and nitrates)  [x] EXERCISE - physical exercise as we all know is good for you in many ways, and not only is good for your heart, but also is beneficial for your mental health, cognitive health and  chronic pain/headaches  I would encourage at the least 5 days of physical exercise weekly for at least 30 minutes  Education and Follow-up  [x] Please call with any questions or concerns  Of course if any new concerning symptoms go to the emergency department    [x] Follow up in 4-5 months

## 2023-01-05 NOTE — PROGRESS NOTES
Review of Systems   Constitutional: Positive for fatigue  Negative for appetite change and fever  HENT: Positive for tinnitus  Negative for hearing loss, trouble swallowing and voice change  Eyes: Positive for photophobia, pain and visual disturbance (see things that aren't there)  Respiratory: Negative  Negative for shortness of breath  Cardiovascular: Negative  Negative for palpitations  Gastrointestinal: Negative  Negative for nausea and vomiting  Endocrine: Negative  Negative for cold intolerance  Genitourinary: Negative  Negative for dysuria, frequency and urgency  Musculoskeletal: Positive for gait problem (stumbling when fatigued), neck pain and neck stiffness  Negative for myalgias  Skin: Negative  Negative for rash  Allergic/Immunologic: Negative  Neurological: Positive for dizziness (upon fast movements), weakness (fingers), light-headedness (upon fast movements), numbness (fingers) and headaches  Negative for tremors, seizures, syncope, facial asymmetry and speech difficulty  Hematological: Negative  Does not bruise/bleed easily  Psychiatric/Behavioral: Positive for sleep disturbance (hard time falling asleep and staying asleep)  Negative for confusion and hallucinations  All other systems reviewed and are negative

## 2023-01-09 ENCOUNTER — CONSULT (OUTPATIENT)
Dept: PAIN MEDICINE | Facility: CLINIC | Age: 23
End: 2023-01-09

## 2023-01-09 VITALS
HEART RATE: 62 BPM | HEIGHT: 68 IN | DIASTOLIC BLOOD PRESSURE: 61 MMHG | WEIGHT: 169.8 LBS | SYSTOLIC BLOOD PRESSURE: 122 MMHG | BODY MASS INDEX: 25.73 KG/M2

## 2023-01-09 DIAGNOSIS — G95.9 CERVICAL MYELOPATHY (HCC): Primary | ICD-10-CM

## 2023-01-09 DIAGNOSIS — S14.129S CENTRAL CORD SYNDROME, SEQUELA (HCC): ICD-10-CM

## 2023-01-09 DIAGNOSIS — Z98.1 S/P CERVICAL SPINAL FUSION: ICD-10-CM

## 2023-01-09 DIAGNOSIS — Z87.81 H/O CERVICAL FRACTURE: ICD-10-CM

## 2023-01-09 RX ORDER — PREGABALIN 100 MG/1
100 CAPSULE ORAL 3 TIMES DAILY
Qty: 90 CAPSULE | Refills: 1 | Status: SHIPPED | OUTPATIENT
Start: 2023-01-09

## 2023-01-09 NOTE — PROGRESS NOTES
Assessment  1  C[facility] )    2  Central cord syndrome, sequela ([facility] [address] )    3  H/O cervical fracture    4  S/P cervical spinal fusion        Plan  21-year-old male with a history of C5 fracture after slipping in the shower status post C4-6 decompression and C3-T1 fusion September 9, 2021, referred by Dr Arnulfo Dunlap, presenting for initial consultation regarding neck pain that radiates into bilateral scapular region and into bilateral upper extremities with associated numbness, paresthesias, and subjective weakness worse on the left than right  His symptoms have improved since cervical fusion although the patient does feel that he has plateaued  MRI the cervical spine shows stable fusion with myelomalacia noted at the level of C5  The patient is currently taking gabapentin 600 mg 3 times daily and he does find some dizziness during the daytime  He has finished a full course of physical therapy and has transitioned to HEP  This has improved proved his function, but unfortunately patient feels as though he has plateaued  Tylenol and tizanidine only provide slight relief  Patient's neck pain does have myofascial component  Upper extremity symptoms secondary to myelopathy  1   I will transition the patient from gabapentin 600 mg 3 times daily to pregabalin 100 mg twice daily x5 days, then the patient may increase to 100 mg 3 times daily  He was given titration instructions at today's office visit  2   Patient may continue with tizanidine as prescribed  3  Patient will continue with his home exercise program  4  I will follow-up with the patient in 4 to 6 weeks      My impressions and treatment recommendations were discussed in detail with the patient who verbalized understanding and had no further questions  Discharge instructions were provided  I personally saw and examined the patient and I agree with the above discussed plan of care      No orders of the defined types were placed in this encounter  No orders of the defined types were placed in this encounter  History of Present Illness    Jesi Javier is a 20 y o  male with a history of C5 fracture after slipping in the shower status post C4-6 decompression and C3-T1 fusion September 9, 2021, referred by Dr Samanta Sanchez, presenting for initial consultation regarding neck pain that radiates into bilateral scapular region and into bilateral upper extremities with associated numbness, paresthesias, and subjective weakness worse on the left than right  His symptoms have improved since cervical fusion although the patient does feel that he has plateaued  MRI the cervical spine shows stable fusion with myelomalacia noted at the level of C5  The patient is currently taking gabapentin 600 mg 3 times daily and he does find some dizziness during the daytime  He has finished a full course of physical therapy and has transitioned to HEP  This has improved proved his function, but unfortunately patient feels as though he has plateaued  Tylenol and tizanidine only provide slight relief  The patient rates his pain a 5-8 out of 10 and the pain is constant  The pain does not follow any particular pattern throughout the day  The pain is described as shooting, pressure-like, throbbing, dull, and aching  The pain is increased with lying down, sitting, walking, exercise, coughing, and sneezing  He has found some relief with PT  Other than as stated above, the patient denies any interval changes in medications, medical condition, mental condition, symptoms, or allergies since the last office visit  I have personally reviewed and/or updated the patient's past medical history, past surgical history, family history, social history, current medications, allergies, and vital signs today       Review of Systems    Patient Active Problem List   Diagnosis   • Central cord syndrome [facility])   • Family history of heart attack   • Positive depression screening   • H/O cervical fracture       Past Medical History:   Diagnosis Date   • C5 cervical fracture ([facility] [address] ) 2021   • Cervicogenic headache    • Depression    • Post concussion syndrome    • Tetraparesis (HCC)    • Weakness        Past Surgical History:   Procedure Laterality Date   • DECOMPRESSION SPINE CERVICAL POSTERIOR Bilateral 2021    Procedure: C4-6 POSTERIOR CERVICAL SPINE DECOMPRESSION WITH C3-T1 FUSION  ;  Surgeon: Merna Hardin MD;  Location: [facility];  Service: Neurosurgery       Family History   Problem Relation Age of Onset   • No Known Problems Mother    • Heart disease Father    • Heart attack Father    • Diabetes Paternal Grandmother    • Heart disease Paternal Grandfather    • Stroke Paternal Grandfather            • Heart disease Paternal Uncle        Social History     Occupational History   • Not on file   Tobacco Use   • Smoking status: Never   • Smokeless tobacco: Never   Vaping Use   • Vaping Use: Never used   Substance and Sexual Activity   • Alcohol use: Never   • Drug use: Never   • Sexual activity: Not Currently     Partners: Female     Birth control/protection: Abstinence       Current Outpatient Medications on File Prior to Visit   Medication Sig   • acetaminophen (TYLENOL) 500 mg tablet Take 500 mg by mouth every 6 (six) hours as needed for mild pain   • buPROPion (WELLBUTRIN XL) 150 mg 24 hr tablet Take 150 mg by mouth daily   • Diclofenac Sodium (VOLTAREN) 1 % APPLY 2 GRAMS TO THE AFFECTED AREA(S) BY TOPICAL ROUTE 4 TIMES PER DAY   • gabapentin (Neurontin) 300 mg capsule Take 2 capsules (600 mg total) by mouth 3 (three) times a day   • Galcanezumab-gnlm 120 MG/ML SOAJ Inject 120 mg under the skin every 30 (thirty) days Following the first month loading dose of 2 pens     • lifitegrast (Xiidra) 5 % op solution    • Lotemax SM 0 38 % GEL INSTILL 1 DROP INTO BOTH EYES TWICE A DAY   • propranolol (INDERAL LA) 80 mg 24 hr capsule Take 80 mg by mouth daily   • SUMAtriptan (Imitrex) 50 mg tablet Take 1 tablet (50 mg total) by mouth once as needed for migraine Okay to repeat in 2 hours  Max 2 tablets in 24 hours  • tamsulosin (FLOMAX) 0 4 mg Take 0 4 mg by mouth daily at bedtime   • tiZANidine (ZANAFLEX) 2 mg tablet Take 2 mg by mouth 3 (three) times a day Taking daily   • polyethylene glycol (MIRALAX) 17 g packet Take 17 g by mouth daily for 14 days     No current facility-administered medications on file prior to visit  No Known Allergies    Physical Exam    /61   Pulse 62   Ht 5' 7 9" (1 725 m)   Wt 77 kg (169 lb 12 8 oz)   BMI 25 89 kg/m²     Constitutional: normal, well developed, well nourished, alert, in no distress and non-toxic and no overt pain behavior  Eyes: anicteric  HEENT: grossly intact  Neck: supple, symmetric, trachea midline and no masses   Pulmonary:even and unlabored  Cardiovascular:No edema or pitting edema present  Skin:Normal without rashes or lesions and well hydrated  Psychiatric:Mood and affect appropriate  Neurologic:Cranial Nerves II-XII grossly intact  Musculoskeletal:normal gait  Well-healed vertical midline cervical incision  Limited range of motion of cervical spine in all planes  Bilateral biceps, brachioradialis, and patellar reflexes 2 out of 4  Bilateral triceps reflex 3-4  Bilateral Achilles reflex 2 out of 4  Positive Montgomery's reflex bilaterally  Positive clonus bilaterally, 2 beats  Right upper extremity strength 5 out of 5 in all muscular's  Left  strength 4-5  Left wrist flexion and extension 5 out of 5 strength  Left biceps 4-5 strength  Left triceps 5 out of 5 strength  Left deltoid 4-5 strength  Sensation intact light touch in C5-T1 dermatomes bilaterally  Positive Spurling's bilaterally  Imaging  MRI CERVICAL SPINE WITH AND WITHOUT CONTRAST     INDICATION: G83 22:  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      COMPARISON:  None      TECHNIQUE:  Sagittal T1, sagittal T2, sagittal inversion recovery, axial 2D merge and axial T2  Sagittal T1 and axial T1 postcontrast     IV Contrast:  7 mL of Gadobutrol injection (SINGLE-DOSE)      IMAGE QUALITY:  Diagnostic      FINDINGS:     ALIGNMENT:  Reversal the cervical lordosis apex C5  Chronic C5 compression fracture noted  Patient status post C3-T1 posterior fusion      MARROW SIGNAL:  Normal marrow signal is identified within the visualized bony structures  No discrete marrow lesion      CERVICAL AND VISUALIZED UPPER THORACIC CORD:  Myelomalacia noted in the right side and central aspect of the spinal cord at the C5 vertebral level      PREVERTEBRAL AND PARASPINAL SOFT TISSUES:  Normal      VISUALIZED POSTERIOR FOSSA:  The visualized posterior fossa demonstrates no abnormal signal      CERVICAL DISC SPACES:     C2-C3:  Normal      C3-C4:  Normal      C4-C5:  Normal      C5-C6:  Normal      C6-C7:  Normal      C7-T1:  Normal      UPPER THORACIC DISC SPACES:  Normal      POSTCONTRAST IMAGING:  Normal      IMPRESSION:     Myelomalacia C5 vertebral level within the cord centrally and eccentric to the right where there is T2 signal abnormality is slight cord thinning  These findings are the sequela of prior trauma  Chronic C5 compression fracture noted  Postoperative   changes of cervical fusion C3-T1

## 2023-01-17 ENCOUNTER — TELEPHONE (OUTPATIENT)
Dept: UROLOGY | Facility: AMBULATORY SURGERY CENTER | Age: 23
End: 2023-01-17

## 2023-01-17 NOTE — TELEPHONE ENCOUNTER
Please Triage  New Patient    What is the reason for the patient’s appointment? Pt's mother called to make an appointment neurogenic dysfunction     What office location does the patient prefer? [place]    Imaging/Lab Results:    Do we accept the patient's insurance or is the patient Self-Pay? Insurance Provider: [employer]  Plan Type/Number:  Member ID#: Has the patient had any previous Urologist(s)? No     Have patient records been requested? If not are records showing in Epic:     Has the patient had any outside testing done? Does the patient have a personal history of cancer?  no

## 2023-02-07 RX ORDER — BUPROPION HYDROCHLORIDE 150 MG/1
TABLET ORAL
COMMUNITY
Start: 2022-12-19 | End: 2023-04-04

## 2023-02-16 ENCOUNTER — TELEPHONE (OUTPATIENT)
Dept: UROLOGY | Facility: MEDICAL CENTER | Age: 23
End: 2023-02-16

## 2023-02-16 ENCOUNTER — OFFICE VISIT (OUTPATIENT)
Dept: UROLOGY | Facility: MEDICAL CENTER | Age: 23
End: 2023-02-16

## 2023-02-16 VITALS
BODY MASS INDEX: 26.53 KG/M2 | SYSTOLIC BLOOD PRESSURE: 118 MMHG | OXYGEN SATURATION: 99 % | HEART RATE: 75 BPM | HEIGHT: 67 IN | DIASTOLIC BLOOD PRESSURE: 78 MMHG | WEIGHT: 169 LBS

## 2023-02-16 DIAGNOSIS — R33.9 URINARY RETENTION: ICD-10-CM

## 2023-02-16 DIAGNOSIS — N31.9 NEUROGENIC BLADDER: Primary | ICD-10-CM

## 2023-02-16 LAB — POST-VOID RESIDUAL VOLUME, ML POC: 44 ML

## 2023-02-16 NOTE — PROGRESS NOTES
2/16/2023      Chief Complaint   Patient presents with   • Neurogenic Bladder     Assessment and Plan    24 y o  male managed by new patient    1  Neurogenic bladder  · Bladder scan PVR-44 mL  · CT abdomen pelvis ordered, BMP ordered  · Urodynamics ordered  · Patient to follow-up in the office after completion of urodynamics and CT resulted    History of Present Illness  Olivia Alcantara is a 24 y o  male here for follow up evaluation of neurogenic bladder  Patient with a previous history status post fall 9/2021 sustaining a burst fracture in cervical spine  Since then patient has reported difficulty with managing both his bowel and bladder  On evaluation in the office today he reports difficulty initiating his urinary stream with sensation of incomplete bladder emptying with urination  He also reports the need to strain to initiate his urinary stream   He denies a previous history of urinary tract infection  He denies ever having the urge to initiate his urinary stream   He reports placing himself on a schedule and empties his bladder after he realizes he has not voided for an extended period of time while at home  He reports drinking 3-4 bottles of water per day and small amount of coffee per day  Previous provider started him on tamsulosin for which she reports a slight increase in recognition of his urge to urinate  He also reports a weak urinary stream     He reports a history of grade 4 renal reflux as a child with self repair  He denies history of  surgery  Patient reports complaints of issues with constipation  He reports bowel movements every 2 to 3 days and reports his stools to be quite firm when he does pass his bowels  He denies incontinence of bowel and bladder  He denies erectile dysfunction  He reports not being sexually active  He denies no ejaculation or nocturnal emissions  He denies a family history of urologic issues      Good cremastic refelx elmo;ater    Review of Systems Constitutional: Negative for chills and fever  Respiratory: Negative for cough and shortness of breath  Cardiovascular: Negative for chest pain  Gastrointestinal: Negative for abdominal distention, abdominal pain, blood in stool, nausea and vomiting  Genitourinary: Positive for difficulty urinating  Negative for dysuria, enuresis, flank pain, frequency, hematuria and urgency  Skin: Negative for rash             AUA SYMPTOM SCORE    Flowsheet Row Most Recent Value   AUA SYMPTOM SCORE    How often have you had a sensation of not emptying your bladder completely after you finished urinating? 3 (P)     How often have you had to urinate again less than two hours after you finished urinating? 1 (P)     How often have you found you stopped and started again several times when you urinate? 4 (P)     How often have you found it difficult to postpone urination? 2 (P)     How often have you had a weak urinary stream? 4 (P)     How often have you had to push or strain to begin urination? 5 (P)     How many times did you most typically get up to urinate from the time you went to bed at night until the time you got up in the morning? 0 (P)     Quality of Life: If you were to spend the rest of your life with your urinary condition just the way it is now, how would you feel about that? 5 (P)     AUA SYMPTOM SCORE 19 (P)              Past Medical History  Past Medical History:   Diagnosis Date   • C5 cervical fracture ([facility] [address] ) 09/09/2021   • Cervicogenic headache    • Depression    • Post concussion syndrome    • Tetraparesis ([facility] [address] )    • Weakness        Past Social History  Past Surgical History:   Procedure Laterality Date   • DECOMPRESSION SPINE CERVICAL POSTERIOR Bilateral 9/9/2021    Procedure: C4-6 POSTERIOR CERVICAL SPINE DECOMPRESSION WITH C3-T1 FUSION  ;  Surgeon: Olive Servin MD;  Location[facility];  Service: Neurosurgery     Social History     Tobacco Use   Smoking Status Never   Smokeless Tobacco Never Past Family History  Family History   Problem Relation Age of Onset   • No Known Problems Mother    • Heart disease Father    • Heart attack Father    • Diabetes Paternal Grandmother    • Heart disease Paternal Grandfather    • Stroke Paternal Grandfather            • Heart disease Paternal Uncle        Past Social history  Social History     Socioeconomic History   • Marital status: Single     Spouse name: Not on file   • Number of children: Not on file   • Years of education: Not on file   • Highest education level: Not on file   Occupational History   • Not on file   Tobacco Use   • Smoking status: Never   • Smokeless tobacco: Never   Vaping Use   • Vaping Use: Never used   Substance and Sexual Activity   • Alcohol use: Never   • Drug use: Never   • Sexual activity: Not Currently     Partners: Female     Birth control/protection: Abstinence   Other Topics Concern   • Not on file   Social History Narrative   • Not on file     Social Determinants of Health     Financial Resource Strain: Low Risk    • Difficulty of Paying Living Expenses: Not hard at all   Food Insecurity: No Food Insecurity   • Worried About Running Out of Food in the Last Year: Never true   • Ran Out of Food in the Last Year: Never true   Transportation Needs: No Transportation Needs   • Lack of Transportation (Medical): No   • Lack of Transportation (Non-Medical):  No   Physical Activity: Inactive   • Days of Exercise per Week: 0 days   • Minutes of Exercise per Session: 0 min   Stress: No Stress Concern Present   • Feeling of Stress : Not at all   Social Connections: Socially Isolated   • Frequency of Communication with Friends and Family: More than three times a week   • Frequency of Social Gatherings with Friends and Family: More than three times a week   • Attends Hindu Services: Never   • Active Member of Clubs or Organizations: No   • Attends Club or Organization Meetings: Never   • Marital Status: Never    Intimate Partner Violence: Not At Risk   • Fear of Current or Ex-Partner: No   • Emotionally Abused: No   • Physically Abused: No   • Sexually Abused: No   Housing Stability: Low Risk    • Unable to Pay for Housing in the Last Year: No   • Number of Places Lived in the Last Year: 1   • Unstable Housing in the Last Year: No       Current Medications  Current Outpatient Medications   Medication Sig Dispense Refill   • buPROPion (WELLBUTRIN XL) 150 mg 24 hr tablet Take 150 mg by mouth daily     • Galcanezumab-gnlm 120 MG/ML SOAJ Inject 120 mg under the skin every 30 (thirty) days Following the first month loading dose of 2 pens  1 mL 11   • lifitegrast (Xiidra) 5 % op solution      • Lotemax SM 0 38 % GEL INSTILL 1 DROP INTO BOTH EYES TWICE A DAY     • tamsulosin (FLOMAX) 0 4 mg Take 0 4 mg by mouth daily at bedtime     • tiZANidine (ZANAFLEX) 2 mg tablet Take 2 mg by mouth 3 (three) times a day Taking daily     • acetaminophen (TYLENOL) 500 mg tablet Take 500 mg by mouth every 6 (six) hours as needed for mild pain (Patient not taking: Reported on 2/16/2023)     • buPROPion (WELLBUTRIN XL) 150 mg 24 hr tablet      • Diclofenac Sodium (VOLTAREN) 1 %      • polyethylene glycol (MIRALAX) 17 g packet Take 17 g by mouth daily for 14 days 238 g 0   • pregabalin (LYRICA) 100 mg capsule Take 1 capsule (100 mg total) by mouth 3 (three) times a day 90 capsule 1   • propranolol (INDERAL LA) 80 mg 24 hr capsule Take 80 mg by mouth daily     • SUMAtriptan (Imitrex) 50 mg tablet Take 1 tablet (50 mg total) by mouth once as needed for migraine Okay to repeat in 2 hours  Max 2 tablets in 24 hours  (Patient not taking: Reported on 2/16/2023) 10 tablet 3     No current facility-administered medications for this visit         Allergies  No Known Allergies      The following portions of the patient's history were reviewed and updated as appropriate: allergies, current medications, past medical history, past social history, past surgical history and problem list       Vitals  Vitals:    02/16/23 1320   BP: 118/78   BP Location: Left arm   Patient Position: Sitting   Cuff Size: Large   Pulse: 75   SpO2: 99%   Weight: 76 7 kg (169 lb)   Height: 5' 7" (1 702 m)           Physical Exam  Physical Exam  Vitals reviewed  Constitutional:       General: He is not in acute distress  Appearance: Normal appearance  He is normal weight  HENT:      Head: Normocephalic  Eyes:      Pupils: Pupils are equal, round, and reactive to light  Cardiovascular:      Rate and Rhythm: Normal rate  Pulmonary:      Effort: No respiratory distress  Breath sounds: Normal breath sounds  Genitourinary:     Penis: Normal        Testes: Normal  Cremasteric reflex is present  Right: Mass, tenderness or swelling not present  Left: Mass, tenderness or swelling not present  Epididymis:      Right: Normal       Left: Normal       Prostate: Normal       Comments: Slightly decreased tone noted  Skin:     General: Skin is warm and dry  Neurological:      General: No focal deficit present  Mental Status: He is alert and oriented to person, place, and time  Psychiatric:         Mood and Affect: Mood normal          Behavior: Behavior normal        Results  No results found for this or any previous visit (from the past 1 hour(s))  ]  No results found for: PSA  Lab Results   Component Value Date    CALCIUM 9 3 12/27/2022    K 4 8 12/27/2022    CO2 30 12/27/2022     12/27/2022    BUN 17 12/27/2022    CREATININE 0 81 12/27/2022     Lab Results   Component Value Date    WBC 5 15 12/27/2022    HGB 16 3 12/27/2022    HCT 48 9 12/27/2022    MCV 91 12/27/2022     (L) 12/27/2022     Orders  Orders Placed This Encounter   Procedures   • CT abdomen pelvis wo contrast     Standing Status:   Future     Standing Expiration Date:   2/16/2027     Scheduling Instructions:      FOR PO CONTRAST:      The patient will need to drink barium for this test  The barium needs to be picked up in the registration area at least one day prior to the patients study  For out of network (non-[facility]) orders please bring your prescription when picking up oral contrast  Further instructions will be given at time of pickup  FOR ALL OTHER CONTRAST SCENARIOS:      Please refer to the Patient Instructions in the Appointment Review window at scheduling  If possible wear clothing without any metal in the abdomen area  Sweat suit, sports bra or bra without underwire may eliminate the need to change  Please bring your insurance cards, a form of photo ID and a list of your medications with you  Arrive 15 minutes prior to your appointment time in order to register  On the day of your test, please bring any prior CT or MRI studies of this area with you that were not performed at a [facility] facility  To schedule this appointment, please contact Central Scheduling at [phone]  Order Specific Question:   What is the patient's sedation requirement? Answer:   No Sedation     Order Specific Question:   Did the patient ever have bariatric surgery? Answer:   No     Order Specific Question:   Contrast information:     Answer:   IV     Order Specific Question:   Did the patient ever have a reaction to x-ray dye? If yes, please verify the type of allergy and order the contrast allergy prep       Answer:   No     Order Specific Question:   [facility]     Answer:   Immediate     Order Specific Question:   Reason for Exam (FREE TEXT)     Answer:   urinary retention, neurogenic bladder   • POCT Measure PVR   • Urodynamics     Standing Status:   Future     Standing Expiration Date:   2/16/2024       DANGELO Workman

## 2023-02-16 NOTE — TELEPHONE ENCOUNTER
After checking with auth dept, I called pt to schedule urodynamics appt  No answer at mom's #: left message asking her to call back to schedule appt: first available for urodynamics at [facility] office is may 10th as of now

## 2023-02-20 NOTE — PROGRESS NOTES
Assessment:  1  Chronic pain syndrome    2  Cervical myelopathy ([facility] [address] )    3  Central cord syndrome, sequela ([facility] [address] )    4  H/O cervical fracture    5  S/P cervical spinal fusion        Plan:  1  Patient was encouraged to increase pregabalin to 100 mg 3 times a day  This medication was refilled today  2  Patient may continue tizanidine as prescribed  3  Patient will continue to follow-up with Dr Rogena Spatz at [facility] and Neurology as scheduled  4  Follow-up in 4 to 6 weeks or sooner if needed    History of Present Illness: The patient is a 24 y o  male a history of C5 fracture after slipping in the shower status post C4 6 decompression and C3-T1 fusion on September 9, 2021 last seen on 01/09/2023 who presents for a follow up office visit in regards to chronic neck pain that radiates into the bilateral scapular region and bilateral upper extremities with associated numbness and paresthesias in no specific dermatomal distribution with associated subjective weakness, left greater than right  Patient states his symptoms did improve since the cervical fusion although the patient does feel that his progression has plateaued  MRI of the cervical spine reveal stable fusion with myelomalacia noted at the level of C5  He does continue to follow with Dr Rogena Spatz at [facility] and neurology  Since last office visit, he transitioned off of gabapentin onto pregabalin and is currently taking 100 mg twice a day  He has not noticed any improvement in his symptoms with this transition  He does take Tylenol and tizanidine with slight relief as well  He rates his pain a 5 out of 10 on the numeric pain rating scale    He constantly has pain throughout the day which is described as dull aching, sharp, pressure-like, shooting, numbness and pins-and-needles    I have personally reviewed and/or updated the patient's past medical history, past surgical history, family history, social history, current medications, allergies, and vital signs today  Review of Systems:    Review of Systems   Respiratory: Negative for shortness of breath  Cardiovascular: Negative for chest pain  Gastrointestinal: Positive for constipation  Negative for diarrhea, nausea and vomiting  Musculoskeletal: Positive for gait problem  Negative for arthralgias, joint swelling and myalgias  Skin: Negative for rash  Neurological: Positive for dizziness  Negative for seizures and weakness  All other systems reviewed and are negative          Past Medical History:   Diagnosis Date   • C5 cervical fracture ([address] ) 2021   • Cervicogenic headache    • Depression    • Post concussion syndrome    • Tetraparesis (HCC)    • Weakness        Past Surgical History:   Procedure Laterality Date   • DECOMPRESSION SPINE CERVICAL POSTERIOR Bilateral 2021    Procedure: C4-6 POSTERIOR CERVICAL SPINE DECOMPRESSION WITH C3-T1 FUSION  ;  Surgeon: Florence Hernandez MD;  [facility]: [facility];  Service: Neurosurgery       Family History   Problem Relation Age of Onset   • No Known Problems Mother    • Heart disease Father    • Heart attack Father    • Diabetes Paternal Grandmother    • Heart disease Paternal Grandfather    • Stroke Paternal Grandfather            • Heart disease Paternal Uncle        Social History     Occupational History   • Not on file   Tobacco Use   • Smoking status: Never   • Smokeless tobacco: Never   Vaping Use   • Vaping Use: Never used   Substance and Sexual Activity   • Alcohol use: Never   • Drug use: Never   • Sexual activity: Not Currently     Partners: Female     Birth control/protection: Abstinence         Current Outpatient Medications:   •  buPROPion (WELLBUTRIN XL) 150 mg 24 hr tablet, Take 150 mg by mouth daily, Disp: , Rfl:   •  buPROPion (WELLBUTRIN XL) 150 mg 24 hr tablet, , Disp: , Rfl:   •  Diclofenac Sodium (VOLTAREN) 1 %, , Disp: , Rfl:   •  pregabalin (LYRICA) 100 mg capsule, Take 1 capsule (100 mg total) by mouth 3 (three) times a day, Disp: 90 capsule, Rfl: 1  •  acetaminophen (TYLENOL) 500 mg tablet, Take 500 mg by mouth every 6 (six) hours as needed for mild pain (Patient not taking: Reported on 2/16/2023), Disp: , Rfl:   •  Galcanezumab-gnlm 120 MG/ML SOAJ, Inject 120 mg under the skin every 30 (thirty) days Following the first month loading dose of 2 pens  , Disp: 1 mL, Rfl: 11  •  lifitegrast (Xiidra) 5 % op solution, , Disp: , Rfl:   •  Lotemax SM 0 38 % GEL, INSTILL 1 DROP INTO BOTH EYES TWICE A DAY, Disp: , Rfl:   •  polyethylene glycol (MIRALAX) 17 g packet, Take 17 g by mouth daily for 14 days, Disp: 238 g, Rfl: 0  •  propranolol (INDERAL LA) 80 mg 24 hr capsule, Take 80 mg by mouth daily, Disp: , Rfl:   •  SUMAtriptan (Imitrex) 50 mg tablet, Take 1 tablet (50 mg total) by mouth once as needed for migraine Okay to repeat in 2 hours  Max 2 tablets in 24 hours  (Patient not taking: Reported on 2/16/2023), Disp: 10 tablet, Rfl: 3  •  tamsulosin (FLOMAX) 0 4 mg, Take 0 4 mg by mouth daily at bedtime, Disp: , Rfl:   •  tiZANidine (ZANAFLEX) 2 mg tablet, Take 2 mg by mouth 3 (three) times a day Taking daily, Disp: , Rfl:     No Known Allergies    Physical Exam:    /66   Pulse 61   Ht 5' 7 9" (1 725 m)   Wt 73 5 kg (162 lb)   BMI 24 70 kg/m²     Constitutional:normal, well developed, well nourished, alert, in no distress and non-toxic and no overt pain behavior  Eyes:anicteric  HEENT:grossly intact  Neck:supple, symmetric, trachea midline and no masses   Pulmonary:even and unlabored  Cardiovascular:No edema or pitting edema present  Skin:Normal without rashes or lesions and well hydrated  Psychiatric:Mood and affect appropriate  Neurologic:Cranial Nerves II-XII grossly intact  Musculoskeletal:normal gait  Well-healed vertical midline cervical incision  Limited range of motion of the cervical spine in all planes  Positive Montgomery's reflex bilaterally  Right upper extremity strength 5 out of 5 in all muscle groups    Left upper extremity strength 4 out of 5 in all muscle groups  Sensation intact to light touch in C5-T1 dermatomes bilaterally  Positive Spurling's bilaterally      Imaging  No orders to display     Narrative & Impression   CERVICAL SPINE   INDICATION: [id number]: Central cord syndrome at unspecified level of cervical spinal cord, subsequent encounter  COMPARISON: 10/26/2021   VIEWS: XR SPINE CERVICAL COMPLETE 6+ VW FLEX /EXT /OBL   Images: 7   FINDINGS:   Moderate to marked compression fracture of C5 as noted previously  Status post ORIF  There is slightly increased compression of the anterior aspect of this vertebral body  Stable mild retrolisthesis of C5  Moderate reversal of the normal cervical lordosis at C5  No significant change on flexion or extension  The intervertebral disc spaces are preserved  No evidence of dynamic instability seen with flexion or extension  The prevertebral soft tissues are within normal limits  The lung apices are clear  Pedicle screws from C3 through T1, skipping T5 and T7  No evidence for hardware fracture  However, slight lucency is noted around the right C4 pedicle screw, not apparent previously  Hardware otherwise without complication  IMPRESSION:   Very slight increase in the degree of anterior compression of C5  Otherwise stable appearing C5 fracture  Minimal lucency around the right C4 pedicle screw  Hardware otherwise without complication  No evidence for dynamic instability on flexion or extension  The study was marked in EPIC for significant notification  Workstation performed: [id number]       No orders of the defined types were placed in this encounter

## 2023-02-21 ENCOUNTER — OFFICE VISIT (OUTPATIENT)
Dept: PAIN MEDICINE | Facility: CLINIC | Age: 23
End: 2023-02-21

## 2023-02-21 VITALS
DIASTOLIC BLOOD PRESSURE: 66 MMHG | BODY MASS INDEX: 24.55 KG/M2 | HEIGHT: 68 IN | HEART RATE: 61 BPM | SYSTOLIC BLOOD PRESSURE: 117 MMHG | WEIGHT: 162 LBS

## 2023-02-21 DIAGNOSIS — Z98.1 S/P CERVICAL SPINAL FUSION: ICD-10-CM

## 2023-02-21 DIAGNOSIS — G89.4 CHRONIC PAIN SYNDROME: Primary | ICD-10-CM

## 2023-02-21 DIAGNOSIS — Z87.81 H/O CERVICAL FRACTURE: ICD-10-CM

## 2023-02-21 DIAGNOSIS — G95.9 CERVICAL MYELOPATHY (HCC): ICD-10-CM

## 2023-02-21 DIAGNOSIS — S14.129S CENTRAL CORD SYNDROME, SEQUELA (HCC): ICD-10-CM

## 2023-02-21 RX ORDER — PREGABALIN 100 MG/1
100 CAPSULE ORAL 3 TIMES DAILY
Qty: 90 CAPSULE | Refills: 1 | Status: SHIPPED | OUTPATIENT
Start: 2023-02-21

## 2023-03-18 ENCOUNTER — HOSPITAL ENCOUNTER (OUTPATIENT)
Dept: RADIOLOGY | Facility: HOSPITAL | Age: 23
Discharge: HOME | End: 2023-03-18

## 2023-03-18 DIAGNOSIS — S14.125D: ICD-10-CM

## 2023-03-18 DIAGNOSIS — R33.9 URINARY RETENTION: ICD-10-CM

## 2023-03-18 DIAGNOSIS — N31.9 NEUROGENIC BLADDER: ICD-10-CM

## 2023-03-18 RX ADMIN — GADOBUTROL 7 ML: 604.72 INJECTION INTRAVENOUS at 16:26

## 2023-03-31 NOTE — PROGRESS NOTES
Assessment:  1  Chronic pain syndrome    2  Cervical myelopathy ([facility] [address] )    3  H/O cervical fracture    4  Central cord syndrome, sequela ([facility] [address] )    5  S/P cervical spinal fusion        Plan:  1  Patient will try taking pregabalin 100 mg in the morning and 200 mg at bedtime to help with daytime sedation  This medication was refilled today  2  [facility] to [facility]  He states that he follows with physician that considering medical marijuana  3  Patient may benefit from a trial of duloxetine  He will discuss this with provider treating him for mood  4  Follow-up in 8 weeks or sooner if needed    History of Present Illness: The patient is a 24 y o  male with a history of C5 fracture after slipping in the shower status post C4-6 decompression and C3-T1 posterior fusion on September 9, 2021 last seen on 02/21/2023  who presents for a follow up office visit in regards to chronic neck pain that radiates into the bilateral upper extremities and bilateral scapular muscles with associated numbness and paresthesias in no specific dermatomal distribution  His symptoms did improve status post cervical fusion  He does feel like his symptoms have plateaued at this point  He does continue to follow with [facility] as well as neurology  He is taking pregabalin 100 mg twice a day, stating he could not tolerate afternoon dosing as it was causing sedation  He also finds some relief with tizanidine 2 mg  He is considering medical marijuana  Patient rates his pain a 5 out of 10 on the numeric pain rating scale  He constantly has pain throughout the day which is described as burning, dull aching, sharp, throbbing, cramping and pressure-like, shooting, numbness and pins and needles      I have personally reviewed and/or updated the patient's past medical history, past surgical history, family history, social history, current medications, allergies, and vital signs today         Review of Systems:    Review of Systems   Respiratory: Negative for shortness of breath  Cardiovascular: Negative for chest pain  Gastrointestinal: Negative for constipation, diarrhea, nausea and vomiting  Musculoskeletal: Negative for arthralgias, gait problem, joint swelling and myalgias  Skin: Negative for rash  Neurological: Negative for dizziness, seizures and weakness  All other systems reviewed and are negative          Past Medical History:   Diagnosis Date   • C5 cervical fracture ([address] ) 2021   • Cervicogenic headache    • Depression    • Post concussion syndrome    • Tetraparesis (HCC)    • Weakness        Past Surgical History:   Procedure Laterality Date   • DECOMPRESSION SPINE CERVICAL POSTERIOR Bilateral 2021    Procedure: C4-6 POSTERIOR CERVICAL SPINE DECOMPRESSION WITH C3-T1 FUSION  ;  Surgeon: Junior Payton MD;  [facility];  Service: Neurosurgery       Family History   Problem Relation Age of Onset   • No Known Problems Mother    • Heart disease Father    • Heart attack Father    • Diabetes Paternal Grandmother    • Heart disease Paternal Grandfather    • Stroke Paternal Grandfather            • Heart disease Paternal Uncle        Social History     Occupational History   • Not on file   Tobacco Use   • Smoking status: Never   • Smokeless tobacco: Never   Vaping Use   • Vaping Use: Never used   Substance and Sexual Activity   • Alcohol use: Never   • Drug use: Never   • Sexual activity: Not Currently     Partners: Female     Birth control/protection: Abstinence         Current Outpatient Medications:   •  buPROPion (WELLBUTRIN XL) 150 mg 24 hr tablet, Take 150 mg by mouth daily, Disp: , Rfl:   •  pregabalin (LYRICA) 100 mg capsule, Take 1 PO AM and 2 PO HS, Disp: 90 capsule, Rfl: 2  •  propranolol (INDERAL LA) 120 mg 24 hr capsule, Take 120 mg by mouth daily, Disp: , Rfl:   •  SUMAtriptan (Imitrex) 50 mg tablet, Take 1 tablet (50 mg total) by mouth once as needed for "migraine Okay to repeat in 2 hours  Max 2 tablets in 24 hours  , Disp: 10 tablet, Rfl: 3  •  tamsulosin (FLOMAX) 0 4 mg, Take 0 4 mg by mouth daily at bedtime, Disp: , Rfl:   •  tiZANidine (ZANAFLEX) 2 mg tablet, Take 2 mg by mouth 3 (three) times a day Taking daily, Disp: , Rfl:   •  acetaminophen (TYLENOL) 500 mg tablet, Take 500 mg by mouth every 6 (six) hours as needed for mild pain (Patient not taking: Reported on 2/16/2023), Disp: , Rfl:   •  Diclofenac Sodium (VOLTAREN) 1 %, , Disp: , Rfl:   •  Galcanezumab-gnlm 120 MG/ML SOAJ, Inject 120 mg under the skin every 30 (thirty) days Following the first month loading dose of 2 pens  , Disp: 1 mL, Rfl: 11  •  lifitegrast (Xiidra) 5 % op solution, , Disp: , Rfl:   •  Lotemax SM 0 38 % GEL, INSTILL 1 DROP INTO BOTH EYES TWICE A DAY, Disp: , Rfl:   •  polyethylene glycol (MIRALAX) 17 g packet, Take 17 g by mouth daily for 14 days, Disp: 238 g, Rfl: 0    No Known Allergies    Physical Exam:    /67   Pulse 69   Ht 5' 7\" (1 702 m)   Wt 74 4 kg (164 lb)   BMI 25 69 kg/m²     Constitutional:normal, well developed, well nourished, alert, in no distress and non-toxic and no overt pain behavior  Eyes:anicteric  HEENT:grossly intact  Neck:supple, symmetric, trachea midline and no masses   Pulmonary:even and unlabored  Cardiovascular:No edema or pitting edema present  Skin:Normal without rashes or lesions and well hydrated  Psychiatric:Mood and affect appropriate  Neurologic:Cranial Nerves II-XII grossly intact  Musculoskeletal:normal gait  Decreased range of motion of the cervical spine      Imaging  No orders to display         No orders of the defined types were placed in this encounter      "

## 2023-04-04 ENCOUNTER — OFFICE VISIT (OUTPATIENT)
Dept: PAIN MEDICINE | Facility: CLINIC | Age: 23
End: 2023-04-04

## 2023-04-04 VITALS
SYSTOLIC BLOOD PRESSURE: 109 MMHG | HEIGHT: 67 IN | HEART RATE: 69 BPM | BODY MASS INDEX: 25.74 KG/M2 | WEIGHT: 164 LBS | DIASTOLIC BLOOD PRESSURE: 67 MMHG

## 2023-04-04 DIAGNOSIS — S14.129S CENTRAL CORD SYNDROME, SEQUELA (HCC): ICD-10-CM

## 2023-04-04 DIAGNOSIS — Z98.1 S/P CERVICAL SPINAL FUSION: ICD-10-CM

## 2023-04-04 DIAGNOSIS — G95.9 CERVICAL MYELOPATHY (HCC): ICD-10-CM

## 2023-04-04 DIAGNOSIS — Z87.81 H/O CERVICAL FRACTURE: ICD-10-CM

## 2023-04-04 DIAGNOSIS — G89.4 CHRONIC PAIN SYNDROME: Primary | ICD-10-CM

## 2023-04-04 RX ORDER — PREGABALIN 100 MG/1
CAPSULE ORAL
Qty: 90 CAPSULE | Refills: 2 | Status: SHIPPED | OUTPATIENT
Start: 2023-04-04

## 2023-04-04 RX ORDER — PROPRANOLOL HYDROCHLORIDE 120 MG/1
120 CAPSULE, EXTENDED RELEASE ORAL DAILY
COMMUNITY
Start: 2023-03-21

## 2023-05-12 ENCOUNTER — TELEPHONE (OUTPATIENT)
Dept: NEUROLOGY | Facility: CLINIC | Age: 23
End: 2023-05-12

## 2023-05-18 ENCOUNTER — OFFICE VISIT (OUTPATIENT)
Dept: NEUROLOGY | Facility: CLINIC | Age: 23
End: 2023-05-18

## 2023-05-18 VITALS
OXYGEN SATURATION: 98 % | HEART RATE: 65 BPM | DIASTOLIC BLOOD PRESSURE: 76 MMHG | WEIGHT: 163.5 LBS | TEMPERATURE: 98.3 F | HEIGHT: 67 IN | SYSTOLIC BLOOD PRESSURE: 112 MMHG | BODY MASS INDEX: 25.66 KG/M2

## 2023-05-18 DIAGNOSIS — M54.2 CERVICALGIA: ICD-10-CM

## 2023-05-18 DIAGNOSIS — G43.109 MIGRAINE WITH AURA AND WITHOUT STATUS MIGRAINOSUS, NOT INTRACTABLE: Primary | ICD-10-CM

## 2023-05-18 DIAGNOSIS — R29.898 DECREASED GRIP STRENGTH: ICD-10-CM

## 2023-05-18 DIAGNOSIS — G47.9 SLEEP DIFFICULTIES: ICD-10-CM

## 2023-05-18 RX ORDER — SUMATRIPTAN 50 MG/1
50 TABLET, FILM COATED ORAL ONCE AS NEEDED
Qty: 10 TABLET | Refills: 6 | Status: SHIPPED | OUTPATIENT
Start: 2023-05-18

## 2023-05-18 NOTE — PROGRESS NOTES
Review of Systems   Constitutional: Negative  Negative for appetite change and fever  HENT: Negative  Negative for hearing loss, tinnitus, trouble swallowing and voice change  Eyes: Positive for pain  Negative for photophobia and visual disturbance  Respiratory: Negative  Negative for shortness of breath  Cardiovascular: Negative  Negative for palpitations  Gastrointestinal: Negative  Negative for nausea and vomiting  Endocrine: Negative  Negative for cold intolerance  Genitourinary: Negative  Negative for dysuria, frequency and urgency  Musculoskeletal: Negative  Negative for gait problem, myalgias and neck pain  Skin: Negative  Negative for rash  Allergic/Immunologic: Negative  Neurological: Positive for headaches  Negative for dizziness, tremors, seizures, syncope, facial asymmetry, speech difficulty, weakness, light-headedness and numbness  Hematological: Negative  Does not bruise/bleed easily  Psychiatric/Behavioral: Negative  Negative for confusion, hallucinations and sleep disturbance  All other systems reviewed and are negative  Since your last visit are your headaches same    Any change to the headache type? Behind eyes and more often    What is your current headache frequency: 2 per day    Are you taking your current medications as prescribed? no    If no, why not? insurance issues with [facility]    Do you have any side effects?  N/A    How may days per week do you take an abortive medicine? 0

## 2023-05-18 NOTE — PATIENT INSTRUCTIONS
Headache Calendar  Please maintain a headache calendar  Consider using phone applications such as Migraine Deshaun or [facility]acker     Headache/migraine treatment:   Acute medications (for immediate treatment of a headache): It is ok to take ibuprofen, acetaminophen or naproxen (Advil, Tylenol,  Aleve, Excedrin) if they help your headaches you should limit these to No more than 2-3 times a week to avoid medication overuse/rebound headaches  For your more moderate to severe migraines take this medication early  Imitrex (Sumatriptan) 50mg tabs - take one at the onset of headache  May repeat one time after 2 hours if pain has not resolved  (Max 2 a day and 10 a month)      Prescription preventive medications for headaches/migraines   (to take every day to help prevent headaches - not to take at the time of headache):  [x] [facility]/Galcanezumab - the 1st dose is 240 mg loading dose of 2 consecutive 120 mg injections  Thereafter, 120 mg injections every 30 days     If needed there is a coupon card for the copay at [employer]  com     READ INSTRUCTIONS that come with the medication  REFRIGERATE  Keep out of direct sunlight  Prior to administration, allow to come to room temperature for 30 minutes  Do not warm using a heat source (eg, microwave or hot water)  Do not shake  Administer in preferably abdomen (avoiding 2 inches around the navel), thigh, upper arm, or buttocks avoiding areas of skin that are tender, bruised, red or hard  Deliver entire contents of single-use prefilled pen or syringe  *Typically these types of medications take time until you see the benefit, although some may see improvement in days, often it may take weeks, especially if the medication is being titrated up to a beneficial level  Please contact us if there are any concerns or questions regarding the medication        Lifestyle Recommendations:  [x] SLEEP - Maintain a regular sleep schedule: Adults need at least 7-8 hours of uninterrupted a night  Maintain good sleep hygiene:  Going to bed and waking up at consistent times, avoiding excessive daytime naps, avoiding caffeinated beverages in the evening, avoid excessive stimulation in the evening and generally using bed primarily for sleeping  One hour before bedtime would recommend turning lights down lower, decreasing your activity (may read quietly, listen to music at a low volume)  When you get into bed, should eliminate all technology (no texting, emailing, playing with your phone, iPad or tablet in bed)  [x] HYDRATION - Maintain good hydration  Drink  2L of fluid a day (4 typical small water bottles)  [x] DIET - Maintain good nutrition  In particular don't skip meals and try and eat healthy balanced meals regularly  [x] TRIGGERS - Look for other triggers and avoid them: Limit caffeine to 1-2 cups a day or less  Avoid dietary triggers that you have noticed bring on your headaches (this could include aged cheese, peanuts, MSG, aspartame and nitrates)  [x] EXERCISE - physical exercise as we all know is good for you in many ways, and not only is good for your heart, but also is beneficial for your mental health, cognitive health and  chronic pain/headaches  I would encourage at the least 5 days of physical exercise weekly for at least 30 minutes  Education and Follow-up  [x] Please call with any questions or concerns  Of course if any new concerning symptoms go to the emergency department    [x] Follow up in 6 months

## 2023-05-18 NOTE — PROGRESS NOTES
[address] [facility] Consult - Follow up   PATIENT:  Francisco Gonzales  MRN:  [id number]  :  2000  DATE OF SERVICE:  2023  REFERRED BY: Roger Rios MD  PMD: Kelvin Boyer MD    Assessment/Plan:   Francisco Gonzales is a delightful 20 y o  male with a past medical history that includes history of cervical fracture, central cord syndrome, depression here for f/u evaluation of headache  Since his last visit, he reports no significant change to his headaches  His insurance gave him some difficulties with the Emgality  He did not receive the loading dose and was only given 1 injection initially  They did not start it because of that  I have represcribed it and asked them to touch base with their pharmacy/insurance tomorrow to see what the issue was and if it requires a prior authorization  I will also send a message to our nursing team for assistance  With regards to abortive management, he finds that sumatriptan is helpful  They were not interested in a bridge at this time  Outside of headaches, he continues with home exercises but finds that he may have plateaued with handgrip exercises  I have put in a new referral for OT for a refresher and assistance with other potential resources to help him with home exercises  Workup:  -MRI brain with and without contrast 2022: No acute intracranial findings  No evidence of white matter changes  No enhancement  -MRI cervical spine with and without contrast 2022: Myelomalacia at the C5 vertebral level  Related to prior trauma  Chronic C5 compression fracture noted    Postoperative changes      Preventative:  - we discussed headache hygiene and lifestyle factors that may improve headaches  - Emgality monthly injection - re-prescribed  - Currently on through other providers: Propranolol, Gabapentin, Wellbutrin  - Past/ failed/contraindicated: Lexapro, Avoid Aimovig due to constipation  - future options: SNRI, Topamax, CGRP med, botox     Acute:  - discussed not taking over-the-counter or prescription pain medications more than 3 days per week to prevent medication overuse/rebound headache  - Sumatriptan 50mg  - Currently on through other providers: None  - Past/ failed/contraindicated: None  - future options:  Triptan, prochlorperazine, Toradol IM or p o , could consider trial of 5 days of Depakote 500 mg nightly or dexamethasone 2 mg daily for prolonged migraine, dre Roger nurtec  Patient instructions   Headache Calendar  Please maintain a headache calendar  Consider using phone applications such as Migraine Deshaun or Tactile Migraine Tracker     Headache/migraine treatment:   Acute medications (for immediate treatment of a headache): It is ok to take ibuprofen, acetaminophen or naproxen (Advil, Tylenol,  Aleve, Excedrin) if they help your headaches you should limit these to No more than 2-3 times a week to avoid medication overuse/rebound headaches       For your more moderate to severe migraines take this medication early  Imitrex (Sumatriptan) 50mg tabs - take one at the onset of headache  May repeat one time after 2 hours if pain has not resolved  (Max 2 a day and 10 a month)      Prescription preventive medications for headaches/migraines   (to take every day to help prevent headaches - not to take at the time of headache):  [x]? Emgality/Galcanezumab - the 1st dose is 240 mg loading dose of 2 consecutive 120 mg injections   Thereafter, 120 mg injections every 30 days     If needed there is a coupon card for the copay at [employer]  com     READ INSTRUCTIONS that come with the medication  REFRIGERATE  Keep out of direct sunlight  Prior to administration, allow to come to room temperature for 30 minutes  Do not warm using a heat source (eg, microwave or hot water)  Do not shake   Administer in preferably abdomen (avoiding 2 inches around the navel), thigh, upper arm, or buttocks avoiding areas of skin that are tender, bruised, red or hard  Deliver entire contents of single-use prefilled pen or syringe      *Typically these types of medications take time until you see the benefit, although some may see improvement in days, often it may take weeks, especially if the medication is being titrated up to a beneficial level  Please contact us if there are any concerns or questions regarding the medication       Lifestyle Recommendations:  [x]? SLEEP - Maintain a regular sleep schedule: Adults need at least 7-8 hours of uninterrupted a night  Maintain good sleep hygiene:  Going to bed and waking up at consistent times, avoiding excessive daytime naps, avoiding caffeinated beverages in the evening, avoid excessive stimulation in the evening and generally using bed primarily for sleeping  One hour before bedtime would recommend turning lights down lower, decreasing your activity (may read quietly, listen to music at a low volume)  When you get into bed, should eliminate all technology (no texting, emailing, playing with your phone, iPad or tablet in bed)  [x]? HYDRATION - Maintain good hydration  Drink  2L of fluid a day (4 typical small water bottles)  [x]? DIET - Maintain good nutrition  In particular don't skip meals and try and eat healthy balanced meals regularly  [x]? TRIGGERS - Look for other triggers and avoid them: Limit caffeine to 1-2 cups a day or less  Avoid dietary triggers that you have noticed bring on your headaches (this could include aged cheese, peanuts, MSG, aspartame and nitrates)  [x]? EXERCISE - physical exercise as we all know is good for you in many ways, and not only is good for your heart, but also is beneficial for your mental health, cognitive health and  chronic pain/headaches  I would encourage at the least 5 days of physical exercise weekly for at least 30 minutes       Education and Follow-up  [x]? Please call with any questions or concerns   Of course if any new concerning symptoms go to the emergency department  [x]? Follow up in 6 months  Subjective:   5/18/23: Since his last visit, he feels that his headaches are the same as before  Continues to experience daily headaches  He was unable to get Emgality injections due to insurance issues  They did not get approved for the loading dose so he did not start the injections  From an abortive standpoint Sumatriptan works well for him  About 2 weeks ago he received new glasses  He feels as though he has plateaued a bit with hand  and has limited exercises at home  Previous History:  1/5/23: Mr Anuradha Arita reports a history of headaches that started last year  He was referred over from the neurosurgery clinic who had been seeing him after he had a fall while getting out of the shower and suffered a cervical spine injury  The description of his headaches sound like migraines  He reports some improvement in terms of his headaches with the start of propanolol last year, but continues to endorse nearly daily headaches, although less severe  We discussed potential treatment options, but thought that Emgality monthly injections would be a good choice given his multitude of other medications already  From an abortive standpoint, I have increased his sumatriptan to 50 mg to provide further relief  I also clarified his ROS that reports he sees things that are not there  Denies any hallucinations, either auditory or visual   Rather he describes it as a distortion of some images with some difficulty focusing  I have asked him to keep me updated throughout the process and let me know if he has any issues or concerns     Past Medical History:     Past Medical History:   Diagnosis Date   • C5 cervical fracture ([facility] [address] ) 09/09/2021   • Cervicogenic headache    • Depression    • Post concussion syndrome    • Tetraparesis (HCC)    • Weakness        Patient Active Problem List   Diagnosis   • Central cord syndrome [facility])   • Family history of heart attack   • Positive depression screening   • H/O cervical fracture   • S/P cervical spinal fusion   • Cervical myelopathy (HCC)   • Chronic pain syndrome       Medications:      Current Outpatient Medications   Medication Sig Dispense Refill   • acetaminophen (TYLENOL) 500 mg tablet Take 500 mg by mouth every 6 (six) hours as needed for mild pain     • buPROPion (WELLBUTRIN XL) 150 mg 24 hr tablet Take 150 mg by mouth daily     • Diclofenac Sodium (VOLTAREN) 1 % as needed     • lifitegrast (Xiidra) 5 % op solution      • polyethylene glycol (MIRALAX) 17 g packet Take 17 g by mouth daily for 14 days (Patient taking differently: Take 17 g by mouth if needed) 238 g 0   • pregabalin (LYRICA) 100 mg capsule Take 1 PO AM and 2 PO HS 90 capsule 2   • propranolol (INDERAL LA) 120 mg 24 hr capsule Take 120 mg by mouth daily     • SUMAtriptan (Imitrex) 50 mg tablet Take 1 tablet (50 mg total) by mouth once as needed for migraine Okay to repeat in 2 hours  Max 2 tablets in 24 hours  10 tablet 3   • tamsulosin (FLOMAX) 0 4 mg Take 0 4 mg by mouth daily at bedtime     • tiZANidine (ZANAFLEX) 2 mg tablet Take 2 mg by mouth 3 (three) times a day Taking daily     • Galcanezumab-gnlm 120 MG/ML SOAJ Inject 120 mg under the skin every 30 (thirty) days Following the first month loading dose of 2 pens  (Patient not taking: Reported on 2023) 1 mL 11   • Lotemax SM 0 38 % GEL INSTILL 1 DROP INTO BOTH EYES TWICE A DAY (Patient not taking: Reported on 2023)       No current facility-administered medications for this visit          Allergies:    No Known Allergies    Family History:     Family History   Problem Relation Age of Onset   • No Known Problems Mother    • Heart disease Father    • Heart attack Father    • Diabetes Paternal Grandmother    • Heart disease Paternal Grandfather    • Stroke Paternal Grandfather            • Heart disease Paternal Uncle        Social History:     Social History     Socioeconomic History   • "Marital status: Single     Spouse name: Not on file   • Number of children: Not on file   • Years of education: Not on file   • Highest education level: Not on file   Occupational History   • Not on file   Tobacco Use   • Smoking status: Never   • Smokeless tobacco: Never   Vaping Use   • Vaping Use: Never used   Substance and Sexual Activity   • Alcohol use: Never   • Drug use: Never   • Sexual activity: Not Currently     Partners: Female     Birth control/protection: Abstinence   Other Topics Concern   • Not on file   Social History Narrative   • Not on file     Social Determinants of Health     Financial Resource Strain: Not on file   Food Insecurity: Not on file   Transportation Needs: Not on file   Physical Activity: Not on file   Stress: Not on file   Social Connections: Not on file   Intimate Partner Violence: Not on file   Housing Stability: Not on file         Objective:   Physical Exam:                                                               Vitals:            Constitutional:  /76 (BP Location: Left arm, Patient Position: Sitting, Cuff Size: Adult)   Pulse 65   Temp 98 3 °F (36 8 °C) (Temporal)   Ht 5' 7\" (1 702 m)   Wt 74 2 kg (163 lb 8 oz)   SpO2 98%   BMI 25 61 kg/m²   BP Readings from Last 3 Encounters:   05/18/23 112/76   04/21/23 120/70   04/04/23 109/67     Pulse Readings from Last 3 Encounters:   05/18/23 65   04/21/23 67   04/04/23 69         Well developed, well nourished, well groomed  No dysmorphic features  HEENT:  Normocephalic atraumatic  See neuro exam   Chest:  Respirations appear regular and unlabored  Cardiovascular:  no observed significant swelling  Musculoskeletal:  (see below under neurologic exam for evaluation of motor function and gait)   Skin:  warm and dry, not diaphoretic  Psychiatric:  Normal behavior and appropriate affect       Neurological Examination:   Mental status/cognitive function:   Orientated to time, place and person   Recent and " remote memory intact  Attention span and concentration as well as fund of knowledge are appropriate for age  Normal language and spontaneous speech      Cranial Nerves:  II-visual fields full  Fundi poorly visualized due to pupillary constriction  III, IV, VI-Pupils were equal, round, and reactive to light and accomodation  Extraocular movements were full and conjugate without nystagmus  Conjugate gaze, normal smooth pursuits, normal saccades   V-facial sensation symmetric     VII-facial expression symmetric, intact forehead wrinkle, strong eye closure, symmetric smile    VIII-hearing grossly intact bilaterally   IX, X-palate elevation symmetric, no dysarthria  XI-shoulder shrug strength slightly limited b/l  XII-tongue protrusion midline  Motor Exam: symmetric bulk and tone throughout, no pronator drift  Power/strength 5/5 bilateral upper and lower extremities (except 5-/5 in the LUE), no atrophy, fasciculations or abnormal movements noted  Sensory: grossly intact light touch in all extremities  Reflexes: brachioradialis 2+, biceps 2+  Mildly brisker in the LE  Coordination: Finger nose finger intact bilaterally, no apparent dysmetria, ataxia or tremor noted  Gait: steady casual and tandem gait  Review of Systems:   Constitutional: Negative  Negative for appetite change and fever  HENT: Negative  Negative for hearing loss, tinnitus, trouble swallowing and voice change  Eyes: Positive for pain  Negative for photophobia and visual disturbance  Respiratory: Negative  Negative for shortness of breath  Cardiovascular: Negative  Negative for palpitations  Gastrointestinal: Negative  Negative for nausea and vomiting  Endocrine: Negative  Negative for cold intolerance  Genitourinary: Negative  Negative for dysuria, frequency and urgency  Musculoskeletal: Negative  Negative for gait problem, myalgias and neck pain  Skin: Negative  Negative for rash     Allergic/Immunologic: Negative  Neurological: Positive for headaches  Negative for dizziness, tremors, seizures, syncope, facial asymmetry, speech difficulty, weakness, light-headedness and numbness  Hematological: Negative  Does not bruise/bleed easily  Psychiatric/Behavioral: Negative  Negative for confusion, hallucinations and sleep disturbance  All other systems reviewed and are negative  I have spent 20 minutes with Patient and family today in which greater than 50% of this time was spent in counseling/coordination of care regarding Prognosis, Risks and benefits of tx options, Patient and family education, Impressions, Documenting in the medical record and Obtaining or reviewing history    I also spent 10 minutes non face to face for this patient the same day       Activity Minutes   Precharting/reviewing 5   Patient care/counseling 20   Postcharting/care coordination 3       Author:  Tommy Harry DO 5/18/2023 2:30 PM

## 2023-05-22 ENCOUNTER — PATIENT MESSAGE (OUTPATIENT)
Dept: NEUROLOGY | Facility: CLINIC | Age: 23
End: 2023-05-22

## 2023-05-22 ENCOUNTER — TELEPHONE (OUTPATIENT)
Dept: NEUROLOGY | Facility: CLINIC | Age: 23
End: 2023-05-22

## 2023-05-22 NOTE — TELEPHONE ENCOUNTER
----- Message from Aldo Smith DO sent at 5/18/2023  3:46 PM EDT -----  Regarding: Emgality  Hi, this patient had difficulty getting Emgality about 4 months ago due to insurance issues  For some reason he was unable to get the loading dose and was only given one injection so he did not start the course  Any assistance to ensure that he gets the appropriate dosing would be appreciated

## 2023-05-22 NOTE — TELEPHONE ENCOUNTER
Chart reviewed:  Emgality PA was not initiated bec we did not receive PA request from the pharmacy  PA initiated on CMM    (Key: [id number])      Awaiting determination

## 2023-05-23 NOTE — TELEPHONE ENCOUNTER
May 22, 2023  Justin Cedillo  to [facility] Monaco (supporting Kate Marmolejo DO)    AM    11:27 Racquel Adan  I am writing you this to inform you that Ramin's prescription [facility]) has not been filled yet, as we are still awaiting insurance approval    I will follow up with [facility] but did want to let you know, as this will be the 2nd time we have had issues with insurance approval process  Remember we were able to fill 2 doses; which we still have sitting in our fridge  Please advise     Thank you!!

## 2023-07-25 ENCOUNTER — TELEPHONE (OUTPATIENT)
Dept: NEUROLOGY | Facility: CLINIC | Age: 23
End: 2023-07-25

## 2023-07-25 NOTE — TELEPHONE ENCOUNTER
Signed, faxed, and uploaded to chart forms from [facility] Tiffanie Serrano

## 2023-07-27 DIAGNOSIS — G43.109 MIGRAINE WITH AURA AND WITHOUT STATUS MIGRAINOSUS, NOT INTRACTABLE: ICD-10-CM

## 2023-08-18 ENCOUNTER — TELEPHONE (OUTPATIENT)
Dept: NEUROLOGY | Facility: CLINIC | Age: 23
End: 2023-08-18

## 2023-08-31 DIAGNOSIS — G43.109 MIGRAINE WITH AURA AND WITHOUT STATUS MIGRAINOSUS, NOT INTRACTABLE: ICD-10-CM

## 2023-09-01 RX ORDER — GALCANEZUMAB 120 MG/ML
INJECTION, SOLUTION SUBCUTANEOUS
OUTPATIENT
Start: 2023-09-01

## 2023-09-07 ENCOUNTER — TELEPHONE (OUTPATIENT)
Dept: NEUROLOGY | Facility: CLINIC | Age: 23
End: 2023-09-07

## 2023-09-07 DIAGNOSIS — G43.109 MIGRAINE WITH AURA AND WITHOUT STATUS MIGRAINOSUS, NOT INTRACTABLE: ICD-10-CM

## 2023-09-07 RX ORDER — GALCANEZUMAB 120 MG/ML
INJECTION, SOLUTION SUBCUTANEOUS
Qty: 2 ML | Refills: 0 | Status: SHIPPED | OUTPATIENT
Start: 2023-09-07

## 2023-10-09 ENCOUNTER — TELEPHONE (OUTPATIENT)
Dept: NEUROSURGERY | Facility: CLINIC | Age: 23
End: 2023-10-09

## 2023-10-09 DIAGNOSIS — Z98.1 S/P CERVICAL SPINAL FUSION: ICD-10-CM

## 2023-10-09 DIAGNOSIS — G95.9 CERVICAL MYELOPATHY (HCC): Primary | ICD-10-CM

## 2023-11-17 ENCOUNTER — TELEPHONE (OUTPATIENT)
Dept: NEUROLOGY | Facility: CLINIC | Age: 23
End: 2023-11-17

## 2023-11-17 NOTE — TELEPHONE ENCOUNTER
11/15/23 at 12:58    Hello. Hi, good afternoon. I am calling on behalf of my son, Miguelina Vogel, date of birth february 5th, 2000. I am calling in regards to his in emgality injection 120 milligrams. There seems to be a back order. He is about a week and a half behind on his last injections for this month November. [facility] does not know when [facility] will [facility]. So I am trying to calling just to find out options since Yasmine Saldivar is now getting increased headaches. You have my number [phone]. Thank you.     Will call pharmacy

## 2023-11-17 NOTE — TELEPHONE ENCOUNTER
Called [facility] pharmacy but on hold for >10 min       Chart reviewed  Dispenses     Dispensed Days Supply Quantity Provider Pharmacy   [facility] 120 MG/ML SOAJ 11/16/2023 28 1 each Beckie Hallman DO [facility]/pharmacy #[id number] - [place]. ..

## 2023-11-19 ENCOUNTER — TELEPHONE (OUTPATIENT)
Dept: NEUROSURGERY | Facility: CLINIC | Age: 23
End: 2023-11-19

## 2023-11-19 NOTE — TELEPHONE ENCOUNTER
Called 11/19/23 spoke to his mom. She started that she will bring him to XR after neurology appt tomorrow 11/20  Told her any [facility] will be able to take him as a walk in. Conf appt with this call as well.

## 2023-11-20 ENCOUNTER — OFFICE VISIT (OUTPATIENT)
Dept: NEUROLOGY | Facility: CLINIC | Age: 23
End: 2023-11-20
Payer: COMMERCIAL

## 2023-11-20 ENCOUNTER — HOSPITAL ENCOUNTER (OUTPATIENT)
Dept: RADIOLOGY | Facility: HOSPITAL | Age: 23
Discharge: HOME | End: 2023-11-20
Payer: COMMERCIAL

## 2023-11-20 ENCOUNTER — TELEPHONE (OUTPATIENT)
Dept: NEUROSURGERY | Facility: CLINIC | Age: 23
End: 2023-11-20

## 2023-11-20 VITALS
OXYGEN SATURATION: 98 % | TEMPERATURE: 97.9 F | HEART RATE: 67 BPM | HEIGHT: 67 IN | WEIGHT: 167.2 LBS | BODY MASS INDEX: 26.24 KG/M2 | DIASTOLIC BLOOD PRESSURE: 84 MMHG | SYSTOLIC BLOOD PRESSURE: 132 MMHG

## 2023-11-20 DIAGNOSIS — G95.9 CERVICAL MYELOPATHY (HCC): ICD-10-CM

## 2023-11-20 DIAGNOSIS — G47.9 SLEEP DIFFICULTIES: ICD-10-CM

## 2023-11-20 DIAGNOSIS — G43.109 MIGRAINE WITH AURA AND WITHOUT STATUS MIGRAINOSUS, NOT INTRACTABLE: Primary | ICD-10-CM

## 2023-11-20 DIAGNOSIS — M54.2 CERVICALGIA: ICD-10-CM

## 2023-11-20 DIAGNOSIS — Z98.1 S/P CERVICAL SPINAL FUSION: ICD-10-CM

## 2023-11-20 PROCEDURE — 72052 X-RAY EXAM NECK SPINE 6/>VWS: CPT

## 2023-11-20 PROCEDURE — 99213 OFFICE O/P EST LOW 20 MIN: CPT | Performed by: STUDENT IN AN ORGANIZED HEALTH CARE EDUCATION/TRAINING PROGRAM

## 2023-11-20 NOTE — PROGRESS NOTES
[facility] Consult - Follow up   PATIENT:  Viviana Francois  MRN:  [id number]  :  2000  DATE OF SERVICE:  2023  REFERRED BY: Mirna Mendenhall MD  PMD: Mirna Mendenhall MD    Assessment/Plan:   Viviana Francois is a delightful 20 y.o. male with a past medical history that includes history of cervical fracture, central cord syndrome, depression here for f/u evaluation of headache. At today's visit, he reports improvement in terms of his headaches. He has been using Emgality monthly injections since May and finds that his headache frequency is approximately 10 to 12 days/month at this time. He also notes that the severity is less than before. He has not needed to use sumatriptan in the last few months, but when he does use it, it is helpful. He has had some difficulties obtaining the Emgality injections from his pharmacy, so I have prescribed 3-month supply to see if that can help alleviate some of this issue. Going forward, if he continues to have a wearing off effect at the end of the month, we can always consider a short bridge with something like cyproheptadine prior to the next injection. Workup:  -MRI brain with and without contrast 2022: No acute intracranial findings. No evidence of white matter changes. No enhancement. -MRI cervical spine with and without contrast 2022: Myelomalacia at the C5 vertebral level. Related to prior trauma. Chronic C5 compression fracture noted. Postoperative changes. Preventative:  - we discussed headache hygiene and lifestyle factors that may improve headaches  - Emgality monthly injection  - Currently on through other providers:   Wellbutrin  - Past/ failed/contraindicated: Lexapro, Avoid Aimovig due to constipation, Propranolol, Gabapentin  - future options: SNRI, Topamax, CGRP med, botox     Acute:  - discussed not taking over-the-counter or prescription pain medications more than 3 days per week to prevent medication overuse/rebound headache  - Sumatriptan 50mg  - Currently on through other providers: Tizanidine  - Past/ failed/contraindicated: None  - future options:  Triptan, prochlorperazine, Toradol IM or p.o., could consider trial of 5 days of Depakote 500 mg nightly or dexamethasone 2 mg daily for prolonged migraine, dre Benavides nurtec  Patient instructions   Headache Calendar  Please maintain a headache calendar  Consider using phone applications such as Migraine Deshaun or [place] Migraine Tracker     Headache/migraine treatment:   Acute medications (for immediate treatment of a headache): It is ok to take ibuprofen, acetaminophen or naproxen (Advil, Tylenol,  Aleve, Excedrin) if they help your headaches you should limit these to No more than 2-3 times a week to avoid medication overuse/rebound headaches. For your more moderate to severe migraines take this medication early  Imitrex (Sumatriptan) 50mg tabs - take one at the onset of headache. May repeat one time after 2 hours if pain has not resolved. (Max 2 a day and 10 a month)      Prescription preventive medications for headaches/migraines   (to take every day to help prevent headaches - not to take at the time of headache):  Emgality/Galcanezumab - 120mg injection monthly     Lifestyle Recommendations:  [x] SLEEP - Maintain a regular sleep schedule: Adults need at least 7-8 hours of uninterrupted a night. Maintain good sleep hygiene:  Going to bed and waking up at consistent times, avoiding excessive daytime naps, avoiding caffeinated beverages in the evening, avoid excessive stimulation in the evening and generally using bed primarily for sleeping. One hour before bedtime would recommend turning lights down lower, decreasing your activity (may read quietly, listen to music at a low volume). When you get into bed, should eliminate all technology (no texting, emailing, playing with your phone, iPad or tablet in bed).   [x] HYDRATION - Maintain good hydration. Drink  2L of fluid a day (4 typical small water bottles)  [x] DIET - Maintain good nutrition. In particular don't skip meals and try and eat healthy balanced meals regularly. [x] TRIGGERS - Look for other triggers and avoid them: Limit caffeine to 1-2 cups a day or less. Avoid dietary triggers that you have noticed bring on your headaches (this could include aged cheese, peanuts, MSG, aspartame and nitrates). [x] EXERCISE - physical exercise as we all know is good for you in many ways, and not only is good for your heart, but also is beneficial for your mental health, cognitive health and  chronic pain/headaches. I would encourage at the least 5 days of physical exercise weekly for at least 30 minutes. Education and Follow-up  [x] Please call with any questions or concerns. Of course if any new concerning symptoms go to the emergency department. [x] Follow up in 8 months  Subjective:   11/20/23: At today's visit, he reports improvement in terms of his headaches. Currently experiencing about 10-12 headache days per month. He continues with Emgality monthly injections which he started around May following our last visit. From an abortive standpoint, he has not needed to use Sumatriptan in a few months. Previous History:  5/18/23: Since his last visit, he feels that his headaches are the same as before. Continues to experience daily headaches. He was unable to get Emgality injections due to insurance issues. They did not get approved for the loading dose so he did not start the injections. From an abortive standpoint Sumatriptan works well for him. About 2 weeks ago he received new glasses. He feels as though he has plateaued a bit with hand  and has limited exercises at home. 1/5/23: Mr. Columba Baeza reports a history of headaches that started last year.   He was referred over from the neurosurgery clinic who had been seeing him after he had a fall while getting out of the shower and suffered a cervical spine injury. The description of his headaches sound like migraines. He reports some improvement in terms of his headaches with the start of propanolol last year, but continues to endorse nearly daily headaches, although less severe. We discussed potential treatment options, but thought that Emgality monthly injections would be a good choice given his multitude of other medications already. From an abortive standpoint, I have increased his sumatriptan to 50 mg to provide further relief. I also clarified his ROS that reports he sees things that are not there. Denies any hallucinations, either auditory or visual.  Rather he describes it as a distortion of some images with some difficulty focusing. I have asked him to keep me updated throughout the process and let me know if he has any issues or concerns. Past Medical History:     Past Medical History:   Diagnosis Date    C5 cervical fracture ([address]) 09/09/2021    Cervicogenic headache     Depression     Post concussion syndrome     Tetraparesis (HCC)     Weakness        Patient Active Problem List   Diagnosis    Central cord syndrome ([address])    Family history of heart attack    Positive depression screening    H/O cervical fracture    S/P cervical spinal fusion    Cervical myelopathy (HCC)    Chronic pain syndrome       Medications:      Current Outpatient Medications   Medication Sig Dispense Refill    acetaminophen (TYLENOL) 500 mg tablet Take 500 mg by mouth every 6 (six) hours as needed for mild pain      buPROPion (WELLBUTRIN XL) 150 mg 24 hr tablet Take 150 mg by mouth daily      Emgality 120 MG/ML SOAJ INJECT 240 MG UNDER THE SKIN ONCE FOR 1 DOSE FOR JUST THE FIRST MONTH LOADING DOSE, FOLLOWED BY 1 INJECTION MONTHLY ON SEPARATE PRESCRIPTION.  2 mL 0    polyethylene glycol (MIRALAX) 17 g packet Take 17 g by mouth daily for 14 days (Patient taking differently: Take 17 g by mouth if needed) 238 g 0    SUMAtriptan (Imitrex) 50 mg tablet Take 1 tablet (50 mg total) by mouth once as needed for migraine Okay to repeat in 2 hours. Max 2 tablets in 24 hours. 10 tablet 6    tamsulosin (FLOMAX) 0.4 mg Take 0.4 mg by mouth daily at bedtime      tiZANidine (ZANAFLEX) 2 mg tablet Take 2 mg by mouth 3 (three) times a day Taking daily      Diclofenac Sodium (VOLTAREN) 1 % as needed (Patient not taking: Reported on 2023)      lifitegrast Lum [place]) 5 % op solution  (Patient not taking: Reported on 2023)      pregabalin (LYRICA) 100 mg capsule Take 1 PO AM and 2 PO HS (Patient not taking: Reported on 2023) 90 capsule 2    propranolol (INDERAL LA) 120 mg 24 hr capsule Take 120 mg by mouth daily (Patient not taking: Reported on 2023)       No current facility-administered medications for this visit.         Allergies:    No Known Allergies    Family History:     Family History   Problem Relation Age of Onset    No Known Problems Mother     Heart disease Father     Heart attack Father     Diabetes Paternal Grandmother     Heart disease Paternal Grandfather     Stroke Paternal Grandfather             Heart disease Paternal Uncle        Social History:     Social History     Socioeconomic History    Marital status: Single     Spouse name: Not on file    Number of children: Not on file    Years of education: Not on file    Highest education level: Not on file   Occupational History    Not on file   Tobacco Use    Smoking status: Never    Smokeless tobacco: Never   Vaping Use    Vaping Use: Never used   Substance and Sexual Activity    Alcohol use: Never    Drug use: Never    Sexual activity: Not Currently     Partners: Female     Birth control/protection: Abstinence   Other Topics Concern    Not on file   Social History Narrative    Not on file     Social Determinants of Health     Financial Resource Strain: Low Risk  (3/29/2022)    Overall Financial Resource Strain (CARDIA)     Difficulty of Paying Living Expenses: Not hard at all   Food Insecurity: No Food Insecurity (3/29/2022)    Hunger Vital Sign     Worried About Running Out of Food in the Last Year: Never true     Ran Out of Food in the Last Year: Never true   Transportation Needs: No Transportation Needs (3/29/2022)    PRAPARE - Transportation     Lack of Transportation (Medical): No     Lack of Transportation (Non-Medical):  No   Physical Activity: Inactive (3/29/2022)    Exercise Vital Sign     Days of Exercise per Week: 0 days     Minutes of Exercise per Session: 0 min   Stress: No Stress Concern Present (3/29/2022)    [facility]     Feeling of Stress : Not at all   Social Connections: Socially Isolated (3/29/2022)    Social Connection and Isolation Panel [NHANES]     Frequency of Communication with Friends and Family: More than three times a week     Frequency of Social Gatherings with Friends and Family: More than three times a week     Attends [employer] Services: Never     Active Member of Clubs or Organizations: No     Attends Club or Organization Meetings: Never     Marital Status: Never    Intimate Partner Violence: Not At Risk (3/29/2022)    Humiliation, Afraid, Rape, and Kick questionnaire     Fear of Current or Ex-Partner: No     Emotionally Abused: No     Physically Abused: No     Sexually Abused: No   Housing Stability: [address]  (3/29/2022)    Housing Stability Vital Sign     Unable to Pay for Housing in the Last Year: No     Number of [address] in the Last Year: 1     Unstable Housing in the Last Year: No         Objective:   Physical Exam:                                                               Vitals:            Constitutional:  /84 (BP Location: Left arm, Patient Position: Sitting, Cuff Size: Adult)   Pulse 67   Temp 97.9 °F (36.6 °C) (Temporal)   Ht 5' 7" (1.702 m)   Wt 75.8 kg (167 lb 3.2 oz)   SpO2 98%   BMI 26.19 kg/m²   BP Readings from Last 3 Encounters:   11/20/23 132/84   05/18/23 112/76   04/21/23 120/70     Pulse Readings from Last 3 Encounters:   11/20/23 67   05/18/23 65   04/21/23 67         Well developed, well nourished, well groomed. No dysmorphic features. HEENT:  Normocephalic atraumatic. See neuro exam   Chest:  Respirations appear regular and unlabored. Cardiovascular:  no observed significant swelling. Musculoskeletal:  (see below under neurologic exam for evaluation of motor function and gait)   Skin:  warm and dry, not diaphoretic. Psychiatric:  Normal behavior and appropriate affect       Neurological Examination:     Mental status/cognitive function:   Orientated to time, place and person. Recent and remote memory intact. Attention span and concentration as well as fund of knowledge are appropriate for age. Normal language and spontaneous speech. Cranial Nerves:  II-visual fields full. Fundi poorly visualized due to pupillary constriction  III, IV, VI-Pupils were equal, round, and reactive to light and accomodation. Extraocular movements were full and conjugate without nystagmus. Conjugate gaze, normal smooth pursuits, normal saccades   V-facial sensation symmetric. VII-facial expression symmetric, intact forehead wrinkle, strong eye closure, symmetric smile    VIII-hearing grossly intact bilaterally   IX, X-palate elevation symmetric, no dysarthria. XI-shoulder shrug strength slightly limited b/l  XII-tongue protrusion midline. Motor Exam: symmetric bulk and tone throughout, no pronator drift. Power/strength 5/5 bilateral upper and lower extremities (except 5-/5 in the LUE), no atrophy, fasciculations or abnormal movements noted. Sensory: grossly intact light touch in all extremities. Reflexes: brachioradialis 2+, biceps 2+. Mildly brisker in the LE  Coordination: Finger nose finger intact bilaterally, no apparent dysmetria, ataxia or tremor noted  Gait: steady casual and tandem gait.    Review of Systems:   Constitutional: Negative for appetite change, fatigue and fever. HENT: Negative. Negative for hearing loss, tinnitus, trouble swallowing and voice change. Eyes: Negative. Negative for photophobia, pain and visual disturbance. Respiratory: Negative. Negative for shortness of breath. Cardiovascular: Negative. Negative for palpitations. Gastrointestinal: Negative. Negative for nausea and vomiting. Endocrine: Negative. Negative for cold intolerance. Genitourinary: Negative. Negative for dysuria, frequency and urgency. Musculoskeletal:  Negative for back pain, gait problem, myalgias and neck pain. Skin: Negative. Negative for rash. Allergic/Immunologic: Negative. Neurological:  Positive for headaches. Negative for dizziness, tremors, seizures, syncope, facial asymmetry, speech difficulty, weakness, light-headedness and numbness. Hematological: Negative. Does not bruise/bleed easily. Psychiatric/Behavioral: Negative. Negative for confusion, hallucinations and sleep disturbance. All other systems reviewed and are negative. I have spent 15 minutes with Patient and family today in which greater than 50% of this time was spent in counseling/coordination of care regarding Prognosis, Risks and benefits of tx options, Patient and family education, Importance of tx compliance, Impressions, Documenting in the medical record, and Obtaining or reviewing history  . I also spent 10 minutes non face to face for this patient the same day.      Activity Minutes   Precharting/reviewing 5   Patient care/counseling 15   Postcharting/care coordination 5       Author:  Pepper Triana DO 11/20/2023 9:10 AM

## 2023-11-20 NOTE — PATIENT INSTRUCTIONS
Headache Calendar  Please maintain a headache calendar  Consider using phone applications such as Migraine Deshaun or [place] Migraine Tracker     Headache/migraine treatment:   Acute medications (for immediate treatment of a headache): It is ok to take ibuprofen, acetaminophen or naproxen (Advil, Tylenol,  Aleve, Excedrin) if they help your headaches you should limit these to No more than 2-3 times a week to avoid medication overuse/rebound headaches. For your more moderate to severe migraines take this medication early  Imitrex (Sumatriptan) 50mg tabs - take one at the onset of headache. May repeat one time after 2 hours if pain has not resolved. (Max 2 a day and 10 a month)      Prescription preventive medications for headaches/migraines   (to take every day to help prevent headaches - not to take at the time of headache):  Emgality/Galcanezumab - 120mg injection monthly     Lifestyle Recommendations:  [x] SLEEP - Maintain a regular sleep schedule: Adults need at least 7-8 hours of uninterrupted a night. Maintain good sleep hygiene:  Going to bed and waking up at consistent times, avoiding excessive daytime naps, avoiding caffeinated beverages in the evening, avoid excessive stimulation in the evening and generally using bed primarily for sleeping. One hour before bedtime would recommend turning lights down lower, decreasing your activity (may read quietly, listen to music at a low volume). When you get into bed, should eliminate all technology (no texting, emailing, playing with your phone, iPad or tablet in bed). [x] HYDRATION - Maintain good hydration. Drink  2L of fluid a day (4 typical small water bottles)  [x] DIET - Maintain good nutrition. In particular don't skip meals and try and eat healthy balanced meals regularly. [x] TRIGGERS - Look for other triggers and avoid them: Limit caffeine to 1-2 cups a day or less.  Avoid dietary triggers that you have noticed bring on your headaches (this could include aged cheese, peanuts, MSG, aspartame and nitrates). [x] EXERCISE - physical exercise as we all know is good for you in many ways, and not only is good for your heart, but also is beneficial for your mental health, cognitive health and  chronic pain/headaches. I would encourage at the least 5 days of physical exercise weekly for at least 30 minutes. Education and Follow-up  [x] Please call with any questions or concerns. Of course if any new concerning symptoms go to the emergency department.   [x] Follow up in 8 months

## 2023-11-20 NOTE — PROGRESS NOTES
Review of Systems   Constitutional:  Negative for appetite change, fatigue and fever. HENT: Negative. Negative for hearing loss, tinnitus, trouble swallowing and voice change. Eyes: Negative. Negative for photophobia, pain and visual disturbance. Respiratory: Negative. Negative for shortness of breath. Cardiovascular: Negative. Negative for palpitations. Gastrointestinal: Negative. Negative for nausea and vomiting. Endocrine: Negative. Negative for cold intolerance. Genitourinary: Negative. Negative for dysuria, frequency and urgency. Musculoskeletal:  Negative for back pain, gait problem, myalgias and neck pain. Skin: Negative. Negative for rash. Allergic/Immunologic: Negative. Neurological:  Positive for headaches. Negative for dizziness, tremors, seizures, syncope, facial asymmetry, speech difficulty, weakness, light-headedness and numbness. Hematological: Negative. Does not bruise/bleed easily. Psychiatric/Behavioral: Negative. Negative for confusion, hallucinations and sleep disturbance. All other systems reviewed and are negative. Since your last visit are your headaches Improved    Any change to the headache type? no    What is your current headache frequency: every other day    Are you taking your current medications as prescribed?  yes    Do you have any side effects? no    How may days per week do you take an abortive medicine? 0/7

## 2023-11-21 ENCOUNTER — OFFICE VISIT (OUTPATIENT)
Dept: NEUROSURGERY | Facility: CLINIC | Age: 23
End: 2023-11-21
Payer: COMMERCIAL

## 2023-11-21 VITALS
OXYGEN SATURATION: 98 % | TEMPERATURE: 98.1 F | BODY MASS INDEX: 26.21 KG/M2 | DIASTOLIC BLOOD PRESSURE: 78 MMHG | HEART RATE: 65 BPM | HEIGHT: 67 IN | WEIGHT: 167 LBS | RESPIRATION RATE: 16 BRPM | SYSTOLIC BLOOD PRESSURE: 124 MMHG

## 2023-11-21 DIAGNOSIS — G83.22: ICD-10-CM

## 2023-11-21 DIAGNOSIS — S14.129D CENTRAL CORD SYNDROME, SUBSEQUENT ENCOUNTER (HCC): ICD-10-CM

## 2023-11-21 DIAGNOSIS — Z98.1 S/P CERVICAL SPINAL FUSION: Primary | ICD-10-CM

## 2023-11-21 DIAGNOSIS — S12.9XXA PSEUDOARTHROSIS OF CERVICAL SPINE (HCC): ICD-10-CM

## 2023-11-21 DIAGNOSIS — S12.400A C5 VERTEBRAL FRACTURE (HCC): ICD-10-CM

## 2023-11-21 PROCEDURE — 99215 OFFICE O/P EST HI 40 MIN: CPT | Performed by: NEUROLOGICAL SURGERY

## 2023-11-21 RX ORDER — CEFAZOLIN SODIUM 2 G/50ML
2000 SOLUTION INTRAVENOUS ONCE
OUTPATIENT
Start: 2023-11-21 | End: 2023-11-21

## 2023-11-21 NOTE — PROGRESS NOTES
Neurosurgery Office Note  Itz Lentz 24 y.o. 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 .. (Completed)              Discussion:  Patient was previously evaluated on 10/26/21 and 11/3/22. He returns with his mother Vida John. He is a 30-year-old male with h/o traumatic C5 teardrop fracture with diffuse cervical spinal cord edema and central cord syndrome, after slipping and falling in shower, s/p emergent posterior C4-6 decompression with C3-T1 fusion on 9/9/21. Preoperative symptoms including BUE weakness, fine motor dysfunction in bilateral hands, dyscoordination, and gait imbalance have significantly improved since surgery, particularly after aggressive PT/OT in rehab. Today, he reports several months of gradual worsening mechanical neck pain, despite PT, medications, and trigger point injections in rehab. He also still has paresthesias in bilateral fingertips (mostly in second digit) with hyperesthesias ("still very sensitive") but "not as bad as before surgery". He remains full strength throughout both arms (unable to move at all preop). No recent falls or trauma. Remains on Lyrica and Zanaflex. Not on AC/AP. Non-smoker. Incision healed well without erythema, edema, dehiscence, drainage, or underlying fluid collection.         XR on 11/20/23 showed interval progression of C5 kyphotic deformity with pull out of C3 screws, lucency around right C4 pedicle screw, and interval fracture of right T1 pedicle screw, malalignment exacerbated by flexion (vs extension). I explained XR findings and my concern for worsening deformity and instrumentation failure. At this time, I recommend revision surgery with anterior approach for C5 corpectomy with C4-6 fixation, other levels as intraoperatively indicated, and posterior revision fusion to C2 (including revision of right C4 pedicle screw and right T1 pedicle screw) and possibly T2, other levels as intraoperatively indicated, with neuronavigation and neuromonitoring. This will be performed in the following order: posterior approach first to remove bilateral rods, anterior approach for C5 corpectomy with C4-6 fixation (other levels as indicated), then posterior approach for revision of fusion with replacement of loose/fractured screws and extension to C2 and/or T2 (other levels as indicated). I discussed extensively with both AJ and his mom the goals of surgery, expected postoperative recovery, as well as potential risks and complications. He agrees to proceed, signing consent today. His mother also understands all this information and agrees to proceed. All questions and concerns were addressed during this visit. CHIEF COMPLAINT    Chief Complaint   Patient presents with    Follow-up       HISTORY    History of Present Illness     24y.o. year old male     HPI    See Discussion    REVIEW OF SYSTEMS    Review of Systems   Constitutional:  Positive for fatigue. Eyes:  Positive for visual disturbance (dbl vision with migranes). Gastrointestinal:  Negative for constipation, diarrhea, nausea and vomiting. Genitourinary:  Positive for decreased urine volume.         Difficulty starting stream    Musculoskeletal:  Positive for neck pain (SHOULDER TO NECK PAIN ESPECIALLY WHEN STANDING ) and neck stiffness (PT ON BRACE). XR 11/20/23    EMG scheduled 12/7/23 for bi lat hands  N/T neck to fingertips  Balance issues/gait difference  No recent falls    Neurological:  Positive for numbness (BILATERAL FINGERTIP PRESSURE ) and headaches (cervicagic ). Psychiatric/Behavioral:  Positive for sleep disturbance. The patient is nervous/anxious. All other systems reviewed and are negative. Meds/Allergies     Current Outpatient Medications   Medication Sig Dispense Refill    acetaminophen (TYLENOL) 500 mg tablet Take 500 mg by mouth every 6 (six) hours as needed for mild pain      buPROPion (WELLBUTRIN XL) 150 mg 24 hr tablet Take 150 mg by mouth daily      Galcanezumab-gnlm 120 MG/ML SOAJ Inject 120 mg under the skin every 30 (thirty) days 3 mL 4    SUMAtriptan (Imitrex) 50 mg tablet Take 1 tablet (50 mg total) by mouth once as needed for migraine Okay to repeat in 2 hours. Max 2 tablets in 24 hours. 10 tablet 6    tamsulosin (FLOMAX) 0.4 mg Take 0.4 mg by mouth daily at bedtime      tiZANidine (ZANAFLEX) 2 mg tablet Take 2 mg by mouth 3 (three) times a day Taking daily      Diclofenac Sodium (VOLTAREN) 1 % as needed (Patient not taking: Reported on 11/20/2023)      lifitegrast Marvin Narinder) 5 % op solution  (Patient not taking: Reported on 11/20/2023)      polyethylene glycol (MIRALAX) 17 g packet Take 17 g by mouth daily for 14 days (Patient taking differently: Take 17 g by mouth if needed) 238 g 0    pregabalin (LYRICA) 100 mg capsule Take 1 PO AM and 2 PO HS (Patient not taking: Reported on 11/20/2023) 90 capsule 2    propranolol (INDERAL LA) 120 mg 24 hr capsule Take 120 mg by mouth daily (Patient not taking: Reported on 11/20/2023)       No current facility-administered medications for this visit.        No Known Allergies    PAST HISTORY    Past Medical History:   Diagnosis Date    C5 cervical fracture ([address]) 09/09/2021    Cervicogenic headache     Depression     Post concussion syndrome     Tetraparesis ([address]) Weakness        Past Surgical History:   Procedure Laterality Date    DECOMPRESSION SPINE CERVICAL POSTERIOR Bilateral 2021    Procedure: C4-6 POSTERIOR CERVICAL SPINE DECOMPRESSION WITH C3-T1 FUSION  ;  Surgeon: Sherman Sierra MD;  Location: [facility];  Service: Neurosurgery       Social History     Tobacco Use    Smoking status: Never    Smokeless tobacco: Never   Vaping Use    Vaping Use: Never used   Substance Use Topics    Alcohol use: Never    Drug use: Never       Family History   Problem Relation Age of Onset    No Known Problems Mother     Heart disease Father     Heart attack Father     Diabetes Paternal Grandmother     Heart disease Paternal Grandfather     Stroke Paternal Grandfather             Heart disease Paternal Uncle          The following portions of the patient's history were reviewed in this encounter and updated as appropriate: Past medical, surgical, family, and social history, as well as medications, allergies, and review of systems. EXAM    Vitals:Pulse 65, temperature 98.1 °F (36.7 °C), temperature source Tympanic, resp. rate 16, height 5' 7" (1.702 m), weight 75.8 kg (167 lb), SpO2 98 %. ,Body mass index is 26.16 kg/m². Physical Exam  Vitals and nursing note reviewed. Constitutional:       Appearance: Normal appearance. He is normal weight. HENT:      Head: Normocephalic and atraumatic. Eyes:      Extraocular Movements: Extraocular movements intact and EOM normal.      Pupils: Pupils are equal, round, and reactive to light. Cardiovascular:      Rate and Rhythm: Normal rate and regular rhythm. Pulses: Normal pulses. Heart sounds: Normal heart sounds. Pulmonary:      Effort: Pulmonary effort is normal.      Breath sounds: Normal breath sounds. Abdominal:      General: Abdomen is flat. Palpations: Abdomen is soft. Musculoskeletal:         General: Normal range of motion. Cervical back: Normal range of motion.    Neurological:      General: No focal deficit present. Mental Status: He is alert and oriented to person, place, and time. Mental status is at baseline. GCS: GCS eye subscore is 4. GCS verbal subscore is 5. GCS motor subscore is 6. Sensory: Sensation is intact. Motor: Motor strength is normal.Motor function is intact. Coordination: Coordination is intact. Gait: Gait is intact. Deep Tendon Reflexes: Reflexes are normal and symmetric. Psychiatric:         Mood and Affect: Mood normal.         Speech: Speech normal.         Behavior: Behavior normal.         Neurologic Exam     Mental Status   Oriented to person, place, and time. Attention: normal.   Speech: speech is normal   Level of consciousness: alert    Cranial Nerves     CN II   Visual fields full to confrontation. Visual acuity: normal  Right visual field deficit: none  Left visual field deficit: none     CN III, IV, VI   Pupils are equal, round, and reactive to light. Extraocular motions are normal.   CN III: no CN III palsy  CN VI: no CN VI palsy  Nystagmus: none   Diplopia: none  Ophthalmoparesis: none  Upgaze: normal  Downgaze: normal  Conjugate gaze: present    CN V   Facial sensation intact. Right facial sensation deficit: none  Left facial sensation deficit: none    CN VII   Facial expression full, symmetric. Right facial weakness: none  Left facial weakness: none    CN VIII   CN VIII normal.     CN IX, X   CN IX normal.   CN X normal.   Palate: symmetric    CN XI   CN XI normal.   Right sternocleidomastoid strength: normal  Left sternocleidomastoid strength: normal  Right trapezius strength: normal  Left trapezius strength: normal    CN XII   CN XII normal.   Tongue deviation: none    Motor Exam   Muscle bulk: normal  Overall muscle tone: normal  Right arm pronator drift: absent  Left arm pronator drift: absent    Strength   Strength 5/5 throughout.      Sensory Exam   Persistent bilateral hand numbness/paresthesias since trauma Gait, Coordination, and Reflexes     Gait  Gait: normal    Reflexes   Reflexes 2+ except as noted. Prior incision healed      MEDICAL DECISION MAKING    Imaging Studies:     XR spine cervical complete 6+ vw flex/ext/obl    Result Date: 11/21/2023  Narrative: CERVICAL SPINE INDICATION:   G95.9: Disease of spinal cord, unspecified Z98.1: Arthrodesis status. COMPARISON: 11/2/2022 VIEWS:  XR SPINE CERVICAL COMPLETE 6+ VW FLEX /EXT /OBL Images: 7 FINDINGS: Stable moderate to marked compression fracture of C5, status post ORIF. The right T1 pedicle screw is fractured, a finding not apparent previously. There is still faint lucency around the right C4 pedicle screw. Stable reversal of the normal cervical lordosis at the C5 level. No new fractures. Narrowed C4-5 and C5-6 disc spaces as before. Other disc spaces appear normal. No evidence of dynamic instability seen with flexion or extension. The prevertebral soft tissues are within normal limits. Neuroforamina partly obscured by hardware. The lung apices are clear. Impression: Interval fracture of the right T1 pedicle screw. Stable lucency around the right C4 pedicle screw. Stable moderate to marked compression fracture of C5 with associated reversal of the normal cervical lordosis at this level. No new vertebral fractures. The study was marked in [facility] for immediate notification. Workstation performed: [id number]       I have personally reviewed pertinent reports. and I have personally reviewed pertinent films in [facility] Mortimer M.D.   Neurosurgeon

## 2024-01-27 ENCOUNTER — LAB REQUISITION (OUTPATIENT)
Dept: LAB | Facility: HOSPITAL | Age: 24
End: 2024-01-27
Payer: COMMERCIAL

## 2024-01-27 ENCOUNTER — APPOINTMENT (OUTPATIENT)
Dept: LAB | Facility: CLINIC | Age: 24
End: 2024-01-27
Payer: COMMERCIAL

## 2024-01-27 ENCOUNTER — OFFICE VISIT (OUTPATIENT)
Dept: LAB | Facility: CLINIC | Age: 24
End: 2024-01-27
Payer: COMMERCIAL

## 2024-01-27 DIAGNOSIS — N31.9 NEUROGENIC BLADDER: ICD-10-CM

## 2024-01-27 DIAGNOSIS — Z01.818 ENCOUNTER FOR OTHER PREPROCEDURAL EXAMINATION: ICD-10-CM

## 2024-01-27 DIAGNOSIS — Z01.818 PRE-PROCEDURAL EXAMINATION: ICD-10-CM

## 2024-01-27 DIAGNOSIS — S12.9XXA PSEUDOARTHROSIS OF CERVICAL SPINE (HCC): ICD-10-CM

## 2024-01-27 LAB
ABO GROUP BLD: NORMAL
ALBUMIN SERPL BCP-MCNC: 4.7 G/DL (ref 3.5–5)
ALP SERPL-CCNC: 74 U/L (ref 34–104)
ALT SERPL W P-5'-P-CCNC: 14 U/L (ref 7–52)
ANION GAP SERPL CALCULATED.3IONS-SCNC: 6 MMOL/L
APTT PPP: 31 SECONDS (ref 23–37)
AST SERPL W P-5'-P-CCNC: 14 U/L (ref 13–39)
BASOPHILS # BLD AUTO: 0.03 THOUSANDS/ÂΜL (ref 0–0.1)
BASOPHILS NFR BLD AUTO: 0 % (ref 0–1)
BILIRUB SERPL-MCNC: 0.64 MG/DL (ref 0.2–1)
BLD GP AB SCN SERPL QL: NEGATIVE
BUN SERPL-MCNC: 17 MG/DL (ref 5–25)
CALCIUM SERPL-MCNC: 9.4 MG/DL (ref 8.4–10.2)
CHLORIDE SERPL-SCNC: 102 MMOL/L (ref 96–108)
CO2 SERPL-SCNC: 29 MMOL/L (ref 21–32)
CREAT SERPL-MCNC: 0.86 MG/DL (ref 0.6–1.3)
EOSINOPHIL # BLD AUTO: 0.28 THOUSAND/ÂΜL (ref 0–0.61)
EOSINOPHIL NFR BLD AUTO: 4 % (ref 0–6)
ERYTHROCYTE [DISTWIDTH] IN BLOOD BY AUTOMATED COUNT: 12.8 % (ref 11.6–15.1)
EST. AVERAGE GLUCOSE BLD GHB EST-MCNC: 108 MG/DL
GFR SERPL CREATININE-BSD FRML MDRD: 121 ML/MIN/1.73SQ M
GLUCOSE P FAST SERPL-MCNC: 92 MG/DL (ref 65–99)
HBA1C MFR BLD: 5.4 %
HCT VFR BLD AUTO: 46.8 % (ref 36.5–49.3)
HGB BLD-MCNC: 15.7 G/DL (ref 12–17)
IMM GRANULOCYTES # BLD AUTO: 0.02 THOUSAND/UL (ref 0–0.2)
IMM GRANULOCYTES NFR BLD AUTO: 0 % (ref 0–2)
INR PPP: 0.96 (ref 0.84–1.19)
LYMPHOCYTES # BLD AUTO: 3.13 THOUSANDS/ÂΜL (ref 0.6–4.47)
LYMPHOCYTES NFR BLD AUTO: 42 % (ref 14–44)
MCH RBC QN AUTO: 30.3 PG (ref 26.8–34.3)
MCHC RBC AUTO-ENTMCNC: 33.5 G/DL (ref 31.4–37.4)
MCV RBC AUTO: 90 FL (ref 82–98)
MONOCYTES # BLD AUTO: 0.7 THOUSAND/ÂΜL (ref 0.17–1.22)
MONOCYTES NFR BLD AUTO: 10 % (ref 4–12)
NEUTROPHILS # BLD AUTO: 3.23 THOUSANDS/ÂΜL (ref 1.85–7.62)
NEUTS SEG NFR BLD AUTO: 44 % (ref 43–75)
NRBC BLD AUTO-RTO: 0 /100 WBCS
PLATELET # BLD AUTO: 113 THOUSANDS/UL (ref 149–390)
PLATELET BLD QL SMEAR: ABNORMAL
PMV BLD AUTO: 10.6 FL (ref 8.9–12.7)
POTASSIUM SERPL-SCNC: 3.3 MMOL/L (ref 3.5–5.3)
PROT SERPL-MCNC: 7.6 G/DL (ref 6.4–8.4)
PROTHROMBIN TIME: 13.4 SECONDS (ref 11.6–14.5)
RBC # BLD AUTO: 5.18 MILLION/UL (ref 3.88–5.62)
RBC MORPH BLD: NORMAL
RH BLD: POSITIVE
SODIUM SERPL-SCNC: 137 MMOL/L (ref 135–147)
SPECIMEN EXPIRATION DATE: NORMAL
WBC # BLD AUTO: 7.39 THOUSAND/UL (ref 4.31–10.16)

## 2024-01-27 PROCEDURE — 85610 PROTHROMBIN TIME: CPT

## 2024-01-27 PROCEDURE — 83036 HEMOGLOBIN GLYCOSYLATED A1C: CPT

## 2024-01-27 PROCEDURE — 80053 COMPREHEN METABOLIC PANEL: CPT

## 2024-01-27 PROCEDURE — 85025 COMPLETE CBC W/AUTO DIFF WBC: CPT

## 2024-01-27 PROCEDURE — 93005 ELECTROCARDIOGRAM TRACING: CPT

## 2024-01-27 PROCEDURE — 86900 BLOOD TYPING SEROLOGIC ABO: CPT | Performed by: NEUROLOGICAL SURGERY

## 2024-01-27 PROCEDURE — 86901 BLOOD TYPING SEROLOGIC RH(D): CPT | Performed by: NEUROLOGICAL SURGERY

## 2024-01-27 PROCEDURE — 85730 THROMBOPLASTIN TIME PARTIAL: CPT

## 2024-01-27 PROCEDURE — 87081 CULTURE SCREEN ONLY: CPT

## 2024-01-27 PROCEDURE — 86850 RBC ANTIBODY SCREEN: CPT | Performed by: NEUROLOGICAL SURGERY

## 2024-01-27 PROCEDURE — 36415 COLL VENOUS BLD VENIPUNCTURE: CPT

## 2024-01-28 ENCOUNTER — APPOINTMENT (OUTPATIENT)
Dept: LAB | Facility: CLINIC | Age: 24
End: 2024-01-28
Payer: COMMERCIAL

## 2024-01-28 LAB — MRSA NOSE QL CULT: NORMAL

## 2024-01-29 LAB
ATRIAL RATE: 52 BPM
P AXIS: 32 DEGREES
PR INTERVAL: 148 MS
QRS AXIS: 84 DEGREES
QRSD INTERVAL: 118 MS
QT INTERVAL: 432 MS
QTC INTERVAL: 401 MS
T WAVE AXIS: 62 DEGREES
VENTRICULAR RATE: 52 BPM

## 2024-01-30 ENCOUNTER — ANESTHESIA EVENT (OUTPATIENT)
Dept: PERIOP | Facility: HOSPITAL | Age: 24
End: 2024-01-30
Payer: COMMERCIAL

## 2024-01-30 ENCOUNTER — TELEPHONE (OUTPATIENT)
Dept: HEMATOLOGY ONCOLOGY | Facility: CLINIC | Age: 24
End: 2024-01-30

## 2024-01-30 DIAGNOSIS — Z00.8 ENCOUNTER FOR MEDICAL CLEARANCE FOR PATIENT HOLD: ICD-10-CM

## 2024-01-30 DIAGNOSIS — D69.6 THROMBOCYTOPENIA (HCC): Primary | ICD-10-CM

## 2024-01-30 NOTE — TELEPHONE ENCOUNTER
Shalini calling in regards to patient's upcoming surgery on 2/7/24 and patient needing to get preoperative clearance.  Patient has chronic low platelet count.  Shalini states the referral is being entered and will be entered as a Stat referral.

## 2024-01-30 NOTE — PRE-PROCEDURE INSTRUCTIONS
Pre-Surgery Instructions:   Medication Instructions    acetaminophen (TYLENOL) 500 mg tablet Uses PRN- OK to take day of surgery      Medication instructions for day surgery reviewed. Please use only a sip of water to take your instructed medications. Avoid all over the counter vitamins, supplements and NSAIDS for one week prior to surgery per anesthesia guidelines. Tylenol is ok to take as needed.     You will receive a call one business day prior to surgery with an arrival time and hospital directions. If your surgery is scheduled on a Monday, the hospital will be calling you on the Friday prior to your surgery. If you have not heard from anyone by 8pm, please call the hospital supervisor through the hospital  at [phone]. ([place] [phone]).    Do not eat or drink anything after midnight the night before your surgery, including candy, mints, lifesavers, or chewing gum. Do not drink alcohol 24hrs before your surgery. Try not to smoke at least 24hrs before your surgery.       Follow the pre surgery showering instructions as listed in the “My Surgical Experience Booklet” or otherwise provided by your surgeon's office. Do not use a blade to shave the surgical area 1 week before surgery. It is okay to use a clean electric clippers up to 24 hours before surgery. Do not apply any lotions, creams, including makeup, cologne, deodorant, or perfumes after showering on the day of your surgery. Do not use dry shampoo, hair spray, hair gel, or any type of hair products.     No contact lenses, eye make-up, or artificial eyelashes. Remove nail polish, including gel polish, and any artificial, gel, or acrylic nails if possible. Remove all jewelry including rings and body piercing jewelry.     Wear causal clothing that is easy to take on and off. Consider your type of surgery.    Keep any valuables, jewelry, piercings at home. Please bring any specially ordered equipment (sling, braces) if indicated.    Arrange  for a responsible person to drive you to and from the hospital on the day of your surgery. Visitor Guidelines discussed.     Call the surgeon's office with any new illnesses, exposures, or additional questions prior to surgery.    Please reference your “My Surgical Experience Booklet” for additional information to prepare for your upcoming surgery.

## 2024-02-02 ENCOUNTER — CONSULT (OUTPATIENT)
Dept: HEMATOLOGY ONCOLOGY | Facility: CLINIC | Age: 24
End: 2024-02-02
Payer: COMMERCIAL

## 2024-02-02 ENCOUNTER — APPOINTMENT (OUTPATIENT)
Dept: LAB | Facility: CLINIC | Age: 24
End: 2024-02-02
Payer: COMMERCIAL

## 2024-02-02 VITALS
SYSTOLIC BLOOD PRESSURE: 120 MMHG | HEIGHT: 67 IN | HEART RATE: 54 BPM | TEMPERATURE: 97.9 F | WEIGHT: 158 LBS | BODY MASS INDEX: 24.8 KG/M2 | RESPIRATION RATE: 17 BRPM | DIASTOLIC BLOOD PRESSURE: 72 MMHG | OXYGEN SATURATION: 99 %

## 2024-02-02 DIAGNOSIS — Z00.8 ENCOUNTER FOR MEDICAL CLEARANCE FOR PATIENT HOLD: ICD-10-CM

## 2024-02-02 DIAGNOSIS — Z01.818 PRE-PROCEDURAL EXAMINATION: ICD-10-CM

## 2024-02-02 DIAGNOSIS — D69.6 THROMBOCYTOPENIA (HCC): Primary | ICD-10-CM

## 2024-02-02 DIAGNOSIS — D69.6 THROMBOCYTOPENIA (HCC): ICD-10-CM

## 2024-02-02 DIAGNOSIS — E87.6 HYPOKALEMIA: ICD-10-CM

## 2024-02-02 LAB
ANA SER QL IA: NEGATIVE
ERYTHROCYTE [SEDIMENTATION RATE] IN BLOOD: 16 MM/HOUR (ref 0–14)
FIBRINOGEN PPP-MCNC: 328 MG/DL (ref 207–520)

## 2024-02-02 PROCEDURE — 99244 OFF/OP CNSLTJ NEW/EST MOD 40: CPT | Performed by: INTERNAL MEDICINE

## 2024-02-02 PROCEDURE — 36415 COLL VENOUS BLD VENIPUNCTURE: CPT

## 2024-02-02 PROCEDURE — 85652 RBC SED RATE AUTOMATED: CPT

## 2024-02-02 PROCEDURE — 85384 FIBRINOGEN ACTIVITY: CPT

## 2024-02-02 PROCEDURE — 86038 ANTINUCLEAR ANTIBODIES: CPT

## 2024-02-02 RX ORDER — POTASSIUM CHLORIDE 750 MG/1
10 TABLET, EXTENDED RELEASE ORAL 3 TIMES DAILY
Qty: 10 TABLET | Refills: 0 | Status: SHIPPED | OUTPATIENT
Start: 2024-02-02

## 2024-02-04 NOTE — PROGRESS NOTES
Consultation - Medical Oncology   Ramin Sadler 23 y.o. male MRN: [id number]  Unit/Bed#: ? Encounter: [id number]  Referring physician: Dr. Funk  Date of service: 2/2/2024  Reason for Consult: Thrombocytopenia  HPI: Ramin Sadler is a 23 y.o. year old male.  Patient is here with his mother.  He has chronic thrombocytopenia at least since 2021.  Platelet counts never dropped below 100,000.  No problem with WBC and hemoglobin.  In 2021 patient fell and fractured cervical spine and that required neck surgery.  Patient still has some neck pain, weakness and numbness in both arms left more than right.  Migraines since that accident.  No history of bleeding or bruising spontaneously and otherwise.  He did not have excessive bleeding when he had neck surgery in 2021 and he had thrombocytopenia even then.  No family history of bleeding disorder.  There is history of heart disease and diabetes in the family.    ROS:  02/04/24 Reviewed 12 systems: See symptoms in HPI  Presently no other neurological, cardiac, pulmonary, GI and  symptoms other than listed in HPI.  Other symptoms are in HPI.  No  fever, chills, bleeding,  skin rash, weight loss, night sweats,   claudication and gait problem. No frequent infections.  Not unusually sensitive to heat or cold. No swelling of the ankles. No swollen glands.  Patient is anxious.       Historical Information   Past Medical History:   Diagnosis Date   • C5 cervical fracture (HCC) 09/09/2021   • Cervicogenic headache    • Depression    • HL (hearing loss)    • Migraine    • Post concussion syndrome    • Tetraparesis (HCC)    • Weakness      Past Surgical History:   Procedure Laterality Date   • DECOMPRESSION SPINE CERVICAL POSTERIOR Bilateral 9/9/2021    Procedure: C4-6 POSTERIOR CERVICAL SPINE DECOMPRESSION WITH C3-T1 FUSION  ;  Surgeon: Christoph Camacho MD;  Location: [facility];  Service: Neurosurgery     Social History   Social History     Substance and Sexual Activity    Alcohol Use Never     Social History     Substance and Sexual Activity   Drug Use Never     Social History     Tobacco Use   Smoking Status Never   Smokeless Tobacco Never     Family History:   Family History   Problem Relation Age of Onset   • No Known Problems Mother    • Heart disease Father    • Heart attack Father    • Diabetes Paternal Grandmother    • Heart disease Paternal Grandfather    • Stroke Paternal Grandfather            • Heart disease Paternal Uncle          Current Outpatient Medications:   •  acetaminophen (TYLENOL) 500 mg tablet, Take 500 mg by mouth every 6 (six) hours as needed for mild pain, Disp: , Rfl:   •  Galcanezumab-gnlm 120 MG/ML SOAJ, Inject 120 mg under the skin every 30 (thirty) days, Disp: 3 mL, Rfl: 4  •  potassium chloride (Klor-Con M10) 10 mEq tablet, Take 1 tablet (10 mEq total) by mouth 3 (three) times a day, Disp: 10 tablet, Rfl: 0  •  buPROPion (WELLBUTRIN XL) 150 mg 24 hr tablet, Take 150 mg by mouth daily (Patient not taking: Reported on 2024), Disp: , Rfl:   •  Diclofenac Sodium (VOLTAREN) 1 %, as needed (Patient not taking: Reported on 2023), Disp: , Rfl:   •  lifitegrast (Xiidra) 5 % op solution, , Disp: , Rfl:   •  polyethylene glycol (MIRALAX) 17 g packet, Take 17 g by mouth daily for 14 days (Patient taking differently: Take 17 g by mouth if needed), Disp: 238 g, Rfl: 0  •  pregabalin (LYRICA) 100 mg capsule, Take 1 PO AM and 2 PO HS (Patient not taking: Reported on 2023), Disp: 90 capsule, Rfl: 2  •  propranolol (INDERAL LA) 120 mg 24 hr capsule, Take 120 mg by mouth daily (Patient not taking: Reported on 2023), Disp: , Rfl:   •  SUMAtriptan (Imitrex) 50 mg tablet, Take 1 tablet (50 mg total) by mouth once as needed for migraine Okay to repeat in 2 hours. Max 2 tablets in 24 hours. (Patient not taking: Reported on 2024), Disp: 10 tablet, Rfl: 6  •  tamsulosin (FLOMAX) 0.4 mg, Take 0.4 mg by mouth daily at bedtime (Patient not  "taking: Reported on 1/30/2024), Disp: , Rfl:   •  tiZANidine (ZANAFLEX) 2 mg tablet, Take 2 mg by mouth 3 (three) times a day Taking daily (Patient not taking: Reported on 1/30/2024), Disp: , Rfl:     No Known Allergies  @ ROS@  Physical Exam:  Vitals:    02/02/24 1313   BP: 120/72   BP Location: Left arm   Patient Position: Sitting   Cuff Size: Adult   Pulse: (!) 54   Resp: 17   Temp: 97.9 °F (36.6 °C)   SpO2: 99%   Weight: 71.7 kg (158 lb)   Height: 5' 7\" (1.702 m)     Alert, oriented, not in distress, vitals are above, no icterus, no oral thrush, no palpable neck mass, clear lung fields, regular heart rate, abdomen  soft and non tender, no palpable abdominal mass, no ascites, no edema of ankles, no calf tenderness, some weakness in left hand  compared to right, no skin rash, no palpable lymphadenopathy in the neck and axillary areas,  no clubbing.   Patient is anxious.  Performance status 1.      Lab Results: I have reviewed all pertinent labs.  LABS:    Results for orders placed or performed in visit on 01/27/24   Type and screen   Result Value Ref Range    ABO Grouping A     Rh Factor Positive     Antibody Screen Negative     Specimen Expiration Date 20240224    CBC and differential  Status: Final result      Contains abnormal data CBC and differential  Order: [id number]  Status: Final result       Visible to patient: Yes (seen)       Next appt: 02/21/2024 at 11:30 AM in Neurosurgery (Kartik Varghese PA-C)       Dx: Pre-procedural examination; Pseudoart...    0 Result Notes             Component  Ref Range & Units 1/27/24 10:08 AM 12/27/22  1:59 PM 9/14/21  3:31 PM 9/11/21  5:10 AM 9/10/21  4:51 AM 9/9/21 10:53 PM 9/9/21 11:59 AM 9/9/21 11:59 AM   WBC  4.31 - 10.16 Thousand/uL 7.39 5.15  8.62 9.31 5.58  5.35   RBC  3.88 - 5.62 Million/uL 5.18 5.37  4.21 CM 4.37 4.74  5.06   Hemoglobin  12.0 - 17.0 g/dL 15.7 16.3  12.8 13.1 14.3  15.1   Hematocrit  36.5 - 49.3 % 46.8 48.9  38.2 39.0 42.5  44.4   MCV  82 - 98 fL " 90 91  91 89 90  88   MCH  26.8 - 34.3 pg 30.3 30.4  30.4 30.0 30.2  29.8   MCHC  31.4 - 37.4 g/dL 33.5 33.3  33.5 33.6 33.6  34.0   RDW  11.6 - 15.1 % 12.8 12.8  13.1 12.7 12.6  12.6   MPV  8.9 - 12.7 fL 10.6 10.8 10.3 10.7 10.7 10.5  10.7   Platelets  149 - 390 Thousands/uL 113 Low  125 Low  142 Low  108 Low   Low  119 Low   118 Low    Comment: Manual Review of Smear Performed   nRBC  /100 WBCs 0 0   0   0   Neutrophils Relative  43 - 75 % 44 35 Low    88 High       Immat GRANS %  0 - 2 % 0 0   1      Lymphocytes Relative  14 - 44 % 42 43   8 Low       Monocytes Relative  4 - 12 % 10 11   3 Low       Eosinophils Relative  0 - 6 % 4 10 High    0  4    Basophils Relative  0 - 1 % 0 1   0  0    Neutrophils Absolute  1.85 - 7.62 Thousands/µL 3.23 1.82 Low    8.27 High       Immature Grans Absolute  0.00 - 0.20 Thousand/uL 0.02 0.02   0.05      Lymphocytes Absolute  0.60 - 4.47 Thousands/µL 3.13 2.20   0.73      Monocytes Absolute  0.17 - 1.22 Thousand/µL 0.70 0.56   0.26      Eosinophils Absolute  0.00 - 0.61 Thousand/µL 0.28 0.49   0.00  0.21 R    Basophils Absolute  0.00 - 0.10 Thousands/µL 0.03 0.06   0.00  0.00 R    Segmented %       59 R    Total Counted           RBC Morphology       Present    Polychromasia       Present    Platelet Estimate       Decreased Abnormal  R    Artifact       Present    Bands %       2 R    Lymphocytes %       29 R    Monocytes %       4 R    Promyelocytes %       1 High  R    Blasts %       1 High  R    Absolute Neutrophils       3.26 R    Lymphocytes Absolute       1.55 R    Monocytes Absolute       0.21 R               Narrative    This is an appended report.  These results have been appended to a previously verified report.      Specimen Collected: 01/27/24 10:08 AM Last Resulted: 01/27/24 11:36 AM        Lab Flowsheet        Order Details        View Encounter        Lab and Collection Details        Routing        Result History     View All Conversations on this  Encounter        CM=Additional comments  R=Reference range differs from displayed range        Result Care Coordination      Patient Communication     Add Comments   Seen Back to Top          Contains abnormal data Smear Review(Phlebs Do Not Order)  Order: [id number] - Reflex for Order [id number]  Status: Final result         Visible to patient: Yes (seen)         Next appt: 02/21/2024 at 11:30 AM in Neurosurgery (Kartik Varghese PA-C)         Dx: Pre-procedural examination; Pseudoart...      0 Result Notes           Component  Ref Range & Units 1/27/24 10:08 AM 9/9/21 11:59 AM   RBC Morphology Normal Present   Platelet Estimate  Adequate Borderline Abnormal  Decreased Abnormal               Specimen Collected: 01/27/24 10:08 AM Last Resulted: 01/27/24 11:36 AM        Lab Flowsheet          Order Details          View Encounter          Lab and Collection Details          Routing          Result History       View All Conversations on this Encounter           Result Care Coordination      Patient Communication     Add Comments   Seen Back to Top           Ordered On 12/4/2023  3:04 PM    Ordering Provider Authorizing Provider Ordering User Ordering Department   MD Christoph Melendez MD Sherry Bonner, MA  [facility] [place]   Neurosurgery Neurosurgery Neurosurgery Neurosurgery    [phone]  [phone]   [phone]     Other Results from 1/27/2024       Contains abnormal data Comprehensive metabolic panel  Order: [id number]  Status: Final result         Visible to patient: Yes (not seen)         Next appt: 02/21/2024 at 11:30 AM in Neurosurgery (Kartik Varghese PA-C)         Dx: Pre-procedural examination; Pseudoart...      0 Result Notes               Component  Ref Range & Units 1/27/24 10:08 AM 12/27/22  1:59 PM 9/11/21  5:10 AM 9/10/21  4:51 AM 9/9/21 10:53 PM 9/9/21 11:59 AM   Sodium  135 - 147 mmol/L 137 139 140 R 138 R 140 R 138 R   Potassium  3.5 - 5.3 mmol/L 3.3 Low  4.8 3.7 3.9 4.0 4.0    Chloride  96 - 108 mmol/L 102 104 107 R 109 High  R 112 High  R 109 High  R   CO2  21 - 32 mmol/L 29 30 29 24 23 23   ANION GAP  mmol/L 6 5 R 4 R 5 R 5 R 6 R   BUN  5 - 25 mg/dL 17 17 15 12 13 15   Creatinine  0.60 - 1.30 mg/dL 0.86 0.81 CM 0.69 CM 0.59 Low  CM 0.78 CM 0.71 CM   Comment: Standardized to IDMS reference method   Glucose, Fasting  65 - 99 mg/dL 92 93 CM       Calcium  8.4 - 10.2 mg/dL 9.4 9.3 8.5 R 8.3 R 8.8 R 9.1 R   AST  13 - 39 U/L 14 17 CM       ALT  7 - 52 U/L 14 18 CM       Comment: Specimen collection should occur prior to Sulfasalazine administration due to the potential for falsely depressed results.   Alkaline Phosphatase  34 - 104 U/L 74 80       Total Protein  6.4 - 8.4 g/dL 7.6 7.7       Albumin  3.5 - 5.0 g/dL 4.7 4.7       Total Bilirubin  0.20 - 1.00 mg/dL 0.64 0.28       Comment: Use of this assay is not recommended for patients undergoing treatment with eltrombopag due to the potential for falsely elevated results.  N-acetyl-p-benzoquinone imine (metabolite of Acetaminophen) will generate erroneously low results in samples for patients that have taken an overdose of Acetaminophen.   eGFR  ml/min/1.73sq m 121 125 135 144 128 133                Narrative    National Kidney [employer] Foundation guidelines for Chronic Kidney Disease (CKD):  •  Stage 1 with normal or high GFR (GFR > 90 mL/min/1.73 square meters)  •  Stage 2 Mild CKD (GFR = 60-89 mL/min/1.73 square meters)  •  Stage 3A Moderate CKD (GFR = 45-59 mL/min/1.73 square meters)  •  Stage 3B Moderate CKD (GFR = 30-44 mL/min/1.73 square meters)  •  Stage 4 Severe CKD (GFR = 15-29 mL/min/1.73 square meters)  •  Stage 5 End Stage CKD (GFR <15 mL/min/1.73 square meters)  Note: GFR calculation is accurate only with a steady state creatinine      Specimen Collected: 01/27/24 10:08 AM Last Resulted: 01/27/24 10:54 AM        Lab Flowsheet          Order Details          View Encounter          Lab and Collection Details          Routing           Result History       View All Conversations on this Encounter        CM=Additional comments  R=Reference range differs from displayed range          Result Care Coordination        Patient Communication     Add Comments   Add Notifications  Back to Top           Ordered On 12/4/2023  3:04 PM    Ordering Provider Authorizing Provider Ordering User Ordering Department   MD Christoph Melendez MD Sherry Bonner, MA [facility] [place]   Neurosurgery Neurosurgery Neurosurgery Neurosurgery    [phone]  [phone]   [phone]          APTT  Order: [id number]  Status: Final result         Visible to patient: Yes (seen)         Next appt: 02/21/2024 at 11:30 AM in Neurosurgery (Kartik Varghese PA-C)         Dx: Pre-procedural examination; Pseudoart...      0 Result Notes           Component  Ref Range & Units 1/27/24 10:08 AM 9/9/21  2:29 PM   PTT  23 - 37 seconds 31 29 CM   Comment: Therapeutic Heparin Range =  60-90 seconds              Specimen Collected: 01/27/24 10:08 AM Last Resulted: 01/27/24 10:39 AM        Lab Flowsheet          Order Details          View Encounter          Lab and Collection Details          Routing          Result History       View All Conversations on this Encounter        CM=Additional comments          Related Results     Protime-INR Final result 1/27/2024                         Result Care Coordination        Patient Communication     Add Comments   Seen Back to Top           Ordered On 12/4/2023  3:04 PM    Ordering Provider Authorizing Provider Ordering User Ordering Department   MD Christoph Melendez [facility]   [facility] Neurosurgery Neurosurgery Neurosurgery    [phone]  [phone]   [phone]          Protime-INR  Order: [id number]  Status: Final result         Visible to patient: Yes (seen)         Next appt: 02/21/2024 at 11:30 AM in Neurosurgery (Kartik Varghese PA-C)         Dx: Pre-procedural  examination; Pseudoart...      0 Result Notes             Component  Ref Range & Units 1/27/24 10:08 AM 9/10/21  4:51 AM 9/9/21 10:53 PM 9/9/21  2:29 PM   Protime  11.6 - 14.5 seconds 13.4 14.5 14.0 13.8   INR  0.84 - 1.19 0.96 1.18 1.12 1.10              Specimen Collected: 01/27/24 10:08 AM Last Resulted: 01/27/24 10:39 AM        Lab Flowsheet          Order Details          View Encounter          Lab and Collection Details          Routing          Result History       View All Conversations on this Encounter             Related Results     APTT Final result 1/27/2024                         Result Care Coordination        Patient Communication     Add Comments   Seen Back to Top           Ordered On 12/4/2023  3:04 PM    Ordering Provider Authorizing Provider Ordering User Ordering Department   MD Christoph Melendez MD Sherry Bonner, MA [facility] [place]   Neurosurgery Neurosurgery Neurosurgery Neurosurgery    [phone]  [phone]   [phone]        HEMOGLOBIN A1C W/ EAG ESTIMATION  Order: [id number]  Status: Final result         Visible to patient: Yes (seen)         Next appt: 02/21/2024 at 11:30 AM in Neurosurgery (Kartik Varghese PA-C)         Dx: Pre-procedural examination; Pseudoart...      0 Result Notes          Component  Ref Range & Units 1/27/24 10:08 AM   Hemoglobin A1C  Normal 4.0-5.6%; PreDiabetic 5.7-6.4%; Diabetic >=6.5%; Glycemic control for adults with diabetes <7.0% % 5.4   EAG  mg/dl 108              Specimen Collected: 01/27/24 10:08 AM Last Resulted: 01/27/24  4:01 PM        Lab Flowsheet          Order Details          View Encounter          Lab and Collection Details          Routing          Result History       View All Conversations on this Encounter             Result Care Coordination        Patient Communication     Add Comments   Seen Back to Top           Ordered On 12/4/2023  3:04 PM    Ordering Provider Authorizing Provider Ordering User Ordering  Department   MD Christoph Melendez MD Sherry Bonner, MA  [facility]   Neurosurgery Neurosurgery Neurosurgery Neurosurgery    [phone]  [phone]   [phone]          MRSA culture  Order: [id number]  Status: Final result         Visible to patient: Yes (seen)         Next appt: 02/21/2024 at 11:30 AM in Neurosurgery (Kartik Varghese PA-C)         Dx: Pre-procedural examination; Pseudoart...      Specimen Information: Nose; Nares   0 Result Notes      MRSA Culture Only No Methicillin Resistant Staphlyococcus aureus (MRSA) isolated                 Specimen Collected: 01/27/24 10:08 AM Last Resulted: 01/28/24 11:55 PM       Order Details          View Encounter          Lab and Collection Details          Routing          Result History       View All Conversations on this Encounter             Result Care Coordination        Patient Communication     Add Comments   Seen Back to Top           Ordered On 12/4/2023  3:04 PM    Ordering Provider Authorizing Provider Ordering User Ordering Department   MD Christoph Melendez MD Sherry Bonner, MA  [facility]   Neurosurgery Neurosurgery Neurosurgery Neurosurgery    [phone]  [phone]   [phone]     Result Narrative    This is an appended report.  These results have been appended to a previously verified report.  Imaging    CBC and differential (Order: [id number]) - 1/27/2024  Result History    CBC and differential (Order #[id number]) on 1/27/2024 - Order Result History Report  Lab Order Result Printout    CBC and differential (Order #[id number]) on 1/27/24     External Results Report    Open External Results Report     Comments    This is a patient instruction: This test is non-fasting. Please drink two glasses of water morning of bloodwork.            Lab Component SmartPhrase Guide    CBC and differential (Order #[id number]) on 1/27/24       Specimen Description:             CBC and differential (Order  [id number])      1/27/2024 11:36 AM      Component Value Flag Ref Range Units Status   WBC 7.39  4.31 - 10.16 Thousand/uL Final   RBC 5.18  3.88 - 5.62 Million/uL Final   Hemoglobin 15.7  12.0 - 17.0 g/dL Final   Hematocrit 46.8  36.5 - 49.3 % Final   MCV 90  82 - 98 fL Final   MCH 30.3  26.8 - 34.3 pg Final   MCHC 33.5  31.4 - 37.4 g/dL Final   RDW 12.8  11.6 - 15.1 % Final   MPV 10.6  8.9 - 12.7 fL Final   Platelets 113  Low  149 - 390 Thousands/uL Final   Comment:   Manual Review of Smear Performed   nRBC 0   /100 WBCs Final   Neutrophils Relative 44  43 - 75 % Final   Immat GRANS % 0  0 - 2 % Final   Lymphocytes Relative 42  14 - 44 % Final   Monocytes Relative 10  4 - 12 % Final   Eosinophils Relative 4  0 - 6 % Final   Basophils Relative 0  0 - 1 % Final   Neutrophils Absolute 3.23  1.85 - 7.62 Thousands/µL Final   Immature Grans Absolute 0.02  0.00 - 0.20 Thousand/uL Final   Lymphocytes Absolute 3.13  0.60 - 4.47 Thousands/µL Final   Monocytes Absolute 0.70  0.17 - 1.22 Thousand/µL Final   Eosinophils Absolute 0.28  0.00 - 0.61 Thousand/µL Final   Basophils Absolute 0.03  0.00 - 0.10 Thousands/µL Final       Narrative    This is an appended report.  These results have been appended to a previously verified report.       Smear Review(Phlebs Do Not Order) (Order [id number]) - Reflex for Order [id number]      1/27/2024 11:36 AM       CBC and differential  Status: Final result      Contains abnormal data CBC and differential  Order: [id number]  Status: Final result       Visible to patient: Yes (seen)       Next appt: 02/21/2024 at 11:30 AM in Neurosurgery (Kartik Varghese PA-C)       Dx: Pre-procedural examination; Pseudoart...    0 Result Notes             Component  Ref Range & Units 1/27/24 10:08 AM 12/27/22  1:59 PM 9/14/21  3:31 PM 9/11/21  5:10 AM 9/10/21  4:51 AM 9/9/21 10:53 PM 9/9/21 11:59 AM 9/9/21 11:59 AM   WBC  4.31 - 10.16 Thousand/uL 7.39 5.15  8.62 9.31 5.58  5.35   RBC  3.88 - 5.62 Million/uL 5.18  5.37  4.21 CM 4.37 4.74  5.06   Hemoglobin  12.0 - 17.0 g/dL 15.7 16.3  12.8 13.1 14.3  15.1   Hematocrit  36.5 - 49.3 % 46.8 48.9  38.2 39.0 42.5  44.4   MCV  82 - 98 fL 90 91  91 89 90  88   MCH  26.8 - 34.3 pg 30.3 30.4  30.4 30.0 30.2  29.8   MCHC  31.4 - 37.4 g/dL 33.5 33.3  33.5 33.6 33.6  34.0   RDW  11.6 - 15.1 % 12.8 12.8  13.1 12.7 12.6  12.6   MPV  8.9 - 12.7 fL 10.6 10.8 10.3 10.7 10.7 10.5  10.7   Platelets  149 - 390 Thousands/uL 113 Low  125 Low  142 Low  108 Low   Low  119 Low   118 Low    Comment: Manual Review of Smear Performed   nRBC  /100 WBCs 0 0   0   0   Neutrophils Relative  43 - 75 % 44 35 Low    88 High       Immat GRANS %  0 - 2 % 0 0   1      Lymphocytes Relative  14 - 44 % 42 43   8 Low       Monocytes Relative  4 - 12 % 10 11   3 Low       Eosinophils Relative  0 - 6 % 4 10 High    0  4    Basophils Relative  0 - 1 % 0 1   0  0    Neutrophils Absolute  1.85 - 7.62 Thousands/µL 3.23 1.82 Low    8.27 High       Immature Grans Absolute  0.00 - 0.20 Thousand/uL 0.02 0.02   0.05      Lymphocytes Absolute  0.60 - 4.47 Thousands/µL 3.13 2.20   0.73      Monocytes Absolute  0.17 - 1.22 Thousand/µL 0.70 0.56   0.26      Eosinophils Absolute  0.00 - 0.61 Thousand/µL 0.28 0.49   0.00  0.21 R    Basophils Absolute  0.00 - 0.10 Thousands/µL 0.03 0.06   0.00  0.00 R    Segmented %       59 R    Total Counted           RBC Morphology       Present    Polychromasia       Present    Platelet Estimate       Decreased Abnormal  R    Artifact       Present    Bands %       2 R    Lymphocytes %       29 R    Monocytes %       4 R    Promyelocytes %       1 High  R    Blasts %       1 High  R    Absolute Neutrophils       3.26 R    Lymphocytes Absolute       1.55 R    Monocytes Absolute       0.21 R               Narrative    This is an appended report.  These results have been appended to a previously verified report.      Specimen Collected: 01/27/24 10:08 AM Last Resulted: 01/27/24 11:36 AM         Lab Flowsheet        Order Details        View Encounter        Lab and Collection Details        Routing        Result History     View All Conversations on this Encounter        CM=Additional comments  R=Reference range differs from displayed range        Result Care Coordination      Patient Communication     Add Comments   Seen Back to Top          Contains abnormal data Smear Review(Phlebs Do Not Order)  Order: [id number] - Reflex for Order [id number]  Status: Final result         Visible to patient: Yes (seen)         Next appt: 02/21/2024 at 11:30 AM in Neurosurgery (Kartik Varghese PA-C)         Dx: Pre-procedural examination; Pseudoart...      0 Result Notes           Component  Ref Range & Units 1/27/24 10:08 AM 9/9/21 11:59 AM   RBC Morphology Normal Present   Platelet Estimate  Adequate Borderline Abnormal  Decreased Abnormal               Specimen Collected: 01/27/24 10:08 AM Last Resulted: 01/27/24 11:36 AM        Lab Flowsheet          Order Details          View Encounter          Lab and Collection Details          Routing          Result History       View All Conversations on this Encounter           Result Care Coordination      Patient Communication     Add Comments   Seen Back to Top           Ordered On 12/4/2023  3:04 PM    Ordering Provider Authorizing Provider Ordering User Ordering Department   MD Christoph Melendez MD Sherry Bonner, MA  [facility] [place]   Neurosurgery Neurosurgery Neurosurgery Neurosurgery    [phone]  [phone]   [phone]     Other Results from 1/27/2024       Contains abnormal data Comprehensive metabolic panel  Order: [id number]  Status: Final result         Visible to patient: Yes (not seen)         Next appt: 02/21/2024 at 11:30 AM in Neurosurgery (Kartik Varghese PA-C)         Dx: Pre-procedural examination; Pseudoart...      0 Result Notes               Component  Ref Range & Units 1/27/24 10:08 AM 12/27/22  1:59 PM 9/11/21  5:10 AM 9/10/21  4:51  AM 9/9/21 10:53 PM 9/9/21 11:59 AM   Sodium  135 - 147 mmol/L 137 139 140 R 138 R 140 R 138 R   Potassium  3.5 - 5.3 mmol/L 3.3 Low  4.8 3.7 3.9 4.0 4.0   Chloride  96 - 108 mmol/L 102 104 107 R 109 High  R 112 High  R 109 High  R   CO2  21 - 32 mmol/L 29 30 29 24 23 23   ANION GAP  mmol/L 6 5 R 4 R 5 R 5 R 6 R   BUN  5 - 25 mg/dL 17 17 15 12 13 15   Creatinine  0.60 - 1.30 mg/dL 0.86 0.81 CM 0.69 CM 0.59 Low  CM 0.78 CM 0.71 CM   Comment: Standardized to IDMS reference method   Glucose, Fasting  65 - 99 mg/dL 92 93 CM       Calcium  8.4 - 10.2 mg/dL 9.4 9.3 8.5 R 8.3 R 8.8 R 9.1 R   AST  13 - 39 U/L 14 17 CM       ALT  7 - 52 U/L 14 18 CM       Comment: Specimen collection should occur prior to Sulfasalazine administration due to the potential for falsely depressed results.   Alkaline Phosphatase  34 - 104 U/L 74 80       Total Protein  6.4 - 8.4 g/dL 7.6 7.7       Albumin  3.5 - 5.0 g/dL 4.7 4.7       Total Bilirubin  0.20 - 1.00 mg/dL 0.64 0.28       Comment: Use of this assay is not recommended for patients undergoing treatment with eltrombopag due to the potential for falsely elevated results.  N-acetyl-p-benzoquinone imine (metabolite of Acetaminophen) will generate erroneously low results in samples for patients that have taken an overdose of Acetaminophen.   eGFR  ml/min/1.73sq m 121 125 135 144 128 133                Narrative    National Kid[employer] Foundation guidelines for Chronic Kidney Disease (CKD):  •  Stage 1 with normal or high GFR (GFR > 90 mL/min/1.73 square meters)  •  Stage 2 Mild CKD (GFR = 60-89 mL/min/1.73 square meters)  •  Stage 3A Moderate CKD (GFR = 45-59 mL/min/1.73 square meters)  •  Stage 3B Moderate CKD (GFR = 30-44 mL/min/1.73 square meters)  •  Stage 4 Severe CKD (GFR = 15-29 mL/min/1.73 square meters)  •  Stage 5 End Stage CKD (GFR <15 mL/min/1.73 square meters)  Note: GFR calculation is accurate only with a steady state creatinine      Specimen Collected: 01/27/24 10:08 AM  Last Resulted: 01/27/24 10:54 AM        Lab Flowsheet          Order Details          View Encounter          Lab and Collection Details          Routing          Result History       View All Conversations on this Encounter        CM=Additional comments  R=Reference range differs from displayed range          Result Care Coordination        Patient Communication     Add Comments   Add Notifications  Back to Top           Ordered On 12/4/2023  3:04 PM    Ordering Provider Authorizing Provider Ordering User Ordering Department   MD Christoph Melendez MD Sherry Bonner, MA [facility]   Neurosurgery Neurosurgery Neurosurgery Neurosurgery    [phone]  [phone]   [phone]          APTT  Order: [id number]  Status: Final result         Visible to patient: Yes (seen)         Next appt: 02/21/2024 at 11:30 AM in Neurosurgery (Kartik Varghese PA-C)         Dx: Pre-procedural examination; Pseudoart...      0 Result Notes           Component  Ref Range & Units 1/27/24 10:08 AM 9/9/21  2:29 PM   PTT  23 - 37 seconds 31 29 CM   Comment: Therapeutic Heparin Range =  60-90 seconds              Specimen Collected: 01/27/24 10:08 AM Last Resulted: 01/27/24 10:39 AM        Lab Flowsheet          Order Details          View Encounter          Lab and Collection Details          Routing          Result History       View All Conversations on this Encounter        CM=Additional comments          Related Results     Protime-INR Final result 1/27/2024                         Result Care Coordination        Patient Communication     Add Comments   Seen Back to Top           Ordered On 12/4/2023  3:04 PM    Ordering Provider Authorizing Provider Ordering User Ordering Department   MD Christoph Melendez MD Sherry Bonner, MA [facility]   Neurosurgery Neurosurgery Neurosurgery Neurosurgery    [phone]  [phone]   [phone]          Protime-INR  Order: [id number]  Status: Final result          Visible to patient: Yes (seen)         Next appt: 02/21/2024 at 11:30 AM in Neurosurgery (Kartik Varghese PA-C)         Dx: Pre-procedural examination; Pseudoart...      0 Result Notes             Component  Ref Range & Units 1/27/24 10:08 AM 9/10/21  4:51 AM 9/9/21 10:53 PM 9/9/21  2:29 PM   Protime  11.6 - 14.5 seconds 13.4 14.5 14.0 13.8   INR  0.84 - 1.19 0.96 1.18 1.12 1.10              Specimen Collected: 01/27/24 10:08 AM Last Resulted: 01/27/24 10:39 AM        Lab Flowsheet          Order Details          View Encounter          Lab and Collection Details          Routing          Result History       View All Conversations on this Encounter             Related Results     APTT Final result 1/27/2024                         Result Care Coordination        Patient Communication     Add Comments   Seen Back to Top           Ordered On 12/4/2023  3:04 PM    Ordering Provider Authorizing Provider Ordering User Ordering Department   MD Christoph Melendez MD Sherry Bonner, MA  [facility]   Neurosurgery Neurosurgery Neurosurgery Neurosurgery    [phone]  [phone]   [phone]        HEMOGLOBIN A1C W/ EAG ESTIMATION  Order: [id number]  Status: Final result         Visible to patient: Yes (seen)         Next appt: 02/21/2024 at 11:30 AM in Neurosurgery (Kartik Varghese PA-C)         Dx: Pre-procedural examination; Pseudoart...      0 Result Notes          Component  Ref Range & Units 1/27/24 10:08 AM   Hemoglobin A1C  Normal 4.0-5.6%; PreDiabetic 5.7-6.4%; Diabetic >=6.5%; Glycemic control for adults with diabetes <7.0% % 5.4   EAG  mg/dl 108              Specimen Collected: 01/27/24 10:08 AM Last Resulted: 01/27/24  4:01 PM        Lab Flowsheet          Order Details          View Encounter          Lab and Collection Details          Routing          Result History       View All Conversations on this Encounter             Result Care Coordination        Patient Communication      Add Comments   Seen Back to Top           Ordered On 12/4/2023  3:04 PM    Ordering Provider Authorizing Provider Ordering User Ordering Department   MD Christoph Melendez MD Sherry Bonner, MA  [facility]   Neurosurgery Neurosurgery Neurosurgery Neurosurgery    [phone]  [phone]   [phone]          MRSA culture  Order: [id number]  Status: Final result         Visible to patient: Yes (seen)         Next appt: 02/21/2024 at 11:30 AM in Neurosurgery (Kartik Varghese PA-C)         Dx: Pre-procedural examination; Pseudoart...      Specimen Information: Nose; Nares   0 Result Notes      MRSA Culture Only No Methicillin Resistant Staphlyococcus aureus (MRSA) isolated                 Specimen Collected: 01/27/24 10:08 AM Last Resulted: 01/28/24 11:55 PM       Order Details          View Encounter          Lab and Collection Details          Routing          Result History       View All Conversations on this Encounter             Result Care Coordination        Patient Communication     Add Comments   Seen Back to Top           Ordered On 12/4/2023  3:04 PM    Ordering Provider Authorizing Provider Ordering User Ordering Department   MD Christoph Melendez MD Sherry Bonner, MA  [facility]   Neurosurgery Neurosurgery Neurosurgery Neurosurgery    [phone]  [phone]   [phone]     Result Narrative    This is an appended report.  These results have been appended to a previously verified report.  Imaging    CBC and differential (Order: [id number]) - 1/27/2024  Result History    CBC and differential (Order #[id number]) on 1/27/2024 - Order Result History Report  Lab Order Result Printout    CBC and differential (Order #[id number]) on 1/27/24     External Results Report    Open External Results Report     Comments    This is a patient instruction: This test is non-fasting. Please drink two glasses of water morning of bloodwork.            Lab Component SmartPhrase  Guide    CBC and differential (Order #[id number]) on 1/27/24       Specimen Description:             CBC and differential (Order [id number])      1/27/2024 11:36 AM      Component Value Flag Ref Range Units Status   WBC 7.39  4.31 - 10.16 Thousand/uL Final   RBC 5.18  3.88 - 5.62 Million/uL Final   Hemoglobin 15.7  12.0 - 17.0 g/dL Final   Hematocrit 46.8  36.5 - 49.3 % Final   MCV 90  82 - 98 fL Final   MCH 30.3  26.8 - 34.3 pg Final   MCHC 33.5  31.4 - 37.4 g/dL Final   RDW 12.8  11.6 - 15.1 % Final   MPV 10.6  8.9 - 12.7 fL Final   Platelets 113  Low  149 - 390 Thousands/uL Final   Comment:   Manual Review of Smear Performed   nRBC 0   /100 WBCs Final   Neutrophils Relative 44  43 - 75 % Final   Immat GRANS % 0  0 - 2 % Final   Lymphocytes Relative 42  14 - 44 % Final   Monocytes Relative 10  4 - 12 % Final   Eosinophils Relative 4  0 - 6 % Final   Basophils Relative 0  0 - 1 % Final   Neutrophils Absolute 3.23  1.85 - 7.62 Thousands/µL Final   Immature Grans Absolute 0.02  0.00 - 0.20 Thousand/uL Final   Lymphocytes Absolute 3.13  0.60 - 4.47 Thousands/µL Final   Monocytes Absolute 0.70  0.17 - 1.22 Thousand/µL Final   Eosinophils Absolute 0.28  0.00 - 0.61 Thousand/µL Final   Basophils Absolute 0.03  0.00 - 0.10 Thousands/µL Final       Narrative    This is an appended report.  These results have been appended to a previously verified report.       Smear Review(Phlebs Do Not Order) (Order [id number]) - Reflex for Order [id number]      1/27/2024 11:36 AM         Imaging Studies: I have personally reviewed pertinent reports.    CT abdomen pelvis wo contrast  Status: Final result     PACS Images     Show images for CT abdomen pelvis wo contrast  Study Result    Narrative & Impression   CT ABDOMEN AND PELVIS WITHOUT IV CONTRAST     INDICATION:   N31.9: Neuromuscular dysfunction of bladder, unspecified  R33.9: Retention of urine, unspecified.  History of fall in September 2021 with a cervical spine burst fracture  resulting in difficulty with management of urinary and fecal continence.     COMPARISON:  Ultrasound, dated 12/27/2022.     TECHNIQUE:  CT examination of the abdomen and pelvis was performed without intravenous contrast. Multiplanar 2D reformatted images were created from the source data.     Radiation dose length product (DLP) for this visit:  382.13 mGy-cm .  This examination, like all CT scans performed in the [facility], was performed utilizing techniques to minimize radiation dose exposure, including the use of iterative   reconstruction and automated exposure control.      Enteric contrast was not administered.      FINDINGS:     ABDOMEN     LOWER CHEST:  No clinically significant abnormality identified in the visualized lower chest.     LIVER/BILIARY TREE:  Unremarkable.     GALLBLADDER:  No calcified gallstones. No pericholecystic inflammatory change.     SPLEEN:  Unremarkable.     PANCREAS:  Unremarkable.     ADRENAL GLANDS:  Unremarkable.     KIDNEYS/URETERS:  Unremarkable. No hydronephrosis.     STOMACH AND BOWEL:  Unremarkable.     APPENDIX:  No findings to suggest appendicitis.     ABDOMINOPELVIC CAVITY:  No ascites.  No pneumoperitoneum.  No lymphadenopathy.     VESSELS:  Unremarkable for patient's age.     PELVIS     REPRODUCTIVE ORGANS:  Unremarkable for patient's age.     URINARY BLADDER:  Unremarkable.     ABDOMINAL WALL/INGUINAL REGIONS:  Unremarkable.     OSSEOUS STRUCTURES:  No acute fracture or destructive osseous lesion.  Benign bone island in the right iliac wing (series 2, image 109).     IMPRESSION:     Normal unenhanced CT of the abdomen and pelvis.           Workstation performed: [id number]        Imaging    CT abdomen pelvis wo contrast (Order: [id number]) - 3/18/2023      IMPRESSION:     Interval fracture of the right T1 pedicle screw.     Stable lucency around the right C4 pedicle screw.     Stable moderate to marked compression fracture of C5 with associated reversal  of the normal cervical lordosis at this level.     No new vertebral fractures.     The study was marked in EPIC for immediate notification.        Workstation performed: [id number]        Imaging    XR spine cervical complete 6+ vw flex/ext/obl (Order: [id number]) - 11/20/2023  Pathology, and Other Studies: I have personally reviewed pertinent reports.        Assessment and Plan:  See diagnoses, orders instructions below   He has chronic thrombocytopenia at least since 2021.  Platelet counts never dropped below 100,000.  No problem with WBC and hemoglobin.  In 2021 patient fell and fractured cervical spine and that required neck surgery.  Patient still has some neck pain, weakness and numbness in both arms left more than right.  Migraines since that accident.  No history of bleeding or bruising spontaneously and otherwise.  He did not have excessive bleeding when he had neck surgery in 2021 and he had thrombocytopenia even then.  No family history of bleeding disorder.  There is history of heart disease and diabetes in the family.    Physical examination and test results are as recorded and discussed.  Thrombocytopenia is chronic.  No splenomegaly.  Most likely has chronic mild ITP.  Normal PT and PTT and rest of CBCD.  He will have blood drawn for sedimentation rate, KE and fibrinogen.  Potassium is 3.3 and he will have potassium tablets for 3 days-ordered and explained.  Patient will be having neck surgery on 2/7/2024 and hematologically he is cleared for surgery.  Discussed with patient and his mother.  Questions answered.  Diet and activities per patient's primary physician and neurosurgeon.  Later on health screening test.  Discussed in detail.  Questions answered.  Goal is to monitor platelet count and to prevent bleeding.  Patient appears to be capable of self-care.  1. Thrombocytopenia (HCC)    - Ambulatory Referral to Hematology / Oncology  - KE Screen w/ Reflex to Titer/Pattern; Future  - Sedimentation  rate, automated; Future  - Fibrinogen; Future    2. Pre-procedural examination    - Ambulatory referral to Hematology / Oncology  - KE Screen w/ Reflex to Titer/Pattern; Future  - Sedimentation rate, automated; Future  - Fibrinogen; Future    3. Encounter for medical clearance for patient hold    - Ambulatory Referral to Hematology / Oncology  - KE Screen w/ Reflex to Titer/Pattern; Future  - Sedimentation rate, automated; Future  - Fibrinogen; Future    4. Hypokalemia    - potassium chloride (Klor-Con M10) 10 mEq tablet; Take 1 tablet (10 mEq total) by mouth 3 (three) times a day  Dispense: 10 tablet; Refill: 0     Patient will continue to follow with  primary physician and other consultants.  Patient voiced understanding and agrees  Disclaimer: This document was prepared using a dictation device. If a word or phrase is confusing, or does not make sense, this is likely due to recognition error which was not discovered during the providers review. If you believe an error has occurred, please Contact me through [employer] [facility] Line service for deon?cation.    Counseling / Coordination of Care  ..  Provided counseling and support

## 2024-02-06 NOTE — H&P
History & Physical Exam  Ramin Sadler 24 y.o. male MRN: [id number]    Assessment & Plan:   Patient is a 24-year-old male with h/o traumatic C5 teardrop fracture with diffuse cervical spinal cord edema and central cord syndrome, after slipping and falling in shower, s/p emergent posterior C4-6 decompression with C3-T1 fusion on 9/9/21 who developed pseudoarthrosis and worsening mechanical neck pain undergoing revision surgery with anterior approach for C5 corpectomy with C4-6 fixation, other levels as intraoperatively indicated, and posterior revision fusion to C2 (including revision of right C4 pedicle screw and right T1 pedicle screw) and possibly T2, other levels as intraoperatively indicated, with neuronavigation and neuromonitoring, after my evaluation and medical clearance. No changes per patient since my last evaluation in clinic.    HPI  Please refer to my office note for full HPI leading up to this surgery.    History:  Past Medical History:   Diagnosis Date    C5 cervical fracture (HCC) 09/09/2021    Cervicogenic headache     Depression     HL (hearing loss)     Migraine     Post concussion syndrome     Tetraparesis (HCC)     Weakness      Past Surgical History:   Procedure Laterality Date    DECOMPRESSION SPINE CERVICAL POSTERIOR Bilateral 9/9/2021    Procedure: C4-6 POSTERIOR CERVICAL SPINE DECOMPRESSION WITH C3-T1 FUSION  ;  Surgeon: Christoph Camacho MD;  Location: [facility];  Service: Neurosurgery       Current medications:  No current facility-administered medications for this encounter.    Current Outpatient Medications:     acetaminophen (TYLENOL) 500 mg tablet, Take 500 mg by mouth every 6 (six) hours as needed for mild pain, Disp: , Rfl:     buPROPion (WELLBUTRIN XL) 150 mg 24 hr tablet, Take 150 mg by mouth daily (Patient not taking: Reported on 1/30/2024), Disp: , Rfl:     Diclofenac Sodium (VOLTAREN) 1 %, as needed (Patient not taking: Reported on 11/20/2023), Disp: , Rfl:     Galcanezumab-[facility]  120 MG/ML SOAJ, Inject 120 mg under the skin every 30 (thirty) days, Disp: 3 mL, Rfl: 4    lifitegrast (Xiidra) 5 % op solution, , Disp: , Rfl:     polyethylene glycol (MIRALAX) 17 g packet, Take 17 g by mouth daily for 14 days (Patient taking differently: Take 17 g by mouth if needed), Disp: 238 g, Rfl: 0    potassium chloride (Klor-Con M10) 10 mEq tablet, Take 1 tablet (10 mEq total) by mouth 3 (three) times a day, Disp: 10 tablet, Rfl: 0    pregabalin (LYRICA) 100 mg capsule, Take 1 PO AM and 2 PO HS (Patient not taking: Reported on 11/20/2023), Disp: 90 capsule, Rfl: 2    propranolol (INDERAL LA) 120 mg 24 hr capsule, Take 120 mg by mouth daily (Patient not taking: Reported on 11/20/2023), Disp: , Rfl:     SUMAtriptan (Imitrex) 50 mg tablet, Take 1 tablet (50 mg total) by mouth once as needed for migraine Okay to repeat in 2 hours. Max 2 tablets in 24 hours. (Patient not taking: Reported on 1/30/2024), Disp: 10 tablet, Rfl: 6    tamsulosin (FLOMAX) 0.4 mg, Take 0.4 mg by mouth daily at bedtime (Patient not taking: Reported on 1/30/2024), Disp: , Rfl:     tiZANidine (ZANAFLEX) 2 mg tablet, Take 2 mg by mouth 3 (three) times a day Taking daily (Patient not taking: Reported on 1/30/2024), Disp: , Rfl:     Allergies:  No Known Allergies    Review of systems:  General ROS: negative for chills, fatigue, fever, night sweats, or unintentional weight changes  Respiratory ROS: no cough, shortness of breath, or wheezing  Cardiovascular ROS: no chest pain or dyspnea on exertion  Gastrointestinal ROS: no abdominal pain, change in bowel habits, or black or bloody stools  Genito-Urinary ROS: no dysuria, trouble voiding, or hematuria  Musculoskeletal ROS: negative  Neurological ROS: negative except for symptoms outlined in HPI and Discussion     Physical exam:  Neurologic:  AOX3  PERRL, EOMI  FS  Follows commands briskly throughout  5/5 in all extremities, no drift  Sensation intact throughout  Baseline neurologic deficits as  per recent clinic note    CV: regular sinus rhythm without murmur or abnormalities  Respiratory: normal breath sounds  Abdominal: soft, non-tender, normal bowel sounds  Extremities: normal coloration, no edema or visible/gross abnormalities     Lab Results: All relevant preoperative labs reviewed  Imaging: All relevant preoperative imaging reviewed  EKG, Pathology, and Other Studies: All relevant preoperative studies reviewed    We will proceed with surgery as planned.    Christoph Camacho MD

## 2024-02-07 ENCOUNTER — ANESTHESIA (OUTPATIENT)
Dept: PERIOP | Facility: HOSPITAL | Age: 24
End: 2024-02-07
Payer: COMMERCIAL

## 2024-02-07 ENCOUNTER — HOSPITAL ENCOUNTER (INPATIENT)
Facility: HOSPITAL | Age: 24
LOS: 4 days | Discharge: HOME | DRG: 471 | End: 2024-02-11
Attending: NEUROLOGICAL SURGERY | Admitting: NEUROLOGICAL SURGERY
Payer: COMMERCIAL

## 2024-02-07 ENCOUNTER — APPOINTMENT (OUTPATIENT)
Dept: RADIOLOGY | Facility: HOSPITAL | Age: 24
DRG: 471 | End: 2024-02-07
Payer: COMMERCIAL

## 2024-02-07 DIAGNOSIS — Z98.1 S/P CERVICAL SPINAL FUSION: Primary | ICD-10-CM

## 2024-02-07 DIAGNOSIS — G95.9 CERVICAL MYELOPATHY (HCC): ICD-10-CM

## 2024-02-07 DIAGNOSIS — S14.129D CENTRAL CORD SYNDROME, SUBSEQUENT ENCOUNTER (HCC): ICD-10-CM

## 2024-02-07 DIAGNOSIS — D69.6 THROMBOCYTOPENIA (HCC): ICD-10-CM

## 2024-02-07 DIAGNOSIS — E87.6 HYPOKALEMIA: ICD-10-CM

## 2024-02-07 DIAGNOSIS — G89.4 CHRONIC PAIN SYNDROME: ICD-10-CM

## 2024-02-07 LAB
BASE EXCESS BLDA CALC-SCNC: -2 MMOL/L (ref -2–3)
BASE EXCESS BLDA CALC-SCNC: -3 MMOL/L (ref -2–3)
BASE EXCESS BLDA CALC-SCNC: -4 MMOL/L (ref -2–3)
CA-I BLD-SCNC: 1.08 MMOL/L (ref 1.12–1.32)
CA-I BLD-SCNC: 1.25 MMOL/L (ref 1.12–1.32)
CA-I BLD-SCNC: 1.3 MMOL/L (ref 1.12–1.32)
GLUCOSE SERPL-MCNC: 107 MG/DL (ref 65–140)
GLUCOSE SERPL-MCNC: 78 MG/DL (ref 65–140)
GLUCOSE SERPL-MCNC: 84 MG/DL (ref 65–140)
GLUCOSE SERPL-MCNC: 85 MG/DL (ref 65–140)
GLUCOSE SERPL-MCNC: 85 MG/DL (ref 65–140)
HCO3 BLDA-SCNC: 20.6 MMOL/L (ref 22–28)
HCO3 BLDA-SCNC: 21.2 MMOL/L (ref 22–28)
HCO3 BLDA-SCNC: 22.7 MMOL/L (ref 22–28)
HCT VFR BLD CALC: 34 % (ref 36.5–49.3)
HGB BLDA-MCNC: 11.6 G/DL (ref 12–17)
PCO2 BLD: 22 MMOL/L (ref 21–32)
PCO2 BLD: 22 MMOL/L (ref 21–32)
PCO2 BLD: 24 MMOL/L (ref 21–32)
PCO2 BLD: 35.1 MM HG (ref 36–44)
PCO2 BLD: 35.6 MM HG (ref 36–44)
PCO2 BLD: 37 MM HG (ref 36–44)
PH BLD: 7.38 [PH] (ref 7.35–7.45)
PH BLD: 7.38 [PH] (ref 7.35–7.45)
PH BLD: 7.39 [PH] (ref 7.35–7.45)
PO2 BLD: 286 MM HG (ref 75–129)
PO2 BLD: 296 MM HG (ref 75–129)
PO2 BLD: 301 MM HG (ref 75–129)
POTASSIUM BLD-SCNC: 2.6 MMOL/L (ref 3.5–5.3)
POTASSIUM BLD-SCNC: 3.4 MMOL/L (ref 3.5–5.3)
POTASSIUM BLD-SCNC: 3.6 MMOL/L (ref 3.5–5.3)
SAO2 % BLD FROM PO2: 100 % (ref 60–85)
SODIUM BLD-SCNC: 144 MMOL/L (ref 136–145)
SODIUM BLD-SCNC: 145 MMOL/L (ref 136–145)
SODIUM BLD-SCNC: 147 MMOL/L (ref 136–145)
SPECIMEN SOURCE: ABNORMAL

## 2024-02-07 PROCEDURE — 61783 SCAN PROC SPINAL: CPT | Performed by: NEUROLOGICAL SURGERY

## 2024-02-07 PROCEDURE — C1713 ANCHOR/SCREW BN/BN,TIS/BN: HCPCS | Performed by: NEUROLOGICAL SURGERY

## 2024-02-07 PROCEDURE — 20936 SP BONE AGRFT LOCAL ADD-ON: CPT | Performed by: NEUROLOGICAL SURGERY

## 2024-02-07 PROCEDURE — 22854 INSJ BIOMECHANICAL DEVICE: CPT | Performed by: NEUROLOGICAL SURGERY

## 2024-02-07 PROCEDURE — 22842 INSERT SPINE FIXATION DEVICE: CPT | Performed by: NEUROLOGICAL SURGERY

## 2024-02-07 PROCEDURE — 22585 ARTHRD ANT NTRBD MIN DSC EA: CPT | Performed by: NEUROLOGICAL SURGERY

## 2024-02-07 PROCEDURE — 20930 SP BONE ALGRFT MORSEL ADD-ON: CPT | Performed by: NEUROLOGICAL SURGERY

## 2024-02-07 PROCEDURE — 82948 REAGENT STRIP/BLOOD GLUCOSE: CPT

## 2024-02-07 PROCEDURE — 0RW404Z REVISION OF INTERNAL FIXATION DEVICE IN CERVICOTHORACIC VERTEBRAL JOINT, OPEN APPROACH: ICD-10-PCS | Performed by: NEUROLOGICAL SURGERY

## 2024-02-07 PROCEDURE — 82803 BLOOD GASES ANY COMBINATION: CPT

## 2024-02-07 PROCEDURE — 82947 ASSAY GLUCOSE BLOOD QUANT: CPT

## 2024-02-07 PROCEDURE — 22845 INSERT SPINE FIXATION DEVICE: CPT | Performed by: NEUROLOGICAL SURGERY

## 2024-02-07 PROCEDURE — 63081 REMOVE VERT BODY DCMPRN CRVL: CPT | Performed by: NEUROLOGICAL SURGERY

## 2024-02-07 PROCEDURE — 0RG2070 FUSION OF 2 OR MORE CERVICAL VERTEBRAL JOINTS WITH AUTOLOGOUS TISSUE SUBSTITUTE, ANTERIOR APPROACH, ANTERIOR COLUMN, OPEN APPROACH: ICD-10-PCS | Performed by: NEUROLOGICAL SURGERY

## 2024-02-07 PROCEDURE — 84295 ASSAY OF SERUM SODIUM: CPT

## 2024-02-07 PROCEDURE — 85014 HEMATOCRIT: CPT

## 2024-02-07 PROCEDURE — 22554 ARTHRD ANT NTRBD MIN DSC CRV: CPT | Performed by: NEUROLOGICAL SURGERY

## 2024-02-07 PROCEDURE — 99024 POSTOP FOLLOW-UP VISIT: CPT | Performed by: NEUROLOGICAL SURGERY

## 2024-02-07 PROCEDURE — 82330 ASSAY OF CALCIUM: CPT

## 2024-02-07 PROCEDURE — 84132 ASSAY OF SERUM POTASSIUM: CPT

## 2024-02-07 PROCEDURE — 99223 1ST HOSP IP/OBS HIGH 75: CPT | Performed by: EMERGENCY MEDICINE

## 2024-02-07 PROCEDURE — NC001 PR NO CHARGE: Performed by: NEUROLOGICAL SURGERY

## 2024-02-07 PROCEDURE — 72040 X-RAY EXAM NECK SPINE 2-3 VW: CPT

## 2024-02-07 DEVICE — GRAFTON DBM DBF 6ML: Type: IMPLANTABLE DEVICE | Site: SPINE CERVICAL | Status: FUNCTIONAL

## 2024-02-07 DEVICE — CENTERPIECE 436013B 13MM B-SIZE
Type: IMPLANTABLE DEVICE | Site: SPINE CERVICAL | Status: FUNCTIONAL
Brand: T2 STRATOSPHERE™ EXPANDABLE CORPECTOMY SYSTEM

## 2024-02-07 DEVICE — DBM T45010 10CC PASTE GRAFTON PLUS
Type: IMPLANTABLE DEVICE | Site: SPINE CERVICAL | Status: FUNCTIONAL
Brand: GRAFTON®AND GRAFTON PLUS®DEMINERALIZED BONE MATRIX (DBM)

## 2024-02-07 DEVICE — SCREW 7713513 ZEVO VAR SD 3.5MM X 13MM
Type: IMPLANTABLE DEVICE | Site: SPINE CERVICAL | Status: FUNCTIONAL
Brand: ZEVO™ ANTERIOR CERVICAL PLATE SYSTEM

## 2024-02-07 RX ORDER — VANCOMYCIN HYDROCHLORIDE 1 G/20ML
INJECTION, POWDER, LYOPHILIZED, FOR SOLUTION INTRAVENOUS AS NEEDED
Status: DISCONTINUED | OUTPATIENT
Start: 2024-02-07 | End: 2024-02-07 | Stop reason: HOSPADM

## 2024-02-07 RX ORDER — EPHEDRINE SULFATE 50 MG/ML
INJECTION INTRAVENOUS AS NEEDED
Status: DISCONTINUED | OUTPATIENT
Start: 2024-02-07 | End: 2024-02-07

## 2024-02-07 RX ORDER — CEFAZOLIN SODIUM 1 G/50ML
1000 SOLUTION INTRAVENOUS EVERY 8 HOURS
Qty: 100 ML | Refills: 0 | Status: COMPLETED | OUTPATIENT
Start: 2024-02-08 | End: 2024-02-08

## 2024-02-07 RX ORDER — GLYCOPYRROLATE 0.2 MG/ML
INJECTION INTRAMUSCULAR; INTRAVENOUS AS NEEDED
Status: DISCONTINUED | OUTPATIENT
Start: 2024-02-07 | End: 2024-02-07

## 2024-02-07 RX ORDER — POTASSIUM CHLORIDE 750 MG/1
10 TABLET, EXTENDED RELEASE ORAL 3 TIMES DAILY
Status: DISCONTINUED | OUTPATIENT
Start: 2024-02-07 | End: 2024-02-11 | Stop reason: HOSPADM

## 2024-02-07 RX ORDER — LIDOCAINE HYDROCHLORIDE AND EPINEPHRINE 10; 10 MG/ML; UG/ML
INJECTION, SOLUTION INFILTRATION; PERINEURAL AS NEEDED
Status: DISCONTINUED | OUTPATIENT
Start: 2024-02-07 | End: 2024-02-07 | Stop reason: HOSPADM

## 2024-02-07 RX ORDER — POTASSIUM CHLORIDE 14.9 MG/ML
INJECTION INTRAVENOUS CONTINUOUS PRN
Status: DISCONTINUED | OUTPATIENT
Start: 2024-02-07 | End: 2024-02-07

## 2024-02-07 RX ORDER — FENTANYL CITRATE 50 UG/ML
INJECTION, SOLUTION INTRAMUSCULAR; INTRAVENOUS AS NEEDED
Status: DISCONTINUED | OUTPATIENT
Start: 2024-02-07 | End: 2024-02-07

## 2024-02-07 RX ORDER — SODIUM CHLORIDE, SODIUM LACTATE, POTASSIUM CHLORIDE, CALCIUM CHLORIDE 600; 310; 30; 20 MG/100ML; MG/100ML; MG/100ML; MG/100ML
125 INJECTION, SOLUTION INTRAVENOUS CONTINUOUS
Status: DISCONTINUED | OUTPATIENT
Start: 2024-02-07 | End: 2024-02-08

## 2024-02-07 RX ORDER — HYDROMORPHONE HCL/PF 1 MG/ML
0.5 SYRINGE (ML) INJECTION
Status: DISCONTINUED | OUTPATIENT
Start: 2024-02-07 | End: 2024-02-07 | Stop reason: HOSPADM

## 2024-02-07 RX ORDER — CEFAZOLIN SODIUM 2 G/50ML
2000 SOLUTION INTRAVENOUS ONCE
Status: COMPLETED | OUTPATIENT
Start: 2024-02-07 | End: 2024-02-07

## 2024-02-07 RX ORDER — ONDANSETRON 2 MG/ML
4 INJECTION INTRAMUSCULAR; INTRAVENOUS EVERY 6 HOURS PRN
Status: DISCONTINUED | OUTPATIENT
Start: 2024-02-07 | End: 2024-02-11 | Stop reason: HOSPADM

## 2024-02-07 RX ORDER — MIDAZOLAM HYDROCHLORIDE 2 MG/2ML
INJECTION, SOLUTION INTRAMUSCULAR; INTRAVENOUS AS NEEDED
Status: DISCONTINUED | OUTPATIENT
Start: 2024-02-07 | End: 2024-02-07

## 2024-02-07 RX ORDER — LIDOCAINE 50 MG/G
2 PATCH TOPICAL DAILY
Status: DISCONTINUED | OUTPATIENT
Start: 2024-02-08 | End: 2024-02-11 | Stop reason: HOSPADM

## 2024-02-07 RX ORDER — DOCUSATE SODIUM 100 MG/1
100 CAPSULE, LIQUID FILLED ORAL 2 TIMES DAILY
Status: DISCONTINUED | OUTPATIENT
Start: 2024-02-07 | End: 2024-02-11 | Stop reason: HOSPADM

## 2024-02-07 RX ORDER — MEPERIDINE HYDROCHLORIDE 25 MG/ML
12.5 INJECTION INTRAMUSCULAR; INTRAVENOUS; SUBCUTANEOUS ONCE
Status: DISCONTINUED | OUTPATIENT
Start: 2024-02-07 | End: 2024-02-07 | Stop reason: HOSPADM

## 2024-02-07 RX ORDER — SODIUM CHLORIDE 9 MG/ML
INJECTION, SOLUTION INTRAVENOUS CONTINUOUS PRN
Status: DISCONTINUED | OUTPATIENT
Start: 2024-02-07 | End: 2024-02-07

## 2024-02-07 RX ORDER — HYDROMORPHONE HYDROCHLORIDE 2 MG/ML
INJECTION, SOLUTION INTRAMUSCULAR; INTRAVENOUS; SUBCUTANEOUS AS NEEDED
Status: DISCONTINUED | OUTPATIENT
Start: 2024-02-07 | End: 2024-02-07

## 2024-02-07 RX ORDER — PROPOFOL 10 MG/ML
INJECTION, EMULSION INTRAVENOUS CONTINUOUS PRN
Status: DISCONTINUED | OUTPATIENT
Start: 2024-02-07 | End: 2024-02-07

## 2024-02-07 RX ORDER — ALBUMIN, HUMAN INJ 5% 5 %
SOLUTION INTRAVENOUS CONTINUOUS PRN
Status: DISCONTINUED | OUTPATIENT
Start: 2024-02-07 | End: 2024-02-07

## 2024-02-07 RX ORDER — OXYCODONE HYDROCHLORIDE 10 MG/1
10 TABLET ORAL EVERY 4 HOURS PRN
Status: DISCONTINUED | OUTPATIENT
Start: 2024-02-07 | End: 2024-02-11 | Stop reason: HOSPADM

## 2024-02-07 RX ORDER — PROPOFOL 10 MG/ML
INJECTION, EMULSION INTRAVENOUS AS NEEDED
Status: DISCONTINUED | OUTPATIENT
Start: 2024-02-07 | End: 2024-02-07

## 2024-02-07 RX ORDER — HYDROMORPHONE HCL/PF 1 MG/ML
0.5 SYRINGE (ML) INJECTION
Status: DISCONTINUED | OUTPATIENT
Start: 2024-02-07 | End: 2024-02-09

## 2024-02-07 RX ORDER — LIDOCAINE HYDROCHLORIDE 10 MG/ML
INJECTION, SOLUTION EPIDURAL; INFILTRATION; INTRACAUDAL; PERINEURAL AS NEEDED
Status: DISCONTINUED | OUTPATIENT
Start: 2024-02-07 | End: 2024-02-07

## 2024-02-07 RX ORDER — FENTANYL CITRATE/PF 50 MCG/ML
25 SYRINGE (ML) INJECTION
Status: DISCONTINUED | OUTPATIENT
Start: 2024-02-07 | End: 2024-02-07 | Stop reason: HOSPADM

## 2024-02-07 RX ORDER — METHOCARBAMOL 750 MG/1
750 TABLET, FILM COATED ORAL EVERY 6 HOURS SCHEDULED
Status: DISCONTINUED | OUTPATIENT
Start: 2024-02-08 | End: 2024-02-11 | Stop reason: HOSPADM

## 2024-02-07 RX ORDER — ONDANSETRON 2 MG/ML
4 INJECTION INTRAMUSCULAR; INTRAVENOUS ONCE AS NEEDED
Status: DISCONTINUED | OUTPATIENT
Start: 2024-02-07 | End: 2024-02-07 | Stop reason: HOSPADM

## 2024-02-07 RX ORDER — CALCIUM CHLORIDE 100 MG/ML
INJECTION INTRAVENOUS; INTRAVENTRICULAR AS NEEDED
Status: DISCONTINUED | OUTPATIENT
Start: 2024-02-07 | End: 2024-02-07

## 2024-02-07 RX ORDER — GABAPENTIN 300 MG/1
300 CAPSULE ORAL
Status: DISCONTINUED | OUTPATIENT
Start: 2024-02-07 | End: 2024-02-11 | Stop reason: HOSPADM

## 2024-02-07 RX ORDER — BUPIVACAINE HYDROCHLORIDE 5 MG/ML
INJECTION, SOLUTION EPIDURAL; INTRACAUDAL AS NEEDED
Status: DISCONTINUED | OUTPATIENT
Start: 2024-02-07 | End: 2024-02-07 | Stop reason: HOSPADM

## 2024-02-07 RX ORDER — SENNOSIDES 8.6 MG
1 TABLET ORAL DAILY
Status: DISCONTINUED | OUTPATIENT
Start: 2024-02-08 | End: 2024-02-11 | Stop reason: HOSPADM

## 2024-02-07 RX ORDER — CALCIUM CARBONATE 500 MG/1
1000 TABLET, CHEWABLE ORAL DAILY PRN
Status: DISCONTINUED | OUTPATIENT
Start: 2024-02-07 | End: 2024-02-11 | Stop reason: HOSPADM

## 2024-02-07 RX ORDER — GINSENG 100 MG
CAPSULE ORAL AS NEEDED
Status: DISCONTINUED | OUTPATIENT
Start: 2024-02-07 | End: 2024-02-07 | Stop reason: HOSPADM

## 2024-02-07 RX ORDER — POTASSIUM CHLORIDE 750 MG/1
10 CAPSULE, EXTENDED RELEASE ORAL 3 TIMES DAILY
COMMUNITY

## 2024-02-07 RX ORDER — ONDANSETRON 2 MG/ML
INJECTION INTRAMUSCULAR; INTRAVENOUS AS NEEDED
Status: DISCONTINUED | OUTPATIENT
Start: 2024-02-07 | End: 2024-02-07

## 2024-02-07 RX ORDER — ONDANSETRON 2 MG/ML
4 INJECTION INTRAMUSCULAR; INTRAVENOUS EVERY 4 HOURS PRN
Status: DISCONTINUED | OUTPATIENT
Start: 2024-02-07 | End: 2024-02-07 | Stop reason: SDUPTHER

## 2024-02-07 RX ORDER — SODIUM CHLORIDE 9 MG/ML
75 INJECTION, SOLUTION INTRAVENOUS CONTINUOUS
Status: DISCONTINUED | OUTPATIENT
Start: 2024-02-07 | End: 2024-02-08

## 2024-02-07 RX ORDER — ACETAMINOPHEN 325 MG/1
975 TABLET ORAL EVERY 8 HOURS SCHEDULED
Status: DISCONTINUED | OUTPATIENT
Start: 2024-02-07 | End: 2024-02-11 | Stop reason: HOSPADM

## 2024-02-07 RX ORDER — DEXAMETHASONE SODIUM PHOSPHATE 10 MG/ML
INJECTION, SOLUTION INTRAMUSCULAR; INTRAVENOUS AS NEEDED
Status: DISCONTINUED | OUTPATIENT
Start: 2024-02-07 | End: 2024-02-07

## 2024-02-07 RX ORDER — OXYCODONE HYDROCHLORIDE 5 MG/1
5 TABLET ORAL EVERY 4 HOURS PRN
Status: DISCONTINUED | OUTPATIENT
Start: 2024-02-07 | End: 2024-02-11 | Stop reason: HOSPADM

## 2024-02-07 RX ORDER — CHLORHEXIDINE GLUCONATE ORAL RINSE 1.2 MG/ML
15 SOLUTION DENTAL ONCE
Status: COMPLETED | OUTPATIENT
Start: 2024-02-07 | End: 2024-02-07

## 2024-02-07 RX ORDER — POLYETHYLENE GLYCOL 3350 17 G/17G
17 POWDER, FOR SOLUTION ORAL DAILY
Status: DISCONTINUED | OUTPATIENT
Start: 2024-02-08 | End: 2024-02-11 | Stop reason: HOSPADM

## 2024-02-07 RX ORDER — ACETAMINOPHEN 325 MG/1
650 TABLET ORAL ONCE
Status: COMPLETED | OUTPATIENT
Start: 2024-02-07 | End: 2024-02-07

## 2024-02-07 RX ORDER — SUCCINYLCHOLINE/SOD CL,ISO/PF 100 MG/5ML
SYRINGE (ML) INTRAVENOUS AS NEEDED
Status: DISCONTINUED | OUTPATIENT
Start: 2024-02-07 | End: 2024-02-07

## 2024-02-07 RX ORDER — KETAMINE HCL IN NACL, ISO-OSM 100MG/10ML
SYRINGE (ML) INJECTION AS NEEDED
Status: DISCONTINUED | OUTPATIENT
Start: 2024-02-07 | End: 2024-02-07

## 2024-02-07 RX ADMIN — NOREPINEPHRINE BITARTRATE 2 MCG/MIN: 1 INJECTION, SOLUTION, CONCENTRATE INTRAVENOUS at 17:22

## 2024-02-07 RX ADMIN — CALCIUM CHLORIDE 0.2 G: 100 INJECTION INTRAVENOUS; INTRAVENTRICULAR at 10:58

## 2024-02-07 RX ADMIN — POTASSIUM CHLORIDE: 14.9 INJECTION, SOLUTION INTRAVENOUS at 13:00

## 2024-02-07 RX ADMIN — SODIUM CHLORIDE, SODIUM LACTATE, POTASSIUM CHLORIDE, AND CALCIUM CHLORIDE: .6; .31; .03; .02 INJECTION, SOLUTION INTRAVENOUS at 11:11

## 2024-02-07 RX ADMIN — GLYCOPYRROLATE 0.2 MG: 0.2 INJECTION, SOLUTION INTRAMUSCULAR; INTRAVENOUS at 12:30

## 2024-02-07 RX ADMIN — FENTANYL CITRATE 25 MCG: 50 INJECTION INTRAMUSCULAR; INTRAVENOUS at 20:20

## 2024-02-07 RX ADMIN — ONDANSETRON 4 MG: 2 INJECTION INTRAMUSCULAR; INTRAVENOUS at 17:49

## 2024-02-07 RX ADMIN — ACETAMINOPHEN 650 MG: 325 TABLET ORAL at 07:05

## 2024-02-07 RX ADMIN — ALBUMIN (HUMAN): 12.5 INJECTION, SOLUTION INTRAVENOUS at 17:33

## 2024-02-07 RX ADMIN — SODIUM CHLORIDE, SODIUM LACTATE, POTASSIUM CHLORIDE, AND CALCIUM CHLORIDE: .6; .31; .03; .02 INJECTION, SOLUTION INTRAVENOUS at 16:09

## 2024-02-07 RX ADMIN — PROPOFOL 200 MG: 10 INJECTION, EMULSION INTRAVENOUS at 08:22

## 2024-02-07 RX ADMIN — POTASSIUM CHLORIDE: 14.9 INJECTION, SOLUTION INTRAVENOUS at 10:54

## 2024-02-07 RX ADMIN — LIDOCAINE HYDROCHLORIDE 50 MG: 10 INJECTION, SOLUTION EPIDURAL; INFILTRATION; INTRACAUDAL; PERINEURAL at 08:22

## 2024-02-07 RX ADMIN — GABAPENTIN 300 MG: 300 CAPSULE ORAL at 21:34

## 2024-02-07 RX ADMIN — PHENYLEPHRINE HYDROCHLORIDE 20 MCG/MIN: 10 INJECTION INTRAVENOUS at 09:19

## 2024-02-07 RX ADMIN — CALCIUM CHLORIDE 0.2 G: 100 INJECTION INTRAVENOUS; INTRAVENTRICULAR at 11:24

## 2024-02-07 RX ADMIN — MIDAZOLAM 2 MG: 1 INJECTION INTRAMUSCULAR; INTRAVENOUS at 08:17

## 2024-02-07 RX ADMIN — SODIUM CHLORIDE, SODIUM LACTATE, POTASSIUM CHLORIDE, AND CALCIUM CHLORIDE 125 ML/HR: .6; .31; .03; .02 INJECTION, SOLUTION INTRAVENOUS at 07:59

## 2024-02-07 RX ADMIN — Medication 100 MG: at 08:23

## 2024-02-07 RX ADMIN — SODIUM CHLORIDE: 0.9 INJECTION, SOLUTION INTRAVENOUS at 12:06

## 2024-02-07 RX ADMIN — SODIUM CHLORIDE 75 ML/HR: 0.9 INJECTION, SOLUTION INTRAVENOUS at 20:00

## 2024-02-07 RX ADMIN — DOCUSATE SODIUM 100 MG: 100 CAPSULE, LIQUID FILLED ORAL at 21:34

## 2024-02-07 RX ADMIN — Medication 50 MG: at 08:47

## 2024-02-07 RX ADMIN — OXYCODONE HYDROCHLORIDE 10 MG: 10 TABLET ORAL at 22:33

## 2024-02-07 RX ADMIN — CEFAZOLIN SODIUM 2000 MG: 2 SOLUTION INTRAVENOUS at 08:49

## 2024-02-07 RX ADMIN — CHLORHEXIDINE GLUCONATE 15 ML: 1.2 SOLUTION ORAL at 07:06

## 2024-02-07 RX ADMIN — SODIUM CHLORIDE: 0.9 INJECTION, SOLUTION INTRAVENOUS at 08:28

## 2024-02-07 RX ADMIN — DEXAMETHASONE SODIUM PHOSPHATE 10 MG: 10 INJECTION, SOLUTION INTRAMUSCULAR; INTRAVENOUS at 17:44

## 2024-02-07 RX ADMIN — NOREPINEPHRINE BITARTRATE 16 MCG: 1 INJECTION, SOLUTION, CONCENTRATE INTRAVENOUS at 18:13

## 2024-02-07 RX ADMIN — HYDROMORPHONE HYDROCHLORIDE 1 MG: 2 INJECTION, SOLUTION INTRAMUSCULAR; INTRAVENOUS; SUBCUTANEOUS at 16:55

## 2024-02-07 RX ADMIN — CALCIUM CHLORIDE 0.2 G: 100 INJECTION INTRAVENOUS; INTRAVENTRICULAR at 10:50

## 2024-02-07 RX ADMIN — SODIUM CHLORIDE, SODIUM LACTATE, POTASSIUM CHLORIDE, AND CALCIUM CHLORIDE: .6; .31; .03; .02 INJECTION, SOLUTION INTRAVENOUS at 11:12

## 2024-02-07 RX ADMIN — CALCIUM CHLORIDE 1 G: 100 INJECTION INTRAVENOUS; INTRAVENTRICULAR at 15:00

## 2024-02-07 RX ADMIN — GLYCOPYRROLATE 0.2 MG: 0.2 INJECTION, SOLUTION INTRAMUSCULAR; INTRAVENOUS at 08:46

## 2024-02-07 RX ADMIN — GLYCOPYRROLATE 0.2 MG: 0.2 INJECTION, SOLUTION INTRAMUSCULAR; INTRAVENOUS at 14:36

## 2024-02-07 RX ADMIN — CEFAZOLIN SODIUM 2000 MG: 2 SOLUTION INTRAVENOUS at 17:49

## 2024-02-07 RX ADMIN — Medication 25 MG: at 12:46

## 2024-02-07 RX ADMIN — Medication 25 MG: at 09:29

## 2024-02-07 RX ADMIN — FENTANYL CITRATE 100 MCG: 50 INJECTION INTRAMUSCULAR; INTRAVENOUS at 08:22

## 2024-02-07 RX ADMIN — SODIUM CHLORIDE 0.2 MCG/KG/MIN: 900 INJECTION INTRAVENOUS at 08:26

## 2024-02-07 RX ADMIN — CEFAZOLIN SODIUM 2000 MG: 2 SOLUTION INTRAVENOUS at 11:44

## 2024-02-07 RX ADMIN — EPHEDRINE SULFATE 10 MG: 50 INJECTION, SOLUTION INTRAVENOUS at 08:43

## 2024-02-07 RX ADMIN — CALCIUM CHLORIDE 0.2 G: 100 INJECTION INTRAVENOUS; INTRAVENTRICULAR at 11:14

## 2024-02-07 RX ADMIN — CALCIUM CHLORIDE 0.2 G: 100 INJECTION INTRAVENOUS; INTRAVENTRICULAR at 11:05

## 2024-02-07 RX ADMIN — FENTANYL CITRATE 25 MCG: 50 INJECTION INTRAMUSCULAR; INTRAVENOUS at 19:25

## 2024-02-07 RX ADMIN — PROPOFOL 120 MCG/KG/MIN: 10 INJECTION, EMULSION INTRAVENOUS at 08:26

## 2024-02-07 RX ADMIN — ALBUMIN (HUMAN): 12.5 INJECTION, SOLUTION INTRAVENOUS at 15:01

## 2024-02-07 RX ADMIN — SODIUM CHLORIDE: 0.9 INJECTION, SOLUTION INTRAVENOUS at 15:06

## 2024-02-07 RX ADMIN — POTASSIUM CHLORIDE 10 MEQ: 750 TABLET, EXTENDED RELEASE ORAL at 21:34

## 2024-02-07 RX ADMIN — CEFAZOLIN SODIUM 2000 MG: 2 SOLUTION INTRAVENOUS at 14:49

## 2024-02-07 RX ADMIN — ACETAMINOPHEN 975 MG: 325 TABLET, FILM COATED ORAL at 21:34

## 2024-02-07 NOTE — CONSULTS
[facility]  Consult: Critical Care  Name: Ramin Sadler 24 y.o. male I MRN: [id number]  Unit/Bed#: OR POOL I Date of Admission: 2/7/2024   Date of Service: 2/7/2024 I Hospital Day: 0    Consults    Assessment/Plan   Neuro:   Diagnosis: Traumatic C5 teardrop fracture with diffuse cervical spinal cord edema, central cord syndrome now POD 0 s/p anterior posterior cervical revision  PMH C5 fracture with SCI 2021, had emergent posterior C4-6 decompression with C3-T1 fusion; developed pseudoarthrosis and worsening mechanical neck pain underwent revision surgery with anterior approach for C5 corpectomy with C4-6 fixation  Persistent migraines since the accident, follows with Dr. Whitaker  Plan: 2 drains, cervical collar, XR, PT OT, APS for pain control, on Emgality and sumatriptan for migraines, cefazolin  Frequent neuro checks. CAM-ICU. Delirium precautions. Frequent re-orientation as able. Regulate sleep/wake cycle.   Pain control -APS consulted per neurosurgery on Robaxin, Agnes 5/10, Dilaudid  Diagnosis: Depression  Plan: Home Wellbutrin when tolerated      CV:   No critical issues  Plan: Continuous cardiopulmonary monitoring.  Maintain MAP > 65.  Monitor resuscitative endpoints.    Pulm:  No critical issues  Plan: Continuous pulse ox.  Pulmonary hygiene.  Elevate HOB.  Maintain O2 sat greater than 92%      GI:   No critical issues  Plan: Zofran, Tums MiraLAX  Advance diet as tolerated      :   Critical issues  Plan: Strict I/O. Monitor urine output and renal indices. Avoid nephrotoxins.        F/E/N:   F: Fluid resuscitation prn.  E: Monitor and replete electrolytes for ionized calcium >1.12, Mg >2, Phos >3, K >4.  N: Speech evaluation for diet recommendations    Heme/Onc:   Diagnosis: Thrombocytopenia  Chronic since 2021, never below 100 K  Plan: Monitor Hgb and transfuse for Hgb <7 or based on hemodynamics if actively bleeding. Monitor platelets.  VTE prophylaxis: pharm ppx  when cleared by neurosurgery. Sequential compression devices and/or foot pumps.      Endo:   No critical issues  Insulin: SSI if needed, Monitor blood glucose for goal SBP < 180.        ID:   No critical issues  Antimicrobials: Cefazolin, de-escalation per neurosurgery, postop  Monitor fever curve and WBC. Maintain normothermia. Peridex oral rinse prn  Tylenol prn for fever management      MSK/Skin:   Frequent turning and repositioning. Pressure injury prevention. Monitor for skin breakdown. PT/OT when appropriate. Encourage OOBTC and ambulation when appropriate. Local wound care prn. Forced warm air blanket if needed for hypothermia.        Disposition: Stepdown Level 1       History of Present Illness       HPI: Ramin Sadler is a 24 y.o. with PMH C5 teardrop fracture in 2021 who presents with due to arthrosis and worsening mechanical neck pain undergoing revision surgery with anterior approach for C5 corpectomy with C4-6 fixation.  Patient had a slip and fall in the shower in 2021 and needed emergent posterior C4-6 compression with C3-T1 fusion.  He presents from the OR this afternoon without any new functional deficits.  He is complaining of ***      History obtained from chart review and the patient.  Review of Systems   Constitutional:  Negative for fatigue.   HENT:  Negative for trouble swallowing and voice change.    Eyes:  Negative for visual disturbance.   Respiratory:  Negative for shortness of breath.    Cardiovascular:  Negative for chest pain.   Gastrointestinal:  Negative for nausea and vomiting.   Endocrine: Negative for polyuria.   Neurological:  Negative for dizziness, weakness, numbness and headaches.   Psychiatric/Behavioral:  Negative for sleep disturbance.      Historical Information   Past Medical History:  09/09/2021: C5 cervical fracture (HCC)  No date: Cervicogenic headache  No date: Depression  No date: HL (hearing loss)  No date: Migraine  No date: Post concussion syndrome  No date:  Tetraparesis (HCC)  No date: Weakness Past Surgical History:  2021: DECOMPRESSION SPINE CERVICAL POSTERIOR; Bilateral      Comment:  Procedure: C4-6 POSTERIOR CERVICAL SPINE DECOMPRESSION                WITH C3-T1 FUSION  ;  Surgeon: Christoph Camacho MD;                 Location: [facility];  Service: Neurosurgery   Current Outpatient Medications   Medication Instructions    acetaminophen (TYLENOL) 500 mg, Oral, Every 6 hours PRN    buPROPion (WELLBUTRIN XL) 150 mg, Daily    Diclofenac Sodium (VOLTAREN) 1 % As needed    Galcanezumab-gnlm 120 mg, Subcutaneous, Every 30 days    lifitegrast (Xiidra) 5 % op solution No dose, route, or frequency recorded.    polyethylene glycol (MIRALAX) 17 g, Oral, Daily    potassium chloride (Klor-Con M10) 10 mEq tablet 10 mEq, Oral, 3 times daily    potassium chloride (MICRO-K) 10 MEQ CR capsule 10 mEq, Oral, 3 times daily    pregabalin (LYRICA) 100 mg capsule Take 1 PO AM and 2 PO HS    propranolol (INDERAL LA) 120 mg, Daily    SUMAtriptan (IMITREX) 50 mg, Oral, Once as needed, Okay to repeat in 2 hours. Max 2 tablets in 24 hours.    tamsulosin (FLOMAX) 0.4 mg, Daily at bedtime    tiZANidine (ZANAFLEX) 2 mg, 3 times daily    No Known Allergies   Social History     Tobacco Use    Smoking status: Never    Smokeless tobacco: Never   Vaping Use    Vaping status: Never Used   Substance Use Topics    Alcohol use: Never    Drug use: Never    Family History   Problem Relation Age of Onset    No Known Problems Mother     Heart disease Father     Heart attack Father     Diabetes Paternal Grandmother     Heart disease Paternal Grandfather     Stroke Paternal Grandfather             Heart disease Paternal Uncle           Objective                            Vitals I/O      Most Recent Min/Max in 24hrs   Temp 97.9 °F (36.6 °C) Temp  Min: 97.9 °F (36.6 °C)  Max: 97.9 °F (36.6 °C)   Pulse 56 Pulse  Min: 56  Max: 56   Resp 16 Resp  Min: 16  Max: 16   /66 BP  Min: 118/66  Max: 118/66    O2 Sat 99 % SpO2  Min: 99 %  Max: 99 %      Intake/Output Summary (Last 24 hours) at 2/7/2024 1553  Last data filed at 2/7/2024 1518  Gross per 24 hour   Intake 3400 ml   Output 2800 ml   Net 600 ml       Diet NPO; Sips with meds  Diet NPO; Sips with meds    Invasive Monitoring   {Invasive Device (Optional):49119}        Physical Exam   Physical Exam  Eyes:      Extraocular Movements: Extraocular movements intact.      Pupils: Pupils are equal, round, and reactive to light.   HENT:      Head: Normocephalic and atraumatic.      Nose: No congestion or rhinorrhea.      Mouth/Throat:      Mouth: Mucous membranes are moist.      Pharynx: No oropharyngeal exudate.   Cardiovascular:      Rate and Rhythm: Normal rate and regular rhythm.      Heart sounds: Normal heart sounds.   Musculoskeletal:      Right lower leg: No edema.      Left lower leg: No edema.   Abdominal: General: Bowel sounds are normal. There is no distension.      Palpations: Abdomen is soft.      Tenderness: There is no abdominal tenderness.   Constitutional:       General: He is not in acute distress.     Appearance: He is well-developed and well-nourished.   Pulmonary:      Effort: Pulmonary effort is normal. No respiratory distress.      Breath sounds: Normal breath sounds. No stridor. No wheezing or rhonchi.   Psychiatric:         Speech: Speech is not no expressive aphasia.   Neurological:      General: No focal deficit present.      Mental Status: He is alert and oriented to person, place and time.      Cranial Nerves: No dysarthria or facial asymmetry.      Sensory: No sensory deficit.      Motor: Strength full and intact in all extremities. No pronator drift or motor deficit.            Diagnostic Studies    {IDisappearing Data Review I RadiologyI Cardiology:71673}  EKG: Sinus bradycardia, incomplete RBBB, stable since 2008  Imaging: XR CS complete 6+, MRA carotids, CT AP I have personally reviewed pertinent reports.   and I have personally  reviewed pertinent films in PACS     Medications:  Scheduled PRN   vancomycin (VANCOCIN) 1,000 mg in sodium chloride 0.9 % 1,000 mL irrigation, 1,000 mg, Once      lidocaine-epinephrine, , PRN  thrombin, , PRN       Continuous    lactated ringers, 125 mL/hr, Last Rate: 125 mL/hr (02/07/24 0759)         Labs:  { I Disappearing Data Review I Results Review I Micro :33522}  CBC    No recent results  BMP    No recent results    Coags    No recent results     Additional Electrolytes  No recent results       Blood Gas    No recent results  No recent results LFTs  No recent results    Infectious  No recent results  Glucose  No recent results           {Critical Care Time Delivered (Optional):05967}    Nichole White MD

## 2024-02-07 NOTE — OP NOTE
OPERATIVE REPORT  PATIENT NAME: Ramin Sadler    :  2000  MRN: [id number]  Pt Location: [facility] OR ROOM 17    SURGERY DATE: 2024    Surgeons and Role:  Christoph Camacho MD - Primary  Moo Nicholson MD - Assisting  Craig Robert Goldberg, MD - Assisting    Preop Diagnosis:  Pseudoarthrosis of cervical spine (HCC) [S12.9XXA]  Kyphotic deformity    Post-Op Diagnosis Codes:  Pseudoarthrosis of cervical spine (HCC) [S12.9XXA]  Kyphotic deformity    Procedure(s):  Posterior removal of bilateral rods, placement of C2 pars articularis screws, replacement of bilateral C3 and C4 lateral mass screws, removal of broken right T1 screw, placement of bilateral pedicle screws at T2 and T3 using neuronavigation  Anterior C5 corpectomy (100%) for deformity correction with C4-6 fixation, use of intraoperative microscope   Posterior revision fusion for deformity correction, placement of new bilateral rods   Use of neuromonitoring  Use of intraoperative fluoroscopy     Specimen(s):  None    Estimated Blood Loss:   200 mL    Drains:  1 anterior and 2 posterior drains     Anesthesia Type:   General    Operative Indications:  Pseudoarthrosis of cervical spine (HCC) [S12.9XXA]  Worsening kyphotic deformity causing mechanical pain and instability     Operative Findings:  -Satisfactory locations/trajectories of instrumentation confirmed with intraoperative CT scans and x-rays  -No sustained changes in neuromonitoring   -Adequate correction of kyphotic deformity confirmed with final x-ray    Procedure:  Prior to induction of anesthesia, an audible huddle was performed confirming site of operation, planned operation, need for antibiotics, and other anticipated needs with the patient, surgical, nursing, and anesthesia teams. All agreed to proceed.     First, patient was pinned with Rivas pins and positioned prone, maintaining spine precautions. All pressure points were verified to be padded appropriately.     Baseline  neuromonitoring signals were obtained.    Prior to starting the operation the surgical team paused and performed an audible timeout. The surgical, nursing, and anesthesia personnel agreed with the procedure to be performed. The surgical region was prepped and draped in the usual sterile fashion.    The previous midline cervicothoracic incision was reopened and extended as needed using a # 10 blade scalpel. Hemostasis was achieved using electrocautery. Using a combination of monopolar electrocautery and blunt dissection, the subcutaneous tissue and fascia were incised. The musculture was dissected free of the osseous structures using blunt dissection and monopolar electrocautery to fully expose previous instrumentation as well as C2 down to T3. Hemostasis was achieved.     Previously placed bilateral C3 and C4 lateral mass screws, which were loosened, were removed. Previously placed bilateral C6 screws and left T1 screw were left in place due to adequate bony purchase, as confirmed with intraoperating imaging. The right T1 screw had broken within the pedicle, and the top portion of this screw was removed while the remaining screw deep within the pedicle was left in place.     O-arm spins were performed to perform navigated placement of bilateral C2 pars articularis screws, replacement of bilateral C3 and C4 screws (larger sizes than before), and placement of bilateral T2 and T3 pedicle screws. All instrumentation was confirmed to be satisfactory on CT scans.    The posterior wound was temporarily closed with Vicryl sutures then covered with sterile towel and Ioban for the second stage of surgery.    Then the patient was flipped supine for the anterior approach of deformity correction.     There were no changes in neuromonitoring.    Right-sided anterior cervical incision overlying C5 level was opened sharply with a #10 scalpel down to the level of the underlying platysma. The skin edges were undermined and the  platysma muscle was divided in the direction of its fibers. The groove between the trachea and esophagus and the carotid sheath was followed down to the anterior cervical spine and intraoperative x-ray localized the level. The longus colli muscles were undermined and self-retaining retractors were placed without difficulty. The annulus was divided. [place] posts were placed in the vertebral bodies of C4 and C6 (above and below level of previous fracture and kyphotic deformity). The disc spaces were distracted appropriately.     The microscope was brought into the field.     Disc materials were removed at C4/5 and C5/6 with pituitary rongeurs, curettes, and Kerrisons. C5 corpectomy (100%, entire vertebral body was resected) was performed in order to achieve adequate correction of kyphotic deformity. The posterior longitudinal ligament was then removed with a blunt nerve hook and Kerrison rongeurs. The spinal canal and neural foramen were decompressed using Kerrison rongeurs. The endplates of the vertebral bodies were prepared using a high speed air drill. In order to adequately achieve deformity correction, I drilled off the anterior inferior edge of C4 vertebral body.    Once the discectomies and corpectomy were complete, I measured and placed an expandable cage of appropriate size filled with bony matrix (Oliver DBM) and autograft bone to promote fusion. There were no changes in neuromonitoring.    Then an MRI-compatible anterior cervical titanium plate with 13 mm screws were placed into the vertebral bodies of C4 and C6. Intra-operative x-ray demonstrated satisfactory locations and trajectories of all instrumentation.     Prior to closure, hemostasis was achieved. The wound was copiously irrigated. All retracting devices were removed from the wound. All temporary implants were removed from the wound. Prior to closure, surgical count was correct and verified x 1. The wound was closed in the usual three-layer  fashion. Fascia, deep dermal, and cutaneous sutures were placed. The wound was then dressed.    Finally, for the last posterior portion of the surgery, we flipped the patient back to prone position in Rivas pins while maintaining spinal precautions and confirming with x-rays adequate restoration of normal lordotic alignment.     I reopened the temporarily closed posterior cervicothoracic wound, maintaining hemostasis. The wound was copiously irrigated with Vancomycin irrigation. I decorticated around the lateral masses/facets at C2-C7 and pedicle screws at T1-3 to promote bony fusion then placed bone substitute as onlay in all these decorticated regions bilaterally.    Finally, I placed the rods bilaterally, placed the screw caps, and final tightened the screws without difficulty. Final x-rays confirmed satisfactory instrumentation with adequate restoration of normal lordotic alignment and deformity correction.    Following products were utilized:  Implant Name Inv. Item No. Used Action   SCREW MULTI-AXLE 5.5 X 30MM INFINITY - [id number] SCREW MULTI-AXLE 5.5 X 30MM INFINITY 4 Implanted   SCREW MULTI-AXLE 5.5 X 30MM INFINITY - [id number] SCREW MULTI-AXLE 5.5 X 30MM INFINITY 1 Implanted   SCREW MULTI-AXLE 5.5 X 30MM INFINITY - [id number] SCREW MULTI-AXLE 5.5 X 30MM INFINITY 1 Implanted   SCREW MULTI-AXLE 5.5 X 30MM INFINITY - [id number] SCREW MULTI-AXLE 5.5 X 30MM INFINITY 1 Implanted   CLAU PLUS DBM 10ML - [id number] CLAU PLUS DBM 10ML 1 Implanted   SCREW 3.5 X 16MM INFINITY - [id number] SCREW 3.5 X 16MM INFINITY 2 Implanted   SCREW 4 X 16MM INFINITY - [id number] SCREW 4 X 16MM INFINITY 3 Implanted   SCREW 4 X 14MM INFINITY - [id number] SCREW 4 X 14MM INFINITY 1 Implanted   NEW STANDARD 3.5 X 240MM - [id number] NEW STANDARD 3.5 X 240MM 2 Implanted   SCREW SET M6 - [id number] SCREW SET M6 13 Implanted   SCREW SET M6 - [id number] SCREW SET M6 6 Explanted   SCREW SET M6 - [id number] SCREW SET M6 2 Explanted  "  NEW SPINAL 3.5 X 95MM - [id number] NEW SPINAL 3.5 X 95MM 2 Explanted   SCREW 3.5 X 14MM INFINITY - [id number] SCREW 3.5 X 14MM INFINITY 4 Explanted   SCREW MULTI-AXLE 5.5 X 25MM INFINITY - [id number] SCREW MULTI-AXLE 5.5 X 25MM INFINITY 1 Explanted   CAGE SPINAL CENTERPIECE 13MM SZ A - [id number] CAGE SPINAL CENTERPIECE 13MM SZ A 1 Implanted and Explanted   CAGE SPINAL CENTERPIECE 13MM SZ B - [id number] CAGE SPINAL CENTERPIECE 13MM SZ B 1 Implanted   PLATE ZEVO 37 MM 2 LVL - [id number] PLATE ZEVO 37 MM 2 LVL 1 Implanted   SCREW ZEVO 3.5 X 13 MM SLF DRL - [id number] SCREW ZEVO 3.5 X 13 MM SLF DRL 4 Implanted   CLAU DBM DBF 6ML - [id number] CLAU DBM DBF 6ML 1 Implanted   CLAU PLUS DBM 10ML - [id number] CLAU PLUS DBM 10ML 1 Implanted     Vancomycin powder and 2 drains were placed in the posterior wound.    All retracting devices were removed from the wound. All temporary implants were removed from the wound. Prior to closure, surgical count was correct and verified x 1.     The wound was closed in the usual three-layer fashion. Fascia and deep dermal sutures were placed. Skin was reapproximated using staples. Drain and \"naomie\" (for future removal) sutures were placed at the drain exit sites bilaterally. The wound was then dressed with Silver-impregnated dressing.    At the end of the case, a sign out was performed whereby the anesthesia, surgical, and nursing teams re-confirmed the operation performed, any blood products to be distributed, specimens to be sent, or equipment issues. All parties agreed.    I performed the entire surgery from wound preparation to wound closure. My partner Dr. Nicholson assisted with posterior revision with placement of navigated C2 pars interarticularis screws. My partner Dr. Goldberg assisted with both posterior revision with placement of thoracic screws and bilateral rods, anterior approach for C5 corpectomy, as well as posterior wound closure. No resident was available " for assistance.    Disposition:  MANINDER Camacho

## 2024-02-07 NOTE — ANESTHESIA PREPROCEDURE EVALUATION
Procedure:  Posterior removal of bilateral rods, anterior C5 corpectomy with C4-6 fixation, other levels as indicated, posterior revision fusion with extension to C2 and possibly T2, other levels as indicated, with neuronavigation and neuromonitoring (Bilateral: Spine Cervical)    Relevant Problems   HEMATOLOGY   (+) Thrombocytopenia (HCC)      NEURO/PSYCH   (+) Central cord syndrome (HCC)   (+) Chronic pain syndrome        Physical Exam    Airway    Mallampati score: I  TM Distance: >3 FB  Neck ROM: limited     Dental   No notable dental hx     Cardiovascular  Rhythm: regular, Rate: normal    Pulmonary  Pulmonary exam normal     Other Findings        Anesthesia Plan  ASA Score- 2     Anesthesia Type- general with ASA Monitors.         Additional Monitors: arterial line.    Airway Plan: ETT.           Plan Factors-Exercise tolerance (METS): >4 METS.    Chart reviewed. EKG reviewed.  Existing labs reviewed.     Patient is not a current smoker.              Induction- intravenous.    Postoperative Plan- Plan for postoperative opioid use. Planned trial extubation    Informed Consent- Anesthetic plan and risks discussed with patient and father.  I personally reviewed this patient with the CRNA. Discussed and agreed on the Anesthesia Plan with the CRNA..              Cr 0.86  Hgb 15.7  Plt 113  K 3.3  INR 0.96    EKG 1/24  Sinus bradycardia  Incomplete right bundle branch block  When compared with ECG of 26-SEP-2008 16:07,  No significant change was found  Confirmed by Blair Muñoz ([id number]) on 1/29/2024 1:29:52 PM     NPO  No problems with anes  Denies CP/SOB  Denies GERD

## 2024-02-07 NOTE — ANESTHESIA PROCEDURE NOTES
"Arterial Line Insertion    Performed by: Mirna Muniz CRNA  Authorized by: Martha Schafer MD  Consent: Verbal consent obtained. Written consent obtained.  Risks and benefits: risks, benefits and alternatives were discussed  Consent given by: patient  Patient understanding: patient states understanding of the procedure being performed  Patient consent: the patient's understanding of the procedure matches consent given  Procedure consent: procedure consent matches procedure scheduled  Relevant documents: relevant documents present and verified  Test results: test results available and properly labeled  Site marked: the operative site was marked  Radiology Images: Radiology Images displayed and confirmed. If images not available, report reviewed  Required items: required blood products, implants, devices, and special equipment available  Patient identity confirmed: verbally with patient and arm band  Time out: Immediately prior to procedure a \"time out\" was called to verify the correct patient, procedure, equipment, support staff and site/side marked as required.  Preparation: Patient was prepped and draped in the usual sterile fashion.  Indications: hemodynamic monitoring  Orientation:  Right  Location: radial artery  Procedure Details:  Needle gauge: 20  Number of attempts: 1    Post-procedure:  Post-procedure: dressing applied  Waveform: good waveform  Post-procedure CNS: unchanged  Patient tolerance: patient tolerated the procedure well with no immediate complications          "

## 2024-02-08 ENCOUNTER — APPOINTMENT (INPATIENT)
Dept: RADIOLOGY | Facility: HOSPITAL | Age: 24
DRG: 471 | End: 2024-02-08
Payer: COMMERCIAL

## 2024-02-08 LAB
ANION GAP SERPL CALCULATED.3IONS-SCNC: 7 MMOL/L
BUN SERPL-MCNC: 7 MG/DL (ref 5–25)
CALCIUM SERPL-MCNC: 9.2 MG/DL (ref 8.4–10.2)
CHLORIDE SERPL-SCNC: 107 MMOL/L (ref 96–108)
CO2 SERPL-SCNC: 25 MMOL/L (ref 21–32)
CREAT SERPL-MCNC: 0.65 MG/DL (ref 0.6–1.3)
ERYTHROCYTE [DISTWIDTH] IN BLOOD BY AUTOMATED COUNT: 12.4 % (ref 11.6–15.1)
GFR SERPL CREATININE-BSD FRML MDRD: 135 ML/MIN/1.73SQ M
GLUCOSE SERPL-MCNC: 142 MG/DL (ref 65–140)
HCT VFR BLD AUTO: 36.8 % (ref 36.5–49.3)
HGB BLD-MCNC: 12.7 G/DL (ref 12–17)
MCH RBC QN AUTO: 30.7 PG (ref 26.8–34.3)
MCHC RBC AUTO-ENTMCNC: 34.5 G/DL (ref 31.4–37.4)
MCV RBC AUTO: 89 FL (ref 82–98)
PLATELET # BLD AUTO: 139 THOUSANDS/UL (ref 149–390)
PMV BLD AUTO: 10.8 FL (ref 8.9–12.7)
POTASSIUM SERPL-SCNC: 4.2 MMOL/L (ref 3.5–5.3)
RBC # BLD AUTO: 4.14 MILLION/UL (ref 3.88–5.62)
SODIUM SERPL-SCNC: 139 MMOL/L (ref 135–147)
WBC # BLD AUTO: 11.73 THOUSAND/UL (ref 4.31–10.16)

## 2024-02-08 PROCEDURE — 99024 POSTOP FOLLOW-UP VISIT: CPT | Performed by: NEUROLOGICAL SURGERY

## 2024-02-08 PROCEDURE — 97166 OT EVAL MOD COMPLEX 45 MIN: CPT

## 2024-02-08 PROCEDURE — 80048 BASIC METABOLIC PNL TOTAL CA: CPT | Performed by: PHYSICIAN ASSISTANT

## 2024-02-08 PROCEDURE — 99291 CRITICAL CARE FIRST HOUR: CPT | Performed by: PSYCHIATRY & NEUROLOGY

## 2024-02-08 PROCEDURE — 85027 COMPLETE CBC AUTOMATED: CPT | Performed by: PHYSICIAN ASSISTANT

## 2024-02-08 PROCEDURE — 99223 1ST HOSP IP/OBS HIGH 75: CPT | Performed by: ANESTHESIOLOGY

## 2024-02-08 PROCEDURE — 97162 PT EVAL MOD COMPLEX 30 MIN: CPT

## 2024-02-08 PROCEDURE — 72040 X-RAY EXAM NECK SPINE 2-3 VW: CPT

## 2024-02-08 RX ORDER — TAMSULOSIN HYDROCHLORIDE 0.4 MG/1
0.4 CAPSULE ORAL
Status: DISCONTINUED | OUTPATIENT
Start: 2024-02-08 | End: 2024-02-11 | Stop reason: HOSPADM

## 2024-02-08 RX ADMIN — HYDROMORPHONE HYDROCHLORIDE 0.5 MG: 1 INJECTION, SOLUTION INTRAMUSCULAR; INTRAVENOUS; SUBCUTANEOUS at 14:00

## 2024-02-08 RX ADMIN — HYDROMORPHONE HYDROCHLORIDE 0.5 MG: 1 INJECTION, SOLUTION INTRAMUSCULAR; INTRAVENOUS; SUBCUTANEOUS at 00:24

## 2024-02-08 RX ADMIN — POTASSIUM CHLORIDE 10 MEQ: 750 TABLET, EXTENDED RELEASE ORAL at 07:40

## 2024-02-08 RX ADMIN — OXYCODONE HYDROCHLORIDE 5 MG: 5 TABLET ORAL at 22:38

## 2024-02-08 RX ADMIN — CEFAZOLIN SODIUM 1000 MG: 1 SOLUTION INTRAVENOUS at 01:08

## 2024-02-08 RX ADMIN — ACETAMINOPHEN 975 MG: 325 TABLET, FILM COATED ORAL at 22:10

## 2024-02-08 RX ADMIN — POTASSIUM CHLORIDE 10 MEQ: 750 TABLET, EXTENDED RELEASE ORAL at 22:10

## 2024-02-08 RX ADMIN — ACETAMINOPHEN 975 MG: 325 TABLET, FILM COATED ORAL at 05:45

## 2024-02-08 RX ADMIN — METHOCARBAMOL TABLETS 750 MG: 750 TABLET, COATED ORAL at 05:45

## 2024-02-08 RX ADMIN — METHOCARBAMOL TABLETS 750 MG: 750 TABLET, COATED ORAL at 11:15

## 2024-02-08 RX ADMIN — TAMSULOSIN HYDROCHLORIDE 0.4 MG: 0.4 CAPSULE ORAL at 11:16

## 2024-02-08 RX ADMIN — OXYCODONE HYDROCHLORIDE 10 MG: 10 TABLET ORAL at 05:30

## 2024-02-08 RX ADMIN — OXYCODONE HYDROCHLORIDE 10 MG: 10 TABLET ORAL at 11:16

## 2024-02-08 RX ADMIN — OXYCODONE HYDROCHLORIDE 5 MG: 5 TABLET ORAL at 18:25

## 2024-02-08 RX ADMIN — ACETAMINOPHEN 975 MG: 325 TABLET, FILM COATED ORAL at 13:46

## 2024-02-08 RX ADMIN — METHOCARBAMOL TABLETS 750 MG: 750 TABLET, COATED ORAL at 16:59

## 2024-02-08 RX ADMIN — HYDROMORPHONE HYDROCHLORIDE 0.5 MG: 1 INJECTION, SOLUTION INTRAMUSCULAR; INTRAVENOUS; SUBCUTANEOUS at 07:40

## 2024-02-08 RX ADMIN — POTASSIUM CHLORIDE 10 MEQ: 750 TABLET, EXTENDED RELEASE ORAL at 16:59

## 2024-02-08 RX ADMIN — CEFAZOLIN SODIUM 1000 MG: 1 SOLUTION INTRAVENOUS at 09:18

## 2024-02-08 RX ADMIN — GABAPENTIN 300 MG: 300 CAPSULE ORAL at 22:10

## 2024-02-08 NOTE — PROGRESS NOTES
"[facility]  Progress Note  Name: Ramin Sadler I  MRN: [id number]  Unit/Bed#: [facility]PHP 720-01 I Date of Admission: 2/7/2024   Date of Service: 2/8/2024 I Hospital Day: 1    Assessment/Plan   * S/P cervical spinal fusion  Assessment & Plan  POD 1 s/p Posterior Cervical revision fusion C2-T3 and anterior C5 corpectomy with C4-6 ACDF for deformity correction (Dr. Camacho 2/8/24).   Hx of C5 teardrop fx with diffuse cervical spinal cord edema nad central cord syndrome after slipping and falling in shower s/p emergent C4-6 decompression with C3-T1 fusion on 9/9/21( Dr. Camacho).    Imaging reviewed personally and by attending. Final results as below  Xray Cervical spine 2/8/24: New anterior plate and screw fusion from C4-C6 with C5 corpectomy. Posterior fusion with transpedicular screws and rods from C2 through through T3. No findings of hardware failure. Straightened lordosis with improved focal kyphosis.    Plan    Continue regular neurologic checks.  Drains: will maintain all the drains today.   1 anterior drain: 90 cc overnight.   2 posterior drains: 10 cc overnight each.   Pain control with multimodal pain regimen: APS consulted. Appreciate input.   Bowel regimen with colace, miralax and senna.   Eval and mobilize per PT/OT  Cervical brace to worn at all times, for showers, switch to calos collar.   DVT ppx: SCDs, HSQ  Labs reviewed. Vitals signs stable.     Rounding completed with RN- Pita Arreguin.    Neurosurgery will continue to follow as primary. Please contact us with any questions or concerns.     Central cord syndrome (HCC)  Assessment & Plan  Hx of central cord syndrome. Seen plan above.            Subjective/Objective     Chief Complaint: 'My neck hurts\"    Subjective: Pt reports neck pain that he rates as 8/10 on the pain scale. Pt denies radicular pain in BUE. He denies any new numbness in BUE/BLE. He reports hx of weakness in left >right UE from before that improved " "and reports he has weakness in BUE currently. He reports that he tolerating oral intake without dysphagia. He reports he had been able to void since the chaudhari catheter was removed this am. He denies having a BM last night or this am.     Objective: Alert and awake, No acute distress.     I/O         02/06 0701  02/07 0700 02/07 0701 02/08 0700 02/08 0701 02/09 0700    P.O.   120    I.V. (mL/kg)  5300 (72.6)     IV Piggyback  750     Total Intake(mL/kg)  6050 (82.9) 120 (1.6)    Urine (mL/kg/hr)  5900 (3.4) 1000 (1.7)    Drains  110 50    Blood  200     Total Output  6210 1050    Net  -160 -930                   Invasive Devices       Peripheral Intravenous Line  Duration             Peripheral IV 02/07/24 Right Hand 1 day              Arterial Line  Duration             Arterial Line 02/07/24 Right Radial 1 day              Drain  Duration             Closed/Suction Drain Posterior;Left Neck Bulb 881 days    Closed/Suction Drain Anterior Neck Bulb 7 Fr. <1 day    Closed/Suction Drain Posterior Neck Bulb 7 Fr. <1 day    Closed/Suction Drain Posterior Neck Bulb 7 Fr. <1 day                    Physical Exam:  Vitals: Blood pressure 127/67, pulse 69, temperature 98.9 °F (37.2 °C), resp. rate 20, height 5' 8\" (1.727 m), weight 73 kg (160 lb 15 oz), SpO2 98%.,Body mass index is 24.47 kg/m².      General appearance:  Appears stated age  Head: Normocephalic, without obvious abnormality, atraumatic  Eyes: EOMI, PERRL  Neck: supple, symmetrical. Incisions clean, dry and intact. PENELOPE drains with serosanguinous drainage.  Lungs: non labored breathing  Heart: regular heart rate  Neurologic:   Mental status: Alert, oriented x 3, thought content appropriate  Cranial nerves: grossly intact (Cranial nerves II-XII)  Motor: BOWMAN, strength BUE 4+/5 except bilateral elbow adduction 3-/5, elbow abduction 4/5, BLE 5/5.  Sensation:  intact to LT X 4   Reflexes: 2+ and symmetric.     Lab Results:  Results from last 7 days   Lab Units " 02/08/24  0541 02/07/24  1447 02/07/24  1254   WBC Thousand/uL 11.73*  --   --    HEMOGLOBIN g/dL 12.7  --   --    I STAT HEMOGLOBIN g/dl  --  11.6* 11.6*   HEMATOCRIT % 36.8  --   --    HEMATOCRIT, ISTAT %  --  34* 34*   PLATELETS Thousands/uL 139*  --   --      Results from last 7 days   Lab Units 02/08/24 0541 02/07/24  1447 02/07/24  1254 02/07/24  1048   POTASSIUM mmol/L 4.2  --   --   --    CHLORIDE mmol/L 107  --   --   --    CO2 mmol/L 25  --   --   --    CO2, I-STAT mmol/L  --  22 24 22   BUN mg/dL 7  --   --   --    CREATININE mg/dL 0.65  --   --   --    CALCIUM mg/dL 9.2  --   --   --    GLUCOSE, ISTAT mg/dl  --  84 85 107                 Imaging Studies: I have personally reviewed pertinent reports and I have personally reviewed pertinent films in PACS    EKG, Pathology, and Other Studies: I have personally reviewed pertinent reports.        PLEASE NOTE:  This encounter may have been completed utilizing the [employer] Direct Speech Voice Recognition Software. Grammatical errors, random word insertions, pronoun errors and incomplete sentences are occasional consequences of the system due to software limitations, ambient noise and hardware issues.These may be missed by proof reading prior to affixing electronic signature. Any questions or concerns about the content, text or information contained within the body of this dictation should be directly addressed to the advanced practitioner or physician for clarification.

## 2024-02-08 NOTE — OCCUPATIONAL THERAPY NOTE
Occupational Therapy Evaluation     Patient Name: Ramin Sadler  Today's Date: 2/8/2024  Problem List  Principal Problem:    S/P cervical spinal fusion  Active Problems:    Central cord syndrome (HCC)    H/O cervical fracture    Chronic pain syndrome    Past Medical History  Past Medical History:   Diagnosis Date    C5 cervical fracture (HCC) 09/09/2021    Cervicogenic headache     Depression     HL (hearing loss)     Migraine     Post concussion syndrome     Tetraparesis (HCC)     Weakness      Past Surgical History  Past Surgical History:   Procedure Laterality Date    DECOMPRESSION SPINE CERVICAL POSTERIOR Bilateral 9/9/2021    Procedure: C4-6 POSTERIOR CERVICAL SPINE DECOMPRESSION WITH C3-T1 FUSION  ;  Surgeon: Christoph Camacho MD;  Location: [facility];  Service: Neurosurgery         02/08/24 0931   OT Last Visit   OT Visit Date 02/08/24   Note Type   Note type Evaluation   Pain Assessment   Pain Assessment Tool 0-10   Pain Score 8   Pain Location/Orientation Location: Neck   Pain Onset/Description Onset: Ongoing;Descriptor: Aching;Descriptor: Discomfort   Patient's Stated Pain Goal No pain   Hospital Pain Intervention(s) Repositioned;Ambulation/increased activity;Emotional support   Multiple Pain Sites No   Restrictions/Precautions   Weight Bearing Precautions Per Order No   Braces or Orthoses (S)  C/S Collar   Other Precautions Multiple lines;Telemetry;Fall Risk;Pain;Spinal precautions  (3 JPs)   Home Living   Type of Home House   Home Layout Two level  ([address])   Bathroom Shower/Tub Tub/shower unit  (actively being changed to a walk in shower)   Bathroom Toilet Standard   Bathroom Equipment Other (Comment)  (denies any)   Home Equipment Other (Comment)  (denies any)   Additional Comments Pt resides in 2 SH, [address]   Prior Function   Level of [place] Independent with ADLs;Independent with functional mobility;Independent with IADLS   Lives With Other (Comment)  (parents & older sister)   Receives Help  From Family;Other (Comment)  (sister is home during the day)   IADLs Independent with meal prep;Independent with medication management;Family/Friend/Other provides transportation  (parents drive him or walks)   Falls in the last 6 months 0   Vocational Unemployed   Comments (-)    Lifestyle   Autonomy I w/ ADLS and IADLS , (-) ; I w/ transfers and functional mobility PTA   Reciprocal Relationships Pt lives w/ his parents (both work) and sister (is home during the day). 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   ADL   Eating Assistance 7  Independent   Grooming Assistance 7  Independent   UB Bathing Assistance 5  Supervision/Setup   LB Bathing Assistance 5  Supervision/Setup   UB Dressing Assistance 5  Supervision/Setup   LB Dressing Assistance 5  Supervision/Setup   Toileting Assistance  5  Supervision/Setup   Functional Assistance 5  Supervision/Setup   Bed Mobility   Supine to Sit 5  Supervision   Additional items HOB elevated   Sit to Supine 5  Supervision   Additional items HOB elevated   Additional Comments pt sat EOB w/ Fair sitting balance/trunk control   Transfers   Sit to Stand 5  Supervision   Additional items Increased time required;Verbal cues   Stand to Sit 5  Supervision   Additional items Increased time required;Verbal cues   Additional Comments no AD/DME used   Functional Mobility   Functional Mobility 5  Supervision   Additional Comments pt engaged in short household distance functional mobility w/ S; no AD/DME used   Balance   Static Sitting Good   Dynamic Sitting Fair +   Static Standing Fair   Dynamic Standing Fair   Ambulatory Fair   Activity Tolerance   Activity Tolerance Patient tolerated treatment well   Medical Staff Made Aware PT   Nurse Made Aware yes   RUE Assessment   RUE Assessment WFL   LUE Assessment   LUE  Assessment WFL   Hand Function   Gross Motor Coordination Functional   Fine Motor Coordination Functional   Sensation   Light Touch No apparent deficits   Proprioception   Proprioception No apparent deficits   Vision-Basic Assessment   Current Vision Wears glasses all the time   Psychosocial   Psychosocial (WDL) WDL   Cognition   Overall Cognitive Status WFL   Arousal/Participation Responsive;Cooperative   Attention Within functional limits   Orientation Level Oriented X4   Memory Within functional limits   Following Commands Follows all commands and directions without difficulty   Comments pt is pleasant and cooperative   Assessment   Limitation Decreased endurance;Decreased high-level ADLs   Prognosis Fair   Assessment Pt is a 23 y/o male seen for OT eval s/p adm to B due to development of pseudoarthritis and worsening mechanical neck pain due to previous hx of traumatic C5 teardrop fx from a fall. Pt had previous emergent posterior C4-6 decompression w/ C3-T1 fusion. Pt is now s/p revision surgery w/ anterior approach for C5 corpectomy and C4-6 fixation on 2/7. Pt has C/S collar and active spinal precautions. Pt  has a past medical history of C5 cervical fracture (HCC) (09/09/2021), Cervicogenic headache, Depression, HL (hearing loss), Migraine, Post concussion syndrome, Tetraparesis (HCC), and Weakness. Pt with active OT orders and activity as tolerated orders. Pt lives with his parents and sister in [place], [address]. Pt was I w/  ADLS and IADLS, does not drive, & required no use of DME PTA. Pt is not currently demonstrating any significant deficits in occupational performance at this time. Overall is functioning at a S level for all functional tasks w/o use of AD/DME. Pt reports no concerns regarding D/C home w/ family support once medically stable. Will D/C OT at this time.   Goals   Patient Goals to go home   Discharge Recommendation   Rehab Resource Intensity Level, OT No post-acute rehabilitation needs    Additional Comments  The patient's raw score on the AM-PAC Daily Activity Inpatient Short Form is 24. A raw score of greater than or equal to 19 suggests the patient may benefit from discharge to home. Please refer to the recommendation of the Occupational Therapist for safe discharge planning.   Additional Comments 2 Pt seen as a co-session due to the patient's co-morbidities, clinically unstable presentation, and present impairments which are a regression from the patient's baseline.   AM-PAC Daily Activity Inpatient   Lower Body Dressing 4   Bathing 4   Toileting 4   Upper Body Dressing 4   Grooming 4   Eating 4   Daily Activity Raw Score 24   Daily Activity Standardized Score (Calc for Raw Score >=11) 57.54   AM-PAC Applied Cognition Inpatient   Following a Speech/Presentation 4   Understanding Ordinary Conversation 4   Taking Medications 4   Remembering Where Things Are Placed or Put Away 4   Remembering List of 4-5 Errands 4   Taking Care of Complicated Tasks 3   Applied Cognition Raw Score 23   Applied Cognition Standardized Score 53.08   End of Consult   Education Provided Yes   Patient Position at End of Consult Supine;All needs within reach;Bed/Chair alarm activated   Nurse Communication Nurse aware of consult;Nurse aware of brace application       Susana Kenney MS, OTR/L

## 2024-02-08 NOTE — PROGRESS NOTES
[facility]  Progress Note: Critical Care  Name: Ramin Sadler 24 y.o. male I MRN: [id number]  Unit/Bed#: PPHP 720-01 I Date of Admission: 2/7/2024   Date of Service: 2/8/2024 I Hospital Day: 1    Assessment/Plan   Neuro:   Diagnosis: Traumatic C5 teardrop fracture with diffuse cervical spinal cord edema, central cord syndrome now POD 0 s/p anterior posterior cervical revision  PMH C5 fracture with SCI 2021, had emergent posterior C4-6 decompression with C3-T1 fusion; developed pseudoarthrosis and worsening mechanical neck pain underwent revision surgery with anterior approach for C5 corpectomy with C4-6 fixation  Persistent migraines since the accident, follows with Dr. Whitaker  Plan:  2 drains; monitor output  Maintain cervical collar and c-spine precautions    XR c-spine  Restart home emgality and sumatriptan for migraines when appropriate  PT/OT when appropriate   Cefazolin x24hrs post op  Frequent neuro checks. CAM-ICU. Delirium precautions. Frequent re-orientation as able. Regulate sleep/wake cycle.   Pain control - APS consulted per Nsg --> Robaxin, Agnes 5/10, Dilaudid IV PRN     Diagnosis: Depression  Plan: Home Wellbutrin when tolerated     CV:   No acute issues  - Continuous cardiopulmonary monitoring  - MAP goal >65     Pulm:  No acute issues  - Continuous pulse ox  - Encourage IS, deep breathing, pulmonary toilet  - SpO2 goal >94%     GI:   No acute issues  - Bowel regimen  - Advance diet as tolerated  - Nausea control     :   No active issues  - Strict I&Os  - Monitor UOP     F/E/N:   F: //  E: monitor and replete to maintain K >4.0, Phos >3.0, Mag >2.0  N: Regular diet     Heme/Onc:   Diagnosis: Thrombocytopenia (chronic since 2021)  Plan:   Monitor for s/s bleeding, oozing, petechiae  Transfuse if necessary  CBC in AM  Hold VT ppx until cleared by Nsg. Apply SCDs as VTE ppx.        Endo:   No active issues  No hx of DM; euglycemic goal     ID:   Diagnosis:  Post-op abx   Plan:  Cefazolin x 24hr post-op  Monitor surgical site; keep clean  Monitor fever curve/WBC trend  Tylenol PRN for fever management     MSK/Skin:   No active issues  - PT/OT when appropriate  - Encourage ambulation  - Turn frequently while in bed      Disposition: likely stable for     ICU Core Measures     A: Assess, Prevent, and Manage Pain Has pain been assessed? Yes  Need for changes to pain regimen? No   B: Both SAT/SAT  N/A   C: Choice of Sedation RASS Goal: N/A patient not on sedation  Need for changes to sedation or analgesia regimen? NA   D: Delirium CAM-ICU: Negative   E: Early Mobility  Plan for early mobility? Yes   F: Family Engagement Plan for family engagement today? Yes       Antibiotic Review: Post op requirements     Review of Invasive Devices:    Arce Plan: Arce to be removed. Order has been placed    Maggi Plan: Discontinue arterial line    Prophylaxis:  VTE Contraindicated secondary to: restart when cleared by NSg   Stress Ulcer  not ordered        Significant 24hr Events     24hr events:   Patient seen and examined. No critical events overnight. Eager to start PT/rehab     Subjective     Review of Systems   Constitutional:  Negative for fatigue.   HENT:  Negative for trouble swallowing and voice change.    Eyes:  Negative for visual disturbance.   Respiratory:  Negative for shortness of breath.    Cardiovascular:  Negative for chest pain.   Gastrointestinal:  Negative for nausea and vomiting.   Endocrine: Negative for polyuria.   Neurological:  Positive for weakness. Negative for dizziness, numbness and headaches.   Psychiatric/Behavioral:  Negative for sleep disturbance.         Objective                            Vitals I/O      Most Recent Min/Max in 24hrs   Temp 98.1 °F (36.7 °C) Temp  Min: 97.9 °F (36.6 °C)  Max: 98.8 °F (37.1 °C)   Pulse 86 Pulse  Min: 56  Max: 112   Resp 19 Resp  Min: 12  Max: 23   /63 BP  Min: 99/49  Max: 147/76   O2 Sat 97 % SpO2  Min: 95 %  Max:  100 %      Intake/Output Summary (Last 24 hours) at 2/8/2024 0657  Last data filed at 2/8/2024 0600  Gross per 24 hour   Intake 6050 ml   Output 6210 ml   Net -160 ml       Diet Regular; Regular House    Invasive Monitoring           Physical Exam   Physical Exam  Eyes:      Extraocular Movements: Extraocular movements intact.      Pupils: Pupils are equal, round, and reactive to light.   HENT:      Head: Normocephalic and atraumatic.      Nose: No congestion or rhinorrhea.      Mouth/Throat:      Mouth: Mucous membranes are moist.      Pharynx: No oropharyngeal exudate.   Neck:      Vascular: No JVD.   Cardiovascular:      Rate and Rhythm: Normal rate and regular rhythm.      Heart sounds: Normal heart sounds.   Musculoskeletal:      Right lower leg: No edema.      Left lower leg: No edema.   Abdominal: General: Bowel sounds are normal. There is no distension.      Palpations: Abdomen is soft.      Tenderness: There is no abdominal tenderness.   Constitutional:       General: He is not in acute distress.     Appearance: He is well-developed and well-nourished.   Pulmonary:      Effort: Pulmonary effort is normal. No respiratory distress.      Breath sounds: Normal breath sounds. No stridor. No wheezing or rhonchi.   Psychiatric:         Speech: Speech is not no expressive aphasia.   Neurological:      General: No focal deficit present.      Mental Status: He is alert and oriented to person, place and time.      Cranial Nerves: No dysarthria or facial asymmetry.      Sensory: No sensory deficit.      Comments: Mild LUE/LLE weakness            Diagnostic Studies      EKG: Sinus bradycardia, incomplete RBBB  Imaging: XR CS I have personally reviewed pertinent reports.   and I have personally reviewed pertinent films in PACS     Medications:  Scheduled PRN   acetaminophen, 975 mg, Q8H ROCÍO  cefazolin, 1,000 mg, Q8H  docusate sodium, 100 mg, BID  gabapentin, 300 mg, HS  lidocaine, 2 patch, Daily  methocarbamol, 750 mg,  Q6H ROCÍO  polyethylene glycol, 17 g, Daily  potassium chloride, 10 mEq, TID  senna, 1 tablet, Daily      calcium carbonate, 1,000 mg, Daily PRN  HYDROmorphone, 0.5 mg, Q3H PRN  naloxone, 0.04 mg, Q1MIN PRN  ondansetron, 4 mg, Q6H PRN  oxyCODONE, 5 mg, Q4H PRN   Or  oxyCODONE, 10 mg, Q4H PRN       Continuous          Labs:    CBC    Recent Labs     02/07/24  1447 02/08/24  0541   WBC  --  11.73*   HGB 11.6* 12.7   HCT 34* 36.8   PLT  --  139*     BMP    Recent Labs     02/07/24  1447 02/08/24  0541   SODIUM  --  139   K  --  4.2   CL  --  107   CO2 22 25   AGAP  --  7   BUN  --  7   CREATININE  --  0.65   CALCIUM  --  9.2       Coags    No recent results     Additional Electrolytes  Recent Labs     02/07/24  1254 02/07/24  1447   CAIONIZED 1.30 1.25          Blood Gas    No recent results  No recent results LFTs  No recent results    Infectious  No recent results  Glucose  Recent Labs     02/08/24  0541   GLUC 142*                   Nichole White MD

## 2024-02-08 NOTE — CONSULTS
Consultation - Acute Pain Service  Ramin Sadler 24 y.o. male MRN: [id number]  Unit/Bed#: [facility] 720-01 Encounter: [id number]               Ramin Sadler is a 24 y.o. male with history of prior traumatic C5 fracture with diffuse cervical spinal cord edema and central cord syndrome requiring emergent posterior C4-6 decompression with C3-T1 fusion in September 2021.  He developed pseudoarthrosis and worsening mechanical neck pain and underwent revision surgery with posterior fusion C2-T3 and anterior C5 corpectomy with C4-6 ACDF for deformity correction.  Acute pain service was consulted for assistance in postop pain management.    * S/P cervical spinal fusion  Assessment & Plan  History of prior traumatic C5 fracture with diffuse cervical cord edema and central cord syndrome  Underwent prior emergent posterior C4-6 decompression with C3-T1 fusion (September 2021)  Now status post revision surgery with posterior fusion C2-T3/C4-6 ACDF for deformity correction  Patient seen by [facility] spine and pain for chronic neck pain -last visit April 2023    Multimodal analgesia:  Tylenol 975 mg every 8 hours scheduled  Gabapentin 300 mg daily at bedtime  Estimated Creatinine Clearance: 168.1 mL/min (by C-G formula based on SCr of 0.65 mg/dL).  Robaxin 750 mg every 6 hours scheduled  Oxycodone 5 mg every 4 hours as needed for moderate pain  Oxycodone 10 mg every 4 hours as needed for severe pain  IV Dilaudid 0.5 mg every 3 hours as needed for breakthrough pain  Narcan as needed for respiratory depression/opioid reversal      Patient may continue the current analgesic regimen as outlined above.  Patient reports adequate pain control with use of the current multimodal analgesic regimen and has been using as needed IV/p.o. opioids sparingly postoperatively. He has been up and ambulatory reporting postsurgical pain of the c-spine both anterior and posterior.  He denies weakness or paresthesias of the upper/lower extremities,  voiding appropriately.  Treatment plan and recommendations discussed with the primary care service.      APS will sign off at this time. Thank you for the consult. All opioids and other analgesics to be written at discretion of primary team. Please contact Acute Pain Service - via AgFlow from [phone] with additional questions or concerns. See Diet4Lifet or Chase for additional contacts and after hours information.    History of Present Illness    Admit Date:  2/7/2024  Hospital Day:  1 day  Primary Service:  Neurosurgery  Attending Provider:  Christoph Camacho MD  Physician Requesting Consult: Christoph Camacho MD  Reason for Consult / Principal Problem: C-spine revision surgery/postop pain  HPI: Ramin Sadler is a 24 y.o. year old male with history of prior traumatic C5 fracture with diffuse cervical spinal cord edema and central cord syndrome requiring emergent posterior C4-6 decompression with C3-T1 fusion in September 2021.  He developed pseudoarthrosis and worsening mechanical neck pain and underwent revision surgery with posterior fusion C2-T3 and anterior C5 corpectomy with C4-6 ACDF for deformity correction.  Acute pain service was consulted for assistance in postop pain management.  Upon bedside evaluation this morning, patient sitting in bed without acute distress.  He reports adequate reduction of pain with use of as needed opioid pain medications.  He denies any pain radiation into his upper extremities or paresthesias/weakness.  States he has been up and ambulating with physical therapy and tolerated repeat upright x-rays this morning.    Current pain location(s): Pain Score: 8  Pain Location/Orientation: Location: Neck  Pain Scale: Pain Assessment Tool: 0-10      Pain History: History of chronic neck pain from prior traumatic C5 fracture in 2021 and subsequent surgery.  Pain Management Physician: [facility]    I have reviewed the patient's controlled substance dispensing history in the  Prescription Drug Monitoring Program in compliance with the [facility] regulations before prescribing any controlled substances.     Inpatient consult to Acute Pain Service  Consult performed by: DANGELO Daley  Consult ordered by: Geraldine Bernardo PA-C          Review of Systems   Constitutional:  Negative for activity change and fatigue.   Gastrointestinal:  Negative for abdominal pain.   Genitourinary:  Negative for difficulty urinating.   Musculoskeletal:  Positive for arthralgias, myalgias and neck pain. Negative for back pain.   Neurological:  Negative for dizziness, weakness, light-headedness, numbness and headaches.   All other systems reviewed and are negative.      Historical Information   Past Medical History:   Diagnosis Date    C5 cervical fracture (HCC) 2021    Cervicogenic headache     Depression     HL (hearing loss)     Migraine     Post concussion syndrome     Tetraparesis (HCC)     Weakness      Past Surgical History:   Procedure Laterality Date    DECOMPRESSION SPINE CERVICAL POSTERIOR Bilateral 2021    Procedure: C4-6 POSTERIOR CERVICAL SPINE DECOMPRESSION WITH C3-T1 FUSION  ;  Surgeon: Christoph Camacho MD;  Location: [facility];  Service: Neurosurgery     Social History   Social History     Substance and Sexual Activity   Alcohol Use Never     Social History     Substance and Sexual Activity   Drug Use Never     Social History     Tobacco Use   Smoking Status Never   Smokeless Tobacco Never     Family History:   Family History   Problem Relation Age of Onset    No Known Problems Mother     Heart disease Father     Heart attack Father     Diabetes Paternal Grandmother     Heart disease Paternal Grandfather     Stroke Paternal Grandfather             Heart disease Paternal Uncle        Meds/Allergies   all current active meds have been reviewed, current meds:   Current Facility-Administered Medications   Medication Dose Route Frequency    acetaminophen (TYLENOL) tablet 975 mg   975 mg Oral Q8H ROCÍO    calcium carbonate (TUMS) chewable tablet 1,000 mg  1,000 mg Oral Daily PRN    docusate sodium (COLACE) capsule 100 mg  100 mg Oral BID    gabapentin (NEURONTIN) capsule 300 mg  300 mg Oral HS    HYDROmorphone (DILAUDID) injection 0.5 mg  0.5 mg Intravenous Q3H PRN    lidocaine (LIDODERM) 5 % patch 2 patch  2 patch Topical Daily    methocarbamol (ROBAXIN) tablet 750 mg  750 mg Oral Q6H ROCÍO    naloxone (NARCAN) 0.04 mg/mL syringe 0.04 mg  0.04 mg Intravenous Q1MIN PRN    ondansetron (ZOFRAN) injection 4 mg  4 mg Intravenous Q6H PRN    oxyCODONE (ROXICODONE) IR tablet 5 mg  5 mg Oral Q4H PRN    Or    oxyCODONE (ROXICODONE) immediate release tablet 10 mg  10 mg Oral Q4H PRN    polyethylene glycol (MIRALAX) packet 17 g  17 g Oral Daily    potassium chloride (Klor-Con M10) CR tablet 10 mEq  10 mEq Oral TID    senna (SENOKOT) tablet 8.6 mg  1 tablet Oral Daily    tamsulosin (FLOMAX) capsule 0.4 mg  0.4 mg Oral Daily With Dinner   , and PTA meds:   Prior to Admission Medications   Prescriptions Last Dose Informant Patient Reported? Taking?   Diclofenac Sodium (VOLTAREN) 1 % Unknown Self Yes No   Sig: as needed   Patient not taking: Reported on 11/20/2023   Galcanezumab-gnlm 120 MG/ML SOAJ More than a month  No No   Sig: Inject 120 mg under the skin every 30 (thirty) days   SUMAtriptan (Imitrex) 50 mg tablet More than a month Self No No   Sig: Take 1 tablet (50 mg total) by mouth once as needed for migraine Okay to repeat in 2 hours. Max 2 tablets in 24 hours.   Patient not taking: Reported on 1/30/2024   acetaminophen (TYLENOL) 500 mg tablet Past Month Self Yes Yes   Sig: Take 500 mg by mouth every 6 (six) hours as needed for mild pain   buPROPion (WELLBUTRIN XL) 150 mg 24 hr tablet More than a month Self Yes No   Sig: Take 150 mg by mouth daily   Patient not taking: Reported on 1/30/2024   lifitegrast (Xiidra) 5 % op solution Unknown Self Yes No   Patient not taking: Reported on 11/20/2023    polyethylene glycol (MIRALAX) 17 g packet  Self No No   Sig: Take 17 g by mouth daily for 14 days   Patient taking differently: Take 17 g by mouth if needed   potassium chloride (Klor-Con M10) 10 mEq tablet Past Month  No Yes   Sig: Take 1 tablet (10 mEq total) by mouth 3 (three) times a day   potassium chloride (MICRO-K) 10 MEQ CR capsule 2/5/2024  Yes Yes   Sig: Take 10 mEq by mouth 3 (three) times a day   pregabalin (LYRICA) 100 mg capsule 1/31/2024 Self No No   Sig: Take 1 PO AM and 2 PO HS   Patient not taking: Reported on 11/20/2023   propranolol (INDERAL LA) 120 mg 24 hr capsule More than a month Self Yes No   Sig: Take 120 mg by mouth daily   Patient not taking: Reported on 11/20/2023   tamsulosin (FLOMAX) 0.4 mg 1/31/2024 Self Yes No   Sig: Take 0.4 mg by mouth daily at bedtime   Patient not taking: Reported on 1/30/2024   tiZANidine (ZANAFLEX) 2 mg tablet Unknown Self Yes No   Sig: Take 2 mg by mouth 3 (three) times a day Taking daily   Patient not taking: Reported on 1/30/2024      Facility-Administered Medications: None       No Known Allergies    Objective   Vitals:    02/08/24 0544 02/08/24 0600 02/08/24 0725 02/08/24 1114   BP:  111/63 116/57 127/67   BP Location:  Right arm  Left arm   Pulse:  86 75 69   Resp:  19  20   Temp:  98.1 °F (36.7 °C) 98 °F (36.7 °C) 98.9 °F (37.2 °C)   TempSrc:  Oral     SpO2:  97% 97% 98%   Weight: 73 kg (160 lb 15 oz)      Height:             Intake/Output Summary (Last 24 hours) at 2/8/2024 1434  Last data filed at 2/8/2024 0918  Gross per 24 hour   Intake 4020 ml   Output 4460 ml   Net -440 ml       Physical Exam      Lab Results:  Estimated Creatinine Clearance: 168.1 mL/min (by C-G formula based on SCr of 0.65 mg/dL).  Lab Results   Component Value Date    WBC 11.73 (H) 02/08/2024    HGB 12.7 02/08/2024    HCT 36.8 02/08/2024     (L) 02/08/2024         Component Value Date/Time    K 4.2 02/08/2024 0541     02/08/2024 0541    CO2 25 02/08/2024 0541     CO2 22 02/07/2024 1447    BUN 7 02/08/2024 0541    CREATININE 0.65 02/08/2024 0541         Component Value Date/Time    CALCIUM 9.2 02/08/2024 0541    ALKPHOS 74 01/27/2024 1008    AST 14 01/27/2024 1008    ALT 14 01/27/2024 1008    TP 7.6 01/27/2024 1008    ALB 4.7 01/27/2024 1008       Imaging Studies/EKG: I have personally reviewed pertinent reports.      Counseling / Coordination of Care  Total floor / unit time spent today 30 minutes. Greater than 50% of total time was spent with the patient and / or family counseling and / or coordination of care. A description of the counseling / coordination of care: Patient interview, physical examination, review of PDMP, review of medical record, review of imaging and laboratory data, development of pain management plan, discussion of pain management plan with patient and primary service.      Please note that the APS provides consultative services regarding pain management only.  With the exception of ketamine and epidural infusions and except when indicated, final decisions regarding starting or changing doses of analgesic medications are at the discretion of the consulting service.  DANGELO Daley  Acute Pain Service

## 2024-02-08 NOTE — ASSESSMENT & PLAN NOTE
POD 1 s/p Posterior Cervical revision fusion C2-T3 and anterior C5 corpectomy with C4-6 ACDF for deformity correction (Dr. Camacho 2/8/24).   Hx of C5 teardrop fx with diffuse cervical spinal cord edema nad central cord syndrome after slipping and falling in shower s/p emergent C4-6 decompression with C3-T1 fusion on 9/9/21( Dr. Camacho).    Imaging reviewed personally and by attending. Final results as below  Xray Cervical spine 2/8/24: New anterior plate and screw fusion from C4-C6 with C5 corpectomy. Posterior fusion with transpedicular screws and rods from C2 through through T3. No findings of hardware failure. Straightened lordosis with improved focal kyphosis.    Plan    Continue regular neurologic checks.  Drains: will maintain all the drains today.   1 anterior drain: 90 cc overnight.   2 posterior drains: 10 cc overnight each.   Pain control with multimodal pain regimen: APS consulted. Appreciate input.   Bowel regimen with colace, miralax and senna.   Eval and mobilize per PT/OT  Cervical brace to worn at all times, for showers, switch to calos collar.   DVT ppx: SCDs, HSQ  Labs reviewed. Vitals signs stable.     Rounding completed with RN- Pita Arreguin.    Neurosurgery will continue to follow as primary. Please contact us with any questions or concerns.

## 2024-02-08 NOTE — UTILIZATION REVIEW
Initial Clinical Review    Elective IP surgical procedure  Age/Sex: 24 y.o. male  Surgery Date: 2/7/2024  Procedure:   Posterior removal of bilateral rods, placement of C2 pars articularis screws, replacement of bilateral C3 and C4 lateral mass screws, removal of broken right T1 screw, placement of bilateral pedicle screws at T2 and T3 using neuronavigation  Anterior C5 corpectomy for deformity correction with C4-6 fixation, use of intraoperative microscope   Posterior revision fusion for deformity correction, placement of new bilateral rods   Use of neuromonitoring  Use of intraoperative fluoroscopy    Anesthesia: General  Operative Findings:   -Satisfactory locations/trajectories of instrumentation confirmed with intraoperative CT scans and x-rays  -No sustained changes in neuromonitoring   -Adequate correction of kyphotic deformity confirmed with final x-ray    POD#1 Progress Note: ICU post-op -- No new neuro deficits noted; pt A&Ox4, moving all extremities, full sensation. 2x posterior PENELOPE drains in place and 1x anterior PENELOPE drain in place with sanguinous output, no clots present. C-collar in place. Monitor hemodynamics with goal MAP > 65mmHg. Neuro checks. Trend labs. Optimize electrolytes with goal K 4.5, Mg 2 and PO4 4. IV fluids as appropriate. Monitor drain output. Monitor UOP. Symptom management. Glycemic control. Home meds as appropriate. Maintain cervical collar and c-spine precautions. XR c-spine. Cefazolin x24hrs post op. Pain control - APS consulted per Nsg --> Robaxin, Agnes 5/10, Dilaudid IV PRN. Bowel regimen. Cont Pox. Encourage IS, deep breathing, pulmonary toilet. Reg diet as jun. Strict I/Os. Monitor CBC. SCDs, encourage ambulation. PT/OT evals.     Admission Orders: Date/Time/Statement:   Admission Orders (From admission, onward)       Ordered        02/07/24 0815  Inpatient Admission  Once                          Orders Placed This Encounter   Procedures    Inpatient Admission     Standing  Status:   Standing     Number of Occurrences:   1     Order Specific Question:   Level of Care     Answer:   Level 1 Stepdown [13]     Order Specific Question:   Estimated length of stay     Answer:   More than 2 Midnights     Order Specific Question:   Certification     Answer:   I certify that inpatient services are medically necessary for this patient for a duration of greater than two midnights. See H&P and MD Progress Notes for additional information about the patient's course of treatment.     Vital Signs:   Date/Time Temp Pulse Resp BP MAP (mmHg) Arterial Line BP MAP SpO2 Calculated FIO2 (%) - Nasal Cannula Nasal Cannula O2 Flow Rate (L/min) O2 Device Patient Position - Orthostatic VS   02/08/24 07:25:34 98 °F (36.7 °C) 75 -- 116/57 -- -- -- 97 % -- -- -- --   02/08/24 0600 98.1 °F (36.7 °C) 86 19 111/63 81 -- -- 97 % -- -- None (Room air) Lying   02/08/24 0400 -- 77 19 122/68 90 -- -- 95 % -- -- None (Room air) --   02/08/24 0200 98.8 °F (37.1 °C) 80 16 109/59 80 -- -- 95 % -- -- None (Room air) Lying   02/08/24 0000 -- 91 16 109/57 76 -- -- 97 % -- -- None (Room air) --   02/07/24 2200 -- 92 18 109/67 83 -- -- 96 % -- -- None (Room air) Lying   02/07/24 2100 98.5 °F (36.9 °C) 76 18 108/65 72 -- -- 96 % -- -- None (Room air) Lying   02/07/24 2025 -- 76 17 -- -- -- -- 97 % -- -- -- --   02/07/24 2021 -- 74 13 102/53 70 134/66 84 mmHg 98 % -- -- -- --   02/07/24 2020 -- -- -- -- -- -- -- 97 % -- -- None (Room air) --   02/07/24 2015 -- 70 13 -- -- 140/68 88 mmHg 97 % -- -- None (Room air) --   02/07/24 2000 -- 102 16 112/74 87 144/72 92 mmHg 98 % -- -- None (Room air) Lying   02/07/24 1950 -- 82 21 147/76 94 -- -- 98 % -- -- None (Room air) Lying   02/07/24 1945 -- 92 23 Abnormal  147/72 68 -- -- 97 % -- -- None (Room air) Lying   02/07/24 1930 -- 92 16 140/62 81 140/62 84 mmHg 97 % -- -- None (Room air) Lying   02/07/24 1925 -- 94 16 110/62 81 -- -- 98 % -- -- None (Room air) Lying   02/07/24 1915 98.4 °F  (36.9 °C) 95 18 127/52 -- -- -- 99 % 32 3 L/min Nasal cannula Lying   02/07/24 1908 98.4 °F (36.9 °C) 112 Abnormal  12 99/49 Abnormal  66 -- -- 100 % 44 6 L/min Simple mask Lying   02/07/24 0702 97.9 °F (36.6 °C) 56 16 118/66 -- -- -- 99 % -- -- None (Room air) --       Pertinent Labs/Diagnostic Test Results:   XR cervical spine 2 or 3 views   Final Result by Laureen Sutton MD (02/08 1030)      Fluoroscopy provided for procedure guidance.      Please refer to the separate procedure note for additional details.         CERVICAL SPINE      INDICATION:   Pseudoarthrosis of cervical spine.      COMPARISON: 11/20/2023      VIEWS:  XR SPINE CERVICAL 2 OR 3 VW INJURY         FINDINGS:      New anterior plate and screw fusion from C4-C6 with C5 corpectomy. Posterior fusion with transpedicular screws and rods from C2 through through C7. No findings of hardware failure.      Straightened lordosis with improved focal kyphosis.      Bones are intact.  No lytic or blastic lesions.      Preserved disc heights.      Mild postoperative prevertebral soft tissue. Anterior and posterior drains. Posterior skin staples. Clear visualized lungs.  Cardiomediastinal silhouette is within normal limits.      IMPRESSION:      Postoperative cervical spine as above.         XR spine cervical 2 or 3 vw injury   Final Result by Laureen Sutton MD (02/08 1030)      Fluoroscopy provided for procedure guidance.      Please refer to the separate procedure note for additional details.      The patient had follow-up radiographs 2/8/2024:            CERVICAL SPINE      INDICATION:   Pseudoarthrosis of cervical spine.      COMPARISON: 11/20/2023      VIEWS:  XR SPINE CERVICAL 2 OR 3 VW INJURY         FINDINGS:      New anterior plate and screw fusion from C4-C6 with C5 corpectomy. Posterior fusion with transpedicular screws and rods from C2 through through C7. No findings of hardware failure.      Straightened lordosis with improved focal kyphosis.       Bones are intact.  No lytic or blastic lesions.      Preserved disc heights.      Mild postoperative prevertebral soft tissue. Anterior and posterior drains. Posterior skin staples. Clear visualized lungs.  Cardiomediastinal silhouette is within normal limits.      IMPRESSION:      Postoperative cervical spine as above.            Results from last 7 days   Lab Units 02/08/24 0541 02/07/24  1447 02/07/24  1254 02/07/24  1048   WBC Thousand/uL 11.73*  --   --   --    HEMOGLOBIN g/dL 12.7  --   --   --    I STAT HEMOGLOBIN g/dl  --  11.6* 11.6* 11.6*   HEMATOCRIT % 36.8  --   --   --    HEMATOCRIT, ISTAT %  --  34* 34* 34*   PLATELETS Thousands/uL 139*  --   --   --      Results from last 7 days   Lab Units 02/08/24  0541 02/07/24 1447 02/07/24 1254 02/07/24  1048   SODIUM mmol/L 139  --   --   --    POTASSIUM mmol/L 4.2  --   --   --    CHLORIDE mmol/L 107  --   --   --    CO2 mmol/L 25  --   --   --    CO2, I-STAT mmol/L  --  22 24 22   ANION GAP mmol/L 7  --   --   --    BUN mg/dL 7  --   --   --    CREATININE mg/dL 0.65  --   --   --    EGFR ml/min/1.73sq m 135  --   --   --    CALCIUM mg/dL 9.2  --   --   --    CALCIUM, IONIZED, ISTAT mmol/L  --  1.25 1.30 1.08*         Results from last 7 days   Lab Units 02/07/24 2010 02/07/24  1911   POC GLUCOSE mg/dl 85 78     Results from last 7 days   Lab Units 02/08/24  0541   GLUCOSE RANDOM mg/dL 142*     Results from last 7 days   Lab Units 02/07/24 1447 02/07/24 1254 02/07/24  1048   I STAT BASE EXC mmol/L -3* -2 -4*   I STAT O2 SAT % 100* 100* 100*   ISTAT PH ART  7.383 7.394 7.377   I STAT ART PCO2 mm HG 35.6* 37.0 35.1*   I STAT ART PO2 mm .0* 286.0* 296.0*   I STAT ART HCO3 mmol/L 21.2* 22.7 20.6*       Results from last 7 days   Lab Units 02/02/24  1356   SED RATE mm/hour 16*     Scheduled Medications:  acetaminophen, 975 mg, Oral, Q8H ROCÍO  docusate sodium, 100 mg, Oral, BID  gabapentin, 300 mg, Oral, HS  lidocaine, 2 patch, Topical,  Daily  methocarbamol, 750 mg, Oral, Q6H ROCÍO  polyethylene glycol, 17 g, Oral, Daily  potassium chloride, 10 mEq, Oral, TID  senna, 1 tablet, Oral, Daily    Continuous IV Infusions:  albumin human (FLEXBUMIN) 5 % injection  Freq: Continuous PRN Route: IV  Last Dose: Stopped (02/07/24 1750)  Start: 02/07/24 1501 End: 02/07/24 1901      lactated ringers infusion  Rate: 125 mL/hr Dose: 125 mL/hr  Freq: Continuous Route: IV  Last Dose: 125 mL/hr (02/07/24 0759)  Start: 02/07/24 0645 End: 02/08/24 0009         PRN Meds:  calcium carbonate, 1,000 mg, Oral, Daily PRN  HYDROmorphone, 0.5 mg, Intravenous, Q3H PRN 2/8 x2  naloxone, 0.04 mg, Intravenous, Q1MIN PRN  ondansetron, 4 mg, Intravenous, Q6H PRN  oxyCODONE, 5 mg, Oral, Q4H PRN   Or  oxyCODONE, 10 mg, Oral, Q4H PRN 2/7 x1, 2/8 x1        Network Utilization Review Department  ATTENTION: Please call with any questions or concerns to [phone] and carefully listen to the prompts so that you are directed to the right person. All voicemails are confidential.   For Discharge needs, contact Care Management DC Support Team at [phone] opt. 2  Send all requests for admission clinical reviews, approved or denied determinations and any other requests to dedicated fax number below belonging to the [facility] where the patient is receiving treatment. List of dedicated fax numbers for the Facilities:  FACILITY NAME UR FAX NUMBER   ADMISSION DENIALS (Administrative/Medical Necessity) [phone]   DISCHARGE SUPPORT TEAM (NETWORK) [phone]   PARENT CHILD HEALTH (Maternity/NICU/Pediatrics) [phone]   [facility] [phone]   [facility] [phone]   [facility] [phone]   [facility] [phone]   [facility] [phone]   [facility] [phone]   [facility] [phone]   [facility]  Critical access hospital [phone]   [facility] [phone]   [facility] [phone]   [facility] [phone]   [facility] [phone]

## 2024-02-08 NOTE — ANESTHESIA POSTPROCEDURE EVALUATION
Post-Op Assessment Note    CV Status:  Stable  Pain Score: 0    Pain management: adequate       Mental Status:  Awake and alert   Hydration Status:  Stable   PONV Controlled:  None   Airway Patency:  Patent     Post Op Vitals Reviewed: Yes    No anethesia notable event occurred.    Staff: CRNA           /52 (02/07/24 1915)    Temp 98.4 °F (36.9 °C) (02/07/24 1915)    Pulse 95 (02/07/24 1915)   Resp 18 (02/07/24 1915)    SpO2 99 % (02/07/24 1915)

## 2024-02-08 NOTE — PROGRESS NOTES
Pt. To PACU. Report given. Course uneventful. VSS. Vision: intact; BOWMAN's x4; No c/o pain or PONV. Fully AAO x3.

## 2024-02-08 NOTE — PROGRESS NOTES
Postop check  Patient is now s/p posterior revision fusion C2-T3 and anterior C5 corpectomy with C4-6 ACDF for deformity correction, recovering well and neurologically stable.     Vitals:    02/08/24 0400 02/08/24 0544 02/08/24 0600 02/08/24 0725   BP: 122/68  111/63 116/57   BP Location:   Right arm    Pulse: 77  86 75   Resp: 19  19    Temp:   98.1 °F (36.7 °C) 98 °F (36.7 °C)   TempSrc:   Oral    SpO2: 95%  97% 97%   Weight:  73 kg (160 lb 15 oz)     Height:           Neurologic exam  Extubated without complication, waking up from anesthesia, opening eyes to name  Moving all extremities briskly to command, symmetric  Dressings CDI, collar on   3 drains in place (1 anterior 2 posterior)    Assessment  Patient is a 24-year-old male with h/o traumatic C5 teardrop fracture with diffuse cervical spinal cord edema and central cord syndrome, after slipping and falling in shower, s/p emergent posterior C4-6 decompression with C3-T1 fusion on 9/9/21 who developed pseudoarthrosis and worsening mechanical neck pain now s/p posterior revision fusion C2-T3 and anterior C5 corpectomy with C4-6 ACDF for deformity correction.    Plan  -Monitor closely in SDU overnight   -SBP<160  -Postop labs, no transfusions required intra-op   -Pain control per APS  -3 drains, monitor output   -Postop XR (intraoperative CT scans and x-rays satisfactory)   -Advance diet as tolerated with IV fluids for now  -Arce in place, remove ASAP   -PT/OT    Mother updated on the phone.    Christoph Camacho M.D.  Neurosurgeon

## 2024-02-08 NOTE — PLAN OF CARE
Problem: PAIN - ADULT  Goal: Verbalizes/displays adequate comfort level or baseline comfort level  Description: Interventions:  - Encourage patient to monitor pain and request assistance  - Assess pain using appropriate pain scale  - Administer analgesics based on type and severity of pain and evaluate response  - Implement non-pharmacological measures as appropriate and evaluate response  - Consider cultural and social influences on pain and pain management  - Notify physician/advanced practitioner if interventions unsuccessful or patient reports new pain  Outcome: Progressing     Problem: INFECTION - ADULT  Goal: Absence or prevention of progression during hospitalization  Description: INTERVENTIONS:  - Assess and monitor for signs and symptoms of infection  - Monitor lab/diagnostic results  - Monitor all insertion sites, i.e. indwelling lines, tubes, and drains  - Monitor endotracheal if appropriate and nasal secretions for changes in amount and color  - [place] appropriate cooling/warming therapies per order  - Administer medications as ordered  - Instruct and encourage patient and family to use good hand hygiene technique  - Identify and instruct in appropriate isolation precautions for identified infection/condition  Outcome: Progressing     Problem: SAFETY ADULT  Goal: Patient will remain free of falls  Description: INTERVENTIONS:  - Educate patient/family on patient safety including physical limitations  - Instruct patient to call for assistance with activity   - Consult OT/PT to assist with strengthening/mobility   - Keep Call bell within reach  - Keep bed low and locked with side rails adjusted as appropriate  - Keep care items and personal belongings within reach  - Initiate and maintain comfort rounds  - Make Fall Risk Sign visible to staff  - Offer Toileting every 2 Hours, in advance of need  - Initiate/Maintain bed alarm  - Obtain necessary fall risk management equipment  - Apply yellow socks and  bracelet for high fall risk patients  - Consider moving patient to room near nurses station  Outcome: Progressing  Goal: Maintain or return to baseline ADL function  Description: INTERVENTIONS:  -  Assess patient's ability to carry out ADLs; assess patient's baseline for ADL function and identify physical deficits which impact ability to perform ADLs (bathing, care of mouth/teeth, toileting, grooming, dressing, etc.)  - Assess/evaluate cause of self-care deficits   - Assess range of motion  - Assess patient's mobility; develop plan if impaired  - Assess patient's need for assistive devices and provide as appropriate  - Encourage maximum independence but intervene and supervise when necessary  - Involve family in performance of ADLs  - Assess for home care needs following discharge   - Consider OT consult to assist with ADL evaluation and planning for discharge  - Provide patient education as appropriate  Outcome: Progressing  Goal: Maintains/Returns to pre admission functional level  Description: INTERVENTIONS:  - Perform AM-PAC 6 Click Basic Mobility/ Daily Activity assessment daily.  - Set and communicate daily mobility goal to care team and patient/family/caregiver.   - Collaborate with rehabilitation services on mobility goals if consulted  - Perform Range of Motion 3 times a day.  - Reposition patient every 2 hours.  - Dangle patient 3 times a day  - Stand patient 3 times a day  - Ambulate patient 3 times a day  - Out of bed to chair 3 times a day   - Out of bed for meals 3 times a day  - Out of bed for toileting  - Record patient progress and toleration of activity level   Outcome: Progressing     Problem: DISCHARGE PLANNING  Goal: Discharge to home or other facility with appropriate resources  Description: INTERVENTIONS:  - Identify barriers to discharge w/patient and caregiver  - Arrange for needed discharge resources and transportation as appropriate  - Identify discharge learning needs (meds, wound care,  etc.)  - Arrange for interpretive services to assist at discharge as needed  - Refer to Case Management Department for coordinating discharge planning if the patient needs post-hospital services based on physician/advanced practitioner order or complex needs related to functional status, cognitive ability, or social support system  Outcome: Progressing     Problem: Knowledge Deficit  Goal: Patient/family/caregiver demonstrates understanding of disease process, treatment plan, medications, and discharge instructions  Description: Complete learning assessment and assess knowledge base.  Interventions:  - Provide teaching at level of understanding  - Provide teaching via preferred learning methods  Outcome: Progressing     Problem: Nutrition/Hydration-ADULT  Goal: Nutrient/Hydration intake appropriate for improving, restoring or maintaining nutritional needs  Description: Monitor and assess patient's nutrition/hydration status for malnutrition. Collaborate with interdisciplinary team and initiate plan and interventions as ordered.  Monitor patient's weight and dietary intake as ordered or per policy. Utilize nutrition screening tool and intervene as necessary. Determine patient's food preferences and provide high-protein, high-caloric foods as appropriate.     INTERVENTIONS:  - Monitor oral intake, urinary output, labs, and treatment plans  - Assess nutrition and hydration status and recommend course of action  - Evaluate amount of meals eaten  - Assist patient with eating if necessary   - Allow adequate time for meals  - Recommend/ encourage appropriate diets, oral nutritional supplements, and vitamin/mineral supplements  - Order, calculate, and assess calorie counts as needed  - Recommend, monitor, and adjust tube feedings and TPN/PPN based on assessed needs  - Assess need for intravenous fluids  - Provide specific nutrition/hydration education as appropriate  - Include patient/family/caregiver in decisions related  to nutrition  Outcome: Progressing     Problem: NEUROSENSORY - ADULT  Goal: Achieves stable or improved neurological status  Description: INTERVENTIONS  - Monitor and report changes in neurological status  - Monitor vital signs such as temperature, blood pressure, glucose, and any other labs ordered   - Initiate measures to prevent increased intracranial pressure  - Monitor for seizure activity and implement precautions if appropriate      Outcome: Progressing  Goal: Remains free of injury related to seizures activity  Description: INTERVENTIONS  - Maintain airway, patient safety  and administer oxygen as ordered  - Monitor patient for seizure activity, document and report duration and description of seizure to physician/advanced practitioner  - If seizure occurs,  ensure patient safety during seizure  - Reorient patient post seizure  - Seizure pads on all 4 side rails  - Instruct patient/family to notify RN of any seizure activity including if an aura is experienced  - Instruct patient/family to call for assistance with activity based on nursing assessment  - Administer anti-seizure medications if ordered    Outcome: Progressing    Goal: Incision(s), wounds(s) or drain site(s) healing without S/S of infection  Description: INTERVENTIONS  - Assess and document dressing, incision, wound bed, drain sites and surrounding tissue  - Provide patient and family education  - Perform skin care/dressing changes every day  Outcome: Progressing

## 2024-02-08 NOTE — ASSESSMENT & PLAN NOTE
History of prior traumatic C5 fracture with diffuse cervical cord edema and central cord syndrome  Underwent prior emergent posterior C4-6 decompression with C3-T1 fusion (September 2021)  Now status post revision surgery with posterior fusion C2-T3/C4-6 ACDF for deformity correction  Patient seen by  [facility] spine and pain for chronic neck pain -last visit April 2023    Multimodal analgesia:  Tylenol 975 mg every 8 hours scheduled  Gabapentin 300 mg daily at bedtime  Estimated Creatinine Clearance: 168.1 mL/min (by C-G formula based on SCr of 0.65 mg/dL).  Robaxin 750 mg every 6 hours scheduled  Oxycodone 5 mg every 4 hours as needed for moderate pain  Oxycodone 10 mg every 4 hours as needed for severe pain  IV Dilaudid 0.5 mg every 3 hours as needed for breakthrough pain  Narcan as needed for respiratory depression/opioid reversal

## 2024-02-08 NOTE — RESTORATIVE TECHNICIAN NOTE
"       Restorative Technician Note      Patient Name: Ramin Sadler     Restorative Tech Visit Date: 02/08/24  Note Type: Bracing, Initial consult  Patient Position Upon Consult: Supine    Follow up with Laveen Durham collar fit in the OR yesterday. Upon entering the patient's room it was noted the brace is very loose ( an inch of space between the patient's chin and the chin plate of the brace) with the patient being able to move his head/neck freely. I informed him the brace should be tighter to support his neck especially post surgery. Pt states he likes it this way, it has been this was all night and he states he does not freely move his head/neck around. Pt then turned the knob to increase the height of the chin plate and asked if that was better.    Laverne shower collar, cervical handout and replacement pads at the bedside for the patient. He states he is familiar with all the products for home.     Please contact Mobility Coordinator on Tiger text \"SLB-PT-Restorative Tech\" role in regards to bracing instruction and/or adjustment.     Miya Vargas,             "

## 2024-02-08 NOTE — CONSULTS
[facility]  Consult: Critical Care  Name: Ramin Sadler 24 y.o. male I MRN: [id number]  Unit/Bed#: [facility] 720-01 I Date of Admission: 2/7/2024   Date of Service: 2/8/2024 I Hospital Day: 1    History of Present Illness     HPI: Ramin Sadler is a 24 y.o. male with  PMHx of C5 teardrop fracture in 2021 who presents POD#0 from anterior approach for C5 corpectomy with C4-6 fixation due to worsening mechanical neck pain and arthrosis. Of note, pt slipped and fell in the shower in 2021 and needed emergent posterior C4-6 compression with C3-T11 fusion.     No new neurological deficits noted; pt A&Ox4, moving all extremities, full sensation. 2x posterior PENELOPE drains in place and 1x anterior PENELOPE drain in place with sanguinous output, no clots present. C-collar in place.     History obtained from chart review and the patient.    Inpatient consult to Medical Critical Care  Consult performed by: DANGELO Rivers  Consult ordered by: Geraldine Bernardo PA-C        Assessment/Plan   Neuro:   Diagnosis: Traumatic C5 teardrop fracture with diffuse cervical spinal cord edema, central cord syndrome now POD 0 s/p anterior posterior cervical revision  PMH C5 fracture with SCI 2021, had emergent posterior C4-6 decompression with C3-T1 fusion; developed pseudoarthrosis and worsening mechanical neck pain underwent revision surgery with anterior approach for C5 corpectomy with C4-6 fixation  Persistent migraines since the accident, follows with Dr. Whitaker  Plan:  2 drains; monitor output  Maintain cervical collar and c-spine precautions    XR c-spine  Restart home emgality and sumatriptan for migraines when appropriate  PT/OT when appropriate   Cefazolin x24hrs post op  Frequent neuro checks. CAM-ICU. Delirium precautions. Frequent re-orientation as able. Regulate sleep/wake cycle.   Pain control - APS consulted per Nsg --> Robaxin, Agnes 5/10, Dilaudid IV PRN    Diagnosis: Depression  Plan: Home  Wellbutrin when tolerated    CV:   No acute issues  - Continuous cardiopulmonary monitoring  - MAP goal >65    Pulm:  No acute issues  - Continuous pulse ox  - Encourage IS, deep breathing, pulmonary toilet  - SpO2 goal >94%    GI:   No acute issues  - Bowel regimen  - Advance diet as tolerated  - Nausea control    :   No active issues  - Strict I&Os  - Monitor UOP    F/E/N:   F: //  E: monitor and replete to maintain K >4.0, Phos >3.0, Mag >2.0  N: Regular diet    Heme/Onc:   Diagnosis: Thrombocytopenia (chronic since 2021)  Plan:   Monitor for s/s bleeding, oozing, petechiae  Transfuse if necessary  CBC in AM  Hold VT ppx until cleared by Nsg. Apply SCDs as VTE ppx.      Endo:   No active issues  No hx of DM; euglycemic goal    ID:   Diagnosis: Post-op abx   Plan:  Cefazolin x 24hr post-op  Monitor surgical site; keep clean  Monitor fever curve/WBC trend  Tylenol PRN for fever management    MSK/Skin:   No active issues  - PT/OT when appropriate  - Encourage ambulation  - Turn frequently while in bed    Disposition: Stepdown Level 1    Review of Systems   Constitutional: Negative.    HENT: Negative.     Eyes: Negative.    Respiratory: Negative.     Cardiovascular: Negative.    Gastrointestinal: Negative.    Endocrine: Negative.    Genitourinary: Negative.    Musculoskeletal:  Positive for neck pain (mild).   Skin: Negative.    Allergic/Immunologic: Negative.    Neurological: Negative.  Negative for dizziness, tremors, speech difficulty, weakness, light-headedness, numbness and headaches.   Psychiatric/Behavioral: Negative.       Historical Information   Past Medical History:  09/09/2021: C5 cervical fracture (HCC)  No date: Cervicogenic headache  No date: Depression  No date: HL (hearing loss)  No date: Migraine  No date: Post concussion syndrome  No date: Tetraparesis (HCC)  No date: Weakness Past Surgical History:  9/9/2021: DECOMPRESSION SPINE CERVICAL POSTERIOR; Bilateral      Comment:  Procedure: C4-6  POSTERIOR CERVICAL SPINE DECOMPRESSION                WITH C3-T1 FUSION  ;  Surgeon: Christoph Camacho MD;                 Location: [facility];  Service: Neurosurgery   Current Outpatient Medications   Medication Instructions    acetaminophen (TYLENOL) 500 mg, Oral, Every 6 hours PRN    buPROPion (WELLBUTRIN XL) 150 mg, Daily    Diclofenac Sodium (VOLTAREN) 1 % As needed    Galcanezumab-gnlm 120 mg, Subcutaneous, Every 30 days    lifitegrast (Xiidra) 5 % op solution No dose, route, or frequency recorded.    polyethylene glycol (MIRALAX) 17 g, Oral, Daily    potassium chloride (Klor-Con M10) 10 mEq tablet 10 mEq, Oral, 3 times daily    potassium chloride (MICRO-K) 10 MEQ CR capsule 10 mEq, Oral, 3 times daily    pregabalin (LYRICA) 100 mg capsule Take 1 PO AM and 2 PO HS    propranolol (INDERAL LA) 120 mg, Daily    SUMAtriptan (IMITREX) 50 mg, Oral, Once as needed, Okay to repeat in 2 hours. Max 2 tablets in 24 hours.    tamsulosin (FLOMAX) 0.4 mg, Daily at bedtime    tiZANidine (ZANAFLEX) 2 mg, 3 times daily    No Known Allergies   Social History     Tobacco Use    Smoking status: Never    Smokeless tobacco: Never   Vaping Use    Vaping status: Never Used   Substance Use Topics    Alcohol use: Never    Drug use: Never    Family History   Problem Relation Age of Onset    No Known Problems Mother     Heart disease Father     Heart attack Father     Diabetes Paternal Grandmother     Heart disease Paternal Grandfather     Stroke Paternal Grandfather             Heart disease Paternal Uncle           Objective                            Vitals I/O      Most Recent Min/Max in 24hrs   Temp 98.5 °F (36.9 °C) Temp  Min: 97.9 °F (36.6 °C)  Max: 98.5 °F (36.9 °C)   Pulse 76 Pulse  Min: 56  Max: 112   Resp 18 Resp  Min: 12  Max: 23   /65 BP  Min: 99/49  Max: 147/76   O2 Sat 96 % SpO2  Min: 96 %  Max: 100 %      Intake/Output Summary (Last 24 hours) at 2024 0043  Last data filed at 2024 2108  Gross per 24 hour    Intake 6050 ml   Output 5355 ml   Net 695 ml       Diet Regular; Regular House    Invasive Monitoring   Arterial Line  [place] /66  Arterial Line BP  Min: 134/66  Max: 144/72   MAP 84 mmHg  Arterial Line MAP (mmHg)  Min: 84 mmHg  Max: 92 mmHg           Physical Exam   Physical Exam  Vitals reviewed.   Eyes:      General: No scleral icterus.     Extraocular Movements: Extraocular movements intact.      Pupils: Pupils are equal, round, and reactive to light.   Skin:     General: Skin is warm and dry.   HENT:      Head: Normocephalic and atraumatic.      Mouth/Throat:      Mouth: Mucous membranes are moist.   Neck:      Comments: 2x posterior PENELOPE drains in place, 1x anterior PENELOPE drain in place; sanguinous output, no clots noted.  Cardiovascular:      Rate and Rhythm: Normal rate.      Pulses: Normal pulses.      Heart sounds: Normal heart sounds.   Musculoskeletal:         General: Normal range of motion.      Right lower leg: No edema.      Left lower leg: No edema.   Abdominal: General: Bowel sounds are normal.      Palpations: Abdomen is soft.      Tenderness: There is no abdominal tenderness.   Constitutional:       General: He is not in acute distress.     Appearance: He is well-developed and well-nourished. He is not ill-appearing.      Interventions: Cervical collar in place.   Pulmonary:      Effort: Pulmonary effort is normal.      Breath sounds: No wheezing, rhonchi or rales.   Neurological:      General: No focal deficit present.      Mental Status: He is alert and oriented to person, place and time. Mental status is at baseline.      Cranial Nerves: No facial asymmetry.      Sensory: Sensation is intact.      Motor: gross motor function is at baseline for patient. Strength full and intact in all extremities.        Corneal reflex present, cough reflex and gag reflex intact.   Genitourinary/Anorectal:  Arce present.          Diagnostic Studies      [email or web address] XR spine cervical 2 or 3 view:  pending    I have personally reviewed pertinent reports.   and I have personally reviewed pertinent films in PACS     Medications:  Scheduled PRN   acetaminophen, 975 mg, Q8H ROCÍO  cefazolin, 1,000 mg, Q8H  docusate sodium, 100 mg, BID  gabapentin, 300 mg, HS  lidocaine, 2 patch, Daily  methocarbamol, 750 mg, Q6H ROCÍO  polyethylene glycol, 17 g, Daily  potassium chloride, 10 mEq, TID  senna, 1 tablet, Daily      calcium carbonate, 1,000 mg, Daily PRN  HYDROmorphone, 0.5 mg, Q3H PRN  naloxone, 0.04 mg, Q1MIN PRN  ondansetron, 4 mg, Q6H PRN  oxyCODONE, 5 mg, Q4H PRN   Or  oxyCODONE, 10 mg, Q4H PRN       Continuous          Labs:    CBC    Recent Labs     02/07/24  1254 02/07/24  1447   HGB 11.6* 11.6*   HCT 34* 34*     BMP    Recent Labs     02/07/24  1254 02/07/24  1447   CO2 24 22       Coags    No recent results     Additional Electrolytes  Recent Labs     02/07/24  1254 02/07/24  1447   CAIONIZED 1.30 1.25          Blood Gas    No recent results  No recent results LFTs  No recent results    Infectious  No recent results  Glucose  No recent results           Non-Critical Care Time Statement: I have spent a total time of 15 minutes in caring for this patient including Instructions for management, Patient and family education, Risk factor reductions, Impressions, Counseling / Coordination of care, Documenting in the medical record, Reviewing / ordering tests, medicine, procedures  , and Obtaining or reviewing history  .     DANGELO Rivers

## 2024-02-09 PROCEDURE — 99024 POSTOP FOLLOW-UP VISIT: CPT | Performed by: NEUROLOGICAL SURGERY

## 2024-02-09 RX ORDER — HYDROMORPHONE HCL/PF 1 MG/ML
0.5 SYRINGE (ML) INJECTION EVERY 6 HOURS PRN
Status: DISCONTINUED | OUTPATIENT
Start: 2024-02-09 | End: 2024-02-10

## 2024-02-09 RX ORDER — HEPARIN SODIUM 5000 [USP'U]/ML
5000 INJECTION, SOLUTION INTRAVENOUS; SUBCUTANEOUS EVERY 8 HOURS SCHEDULED
Status: COMPLETED | OUTPATIENT
Start: 2024-02-09 | End: 2024-02-10

## 2024-02-09 RX ADMIN — HEPARIN SODIUM 5000 UNITS: 5000 INJECTION INTRAVENOUS; SUBCUTANEOUS at 21:03

## 2024-02-09 RX ADMIN — LIDOCAINE 5% 2 PATCH: 700 PATCH TOPICAL at 08:09

## 2024-02-09 RX ADMIN — HYDROMORPHONE HYDROCHLORIDE 0.5 MG: 1 INJECTION, SOLUTION INTRAMUSCULAR; INTRAVENOUS; SUBCUTANEOUS at 16:20

## 2024-02-09 RX ADMIN — DOCUSATE SODIUM 100 MG: 100 CAPSULE, LIQUID FILLED ORAL at 08:09

## 2024-02-09 RX ADMIN — POTASSIUM CHLORIDE 10 MEQ: 750 TABLET, EXTENDED RELEASE ORAL at 08:09

## 2024-02-09 RX ADMIN — POTASSIUM CHLORIDE 10 MEQ: 750 TABLET, EXTENDED RELEASE ORAL at 21:03

## 2024-02-09 RX ADMIN — POTASSIUM CHLORIDE 10 MEQ: 750 TABLET, EXTENDED RELEASE ORAL at 16:20

## 2024-02-09 RX ADMIN — HYDROMORPHONE HYDROCHLORIDE 0.5 MG: 1 INJECTION, SOLUTION INTRAMUSCULAR; INTRAVENOUS; SUBCUTANEOUS at 00:09

## 2024-02-09 RX ADMIN — METHOCARBAMOL TABLETS 750 MG: 750 TABLET, COATED ORAL at 05:11

## 2024-02-09 RX ADMIN — OXYCODONE HYDROCHLORIDE 10 MG: 10 TABLET ORAL at 23:38

## 2024-02-09 RX ADMIN — GABAPENTIN 300 MG: 300 CAPSULE ORAL at 21:03

## 2024-02-09 RX ADMIN — SENNOSIDES 8.6 MG: 8.6 TABLET, FILM COATED ORAL at 08:09

## 2024-02-09 RX ADMIN — ACETAMINOPHEN 975 MG: 325 TABLET, FILM COATED ORAL at 13:03

## 2024-02-09 RX ADMIN — TAMSULOSIN HYDROCHLORIDE 0.4 MG: 0.4 CAPSULE ORAL at 16:20

## 2024-02-09 RX ADMIN — HYDROMORPHONE HYDROCHLORIDE 0.5 MG: 1 INJECTION, SOLUTION INTRAMUSCULAR; INTRAVENOUS; SUBCUTANEOUS at 10:32

## 2024-02-09 RX ADMIN — ACETAMINOPHEN 975 MG: 325 TABLET, FILM COATED ORAL at 05:11

## 2024-02-09 RX ADMIN — METHOCARBAMOL TABLETS 750 MG: 750 TABLET, COATED ORAL at 12:32

## 2024-02-09 RX ADMIN — METHOCARBAMOL TABLETS 750 MG: 750 TABLET, COATED ORAL at 00:07

## 2024-02-09 RX ADMIN — OXYCODONE HYDROCHLORIDE 10 MG: 10 TABLET ORAL at 07:47

## 2024-02-09 RX ADMIN — METHOCARBAMOL TABLETS 750 MG: 750 TABLET, COATED ORAL at 17:17

## 2024-02-09 RX ADMIN — DOCUSATE SODIUM 100 MG: 100 CAPSULE, LIQUID FILLED ORAL at 17:17

## 2024-02-09 RX ADMIN — ACETAMINOPHEN 975 MG: 325 TABLET, FILM COATED ORAL at 21:03

## 2024-02-09 RX ADMIN — HEPARIN SODIUM 5000 UNITS: 5000 INJECTION INTRAVENOUS; SUBCUTANEOUS at 14:15

## 2024-02-09 RX ADMIN — OXYCODONE HYDROCHLORIDE 5 MG: 5 TABLET ORAL at 14:16

## 2024-02-09 RX ADMIN — POLYETHYLENE GLYCOL 3350 17 G: 17 POWDER, FOR SOLUTION ORAL at 08:09

## 2024-02-09 NOTE — ASSESSMENT & PLAN NOTE
POD 2 s/p Posterior Cervical revision fusion C2-T3 and anterior C5 corpectomy with C4-6 ACDF for deformity correction (Dr. Camacho 2/8/24).   Hx of C5 teardrop fx with diffuse cervical spinal cord edema nad central cord syndrome after slipping and falling in shower s/p emergent C4-6 decompression with C3-T1 fusion on 9/9/21( Dr. Camacho).    Imaging reviewed personally and by attending. Final results as below  Xray Cervical spine 2/8/24: New anterior plate and screw fusion from C4-C6 with C5 corpectomy. Posterior fusion with transpedicular screws and rods from C2 through through T3. No findings of hardware failure. Straightened lordosis with improved focal kyphosis.    Plan  Continue regular neurologic checks.  Drains:   Anterior drain: 25 cc/24 hrs. Drain d/c'ed and suture tied.   Right posterior drain: 20 cc/ 24 hrs. Drain d/c'ed and suture tied.   Left posterior drain: 220 cc/24 hrs. Remains in place due to high output.   Pain control with multimodal pain regimen: APS was consulted. Signed off. Appreciate recommendations.   Tylenol 975 mg q 8 hrs matty  Robaxin 750 mg q 6 hrs matty  Gabapentin 300 mg q HS  Lidocaine patches.  IV dilaudid decreased frequency to q 6 hrs prn- consider d/c tomorrow.   Bowel regimen with colace, miralax and senna.   Eval and mobilize per PT/OT  Cervical brace to worn at all times, for showers, switch to calos collar.   DVT ppx: SCDs, HSQ    Rounding completed with RN.     Neurosurgery will continue to follow as primary. Pt not stable for d/c pending drain removal and pain control. Please contact us with any questions or concerns.

## 2024-02-09 NOTE — PROGRESS NOTES
[facility]  Progress Note  Name: Ramin Sadler I  MRN: [id number]  Unit/Bed#: PPHP 732-01 I Date of Admission: 2/7/2024   Date of Service: 2/9/2024 I Hospital Day: 2    Assessment/Plan   * S/P cervical spinal fusion  Assessment & Plan  POD 2 s/p Posterior Cervical revision fusion C2-T3 and anterior C5 corpectomy with C4-6 ACDF for deformity correction (Dr. Camacho 2/8/24).   Hx of C5 teardrop fx with diffuse cervical spinal cord edema nad central cord syndrome after slipping and falling in shower s/p emergent C4-6 decompression with C3-T1 fusion on 9/9/21( Dr. Camacho).    Imaging reviewed personally and by attending. Final results as below  Xray Cervical spine 2/8/24: New anterior plate and screw fusion from C4-C6 with C5 corpectomy. Posterior fusion with transpedicular screws and rods from C2 through through T3. No findings of hardware failure. Straightened lordosis with improved focal kyphosis.    Plan  Continue regular neurologic checks.  Drains:   Anterior drain: 25 cc/24 hrs. Drain d/c'ed and suture tied.   Right posterior drain: 20 cc/ 24 hrs. Drain d/c'ed and suture tied.   Left posterior drain: 220 cc/24 hrs. Remains in place due to high output.   Pain control with multimodal pain regimen: APS was consulted. Signed off. Appreciate recommendations.   Tylenol 975 mg q 8 hrs matty  Robaxin 750 mg q 6 hrs matty  Gabapentin 300 mg q HS  Lidocaine patches.  IV dilaudid decreased frequency to q 6 hrs prn- consider d/c tomorrow.   Bowel regimen with colace, miralax and senna.   Eval and mobilize per PT/OT  Cervical brace to worn at all times, for showers, switch to calos collar.   DVT ppx: SCDs, HSQ    Rounding completed with RN.     Neurosurgery will continue to follow as primary. Pt not stable for d/c pending drain removal and pain control. Please contact us with any questions or concerns.     Central cord syndrome (HCC)  Assessment & Plan  Hx of central cord syndrome. Seen plan  "above.          Subjective/Objective     Chief Complaint: \"My neck hurts\"    Subjective: Pt reports incisional neck pain that he rates as 7 out of 107/10 on the pain scale. Pt denies radicular pain in BUE. Pt reports neck stiffness and \"tightness\". He reports some weakness in BUE. He denies any numbness or tingling. Pt reports he is tolerating oral intake. He reports he is voiding without issues. Denies bowel movement.     Objective: Alert and awake, No acute distress.     I/O         02/07 0701  02/08 0700 02/08 0701  02/09 0700 02/09 0701  02/10 0700    P.O.  120     I.V. (mL/kg) 5300 (72.6)      IV Piggyback 750      Total Intake(mL/kg) 6050 (82.9) 120 (1.7)     Urine (mL/kg/hr) 5900 (3.4) 2675 (1.6) 300 (0.7)    Drains 110 265     Blood 200      Total Output 6210 2940 300    Net -160 -2820 -300                   Invasive Devices       Peripheral Intravenous Line  Duration             Peripheral IV 02/07/24 Right Hand 2 days              Drain  Duration             Closed/Suction Drain Posterior;Left Neck Bulb 882 days    Closed/Suction Drain Anterior Neck Bulb 7 Fr. 1 day    Closed/Suction Drain Posterior Neck Bulb 7 Fr. 1 day    Closed/Suction Drain Posterior Neck Bulb 7 Fr. 1 day                    Physical Exam:  Vitals: Blood pressure 117/63, pulse 97, temperature 98 °F (36.7 °C), resp. rate 20, height 5' 8\" (1.727 m), weight 70.3 kg (154 lb 15.7 oz), SpO2 96%.,Body mass index is 23.57 kg/m².    General appearance:  Appears stated age  Head: Normocephalic, without obvious abnormality, atraumatic  Eyes: EOMI, PERRL  Neck: supple, symmetrical, anterior and posterior cervical spine incisions CDI. PENELOPE drains had serosanguinous fluid.   Lungs: non labored breathing  Heart: regular heart rate  Neurologic:   Mental status: Alert, oriented, thought content appropriate  Cranial nerves: grossly intact (Cranial nerves II-XII)  Sensory: normal to LT X 4.   Motor: moving all extremities, Strength BUE SF 4/5, EF 4+/5, " elbow adduction/abduction 4/5, IO 4+/5. BLE 5/5.   Coordination: no drift in bilateral upper extremities      Lab Results:  Results from last 7 days   Lab Units 02/08/24  0541 02/07/24  1447 02/07/24  1254   WBC Thousand/uL 11.73*  --   --    HEMOGLOBIN g/dL 12.7  --   --    I STAT HEMOGLOBIN g/dl  --  11.6* 11.6*   HEMATOCRIT % 36.8  --   --    HEMATOCRIT, ISTAT %  --  34* 34*   PLATELETS Thousands/uL 139*  --   --      Results from last 7 days   Lab Units 02/08/24  0541 02/07/24  1447 02/07/24  1254 02/07/24  1048   POTASSIUM mmol/L 4.2  --   --   --    CHLORIDE mmol/L 107  --   --   --    CO2 mmol/L 25  --   --   --    CO2, I-STAT mmol/L  --  22 24 22   BUN mg/dL 7  --   --   --    CREATININE mg/dL 0.65  --   --   --    CALCIUM mg/dL 9.2  --   --   --    GLUCOSE, ISTAT mg/dl  --  84 85 107                   Imaging Studies: I have personally reviewed pertinent reports and I have personally reviewed pertinent films in PACS    XR spine cervical 2 or 3 vw injury    Addendum Date: 2/8/2024    ADDENDUM: Please note there is an error in the original report. Posterior fusion extends from C2-T3.    Result Date: 2/8/2024  Impression: Fluoroscopy provided for procedure guidance. Please refer to the separate procedure note for additional details. The patient had follow-up radiographs 2/8/2024: CERVICAL SPINE INDICATION:   Pseudoarthrosis of cervical spine. COMPARISON: 11/20/2023 VIEWS:  XR SPINE CERVICAL 2 OR 3 VW INJURY FINDINGS: New anterior plate and screw fusion from C4-C6 with C5 corpectomy. Posterior fusion with transpedicular screws and rods from C2 through through C7. No findings of hardware failure. Straightened lordosis with improved focal kyphosis. Bones are intact.  No lytic or blastic lesions. Preserved disc heights. Mild postoperative prevertebral soft tissue. Anterior and posterior drains. Posterior skin staples. Clear visualized lungs.  Cardiomediastinal silhouette is within normal limits. IMPRESSION:  Postoperative cervical spine as above. Workstation performed: [id number]     XR cervical spine 2 or 3 views    Addendum Date: 2/8/2024    ADDENDUM: Please note there is an error in the original report. Posterior fusion extends from C2-T3.    Result Date: 2/8/2024  Impression: Fluoroscopy provided for procedure guidance. Please refer to the separate procedure note for additional details. The patient had follow-up radiographs 2/8/2024: CERVICAL SPINE INDICATION:   Pseudoarthrosis of cervical spine. COMPARISON: 11/20/2023 VIEWS:  XR SPINE CERVICAL 2 OR 3 VW INJURY FINDINGS: New anterior plate and screw fusion from C4-C6 with C5 corpectomy. Posterior fusion with transpedicular screws and rods from C2 through through C7. No findings of hardware failure. Straightened lordosis with improved focal kyphosis. Bones are intact.  No lytic or blastic lesions. Preserved disc heights. Mild postoperative prevertebral soft tissue. Anterior and posterior drains. Posterior skin staples. Clear visualized lungs.  Cardiomediastinal silhouette is within normal limits. IMPRESSION: Postoperative cervical spine as above. Workstation performed: [id number]        EKG, Pathology, and Other Studies: I have personally reviewed pertinent reports.      PLEASE NOTE:  This encounter may have been completed utilizing the [employer] Direct Speech Voice Recognition Software. Grammatical errors, random word insertions, pronoun errors and incomplete sentences are occasional consequences of the system due to software limitations, ambient noise and hardware issues.These may be missed by proof reading prior to affixing electronic signature. Any questions or concerns about the content, text or information contained within the body of this dictation should be directly addressed to the advanced practitioner or physician for clarification.

## 2024-02-09 NOTE — RESTORATIVE TECHNICIAN NOTE
Restorative Technician Note      Patient Name: Ramin Sadler     Note Type: Mobility  Patient Position Upon Consult: Bedside chair  Activity Performed: Ambulated; Dangled; Stood  Assistive Device: Other (Comment) (none)  Brace Applied: [employer] Collar Set  Education Provided: Yes  Patient Position at End of Consult: Bedside chair; All needs within reach    Ariela BLACKBURN, Restorative Technician,

## 2024-02-09 NOTE — PLAN OF CARE
Problem: PAIN - ADULT  Goal: Verbalizes/displays adequate comfort level or baseline comfort level  Description: Interventions:  - Encourage patient to monitor pain and request assistance  - Assess pain using appropriate pain scale  - Administer analgesics based on type and severity of pain and evaluate response  - Implement non-pharmacological measures as appropriate and evaluate response  - Consider cultural and social influences on pain and pain management  - Notify physician/advanced practitioner if interventions unsuccessful or patient reports new pain  Outcome: Progressing     Problem: INFECTION - ADULT  Goal: Absence or prevention of progression during hospitalization  Description: INTERVENTIONS:  - Assess and monitor for signs and symptoms of infection  - Monitor lab/diagnostic results  - Monitor all insertion sites, i.e. indwelling lines, tubes, and drains  - Monitor endotracheal if appropriate and nasal secretions for changes in amount and color  - [place] appropriate cooling/warming therapies per order  - Administer medications as ordered  - Instruct and encourage patient and family to use good hand hygiene technique  - Identify and instruct in appropriate isolation precautions for identified infection/condition  Outcome: Progressing     Problem: Knowledge Deficit  Goal: Patient/family/caregiver demonstrates understanding of disease process, treatment plan, medications, and discharge instructions  Description: Complete learning assessment and assess knowledge base.  Interventions:  - Provide teaching at level of understanding  - Provide teaching via preferred learning methods  Outcome: Progressing     Problem: Nutrition/Hydration-ADULT  Goal: Nutrient/Hydration intake appropriate for improving, restoring or maintaining nutritional needs  Description: Monitor and assess patient's nutrition/hydration status for malnutrition. Collaborate with interdisciplinary team and initiate plan and interventions as  ordered.  Monitor patient's weight and dietary intake as ordered or per policy. Utilize nutrition screening tool and intervene as necessary. Determine patient's food preferences and provide high-protein, high-caloric foods as appropriate.     INTERVENTIONS:  - Monitor oral intake, urinary output, labs, and treatment plans  - Assess nutrition and hydration status and recommend course of action  - Evaluate amount of meals eaten  - Assist patient with eating if necessary   - Allow adequate time for meals  - Recommend/ encourage appropriate diets, oral nutritional supplements, and vitamin/mineral supplements  - Order, calculate, and assess calorie counts as needed  - Recommend, monitor, and adjust tube feedings and TPN/PPN based on assessed needs  - Assess need for intravenous fluids  - Provide specific nutrition/hydration education as appropriate  - Include patient/family/caregiver in decisions related to nutrition  Outcome: Progressing     Problem: NEUROSENSORY - ADULT  Goal: Achieves stable or improved neurological status  Description: INTERVENTIONS  - Monitor and report changes in neurological status  - Monitor vital signs such as temperature, blood pressure, glucose, and any other labs ordered   - Initiate measures to prevent increased intracranial pressure  - Monitor for seizure activity and implement precautions if appropriate

## 2024-02-10 PROCEDURE — 99024 POSTOP FOLLOW-UP VISIT: CPT | Performed by: NEUROLOGICAL SURGERY

## 2024-02-10 RX ADMIN — METHOCARBAMOL TABLETS 750 MG: 750 TABLET, COATED ORAL at 23:08

## 2024-02-10 RX ADMIN — METHOCARBAMOL TABLETS 750 MG: 750 TABLET, COATED ORAL at 17:34

## 2024-02-10 RX ADMIN — ACETAMINOPHEN 975 MG: 325 TABLET, FILM COATED ORAL at 05:54

## 2024-02-10 RX ADMIN — OXYCODONE HYDROCHLORIDE 10 MG: 10 TABLET ORAL at 08:59

## 2024-02-10 RX ADMIN — HYDROMORPHONE HYDROCHLORIDE 0.5 MG: 1 INJECTION, SOLUTION INTRAMUSCULAR; INTRAVENOUS; SUBCUTANEOUS at 10:28

## 2024-02-10 RX ADMIN — POTASSIUM CHLORIDE 10 MEQ: 750 TABLET, EXTENDED RELEASE ORAL at 15:21

## 2024-02-10 RX ADMIN — OXYCODONE HYDROCHLORIDE 10 MG: 10 TABLET ORAL at 19:44

## 2024-02-10 RX ADMIN — POTASSIUM CHLORIDE 10 MEQ: 750 TABLET, EXTENDED RELEASE ORAL at 21:38

## 2024-02-10 RX ADMIN — SENNOSIDES 8.6 MG: 8.6 TABLET, FILM COATED ORAL at 08:54

## 2024-02-10 RX ADMIN — ACETAMINOPHEN 975 MG: 325 TABLET, FILM COATED ORAL at 21:38

## 2024-02-10 RX ADMIN — POTASSIUM CHLORIDE 10 MEQ: 750 TABLET, EXTENDED RELEASE ORAL at 08:54

## 2024-02-10 RX ADMIN — GABAPENTIN 300 MG: 300 CAPSULE ORAL at 21:38

## 2024-02-10 RX ADMIN — METHOCARBAMOL TABLETS 750 MG: 750 TABLET, COATED ORAL at 05:55

## 2024-02-10 RX ADMIN — METHOCARBAMOL TABLETS 750 MG: 750 TABLET, COATED ORAL at 12:03

## 2024-02-10 RX ADMIN — POLYETHYLENE GLYCOL 3350 17 G: 17 POWDER, FOR SOLUTION ORAL at 08:54

## 2024-02-10 RX ADMIN — TAMSULOSIN HYDROCHLORIDE 0.4 MG: 0.4 CAPSULE ORAL at 17:34

## 2024-02-10 RX ADMIN — METHOCARBAMOL TABLETS 750 MG: 750 TABLET, COATED ORAL at 00:14

## 2024-02-10 RX ADMIN — OXYCODONE HYDROCHLORIDE 10 MG: 10 TABLET ORAL at 15:20

## 2024-02-10 RX ADMIN — DOCUSATE SODIUM 100 MG: 100 CAPSULE, LIQUID FILLED ORAL at 17:34

## 2024-02-10 RX ADMIN — HEPARIN SODIUM 5000 UNITS: 5000 INJECTION INTRAVENOUS; SUBCUTANEOUS at 21:38

## 2024-02-10 RX ADMIN — HEPARIN SODIUM 5000 UNITS: 5000 INJECTION INTRAVENOUS; SUBCUTANEOUS at 15:24

## 2024-02-10 RX ADMIN — HEPARIN SODIUM 5000 UNITS: 5000 INJECTION INTRAVENOUS; SUBCUTANEOUS at 05:55

## 2024-02-10 RX ADMIN — DOCUSATE SODIUM 100 MG: 100 CAPSULE, LIQUID FILLED ORAL at 08:54

## 2024-02-10 RX ADMIN — ACETAMINOPHEN 975 MG: 325 TABLET, FILM COATED ORAL at 15:24

## 2024-02-10 NOTE — PROGRESS NOTES
"[facility]  Progress Note  Name: Ramin Sadler I  MRN: [id number]  Unit/Bed#: PPHP 732-01 I Date of Admission: 2/7/2024   Date of Service: 2/10/2024 I Hospital Day: 3    Assessment/Plan   * S/P cervical spinal fusion  Assessment & Plan  POD 3 s/p Posterior Cervical revision fusion C2-T3 and anterior C5 corpectomy with C4-6 ACDF for deformity correction (Dr. Camacho 2/8/24).   Hx of C5 teardrop fx with diffuse cervical spinal cord edema nad central cord syndrome after slipping and falling in shower s/p emergent C4-6 decompression with C3-T1 fusion on 9/9/21( Dr. Camacho).    Imaging:  Xray Cervical spine 2/8/24: New anterior plate and screw fusion from C4-C6 with C5 corpectomy. Posterior fusion with transpedicular screws and rods from C2 through through T3. No findings of hardware failure. Straightened lordosis with improved focal kyphosis.    Plan  Continue regular neurologic checks.  Drains:   Left posterior drain: 75 cc/24 hrs but not properly recorded. Will maintain today  Pain control with multimodal pain regimen: APS was consulted and signed off  Tylenol 975 mg q 8 hrs matty  Robaxin 750 mg q 6 hrs matty  Gabapentin 300 mg q HS  Lidocaine patches.  IV dilaudid d/c today  Bowel regimen with colace, miralax and senna.   + BM  Eval and mobilize per PT/OT  Cervical brace to worn at all times, for showers, switch to calos collar.   DVT ppx: SCDs, HSQ    Rounding completed with ADRY De Souza.     Neurosurgery will continue to follow as primary. Pt not stable for d/c pending drain removal and pain control. Please contact us with any questions or concerns.                      Subjective/Objective   Chief Complaint: \"My neck hurts\"    Subjective: Patient with no acute events overnight.  His pain is improving.  He states his pain feels mostly muscular up and down his bilateral back.  He is ambulating around the room.  He had a BM last night.  He is urinating without difficulty.  He is having " "difficulty swallowing and has not been eating much.  No new or worsening weakness, numbness, or paresthesias.    Objective: Patient ambulating around the room in no acute distress.  Vital signs stable.    Intake/Output                   02/10/24 0701 - 02/11/24 0700     1587-8150 5338-3127 Total              Intake    P.O.  240  -- 240    Total Intake 240 -- 240       Output    Drains  75  -- 75    Output (mL) (Closed/Suction Drain Posterior Neck Bulb 7 Fr.) 75 -- 75    Total Output 75 -- 75       Net I/O     165 -- 165            Invasive Devices       Peripheral Intravenous Line  Duration             Peripheral IV 02/07/24 Right Hand 3 days              Drain  Duration             Closed/Suction Drain Posterior;Left Neck Bulb 883 days    Closed/Suction Drain Anterior Neck Bulb 7 Fr. 2 days    Closed/Suction Drain Posterior Neck Bulb 7 Fr. 2 days    Closed/Suction Drain Posterior Neck Bulb 7 Fr. 2 days                    Vitals: Blood pressure 120/72, pulse 94, temperature 98.4 °F (36.9 °C), resp. rate 16, height 5' 8\" (1.727 m), weight 70.3 kg (154 lb 15.7 oz), SpO2 96%.,Body mass index is 23.57 kg/m².    General appearance: alert, appears stated age, cooperative and no distress  Head: Normocephalic, without obvious abnormality, atraumatic  Eyes: EOMI, PERRL, conjugate gaze  Neck: A/P incisions CDI, VISTA collar in place, PENELOPE drain to FS  Lungs: non labored breathing  Heart: regular heart rate  Neurologic:   Mental status: Alert, oriented, thought content appropriate, speech clear and fluent  Cranial nerves: grossly intact (Cranial nerves II-XII)  Sensory: normal to LT  Motor: moving all extremities without focal weakness except trace 4+/5 LUE weakness    Lab Results: I have personally reviewed pertinent results.      Results from last 7 days   Lab Units 02/08/24  0541 02/07/24  1447 02/07/24  1254   WBC Thousand/uL 11.73*  --   --    HEMOGLOBIN g/dL 12.7  --   --    I STAT HEMOGLOBIN g/dl  --  11.6* 11.6* " "  HEMATOCRIT % 36.8  --   --    HEMATOCRIT, ISTAT %  --  34* 34*   PLATELETS Thousands/uL 139*  --   --      Results from last 7 days   Lab Units 02/08/24  0541 02/07/24  1447 02/07/24  1254 02/07/24  1048   POTASSIUM mmol/L 4.2  --   --   --    CHLORIDE mmol/L 107  --   --   --    CO2 mmol/L 25  --   --   --    CO2, I-STAT mmol/L  --  22 24 22   BUN mg/dL 7  --   --   --    CREATININE mg/dL 0.65  --   --   --    CALCIUM mg/dL 9.2  --   --   --    GLUCOSE, ISTAT mg/dl  --  84 85 107                 No results found for: \"TROPONINT\"  ABG:No results found for: \"PHART\", \"LBJ3IGR\", \"PO2ART\", \"GNV2FLD\", \"H4SZOYFE\", \"BEART\", \"SOURCE\"    Imaging Studies: I have personally reviewed pertinent reports.   and I have personally reviewed pertinent films in PACS    XR spine cervical 2 or 3 vw injury    Addendum Date: 2/8/2024 Addendum:   ADDENDUM: Please note there is an error in the original report. Posterior fusion extends from C2-T3.    Result Date: 2/8/2024  Narrative: C-ARM - XR SPINE CERVICAL 2 OR 3 VW INJURY INDICATION: Pseudoarthrosis of cervical spine. Procedure guidance. TECHNIQUE: Fluoroscopic guidance provided. COMPARISON: None FLUOROSCOPY TIME: 167.83 SEC (accession [id number]) [id number] (accession [id number]), 3 (accession [id number]) FLUOROSCOPIC IMAGES FINDINGS: Fluoroscopy provided for procedure guidance. Osseous and soft tissue detail limited by technique.     Impression: Fluoroscopy provided for procedure guidance. Please refer to the separate procedure note for additional details. The patient had follow-up radiographs 2/8/2024: CERVICAL SPINE INDICATION:   Pseudoarthrosis of cervical spine. COMPARISON: 11/20/2023 VIEWS:  XR SPINE CERVICAL 2 OR 3 VW INJURY FINDINGS: New anterior plate and screw fusion from C4-C6 with C5 corpectomy. Posterior fusion with transpedicular screws and rods from C2 through through C7. No findings of hardware failure. Straightened lordosis with improved focal kyphosis. Bones are intact.  No lytic " or blastic lesions. Preserved disc heights. Mild postoperative prevertebral soft tissue. Anterior and posterior drains. Posterior skin staples. Clear visualized lungs.  Cardiomediastinal silhouette is within normal limits. IMPRESSION: Postoperative cervical spine as above. Workstation performed: [id number]     XR cervical spine 2 or 3 views    Addendum Date: 2/8/2024 Addendum:   ADDENDUM: Please note there is an error in the original report. Posterior fusion extends from C2-T3.    Result Date: 2/8/2024  Narrative: C-ARM - XR SPINE CERVICAL 2 OR 3 VW INJURY INDICATION: Pseudoarthrosis of cervical spine. Procedure guidance. TECHNIQUE: Fluoroscopic guidance provided. COMPARISON: None FLUOROSCOPY TIME: 167.83 SEC (accession [id number]) [id number] (accession [id number]), 3 (accession [id number]) FLUOROSCOPIC IMAGES FINDINGS: Fluoroscopy provided for procedure guidance. Osseous and soft tissue detail limited by technique.     Impression: Fluoroscopy provided for procedure guidance. Please refer to the separate procedure note for additional details. The patient had follow-up radiographs 2/8/2024: CERVICAL SPINE INDICATION:   Pseudoarthrosis of cervical spine. COMPARISON: 11/20/2023 VIEWS:  XR SPINE CERVICAL 2 OR 3 VW INJURY FINDINGS: New anterior plate and screw fusion from C4-C6 with C5 corpectomy. Posterior fusion with transpedicular screws and rods from C2 through through C7. No findings of hardware failure. Straightened lordosis with improved focal kyphosis. Bones are intact.  No lytic or blastic lesions. Preserved disc heights. Mild postoperative prevertebral soft tissue. Anterior and posterior drains. Posterior skin staples. Clear visualized lungs.  Cardiomediastinal silhouette is within normal limits. IMPRESSION: Postoperative cervical spine as above. Workstation performed: [id number]     EKG, Pathology, and Other Studies: I have personally reviewed pertinent reports.      VTE Pharmacologic Prophylaxis: Heparin    VTE  Mechanical Prophylaxis: sequential compression device

## 2024-02-10 NOTE — CASE MANAGEMENT
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, unspecified etiology (HCC) 2023    Chronic pain syndrome 2023    S/P cervical spinal fusion 2023    Cervical myelopathy (HCC) 2023    Family history of heart attack 2022    Positive depression screening 2022    H/O cervical fracture 2022    Central cord syndrome (HCC) 2021      LOS (days): 3  Geometric Mean LOS (GMLOS) (days):   Days to GMLOS:     OBJECTIVE:    Risk of Unplanned Readmission Score: 7.64         Current admission status: Inpatient  Referral Reason: Other (Post Acute Placement)    Preferred Pharmacy:   [facility]/pharmacy #[id number] - [place], [place] - [address]  [address] [address]  Phone: [phone] Fax: [phone]    Primary Care Provider: Rony Funk MD    Primary Insurance: BLUE CROSS  Secondary Insurance: [employer]    ASSESSMENT:  Active Health Care Proxies    There are no active Health Care Proxies on file.                 Readmission Root Cause  30 Day Readmission: No    Patient Information  Admitted from:: Home  Mental Status: Alert  During Assessment patient was accompanied by: Not accompanied during assessment  Assessment information provided by:: Patient  Primary Caregiver: Self  Support Systems: Self, Parent, Friend  County of Residence: [place]  What city do you live in?: [place]  Home entry access options. Select all that apply.: Stairs  Number of steps to enter home.: 4  Do the steps have railings?: Yes  Type of Current  Residence: 2 story home  Upon entering residence, is there a bedroom on the main floor (no further steps)?: No  A bedroom is located on the following floor levels of residence (select all that apply):: 2nd Floor  Upon entering residence, is there a bathroom on the main floor (no further steps)?: No  Indicate which floors of current residence have a bathroom (select all the apply):: 2nd Floor  Number of steps to 2nd floor from main floor: One Flight  Living Arrangements: Lives w/ Parent(s)  Is patient a ?: No    Activities of Daily Living Prior to Admission  Functional Status: Independent  Completes ADLs independently?: Yes  Ambulates independently?: Yes  Does patient use assisted devices?: No  Does patient currently own DME?: No  Does patient have a history of Outpatient Therapy (PT/OT)?: Yes  Does the patient have a history of Short-Term Rehab?: Yes ([facility] after last surgery)  Does patient have a history of HHC?: No  Does patient currently have HHC?: No         Patient Information Continued  Income Source: Unemployed  Does patient have prescription coverage?: Yes  Does patient receive dialysis treatments?: No  Does patient have a history of substance abuse?: No  Does patient have a history of Mental Health Diagnosis?: No         Means of Transportation  Means of Transport to Appts:: Family transport      Housing Stability: Low Risk  (2/10/2024)    Housing Stability Vital Sign     Unable to Pay for Housing in the Last Year: No     Number of Places Lived in the Last Year: 1     Unstable Housing in the Last Year: No   Food Insecurity: No Food Insecurity (2/10/2024)    Hunger Vital Sign     Worried About Running Out of Food in the Last Year: Never true     Ran Out of Food in the Last Year: Never true   Transportation Needs: No Transportation Needs (2/10/2024)    PRAPARE - Transportation     Lack of Transportation (Medical): No     Lack of Transportation (Non-Medical): No   Utilities: Not At Risk  (2/10/2024)    [facility] Utilities     Threatened with loss of utilities: No       DISCHARGE DETAILS:    Discharge planning discussed with:: Patient  Freedom of Choice: Yes  Comments - Freedom of Choice: No current CM DC needs discussed or identifed  CM contacted family/caregiver?: No- see comments (Patient is alert and oriented/ declined CM outreach)             Contacts  Patient Contacts: Patient  Relationship to Patient:: Other (Comment)  Contact Method: In Person  Reason/Outcome: Discharge Planning, Continuity of Care       CM met with patient at bedside.  CM name and role reviewed.  CM assessment completed and charted above.    PT/OT evals pending for DC recommendations.    Prior to admission patient is a self from home with family.    CM reviewed discharge planning process including the following: identifying caregivers at home, preference for d/c planning needs, [facility] Meds to Bed program, availability of treatment team to discuss questions or concerns patient and/or family may have regarding diagnosis, plan of care, old or new medications and discharge planning.  CM will continue to follow for care coordination and update assessment as necessary.

## 2024-02-10 NOTE — ASSESSMENT & PLAN NOTE
POD 3 s/p Posterior Cervical revision fusion C2-T3 and anterior C5 corpectomy with C4-6 ACDF for deformity correction (Dr. Camacho 2/8/24).   Hx of C5 teardrop fx with diffuse cervical spinal cord edema nad central cord syndrome after slipping and falling in shower s/p emergent C4-6 decompression with C3-T1 fusion on 9/9/21( Dr. Camacho).    Imaging:  Xray Cervical spine 2/8/24: New anterior plate and screw fusion from C4-C6 with C5 corpectomy. Posterior fusion with transpedicular screws and rods from C2 through through T3. No findings of hardware failure. Straightened lordosis with improved focal kyphosis.    Plan  Continue regular neurologic checks.  Drains:   Left posterior drain: 75 cc/24 hrs but not properly recorded. Will maintain today  Pain control with multimodal pain regimen: APS was consulted and signed off  Tylenol 975 mg q 8 hrs matty  Robaxin 750 mg q 6 hrs matty  Gabapentin 300 mg q HS  Lidocaine patches.  IV dilaudid d/c today  Bowel regimen with colace, miralax and senna.   + BM  Eval and mobilize per PT/OT  Cervical brace to worn at all times, for showers, switch to calos collar.   DVT ppx: SCDs, HSQ    Rounding completed with ADRY De Souza.     Neurosurgery will continue to follow as primary. Pt not stable for d/c pending drain removal and pain control. Please contact us with any questions or concerns.

## 2024-02-10 NOTE — PLAN OF CARE
Problem: PAIN - ADULT  Goal: Verbalizes/displays adequate comfort level or baseline comfort level  Description: Interventions:  - Encourage patient to monitor pain and request assistance  - Assess pain using appropriate pain scale  - Administer analgesics based on type and severity of pain and evaluate response  - Implement non-pharmacological measures as appropriate and evaluate response  - Consider cultural and social influences on pain and pain management  - Notify physician/advanced practitioner if interventions unsuccessful or patient reports new pain  Outcome: Progressing     Problem: INFECTION - ADULT  Goal: Absence or prevention of progression during hospitalization  Description: INTERVENTIONS:  - Assess and monitor for signs and symptoms of infection  - Monitor lab/diagnostic results  - Monitor all insertion sites, i.e. indwelling lines, tubes, and drains  - Monitor endotracheal if appropriate and nasal secretions for changes in amount and color  - [place] appropriate cooling/warming therapies per order  - Administer medications as ordered  - Instruct and encourage patient and family to use good hand hygiene technique  - Identify and instruct in appropriate isolation precautions for identified infection/condition  Outcome: Progressing     Problem: SAFETY ADULT  Goal: Patient will remain free of falls  Description: INTERVENTIONS:  - Educate patient/family on patient safety including physical limitations  - Instruct patient to call for assistance with activity   - Consult OT/PT to assist with strengthening/mobility   - Keep Call bell within reach  - Keep bed low and locked with side rails adjusted as appropriate  - Keep care items and personal belongings within reach  - Initiate and maintain comfort rounds  - Make Fall Risk Sign visible to staff  - Apply yellow socks and bracelet for high fall risk patients  - Consider moving patient to room near nurses station  Outcome: Progressing  Goal: Maintain or  return to baseline ADL function  Description: INTERVENTIONS:  -  Assess patient's ability to carry out ADLs; assess patient's baseline for ADL function and identify physical deficits which impact ability to perform ADLs (bathing, care of mouth/teeth, toileting, grooming, dressing, etc.)  - Assess/evaluate cause of self-care deficits   - Assess range of motion  - Assess patient's mobility; develop plan if impaired  - Assess patient's need for assistive devices and provide as appropriate  - Encourage maximum independence but intervene and supervise when necessary  - Involve family in performance of ADLs  - Assess for home care needs following discharge   - Consider OT consult to assist with ADL evaluation and planning for discharge  - Provide patient education as appropriate  Outcome: Progressing  Goal: Maintains/Returns to pre admission functional level  Description: INTERVENTIONS:  - Perform AM-PAC 6 Click Basic Mobility/ Daily Activity assessment daily.  - Set and communicate daily mobility goal to care team and patient/family/caregiver.   - Collaborate with rehabilitation services on mobility goals if consulted  -- Out of bed for toileting  - Record patient progress and toleration of activity level   Outcome: Progressing     Problem: DISCHARGE PLANNING  Goal: Discharge to home or other facility with appropriate resources  Description: INTERVENTIONS:  - Identify barriers to discharge w/patient and caregiver  - Arrange for needed discharge resources and transportation as appropriate  - Identify discharge learning needs (meds, wound care, etc.)  - Arrange for interpretive services to assist at discharge as needed  - Refer to Case Management Department for coordinating discharge planning if the patient needs post-hospital services based on physician/advanced practitioner order or complex needs related to functional status, cognitive ability, or social support system  Outcome: Progressing     Problem:  Nutrition/Hydration-ADULT  Goal: Nutrient/Hydration intake appropriate for improving, restoring or maintaining nutritional needs  Description: Monitor and assess patient's nutrition/hydration status for malnutrition. Collaborate with interdisciplinary team and initiate plan and interventions as ordered.  Monitor patient's weight and dietary intake as ordered or per policy. Utilize nutrition screening tool and intervene as necessary. Determine patient's food preferences and provide high-protein, high-caloric foods as appropriate.     INTERVENTIONS:  - Monitor oral intake, urinary output, labs, and treatment plans  - Assess nutrition and hydration status and recommend course of action  - Evaluate amount of meals eaten  - Assist patient with eating if necessary   - Allow adequate time for meals  - Recommend/ encourage appropriate diets, oral nutritional supplements, and vitamin/mineral supplements  - Order, calculate, and assess calorie counts as needed  - Recommend, monitor, and adjust tube feedings and TPN/PPN based on assessed needs  - Assess need for intravenous fluids  - Provide specific nutrition/hydration education as appropriate  - Include patient/family/caregiver in decisions related to nutrition  Outcome: Progressing     Problem: NEUROSENSORY - ADULT  Goal: Achieves stable or improved neurological status  Description: INTERVENTIONS  - Monitor and report changes in neurological status  - Monitor vital signs such as temperature, blood pressure, glucose, and any other labs ordered   - Initiate measures to prevent increased intracranial pressure  - Monitor for seizure activity and implement precautions if appropriate      Outcome: Progressing  Goal: Remains free of injury related to seizures activity  Description: INTERVENTIONS  - Maintain airway, patient safety  and administer oxygen as ordered  - Monitor patient for seizure activity, document and report duration and description of seizure to physician/advanced  practitioner  - If seizure occurs,  ensure patient safety during seizure  - Reorient patient post seizure  - Seizure pads on all 4 side rails  - Instruct patient/family to notify RN of any seizure activity including if an aura is experienced  - Instruct patient/family to call for assistance with activity based on nursing assessment  - Administer anti-seizure medications if ordered    Outcome: Progressing  Goal: Achieves maximal functionality and self care  Description: INTERVENTIONS  - Monitor swallowing and airway patency with patient fatigue and changes in neurological status  - Encourage and assist patient to increase activity and self care.   - Encourage visually impaired, hearing impaired and aphasic patients to use assistive/communication devices  Outcome: Progressing     Problem: SKIN/TISSUE INTEGRITY - ADULT  Goal: Skin Integrity remains intact(Skin Breakdown Prevention)  Description: Assess:  Bed Management:  -Have minimal linens on bed & keep smooth, unwrinkled  -Change linens as needed when moist or perspiring  Toileting:  -Offer bedside commode  -Encourage activity and walks on unit  -Encourage or provide ROM exercises     Skin Care:  -Avoid use of baby powder, tape, friction and shearing, hot water or constrictive clothing  -  Goal: Incision(s), wounds(s) or drain site(s) healing without S/S of infection  Description: INTERVENTIONS  - Assess and document dressing, incision, wound bed, drain sites and surrounding tissue  - Provide patient and family education  Insert SmartText    Outcome: Progressing  Goal: Pressure injury heals and does not worsen  Description: Interventions:  - Implement low air loss mattress or specialty surface (Criteria met)  - Apply silicone foam dressing  - Consider nutrition services referral as needed  Outcome: Progressing     Problem: Prexisting or High Potential for Compromised Skin Integrity  Goal: Skin integrity is maintained or improved  Description: INTERVENTIONS:  -  Identify patients at risk for skin breakdown  - Assess and monitor skin integrity  - Assess and monitor nutrition and hydration status  - Monitor labs   - Assess for incontinence   - Turn and reposition patient  - Assist with mobility/ambulation  - Relieve pressure over bony prominences  - Avoid friction and shearing  - Provide appropriate hygiene as needed including keeping skin clean and dry  - Evaluate need for skin moisturizer/barrier cream  - Collaborate with interdisciplinary team   - Patient/family teaching  - Consider wound care consult   Outcome: Progressing

## 2024-02-10 NOTE — PLAN OF CARE
Problem: PAIN - ADULT  Goal: Verbalizes/displays adequate comfort level or baseline comfort level  Description: Interventions:  - Encourage patient to monitor pain and request assistance  - Assess pain using appropriate pain scale  - Administer analgesics based on type and severity of pain and evaluate response  - Implement non-pharmacological measures as appropriate and evaluate response  - Consider cultural and social influences on pain and pain management  - Notify physician/advanced practitioner if interventions unsuccessful or patient reports new pain  Outcome: Progressing     Problem: INFECTION - ADULT  Goal: Absence or prevention of progression during hospitalization  Description: INTERVENTIONS:  - Assess and monitor for signs and symptoms of infection  - Monitor lab/diagnostic results  - Monitor all insertion sites, i.e. indwelling lines, tubes, and drains  - Monitor endotracheal if appropriate and nasal secretions for changes in amount and color  - [place] appropriate cooling/warming therapies per order  - Administer medications as ordered  - Instruct and encourage patient and family to use good hand hygiene technique  - Identify and instruct in appropriate isolation precautions for identified infection/condition  Outcome: Progressing     Problem: SAFETY ADULT  Goal: Patient will remain free of falls  Description: INTERVENTIONS:  - Educate patient/family on patient safety including physical limitations  - Instruct patient to call for assistance with activity   - Consult OT/PT to assist with strengthening/mobility   - Keep Call bell within reach  - Keep bed low and locked with side rails adjusted as appropriate  - Keep care items and personal belongings within reach  - Initiate and maintain comfort rounds  - Make Fall Risk Sign visible to staff  - Offer Toileting every 2 Hours, in advance of need  - Initiate/Maintain bed alarm    - Apply yellow socks and bracelet for high fall risk patients  - Consider  moving patient to room near nurses station  Outcome: Progressing  Goal: Maintain or return to baseline ADL function  Description: INTERVENTIONS:  -  Assess patient's ability to carry out ADLs; assess patient's baseline for ADL function and identify physical deficits which impact ability to perform ADLs (bathing, care of mouth/teeth, toileting, grooming, dressing, etc.)  - Assess/evaluate cause of self-care deficits   - Assess range of motion  - Assess patient's mobility; develop plan if impaired  - Assess patient's need for assistive devices and provide as appropriate  - Encourage maximum independence but intervene and supervise when necessary  - Involve family in performance of ADLs  - Assess for home care needs following discharge   - Consider OT consult to assist with ADL evaluation and planning for discharge  - Provide patient education as appropriate  Outcome: Progressing  Goal: Maintains/Returns to pre admission functional level  Description: INTERVENTIONS:  - Perform AM-PAC 6 Click Basic Mobility/ Daily Activity assessment daily.  - Set and communicate daily mobility goal to care team and patient/family/caregiver.   - Collaborate with rehabilitation services on mobility goals if consulted  - Perform Range of Motion 3 times a day.  - Reposition patient every 2 hours.  - Dangle patient 3 times a day  - Stand patient 3 times a day  - Ambulate patient 3 times a day  - Out of bed to chair 3 times a day   - Out of bed for meals 3 times a day  - Out of bed for toileting  - Record patient progress and toleration of activity level   Outcome: Progressing     Problem: DISCHARGE PLANNING  Goal: Discharge to home or other facility with appropriate resources  Description: INTERVENTIONS:  - Identify barriers to discharge w/patient and caregiver  - Arrange for needed discharge resources and transportation as appropriate  - Identify discharge learning needs (meds, wound care, etc.)  - Arrange for interpretive services to  assist at discharge as needed  - Refer to Case Management Department for coordinating discharge planning if the patient needs post-hospital services based on physician/advanced practitioner order or complex needs related to functional status, cognitive ability, or social support system  Outcome: Progressing     Problem: Knowledge Deficit  Goal: Patient/family/caregiver demonstrates understanding of disease process, treatment plan, medications, and discharge instructions  Description: Complete learning assessment and assess knowledge base.  Interventions:  - Provide teaching at level of understanding  - Provide teaching via preferred learning methods  Outcome: Progressing     Problem: Nutrition/Hydration-ADULT  Goal: Nutrient/Hydration intake appropriate for improving, restoring or maintaining nutritional needs  Description: Monitor and assess patient's nutrition/hydration status for malnutrition. Collaborate with interdisciplinary team and initiate plan and interventions as ordered.  Monitor patient's weight and dietary intake as ordered or per policy. Utilize nutrition screening tool and intervene as necessary. Determine patient's food preferences and provide high-protein, high-caloric foods as appropriate.     INTERVENTIONS:  - Monitor oral intake, urinary output, labs, and treatment plans  - Assess nutrition and hydration status and recommend course of action  - Evaluate amount of meals eaten  - Assist patient with eating if necessary   - Allow adequate time for meals  - Recommend/ encourage appropriate diets, oral nutritional supplements, and vitamin/mineral supplements  - Order, calculate, and assess calorie counts as needed  - Recommend, monitor, and adjust tube feedings and TPN/PPN based on assessed needs  - Assess need for intravenous fluids  - Provide specific nutrition/hydration education as appropriate  - Include patient/family/caregiver in decisions related to nutrition  Outcome: Progressing     Problem:  NEUROSENSORY - ADULT  Goal: Achieves stable or improved neurological status  Description: INTERVENTIONS  - Monitor and report changes in neurological status  - Monitor vital signs such as temperature, blood pressure, glucose, and any other labs ordered   - Initiate measures to prevent increased intracranial pressure  - Monitor for seizure activity and implement precautions if appropriate      Outcome: Progressing  Goal: Remains free of injury related to seizures activity  Description: INTERVENTIONS  - Maintain airway, patient safety  and administer oxygen as ordered  - Monitor patient for seizure activity, document and report duration and description of seizure to physician/advanced practitioner  - If seizure occurs,  ensure patient safety during seizure  - Reorient patient post seizure  - Seizure pads on all 4 side rails  - Instruct patient/family to notify RN of any seizure activity including if an aura is experienced  - Instruct patient/family to call for assistance with activity based on nursing assessment  - Administer anti-seizure medications if ordered    Outcome: Progressing  Goal: Achieves maximal functionality and self care  Description: INTERVENTIONS  - Monitor swallowing and airway patency with patient fatigue and changes in neurological status  - Encourage and assist patient to increase activity and self care.   - Encourage visually impaired, hearing impaired and aphasic patients to use assistive/communication devices  Outcome: Progressing         Outcome: Progressing  Goal: Pressure injury heals and does not worsen  Description: Interventions:  - Implement low air loss mattress or specialty surface (Criteria met)  - Apply silicone foam dressing  - Consider nutrition services referral as needed  Outcome: Progressing     Problem: Prexisting or High Potential for Compromised Skin Integrity  Goal: Skin integrity is maintained or improved  Description: INTERVENTIONS:  - Identify patients at risk for skin  breakdown  - Assess and monitor skin integrity  - Assess and monitor nutrition and hydration status  - Monitor labs   - Assess for incontinence   - Turn and reposition patient  - Assist with mobility/ambulation  - Relieve pressure over bony prominences  - Avoid friction and shearing  - Provide appropriate hygiene as needed including keeping skin clean and dry  - Evaluate need for skin moisturizer/barrier cream  - Collaborate with interdisciplinary team   - Patient/family teaching  - Consider wound care consult   Outcome: Progressing

## 2024-02-11 VITALS
HEIGHT: 68 IN | OXYGEN SATURATION: 95 % | BODY MASS INDEX: 23.72 KG/M2 | HEART RATE: 96 BPM | RESPIRATION RATE: 16 BRPM | SYSTOLIC BLOOD PRESSURE: 132 MMHG | DIASTOLIC BLOOD PRESSURE: 85 MMHG | WEIGHT: 156.53 LBS | TEMPERATURE: 97.4 F

## 2024-02-11 PROCEDURE — 99024 POSTOP FOLLOW-UP VISIT: CPT | Performed by: NEUROLOGICAL SURGERY

## 2024-02-11 RX ORDER — GABAPENTIN 300 MG/1
300 CAPSULE ORAL
Qty: 30 CAPSULE | Refills: 0 | Status: SHIPPED | OUTPATIENT
Start: 2024-02-11

## 2024-02-11 RX ORDER — METHOCARBAMOL 750 MG/1
750 TABLET, FILM COATED ORAL EVERY 6 HOURS PRN
Qty: 30 TABLET | Refills: 0 | Status: SHIPPED | OUTPATIENT
Start: 2024-02-11

## 2024-02-11 RX ORDER — OXYCODONE HYDROCHLORIDE 5 MG/1
5-10 TABLET ORAL EVERY 4 HOURS PRN
Qty: 30 TABLET | Refills: 0 | Status: SHIPPED | OUTPATIENT
Start: 2024-02-11 | End: 2024-02-21

## 2024-02-11 RX ORDER — DOCUSATE SODIUM 100 MG/1
100 CAPSULE, LIQUID FILLED ORAL 2 TIMES DAILY
Qty: 14 CAPSULE | Refills: 0 | Status: SHIPPED | OUTPATIENT
Start: 2024-02-11 | End: 2024-02-18

## 2024-02-11 RX ADMIN — METHOCARBAMOL TABLETS 750 MG: 750 TABLET, COATED ORAL at 05:33

## 2024-02-11 RX ADMIN — METHOCARBAMOL TABLETS 750 MG: 750 TABLET, COATED ORAL at 11:26

## 2024-02-11 RX ADMIN — SENNOSIDES 8.6 MG: 8.6 TABLET, FILM COATED ORAL at 08:55

## 2024-02-11 RX ADMIN — ACETAMINOPHEN 975 MG: 325 TABLET, FILM COATED ORAL at 05:33

## 2024-02-11 RX ADMIN — OXYCODONE HYDROCHLORIDE 10 MG: 10 TABLET ORAL at 08:55

## 2024-02-11 RX ADMIN — DOCUSATE SODIUM 100 MG: 100 CAPSULE, LIQUID FILLED ORAL at 09:00

## 2024-02-11 RX ADMIN — POTASSIUM CHLORIDE 10 MEQ: 750 TABLET, EXTENDED RELEASE ORAL at 08:55

## 2024-02-11 RX ADMIN — POLYETHYLENE GLYCOL 3350 17 G: 17 POWDER, FOR SOLUTION ORAL at 08:55

## 2024-02-11 NOTE — DISCHARGE INSTR - AVS FIRST PAGE
Discharge instructions  Anterior cervical decompression and fixation/fusion      Surgical incisional care:  Keep incision clean and dry. Avoid applying creams, lotion or antiseptic to incision area.  Allow steri-strips to fall off. If still in place at two week postoperative visit, we will remove them.  Check the wound daily. If the incision becomes red, swollen, tender, warm, or has increased drainage please notify physician immediately.  May shower 3 days after surgery, but do not soak in a tub and no swimming.  Use mild antimicrobial soap and water with a clean washcloth. Pat incision dry after showering and a clean towel daily.   Cervical VISTA collar to be worn at all times except for showering. Change from VISTA (grey) collar to the [place] (peach) collar prior to showering. Incision may be cleaned with water and a mild antimicrobial soap using a clean washcloth. Incision is to be gently patted dry with a clean towel. Once dry, collar should be changed back to a VISTA (grey) collar with clean pads in place.  Wash collar pads with mild soap and water. They are to be laid flat to dry on a clean towel. Recommend changing every 1-2 days.   Please refer to VISTA collar instructions for further details.    Activity Restrictions:  No heavy lifting greater than 5 - 10lbs. No strenuous activities.  May walk as tolerated. Encourage at least 4 short walks per day.   No driving while requiring cervical collar, anticipated six weeks.  No significant neck movement.  Diet: consider soft minced food with gravy. Recommend small bites with sips of water between.     Postoperative medication:  Take pain medications to relieve incision pain, and muscle relaxants to prevent spasms as directed. Please see after visit summary (AVS) for details.   Take over the counter stool softeners such as colace or senna-s to avoid constipation while on narcotics. Intake water and fiber intake.   Do not take ibuprofen, Naproxen/Aleve or any NSAID  until cleared by surgeon. May take Tylenol instead.  If taking Coumadin, Aspirin, or Plavix, you may resume these medications when cleared by Neurosurgery.    Follow-up as scheduled for a 2 week incision check. Follow-up 6 weeks after surgery with a repeat cervical spine upright x-rays to be completed prior to visit.    **Please notify MD immediately if you experience a fever of 101F, have increased neck or arm pain, new numbness and/or weakness in your arms/hands, difficulty swallowing or breathing especially while lying down, numbness or weakness in your legs.**

## 2024-02-11 NOTE — PLAN OF CARE
Problem: PAIN - ADULT  Goal: Verbalizes/displays adequate comfort level or baseline comfort level  Description: Interventions:  - Encourage patient to monitor pain and request assistance  - Assess pain using appropriate pain scale  - Administer analgesics based on type and severity of pain and evaluate response  - Implement non-pharmacological measures as appropriate and evaluate response  - Consider cultural and social influences on pain and pain management  - Notify physician/advanced practitioner if interventions unsuccessful or patient reports new pain  Outcome: Progressing     Problem: INFECTION - ADULT  Goal: Absence or prevention of progression during hospitalization  Description: INTERVENTIONS:  - Assess and monitor for signs and symptoms of infection  - Monitor lab/diagnostic results  - Monitor all insertion sites, i.e. indwelling lines, tubes, and drains  - Monitor endotracheal if appropriate and nasal secretions for changes in amount and color  - [place] appropriate cooling/warming therapies per order  - Administer medications as ordered  - Instruct and encourage patient and family to use good hand hygiene technique  - Identify and instruct in appropriate isolation precautions for identified infection/condition  Outcome: Progressing     Problem: SAFETY ADULT  Goal: Patient will remain free of falls  Description: INTERVENTIONS:  - Educate patient/family on patient safety including physical limitations  - Instruct patient to call for assistance with activity   - Consult OT/PT to assist with strengthening/mobility   - Keep Call bell within reach  - Keep bed low and locked with side rails adjusted as appropriate  - Keep care items and personal belongings within reach  - Initiate and maintain comfort rounds  - Make Fall Risk Sign visible to staff  - Apply yellow socks and bracelet for high fall risk patients  - Consider moving patient to room near nurses station  Outcome: Progressing  Goal: Maintain or  return to baseline ADL function  Description: INTERVENTIONS:  -  Assess patient's ability to carry out ADLs; assess patient's baseline for ADL function and identify physical deficits which impact ability to perform ADLs (bathing, care of mouth/teeth, toileting, grooming, dressing, etc.)  - Assess/evaluate cause of self-care deficits   - Assess range of motion  - Assess patient's mobility; develop plan if impaired  - Assess patient's need for assistive devices and provide as appropriate  - Encourage maximum independence but intervene and supervise when necessary  - Involve family in performance of ADLs  - Assess for home care needs following discharge   - Consider OT consult to assist with ADL evaluation and planning for discharge  - Provide patient education as appropriate  Outcome: Progressing  Goal: Maintains/Returns to pre admission functional level  Description: INTERVENTIONS:  - Perform AM-PAC 6 Click Basic Mobility/ Daily Activity assessment daily.  - Set and communicate daily mobility goal to care team and patient/family/caregiver.   - Collaborate with rehabilitation services on mobility goals if consulted  - Out of bed for toileting  - Record patient progress and toleration of activity level   Outcome: Progressing     Problem: DISCHARGE PLANNING  Goal: Discharge to home or other facility with appropriate resources  Description: INTERVENTIONS:  - Identify barriers to discharge w/patient and caregiver  - Arrange for needed discharge resources and transportation as appropriate  - Identify discharge learning needs (meds, wound care, etc.)  - Arrange for interpretive services to assist at discharge as needed  - Refer to Case Management Department for coordinating discharge planning if the patient needs post-hospital services based on physician/advanced practitioner order or complex needs related to functional status, cognitive ability, or social support system  Outcome: Progressing     Problem: Knowledge  Deficit  Goal: Patient/family/caregiver demonstrates understanding of disease process, treatment plan, medications, and discharge instructions  Description: Complete learning assessment and assess knowledge base.  Interventions:  - Provide teaching at level of understanding  - Provide teaching via preferred learning methods  Outcome: Progressing     Problem: Nutrition/Hydration-ADULT  Goal: Nutrient/Hydration intake appropriate for improving, restoring or maintaining nutritional needs  Description: Monitor and assess patient's nutrition/hydration status for malnutrition. Collaborate with interdisciplinary team and initiate plan and interventions as ordered.  Monitor patient's weight and dietary intake as ordered or per policy. Utilize nutrition screening tool and intervene as necessary. Determine patient's food preferences and provide high-protein, high-caloric foods as appropriate.     INTERVENTIONS:  - Monitor oral intake, urinary output, labs, and treatment plans  - Assess nutrition and hydration status and recommend course of action  - Evaluate amount of meals eaten  - Assist patient with eating if necessary   - Allow adequate time for meals  - Recommend/ encourage appropriate diets, oral nutritional supplements, and vitamin/mineral supplements  - Order, calculate, and assess calorie counts as needed  - Recommend, monitor, and adjust tube feedings and TPN/PPN based on assessed needs  - Assess need for intravenous fluids  - Provide specific nutrition/hydration education as appropriate  - Include patient/family/caregiver in decisions related to nutrition  Outcome: Progressing     Problem: NEUROSENSORY - ADULT  Goal: Achieves stable or improved neurological status  Description: INTERVENTIONS  - Monitor and report changes in neurological status  - Monitor vital signs such as temperature, blood pressure, glucose, and any other labs ordered   - Initiate measures to prevent increased intracranial pressure  - Monitor  for seizure activity and implement precautions if appropriate      Outcome: Progressing  Goal: Remains free of injury related to seizures activity  Description: INTERVENTIONS  - Maintain airway, patient safety  and administer oxygen as ordered  - Monitor patient for seizure activity, document and report duration and description of seizure to physician/advanced practitioner  - If seizure occurs,  ensure patient safety during seizure  - Reorient patient post seizure  - Seizure pads on all 4 side rails  - Instruct patient/family to notify RN of any seizure activity including if an aura is experienced  - Instruct patient/family to call for assistance with activity based on nursing assessment  - Administer anti-seizure medications if ordered    Outcome: Progressing  Goal: Achieves maximal functionality and self care  Description: INTERVENTIONS  - Monitor swallowing and airway patency with patient fatigue and changes in neurological status  - Encourage and assist patient to increase activity and self care.   - Encourage visually impaired, hearing impaired and aphasic patients to use assistive/communication devices  Outcome: Progressing     Problem: SKIN/TISSUE INTEGRITY - ADULT  Goal: Skin Integrity remains intact(Skin Breakdown Prevention)    Bed Management:  -Have minimal linens on bed & keep smooth, unwrinkled  -Change linens as needed when moist or perspiring    Toileting:  -Offer bedside commode      Skin Care:  -Avoid use of baby powder, tape, friction and shearing, hot water or constrictive clothing    Next Steps:  Outcome: Progressing  Goal: Incision(s), wounds(s) or drain site(s) healing without S/S of infection  Description: INTERVENTIONS  - Assess and document dressing, incision, wound bed, drain sites and surrounding tissue  - Provide patient and family education  Outcome: Progressing  Goal: Pressure injury heals and does not worsen  Description: Interventions:  - Implement low air loss mattress or specialty  surface (Criteria met)  - Apply silicone foam dressing  Outcome: Progressing     Problem: Prexisting or High Potential for Compromised Skin Integrity  Goal: Skin integrity is maintained or improved  Description: INTERVENTIONS:  - Identify patients at risk for skin breakdown  - Assess and monitor skin integrity  - Assess and monitor nutrition and hydration status  - Monitor labs   - Assess for incontinence   - Turn and reposition patient  - Assist with mobility/ambulation  - Relieve pressure over bony prominences  - Avoid friction and shearing  - Provide appropriate hygiene as needed including keeping skin clean and dry  - Evaluate need for skin moisturizer/barrier cream  - Collaborate with interdisciplinary team   - Patient/family teaching  - Consider wound care consult   Outcome: Progressing

## 2024-02-11 NOTE — RESTORATIVE TECHNICIAN NOTE
"       Restorative Technician Note      Patient Name: Ramin Sadler     Restorative Tech Visit Date: 02/11/24  Note Type: Bracing, Follow-up fitting  Patient Position Upon Consult: Seated edge of bed  Brace Applied: [employer] Collar Replacement Pads  Additional Brace Ordered: No  Patient Position When Brace Applied: Other (comment) (Left at bedside)  Bracing Recommendations: None  Education Provided: Yes  Patient Position at End of Consult: Seated edge of bed; All needs within reach  Nurse Communication: Nurse aware of consult, application of brace    Left in care of nurse.    Please contact Mobility Coordinator on Tiger text \"SLB-PT-Restorative Tech\" role in regards to bracing instruction and/or adjustment.    Do Aguilar, Restorative Tech             "

## 2024-02-11 NOTE — DISCHARGE SUMMARY
[facility]  Discharge- Ramin Sadler 2000, 24 y.o. male MRN: [id number]  [facility] Encounter: [id number]  Primary Care Provider: Rony Funk MD   Date and time admitted to hospital: 2/7/2024  6:36 AM    * S/P cervical spinal fusion  Assessment & Plan  POD 4 s/p Posterior Cervical revision fusion C2-T3 and anterior C5 corpectomy with C4-6 ACDF for deformity correction (Dr. Camacho 2/8/24).   Hx of C5 teardrop fx with diffuse cervical spinal cord edema nad central cord syndrome after slipping and falling in shower s/p emergent C4-6 decompression with C3-T1 fusion on 9/9/21( Dr. Camacho).    Imaging:  Xray Cervical spine 2/8/24: New anterior plate and screw fusion from C4-C6 with C5 corpectomy. Posterior fusion with transpedicular screws and rods from C2 through through T3. No findings of hardware failure. Straightened lordosis with improved focal kyphosis.    Plan  Continue regular neurologic checks.  Drains:   PENELOPE drain d/c today at bedside, patient tolerated well  Pain control with multimodal pain regimen: APS was consulted and signed off  Tylenol 975 mg q 8 hrs matty  Robaxin 750 mg q 6 hrs matty  Gabapentin 300 mg q HS  Lidocaine patches.  Bowel regimen with colace, miralax and senna.   + BM  Eval and mobilize per PT/OT  Cervical brace to worn at all times, for showers, switch to calos collar.   DVT ppx: SCDs, HSQ    Rounding completed with ADRY De Souza.     Neurosurgery will continue to follow as primary. Patient is stable for d/c home today.  Please contact us with any questions or concerns.             Medical Problems       Resolved Problems  Date Reviewed: 11/20/2023   None         Discharge Date: 2/11/24    Admitting Diagnosis: Pseudoarthrosis of cervical spine (HCC) [S12.9XXA]    Discharge Diagnosis: same    Attending: Doug    Consulting Physician(s): NCC, acute pain    Procedures Performed: Posterior removal of bilateral rods, anterior C5 corpectomy  with C4-6 fixation, posterior revision fusion with extension to C2 and T3, with neuronavigation and neuromonitoring    Pathology: none    Hospital Course: Ramin Sadler is a 23 y/o male with a PMH significant for PCDF in 2021 for C5 teardrop fx with associated SCI after fall in shower who presented to the outpatient office complaining of worsening pain and kyphosis.  Imaging revealed progression of C5 kyphotic deformity with hardware failure.  Patient underwent the above procedure under general anesthesia with minimal blood loss and no complications.  Patient was kept in the PACU until stable and then moved to SD1 post op given length of surgery.  Patient received imaging postoperatively which revealed expected post op changes and improved kyphosis with anterior / posterior fusion.  3 drains were placed perioperatively (1 anterior, 2 posterior) and removed when appropriate given output; patient tolerated well.  PT and OT were consulted and recommended home with OPPT.  Patient was cleared medically for discharge on POD 4.  Prior to discharge, postoperative instructions were discussed with patient.  During that time, all questions and concerns were addressed.  Patient will follow up outpatient in 2 weeks for an incision check and 6 weeks with XR.    Physical Exam:  General appearance: alert, appears stated age, cooperative and no distress  Head: Normocephalic, without obvious abnormality, atraumatic  Eyes: EOMI, PERRL, conjugate gaze  Neck: anterior incision CDI, posterior incision CDI. Drains removed. Collar in place.  Lungs: non labored breathing  Heart: regular heart rate  Neurologic:   Mental status: Alert, oriented, thought content appropriate, speech clear and fluent  Cranial nerves: grossly intact (Cranial nerves II-XII)  Sensory: normal to LT  Motor: moving all extremities without focal weakness     Condition at Discharge: good     Discharge instructions/Information to patient and family:   See after visit  summary for information provided to patient and family.      Provisions for Follow-Up Care:  See after visit summary for information related to follow-up care and any pertinent home health orders.      Disposition: Home        Planned Readmission: No    Discharge Statement   I spent 20 minutes discharging the patient. This time was spent on the day of discharge. I had direct contact with the patient on the day of discharge. Additional documentation is required if more than 30 minutes were spent on discharge.     Discharge Medications:  See after visit summary for reconciled discharge medications provided to patient and family.

## 2024-02-11 NOTE — ASSESSMENT & PLAN NOTE
POD 4 s/p Posterior Cervical revision fusion C2-T3 and anterior C5 corpectomy with C4-6 ACDF for deformity correction (Dr. Camacho 2/8/24).   Hx of C5 teardrop fx with diffuse cervical spinal cord edema nad central cord syndrome after slipping and falling in shower s/p emergent C4-6 decompression with C3-T1 fusion on 9/9/21( Dr. Camacho).    Imaging:  Xray Cervical spine 2/8/24: New anterior plate and screw fusion from C4-C6 with C5 corpectomy. Posterior fusion with transpedicular screws and rods from C2 through through T3. No findings of hardware failure. Straightened lordosis with improved focal kyphosis.    Plan  Continue regular neurologic checks.  Drains:   PENELOPE drain d/c today at bedside, patient tolerated well  Pain control with multimodal pain regimen: APS was consulted and signed off  Tylenol 975 mg q 8 hrs matty  Robaxin 750 mg q 6 hrs matty  Gabapentin 300 mg q HS  Lidocaine patches.  Bowel regimen with colace, miralax and senna.   + BM  Eval and mobilize per PT/OT  Cervical brace to worn at all times, for showers, switch to calos collar.   DVT ppx: SCDs, HSQ    Rounding completed with ADRY De Souza.     Neurosurgery will continue to follow as primary. Patient is stable for d/c home today.  Please contact us with any questions or concerns.

## 2024-02-11 NOTE — PROGRESS NOTES
Pt asked for [place] collar inserts replacement. Tiger text sent to Do Restorative Technician. The inserts replacements given to pt before he was discharged.

## 2024-02-12 ENCOUNTER — TELEPHONE (OUTPATIENT)
Dept: NEUROSURGERY | Facility: CLINIC | Age: 24
End: 2024-02-12

## 2024-02-12 NOTE — UTILIZATION REVIEW
NOTIFICATION OF ADMISSION DISCHARGE   This is a Notification of Discharge from [facility]. Please be advised that this patient has been discharge from our facility. Below you will find the admission and discharge date and time including the patient’s disposition.   UTILIZATION REVIEW CONTACT:  Ishaan Joyner  Utilization   Network Utilization Review Department  Phone: [phone] x carefully listen to the prompts. All voicemails are confidential.  Email: [email or web address]     ADMISSION INFORMATION  PRESENTATION DATE: 2/7/2024  6:36 AM  OBERVATION ADMISSION DATE:   INPATIENT ADMISSION DATE: 2/7/24  8:15 AM   DISCHARGE DATE: 2/11/2024  2:13 PM   DISPOSITION:Home/Self Care    Network Utilization Review Department  ATTENTION: Please call with any questions or concerns to [phone] and carefully listen to the prompts so that you are directed to the right person. All voicemails are confidential.   For Discharge needs, contact Care Management DC Support Team at [phone] opt. 2  Send all requests for admission clinical reviews, approved or denied determinations and any other requests to dedicated fax number below belonging to the campus where the patient is receiving treatment. List of dedicated fax numbers for the Facilities:  FACILITY NAME UR FAX NUMBER   ADMISSION DENIALS (Administrative/Medical Necessity) [phone]   DISCHARGE SUPPORT TEAM ([facility]) [phone]   PARENT CHILD HEALTH (Maternity/NICU/Pediatrics) [phone]   [facility] [phone]   [facility] [phone]   [facility] [phone]   [facility] [phone]   [facility] [phone]   [facility] [phone]   [facility] [phone]   [facility] [phone]   [facility]  [facility] [phone]   [facility] [phone]   [facility] [phone]   [facility] [phone]

## 2024-02-21 ENCOUNTER — OFFICE VISIT (OUTPATIENT)
Dept: NEUROSURGERY | Facility: CLINIC | Age: 24
End: 2024-02-21

## 2024-02-21 VITALS
WEIGHT: 156 LBS | OXYGEN SATURATION: 97 % | HEIGHT: 68 IN | SYSTOLIC BLOOD PRESSURE: 108 MMHG | BODY MASS INDEX: 23.64 KG/M2 | HEART RATE: 86 BPM | TEMPERATURE: 98 F | DIASTOLIC BLOOD PRESSURE: 63 MMHG

## 2024-02-21 DIAGNOSIS — Z98.1 STATUS POST CERVICAL SPINAL FUSION: Primary | ICD-10-CM

## 2024-02-21 DIAGNOSIS — G83.22: ICD-10-CM

## 2024-02-21 PROCEDURE — 99024 POSTOP FOLLOW-UP VISIT: CPT | Performed by: PHYSICIAN ASSISTANT

## 2024-02-21 NOTE — PROGRESS NOTES
Neurosurgery Office Note  Ramin Sadler 24 y.o. male MRN: [id number]      Assessment/Plan     Patient is a 24 years old young gentleman status post 360 cervical spinal fixation fusion requiring removal of posterior bilateral rods and placement of C2 pars articularis screws and also replacement of bilateral C3 and C4 lateral mass screws, procedure also involve removal of broken right T1 screw and placement of bilateral pedicle screws at T2 and T3 and anterior C5 corpectomy for deformity correction with C4-6 fixation (2/7/2024 Drs: Doug, TORRI & JOSEPH).  Patient with previous history of fall in the bathroom and  had 3 column injury with C5 fracture and instability and underwent emergency procedure on 9/9/2021 by Dr Camacho.  Patient is here today for 2 weeks incision check and staple removal follow-up.  He reports improvement with his paresthesia and numbness in his left arm, noticed new right ulnar radiculopathy starting about the cubital region, and also pain in his right foot.  Stable  weakness on the left, no significant gait issues or tendency to fall.  Denies any bowel/bladder dysfunction.  Denies any fever, chills, rigors, drainage or discharge from the incision site.  He is wearing brace, reports this moderate to severe incisional pain.  He says he is not taking that much narcotic pain medication, most of the time takes Tylenol.    Exam-patient came with his mother, he is alert and oriented x 3.  Moves all extremities.  Left  strength is 4/5 including interosseous and elbow flexion extension.  Right upper extremity strength is 5/5 including both lower extremities.  Sensation to light touch intact in both upper extremities and lower extremities bilaterally. DTR 3+ no clonus but positive Hitesh stasis bilaterally.  Both anterior and posterior cervical surgical wounds healing well.  All staples in place.  No sign of infection.     Hx, exam reviewed with the patient and his mother and Mx plan discussed.   "Patient is doing fine and noticed some improvement with his left upper extremity but complains of moderate to severe incisional pain, advised to regularly take his pain medications as ordered.  Pain is expected with extensive surgery, will eventually improve once the wound heals.  Ambulatory referral to PT order placed to strengthen his core body muscle and upper extremity weakness and also balance training.  All staples and sutures removed and no post removal weeping or bleeding .  Patient was reminded about his 6 weeks postop follow-up with Dr Camacho with x-rays of cervical spine in brace.  Advised to continue wearing [place] cervical collar as instructed.  Call with question or concern.  Both patient and his mother understand the instructions and agreed with the plan.    Plan:  Both anterior and posterior cervical surgical wound healing well, all staples intact  Staples removed including the drain sutures, no post removal weeping or bleeding  Advised no shower for 24 hours  Continue wearing [place] cervical collar as instructed  Ambulatory Far[place] to PT order placed  Reminded his 6 weeks postop follow-up with , with his cervical spine x-rays  Call with question or concern    I have spent a total time of 45 minutes on 02/21/24 in caring for this patient including Diagnostic results, Prognosis, Risks and benefits of tx options, Instructions for management, Patient and family education, Importance of tx compliance, Risk factor reductions, Impressions, Counseling / Coordination of care, Documenting in the medical record, Reviewing / ordering tests, medicine, procedures  , and Obtaining or reviewing history  .          Chief Complaint   Patient presents with    Post-op     2 Wk P/O C2-T3 Cervical Fusion on 2/7/24         C/C: \" Here for 2 weeks follow-up status post cervical spine fusion\"    HPI    See detailed discussion above    REVIEW OF SYSTEMS    Review of Systems   Constitutional: Negative.  "   HENT: Negative.     Eyes: Negative.    Respiratory: Negative.     Cardiovascular: Negative.    Gastrointestinal: Negative.    Endocrine: Negative.    Genitourinary: Negative.    Musculoskeletal:  Positive for neck pain.        2 Wk P/O C2-T3 Cervical Fusion on 2/7/24     Pain 9/10, Constant  N/T on right hand and sharp stabbing pain in right foot   He denies any B/B issues   Skin: Negative.    Allergic/Immunologic: Negative.    Neurological:  Positive for numbness.   Hematological: Negative.    Psychiatric/Behavioral: Negative.     All other systems reviewed and are negative.      ROS obtained by MA. Reviewed. See HPI.     Meds/Allergies     Current Outpatient Medications   Medication Sig Dispense Refill    acetaminophen (TYLENOL) 500 mg tablet Take 500 mg by mouth every 6 (six) hours as needed for mild pain      buPROPion (WELLBUTRIN XL) 150 mg 24 hr tablet Take 150 mg by mouth daily (Patient not taking: Reported on 1/30/2024)      Diclofenac Sodium (VOLTAREN) 1 % as needed (Patient not taking: Reported on 11/20/2023)      docusate sodium (COLACE) 100 mg capsule Take 1 capsule (100 mg total) by mouth 2 (two) times a day for 7 days 14 capsule 0    gabapentin (NEURONTIN) 300 mg capsule Take 1 capsule (300 mg total) by mouth daily at bedtime 30 capsule 0    Galcanezumab-gnlm 120 MG/ML SOAJ Inject 120 mg under the skin every 30 (thirty) days 3 mL 4    lifitegrast (Xiidra) 5 % op solution  (Patient not taking: Reported on 11/20/2023)      methocarbamol (ROBAXIN) 750 mg tablet Take 1 tablet (750 mg total) by mouth every 6 (six) hours as needed for muscle spasms 30 tablet 0    oxyCODONE (ROXICODONE) 5 immediate release tablet Take 1-2 tablets (5-10 mg total) by mouth every 4 (four) hours as needed for moderate pain for up to 10 days Max Daily Amount: 60 mg 30 tablet 0    polyethylene glycol (MIRALAX) 17 g packet Take 17 g by mouth daily for 14 days (Patient taking differently: Take 17 g by mouth if needed) 238 g 0     potassium chloride (Klor-Con M10) 10 mEq tablet Take 1 tablet (10 mEq total) by mouth 3 (three) times a day 10 tablet 0    potassium chloride (MICRO-K) 10 MEQ CR capsule Take 10 mEq by mouth 3 (three) times a day      propranolol (INDERAL LA) 120 mg 24 hr capsule Take 120 mg by mouth daily (Patient not taking: Reported on 11/20/2023)      SUMAtriptan (Imitrex) 50 mg tablet Take 1 tablet (50 mg total) by mouth once as needed for migraine Okay to repeat in 2 hours. Max 2 tablets in 24 hours. (Patient not taking: Reported on 1/30/2024) 10 tablet 6    tamsulosin (FLOMAX) 0.4 mg Take 0.4 mg by mouth daily at bedtime (Patient not taking: Reported on 1/30/2024)       No current facility-administered medications for this visit.       No Known Allergies    PAST HISTORY    Past Medical History:   Diagnosis Date    C5 cervical fracture (HCC) 09/09/2021    Cervicogenic headache     Depression     HL (hearing loss)     Migraine     Post concussion syndrome     Tetraparesis (HCC)     Weakness        Past Surgical History:   Procedure Laterality Date    DECOMPRESSION SPINE CERVICAL POSTERIOR Bilateral 9/9/2021    Procedure: C4-6 POSTERIOR CERVICAL SPINE DECOMPRESSION WITH C3-T1 FUSION  ;  Surgeon: Christoph Camacho MD;  Location: [facility];  Service: Neurosurgery    [place] ARTHRD ANT INTERBODY MIN DSC CRV BELOW C2 Bilateral 2/7/2024    Procedure: Posterior removal of bilateral rods, anterior C5 corpectomy with C4-6 fixation, posterior revision fusion with extension to C2 and T3, with neuronavigation and neuromonitoring;  Surgeon: Christoph Camacho MD;  Location: [facility];  Service: Neurosurgery       Social History     Tobacco Use    Smoking status: Never    Smokeless tobacco: Never   Vaping Use    Vaping status: Never Used   Substance Use Topics    Alcohol use: Never    Drug use: Never       Family History   Problem Relation Age of Onset    No Known Problems Mother     Heart disease Father     Heart attack Father     Diabetes Paternal  Grandmother     Heart disease Paternal Grandfather     Stroke Paternal Grandfather             Heart disease Paternal Uncle          Above history personally reviewed.       EXAM    Vitals:There were no vitals taken for this visit.,There is no height or weight on file to calculate BMI.     Physical Exam  Constitutional:       Appearance: Normal appearance.   HENT:      Head: Normocephalic and atraumatic.   Pulmonary:      Effort: Pulmonary effort is normal.   Musculoskeletal:         General: Tenderness present.      Cervical back: Tenderness present.   Neurological:      Mental Status: He is alert and oriented to person, place, and time.      GCS: GCS eye subscore is 4. GCS verbal subscore is 5. GCS motor subscore is 6.      Cranial Nerves: Cranial nerves 2-12 are intact.      Sensory: Sensation is intact.      Motor: Weakness present.      Deep Tendon Reflexes:      Reflex Scores:       Tricep reflexes are 3+ on the right side and 3+ on the left side.       Bicep reflexes are 3+ on the right side and 3+ on the left side.       Brachioradialis reflexes are 3+ on the right side and 3+ on the left side.       Patellar reflexes are 3+ on the right side and 3+ on the left side.       Achilles reflexes are 3+ on the right side and 3+ on the left side.  Psychiatric:         Speech: Speech normal.         Neurologic Exam     Mental Status   Oriented to person, place, and time.   Speech: speech is normal   Level of consciousness: alert    Cranial Nerves   Cranial nerves II through XII intact.     CN XI   CN XI normal.     Motor Exam   Muscle bulk: normal  Overall muscle tone: normal  Right arm tone: normal  Left arm tone: normal  Right arm pronator drift: absent  Left arm pronator drift: absent  Right leg tone: normal  Left leg tone: normal    Sensory Exam   Light touch normal.   Right arm light touch: normal  Left arm light touch: normal  Right leg light touch: normal  Left leg light touch: normal    Gait,  Coordination, and Reflexes     Reflexes   Right brachioradialis: 3+  Left brachioradialis: 3+  Right biceps: 3+  Left biceps: 3+  Right triceps: 3+  Left triceps: 3+  Right patellar: 3+  Left patellar: 3+  Right achilles: 3+  Left achilles: 3+  Right Montgomery: present  Left Montgomery: present  Right ankle clonus: absent  Left ankle clonus: absent        MEDICAL DECISION MAKING    Imaging Studies:     XR spine cervical 2 or 3 vw injury    Addendum Date: 2/8/2024 Addendum:   ADDENDUM: Please note there is an error in the original report. Posterior fusion extends from C2-T3.    Result Date: 2/8/2024  Narrative: C-ARM - XR SPINE CERVICAL 2 OR 3 VW INJURY INDICATION: Pseudoarthrosis of cervical spine. Procedure guidance. TECHNIQUE: Fluoroscopic guidance provided. COMPARISON: None FLUOROSCOPY TIME: 167.83 SEC (accession [id number]) [id number] (accession [id number]), 3 (accession [id number]) FLUOROSCOPIC IMAGES FINDINGS: Fluoroscopy provided for procedure guidance. Osseous and soft tissue detail limited by technique.     Impression: Fluoroscopy provided for procedure guidance. Please refer to the separate procedure note for additional details. The patient had follow-up radiographs 2/8/2024: CERVICAL SPINE INDICATION:   Pseudoarthrosis of cervical spine. COMPARISON: 11/20/2023 VIEWS:  XR SPINE CERVICAL 2 OR 3 VW INJURY FINDINGS: New anterior plate and screw fusion from C4-C6 with C5 corpectomy. Posterior fusion with transpedicular screws and rods from C2 through through C7. No findings of hardware failure. Straightened lordosis with improved focal kyphosis. Bones are intact.  No lytic or blastic lesions. Preserved disc heights. Mild postoperative prevertebral soft tissue. Anterior and posterior drains. Posterior skin staples. Clear visualized lungs.  Cardiomediastinal silhouette is within normal limits. IMPRESSION: Postoperative cervical spine as above. Workstation performed: [id number]     XR cervical spine 2 or 3 views    Addendum  Date: 2/8/2024 Addendum:   ADDENDUM: Please note there is an error in the original report. Posterior fusion extends from C2-T3.    Result Date: 2/8/2024  Narrative: C-ARM - XR SPINE CERVICAL 2 OR 3 VW INJURY INDICATION: Pseudoarthrosis of cervical spine. Procedure guidance. TECHNIQUE: Fluoroscopic guidance provided. COMPARISON: None FLUOROSCOPY TIME: 167.83 SEC (accession [id number]) [id number] (accession [id number]), 3 (accession [id number]) FLUOROSCOPIC IMAGES FINDINGS: Fluoroscopy provided for procedure guidance. Osseous and soft tissue detail limited by technique.     Impression: Fluoroscopy provided for procedure guidance. Please refer to the separate procedure note for additional details. The patient had follow-up radiographs 2/8/2024: CERVICAL SPINE INDICATION:   Pseudoarthrosis of cervical spine. COMPARISON: 11/20/2023 VIEWS:  XR SPINE CERVICAL 2 OR 3 VW INJURY FINDINGS: New anterior plate and screw fusion from C4-C6 with C5 corpectomy. Posterior fusion with transpedicular screws and rods from C2 through through C7. No findings of hardware failure. Straightened lordosis with improved focal kyphosis. Bones are intact.  No lytic or blastic lesions. Preserved disc heights. Mild postoperative prevertebral soft tissue. Anterior and posterior drains. Posterior skin staples. Clear visualized lungs.  Cardiomediastinal silhouette is within normal limits. IMPRESSION: Postoperative cervical spine as above. Workstation performed: [id number]       I have personally reviewed pertinent reports.   and I have personally reviewed pertinent films in PACS

## 2024-03-18 ENCOUNTER — HOSPITAL ENCOUNTER (OUTPATIENT)
Dept: RADIOLOGY | Facility: HOSPITAL | Age: 24
Discharge: HOME | End: 2024-03-18
Payer: COMMERCIAL

## 2024-03-18 ENCOUNTER — TELEPHONE (OUTPATIENT)
Dept: NEUROSURGERY | Facility: CLINIC | Age: 24
End: 2024-03-18

## 2024-03-18 DIAGNOSIS — S14.129D CENTRAL CORD SYNDROME, SUBSEQUENT ENCOUNTER (HCC): ICD-10-CM

## 2024-03-18 DIAGNOSIS — Z98.1 S/P CERVICAL SPINAL FUSION: ICD-10-CM

## 2024-03-18 PROCEDURE — 72040 X-RAY EXAM NECK SPINE 2-3 VW: CPT

## 2024-03-18 NOTE — TELEPHONE ENCOUNTER
Patient's mother called in and would like for PT script to be faxed to [facility]  Fax# [phone]

## 2024-03-21 ENCOUNTER — OFFICE VISIT (OUTPATIENT)
Dept: NEUROSURGERY | Facility: CLINIC | Age: 24
End: 2024-03-21

## 2024-03-21 VITALS
WEIGHT: 160.4 LBS | HEIGHT: 68 IN | DIASTOLIC BLOOD PRESSURE: 60 MMHG | TEMPERATURE: 97.7 F | OXYGEN SATURATION: 99 % | HEART RATE: 73 BPM | BODY MASS INDEX: 24.31 KG/M2 | RESPIRATION RATE: 14 BRPM | SYSTOLIC BLOOD PRESSURE: 98 MMHG

## 2024-03-21 DIAGNOSIS — Z98.1 S/P CERVICAL SPINAL FUSION: Primary | ICD-10-CM

## 2024-03-21 PROCEDURE — 99024 POSTOP FOLLOW-UP VISIT: CPT | Performed by: NEUROLOGICAL SURGERY

## 2024-03-21 NOTE — PROGRESS NOTES
Neurosurgery Office Note  Ramin Sadler 24 y.o. male MRN: [id number]      Assessment/Plan        Problem List Items Addressed This Visit       S/P cervical spinal fusion - Primary    Relevant Orders    XR spine cervical complete 6+ vw flex/ext/obl         Discussion:  Patient is a 24-year-old male with h/o traumatic C5 teardrop fracture with diffuse cervical spinal cord edema and central cord syndrome, after slipping and falling in shower, s/p emergent posterior C4-6 decompression with C3-T1 fusion on 9/9/21 who developed pseudoarthrosis and worsening mechanical neck pain s/p posterior revision fusion C2-T3 and anterior C5 corpectomy with C4-6 ACDF for deformity correction on 2/7/24.    He returns with his mother.     Today, he reports no new neurologic symptoms or deficits, just expected posterior incisional pain and mild neck soreness. No new numbness (has bilateral carpal tunnel symptoms at baseline), new weakness, fine motor dysfunction, gait imbalance, bowel/bladder incontinence, voice changes, swallowing difficulties, trauma/fall.    Incisions are healing well without erythema, edema, dehiscence, drainage, or underlying fluid collections.      XR on 3/18/24 showed intact, satisfactory instrumentation with significant improvement in alignment (adequate deformity correction) compared to preoperative imaging.    At this time, we can wean the rigid collar as tolerated. Resume ADLs and PT. He is unable to return to work given his initial traumatic injury and multiple surgeries requiring extensive recovery and rehab.    Continue postoperative follow-up, next for 3-month POV with repeat XR flexion/extension.    All questions and concerns were addressed during this visit.          CHIEF COMPLAINT    Chief Complaint   Patient presents with    Post-op     6week          HISTORY    History of Present Illness     24 y.o. year old male     HPI    See Discussion    REVIEW OF SYSTEMS    Review of Systems   Constitutional:  Negative.    HENT: Negative.     Eyes: Negative.    Respiratory: Negative.     Cardiovascular: Negative.    Gastrointestinal: Negative.    Endocrine: Negative.    Genitourinary: Negative.    Musculoskeletal:  Positive for neck pain (wearing collar).        6 WEEK POV- ANTERIOR CERVICAL FUSION/CORPECTOMY, POSTERIOR CERVICAL REVISION FUSION WITH C SPINE X RAYS done 3/18/24    Pt c/o right bottom of the foot pulling sensation radiating to right two digits    Skin: Negative.    Allergic/Immunologic: Negative.    Neurological:  Positive for weakness (b/l hands) and numbness (shoulder and upper back near incision).   Hematological: Negative.    Psychiatric/Behavioral: Negative.     All other systems reviewed and are negative.        Meds/Allergies     Current Outpatient Medications   Medication Sig Dispense Refill    acetaminophen (TYLENOL) 500 mg tablet Take 500 mg by mouth every 6 (six) hours as needed for mild pain      buPROPion (WELLBUTRIN XL) 150 mg 24 hr tablet Take 150 mg by mouth daily (Patient not taking: Reported on 1/30/2024)      Diclofenac Sodium (VOLTAREN) 1 % as needed (Patient not taking: Reported on 11/20/2023)      docusate sodium (COLACE) 100 mg capsule Take 1 capsule (100 mg total) by mouth 2 (two) times a day for 7 days 14 capsule 0    gabapentin (NEURONTIN) 300 mg capsule Take 1 capsule (300 mg total) by mouth daily at bedtime 30 capsule 0    Galcanezumab-gnlm 120 MG/ML SOAJ Inject 120 mg under the skin every 30 (thirty) days 3 mL 4    lifitegrast (Xiidra) 5 % op solution  (Patient not taking: Reported on 11/20/2023)      methocarbamol (ROBAXIN) 750 mg tablet Take 1 tablet (750 mg total) by mouth every 6 (six) hours as needed for muscle spasms 30 tablet 0    polyethylene glycol (MIRALAX) 17 g packet Take 17 g by mouth daily for 14 days (Patient taking differently: Take 17 g by mouth if needed) 238 g 0    potassium chloride (Klor-Con M10) 10 mEq tablet Take 1 tablet (10 mEq total) by mouth 3 (three)  times a day 10 tablet 0    potassium chloride (MICRO-K) 10 MEQ CR capsule Take 10 mEq by mouth 3 (three) times a day (Patient not taking: Reported on 2024)      propranolol (INDERAL LA) 120 mg 24 hr capsule Take 120 mg by mouth daily (Patient not taking: Reported on 2024)      SUMAtriptan (Imitrex) 50 mg tablet Take 1 tablet (50 mg total) by mouth once as needed for migraine Okay to repeat in 2 hours. Max 2 tablets in 24 hours. 10 tablet 6    tamsulosin (FLOMAX) 0.4 mg Take 0.4 mg by mouth daily at bedtime       No current facility-administered medications for this visit.       No Known Allergies    PAST HISTORY    Past Medical History:   Diagnosis Date    C5 cervical fracture (HCC) 2021    Cervicogenic headache     Depression     HL (hearing loss)     Migraine     Post concussion syndrome     Tetraparesis (HCC)     Weakness        Past Surgical History:   Procedure Laterality Date    DECOMPRESSION SPINE CERVICAL POSTERIOR Bilateral 2021    Procedure: C4-6 POSTERIOR CERVICAL SPINE DECOMPRESSION WITH C3-T1 FUSION  ;  Surgeon: Christoph Camacho MD;  Location: [facility];  Service: Neurosurgery    [place] ARTHRD ANT INTERBODY MIN DSC CRV BELOW C2 Bilateral 2024    Procedure: Posterior removal of bilateral rods, anterior C5 corpectomy with C4-6 fixation, posterior revision fusion with extension to C2 and T3, with neuronavigation and neuromonitoring;  Surgeon: Christoph Camacho MD;  Location[facility];  Service: Neurosurgery       Social History     Tobacco Use    Smoking status: Never    Smokeless tobacco: Never   Vaping Use    Vaping status: Never Used   Substance Use Topics    Alcohol use: Never    Drug use: Never       Family History   Problem Relation Age of Onset    No Known Problems Mother     Heart disease Father     Heart attack Father     Diabetes Paternal Grandmother     Heart disease Paternal Grandfather     Stroke Paternal Grandfather             Heart disease Paternal Uncle          The  "following portions of the patient's history were reviewed in this encounter and updated as appropriate: Past medical, surgical, family, and social history, as well as medications, allergies, and review of systems.      EXAM    Vitals:Blood pressure 98/60, pulse 73, temperature 97.7 °F (36.5 °C), temperature source Temporal, resp. rate 14, height 5' 8\" (1.727 m), weight 72.8 kg (160 lb 6.4 oz), SpO2 99%.,Body mass index is 23.72 kg/m².     Physical Exam  Vitals and nursing note reviewed.   Constitutional:       Appearance: Normal appearance. He is normal weight.   HENT:      Head: Normocephalic and atraumatic.   Eyes:      Extraocular Movements: Extraocular movements intact and EOM normal.      Pupils: Pupils are equal, round, and reactive to light.   Cardiovascular:      Rate and Rhythm: Normal rate and regular rhythm.      Pulses: Normal pulses.      Heart sounds: Normal heart sounds.   Pulmonary:      Effort: Pulmonary effort is normal.      Breath sounds: Normal breath sounds.   Abdominal:      General: Abdomen is flat.      Palpations: Abdomen is soft.   Musculoskeletal:         General: Normal range of motion.      Cervical back: Normal range of motion.   Neurological:      General: No focal deficit present.      Mental Status: He is alert and oriented to person, place, and time. Mental status is at baseline.      GCS: GCS eye subscore is 4. GCS verbal subscore is 5. GCS motor subscore is 6.      Sensory: Sensation is intact.      Motor: Motor strength is normal.Motor function is intact.      Coordination: Coordination is intact.      Gait: Gait is intact.      Deep Tendon Reflexes: Reflexes are normal and symmetric.   Psychiatric:         Mood and Affect: Mood normal.         Speech: Speech normal.         Behavior: Behavior normal.         Neurologic Exam     Mental Status   Oriented to person, place, and time.   Attention: normal.   Speech: speech is normal   Level of consciousness: alert    Cranial Nerves "     CN II   Visual fields full to confrontation.   Visual acuity: normal  Right visual field deficit: none  Left visual field deficit: none     CN III, IV, VI   Pupils are equal, round, and reactive to light.  Extraocular motions are normal.   CN III: no CN III palsy  CN VI: no CN VI palsy  Nystagmus: none   Diplopia: none  Ophthalmoparesis: none  Upgaze: normal  Downgaze: normal  Conjugate gaze: present    CN V   Facial sensation intact.   Right facial sensation deficit: none  Left facial sensation deficit: none    CN VII   Facial expression full, symmetric.   Right facial weakness: none  Left facial weakness: none    CN VIII   CN VIII normal.     CN IX, X   CN IX normal.   CN X normal.   Palate: symmetric    CN XI   CN XI normal.   Right sternocleidomastoid strength: normal  Left sternocleidomastoid strength: normal  Right trapezius strength: normal  Left trapezius strength: normal    CN XII   CN XII normal.   Tongue deviation: none    Motor Exam   Muscle bulk: normal  Overall muscle tone: normal  Right arm pronator drift: absent  Left arm pronator drift: absent    Strength   Strength 5/5 throughout.     Sensory Exam   Light touch normal.     Gait, Coordination, and Reflexes     Gait  Gait: normal    Reflexes   Reflexes 2+ except as noted.   Incisions healing well      MEDICAL DECISION MAKING    Imaging Studies:     XR spine cervical 2 or 3 vw injury    Result Date: 3/18/2024  Narrative: CERVICAL SPINE INDICATION:   Arthrodesis status. Central cord syndrome at unspecified level of cervical spinal cord, subsequent encounter. COMPARISON:  None. VIEWS:  XR SPINE CERVICAL 2 OR 3 VW INJURY Images: 3 FINDINGS: As seen on prior exam there is been C5 corpectomy with ventral plate fusion from C4-C6. In addition there is been dorsal fusion from C2-T3. There is been laminectomy at the C3-C6 levels. Anatomic alignment. Intervertebral disc heights are preserved. Normal prevertebral soft tissues.  Clear lung apices.      Impression: Status post extensive surgical repair as described with no significant residual degenerative change or acute osseous abnormality. Electronically signed: 03/18/2024 10:13 PM Valentino Wren MD      I have personally reviewed pertinent reports.   and I have personally reviewed pertinent films in PACS      Christoph Camacho M.D.  Neurosurgeon

## 2024-04-04 ENCOUNTER — OFFICE VISIT (OUTPATIENT)
Dept: HEMATOLOGY ONCOLOGY | Facility: CLINIC | Age: 24
End: 2024-04-04
Payer: COMMERCIAL

## 2024-04-04 VITALS
BODY MASS INDEX: 24.4 KG/M2 | HEART RATE: 64 BPM | OXYGEN SATURATION: 100 % | HEIGHT: 68 IN | SYSTOLIC BLOOD PRESSURE: 136 MMHG | WEIGHT: 161 LBS | RESPIRATION RATE: 17 BRPM | DIASTOLIC BLOOD PRESSURE: 72 MMHG | TEMPERATURE: 97.4 F

## 2024-04-04 DIAGNOSIS — D69.6 THROMBOCYTOPENIA (HCC): Primary | ICD-10-CM

## 2024-04-04 PROCEDURE — 99213 OFFICE O/P EST LOW 20 MIN: CPT | Performed by: INTERNAL MEDICINE

## 2024-04-04 NOTE — PROGRESS NOTES
HPI: Continuation of care for thrombocytopenia at least since 2021 and platelet count have remained above 100,000.  No history of bleeding or bruising.  He had neck surgery for fracture of cervical spine and there was no excessive bleeding or complication secondary to thrombocytopenia.  Much less neck pain.  He has residual weakness and numbness in the arms but better than before.  He gets migraine headaches.  No family history of bleeding disorder.  There is history of heart disease and diabetes in the family.    ROS:  04/07/24 Reviewed 12 systems: See symptoms in HPI  Presently no other neurological, cardiac, pulmonary, GI and  symptoms other than listed in HPI.  Other symptoms are in HPI.  No other symptoms like fever, chills, bleeding,  skin rash, weight loss, night sweats,   claudication and gait problem. No frequent infections.  Not unusually sensitive to heat or cold. No swelling of the ankles. No swollen glands.  Patient is anxious.       Historical Information   Past Medical History:   Diagnosis Date   • C5 cervical fracture (HCC) 09/09/2021   • Cervicogenic headache    • Depression    • HL (hearing loss)    • Migraine    • Post concussion syndrome    • Tetraparesis (HCC)    • Weakness      Past Surgical History:   Procedure Laterality Date   • DECOMPRESSION SPINE CERVICAL POSTERIOR Bilateral 9/9/2021    Procedure: C4-6 POSTERIOR CERVICAL SPINE DECOMPRESSION WITH C3-T1 FUSION  ;  Surgeon: Christoph Camacho MD;  Location: [facility];  Service: Neurosurgery   • AZ ARTHRD ANT INTERBODY MIN DSC CRV BELOW C2 Bilateral 2/7/2024    Procedure: Posterior removal of bilateral rods, anterior C5 corpectomy with C4-6 fixation, posterior revision fusion with extension to C2 and T3, with neuronavigation and neuromonitoring;  Surgeon: Christoph Camacho MD;  Location: [facility];  Service: Neurosurgery     Social History   Social History     Substance and Sexual Activity   Alcohol Use Never     Social History     Substance and  Sexual Activity   Drug Use Never     Social History     Tobacco Use   Smoking Status Never   Smokeless Tobacco Never     Family History:   Family History   Problem Relation Age of Onset   • No Known Problems Mother    • Heart disease Father    • Heart attack Father    • Diabetes Paternal Grandmother    • Heart disease Paternal Grandfather    • Stroke Paternal Grandfather            • Heart disease Paternal Uncle          Current Outpatient Medications:   •  acetaminophen (TYLENOL) 500 mg tablet, Take 500 mg by mouth every 6 (six) hours as needed for mild pain, Disp: , Rfl:   •  buPROPion (WELLBUTRIN XL) 150 mg 24 hr tablet, Take 150 mg by mouth daily (Patient not taking: Reported on 2024), Disp: , Rfl:   •  Diclofenac Sodium (VOLTAREN) 1 %, as needed (Patient not taking: Reported on 2023), Disp: , Rfl:   •  docusate sodium (COLACE) 100 mg capsule, Take 1 capsule (100 mg total) by mouth 2 (two) times a day for 7 days, Disp: 14 capsule, Rfl: 0  •  gabapentin (NEURONTIN) 300 mg capsule, Take 1 capsule (300 mg total) by mouth daily at bedtime (Patient not taking: Reported on 3/21/2024), Disp: 30 capsule, Rfl: 0  •  Galcanezumab-gnlm 120 MG/ML SOAJ, Inject 120 mg under the skin every 30 (thirty) days (Patient not taking: Reported on 3/21/2024), Disp: 3 mL, Rfl: 4  •  lifitegrast (Xiidra) 5 % op solution, , Disp: , Rfl:   •  methocarbamol (ROBAXIN) 750 mg tablet, Take 1 tablet (750 mg total) by mouth every 6 (six) hours as needed for muscle spasms (Patient not taking: Reported on 3/21/2024), Disp: 30 tablet, Rfl: 0  •  polyethylene glycol (MIRALAX) 17 g packet, Take 17 g by mouth daily for 14 days (Patient not taking: Reported on 3/21/2024), Disp: 238 g, Rfl: 0  •  potassium chloride (Klor-Con M10) 10 mEq tablet, Take 1 tablet (10 mEq total) by mouth 3 (three) times a day (Patient not taking: Reported on 3/21/2024), Disp: 10 tablet, Rfl: 0  •  potassium chloride (MICRO-K) 10 MEQ CR capsule, Take 10 mEq  "by mouth 3 (three) times a day (Patient not taking: Reported on 2/21/2024), Disp: , Rfl:   •  propranolol (INDERAL LA) 120 mg 24 hr capsule, Take 120 mg by mouth daily (Patient not taking: Reported on 2/21/2024), Disp: , Rfl:   •  SUMAtriptan (Imitrex) 50 mg tablet, Take 1 tablet (50 mg total) by mouth once as needed for migraine Okay to repeat in 2 hours. Max 2 tablets in 24 hours. (Patient not taking: Reported on 3/21/2024), Disp: 10 tablet, Rfl: 6  •  tamsulosin (FLOMAX) 0.4 mg, Take 0.4 mg by mouth daily at bedtime (Patient not taking: Reported on 3/21/2024), Disp: , Rfl:     No Known Allergies    Physical Exam:  Vitals:    04/04/24 1442   BP: 136/72   BP Location: Left arm   Patient Position: Sitting   Cuff Size: Adult   Pulse: 64   Resp: 17   Temp: (!) 97.4 °F (36.3 °C)   SpO2: 100%   Weight: 73 kg (161 lb)   Height: 5' 8\" (1.727 m)   Patient is alert and oriented.  Patient is not in distress.  Vital signs are above.  There is no icterus.  There is no oral thrush.  There is no palpable neck mass.  Lung fields are clear to percussion and auscultation.  Heart rate is regular.  There is no palpable abdominal mass.  Abdomen is soft and nontender.  There is no ascites.  There is no edema of the ankles.  There is no calf tenderness.  He has some weakness in left hand  compared to right, no skin rash, no palpable lymphadenopathy in the neck and axillary areas,  no clubbing.   Patient is anxious.  Performance status 1.      Lab Results: I have reviewed all pertinent labs.  LABS:    Results for orders placed or performed during the hospital encounter of 02/07/24   Basic metabolic panel   Result Value Ref Range    Sodium 139 135 - 147 mmol/L    Potassium 4.2 3.5 - 5.3 mmol/L    Chloride 107 96 - 108 mmol/L    CO2 25 21 - 32 mmol/L    ANION GAP 7 mmol/L    BUN 7 5 - 25 mg/dL    Creatinine 0.65 0.60 - 1.30 mg/dL    Glucose 142 (H) 65 - 140 mg/dL    Calcium 9.2 8.4 - 10.2 mg/dL    eGFR 135 ml/min/1.73sq m   CBC "   Result Value Ref Range    WBC 11.73 (H) 4.31 - 10.16 Thousand/uL    RBC 4.14 3.88 - 5.62 Million/uL    Hemoglobin 12.7 12.0 - 17.0 g/dL    Hematocrit 36.8 36.5 - 49.3 %    MCV 89 82 - 98 fL    MCH 30.7 26.8 - 34.3 pg    MCHC 34.5 31.4 - 37.4 g/dL    RDW 12.4 11.6 - 15.1 %    Platelets 139 (L) 149 - 390 Thousands/uL    MPV 10.8 8.9 - 12.7 fL   Fingerstick Glucose (POCT)   Result Value Ref Range    POC Glucose 78 65 - 140 mg/dl   POCT Blood Gas (CG8+)   Result Value Ref Range    pH, Art i-STAT 7.377 7.350 - 7.450    pCO2, Art i-STAT 35.1 (L) 36.0 - 44.0 mm HG    pO2, ART i-STAT 296.0 (H) 75.0 - 129.0 mm HG    BE, i-STAT -4 (L) -2 - 3 mmol/L    HCO3, Art i-STAT 20.6 (L) 22.0 - 28.0 mmol/L    CO2, i-STAT 22 21 - 32 mmol/L    O2 Sat, i-STAT 100 (H) 60 - 85 %    SODIUM, I-STAT 144 136 - 145 mmol/l    Potassium, i-STAT 2.6 (L) 3.5 - 5.3 mmol/L    Calcium, Ionized i-STAT 1.08 (L) 1.12 - 1.32 mmol/L    Hct, i-STAT 34 (L) 36.5 - 49.3 %    Hgb, i-STAT 11.6 (L) 12.0 - 17.0 g/dl    Glucose, i-STAT 107 65 - 140 mg/dl    Specimen Type ARTERIAL    POCT Blood Gas (CG8+)   Result Value Ref Range    pH, Art i-STAT 7.394 7.350 - 7.450    pCO2, Art i-STAT 37.0 36.0 - 44.0 mm HG    pO2, ART i-STAT 286.0 (H) 75.0 - 129.0 mm HG    BE, i-STAT -2 -2 - 3 mmol/L    HCO3, Art i-STAT 22.7 22.0 - 28.0 mmol/L    CO2, i-STAT 24 21 - 32 mmol/L    O2 Sat, i-STAT 100 (H) 60 - 85 %    SODIUM, I-STAT 145 136 - 145 mmol/l    Potassium, i-STAT 3.4 (L) 3.5 - 5.3 mmol/L    Calcium, Ionized i-STAT 1.30 1.12 - 1.32 mmol/L    Hct, i-STAT 34 (L) 36.5 - 49.3 %    Hgb, i-STAT 11.6 (L) 12.0 - 17.0 g/dl    Glucose, i-STAT 85 65 - 140 mg/dl    Specimen Type ARTERIAL    POCT Blood Gas (CG8+)   Result Value Ref Range    pH, Art i-STAT 7.383 7.350 - 7.450    pCO2, Art i-STAT 35.6 (L) 36.0 - 44.0 mm HG    pO2, ART i-STAT 301.0 (H) 75.0 - 129.0 mm HG    BE, i-STAT -3 (L) -2 - 3 mmol/L    HCO3, Art i-STAT 21.2 (L) 22.0 - 28.0 mmol/L    CO2, i-STAT 22 21 - 32 mmol/L     O2 Sat, i-STAT 100 (H) 60 - 85 %    SODIUM, I-STAT 147 (H) 136 - 145 mmol/l    Potassium, i-STAT 3.6 3.5 - 5.3 mmol/L    Calcium, Ionized i-STAT 1.25 1.12 - 1.32 mmol/L    Hct, i-STAT 34 (L) 36.5 - 49.3 %    Hgb, i-STAT 11.6 (L) 12.0 - 17.0 g/dl    Glucose, i-STAT 84 65 - 140 mg/dl    Specimen Type ARTERIAL    Fingerstick Glucose (POCT)   Result Value Ref Range    POC Glucose 85 65 - 140 mg/dl   CBC and differential  Status: Final result     CBC  Status: Final result      Contains abnormal data CBC  Order: [id number]   Status: Final result       Visible to patient: Yes (seen)       Next appt: 06/28/2024 at 11:00 AM in Neurosurgery (Christoph Camacho MD)    0 Result Notes            Component  Ref Range & Units 2/8/24  5:41 AM 1/27/24 10:08 AM 12/27/22  1:59 PM 9/14/21  3:31 PM 9/11/21  5:10 AM 9/10/21  4:51 AM 9/9/21 10:53 PM   WBC  4.31 - 10.16 Thousand/uL 11.73 High  7.39 5.15  8.62 9.31 5.58   RBC  3.88 - 5.62 Million/uL 4.14 5.18 5.37  4.21 CM 4.37 4.74   Hemoglobin  12.0 - 17.0 g/dL 12.7 15.7 16.3  12.8 13.1 14.3   Hematocrit  36.5 - 49.3 % 36.8 46.8 48.9  38.2 39.0 42.5   MCV  82 - 98 fL 89 90 91  91 89 90   MCH  26.8 - 34.3 pg 30.7 30.3 30.4  30.4 30.0 30.2   MCHC  31.4 - 37.4 g/dL 34.5 33.5 33.3  33.5 33.6 33.6   RDW  11.6 - 15.1 % 12.4 12.8 12.8  13.1 12.7 12.6   Platelets  149 - 390 Thousands/uL 139 Low  113 Low   Low  142 Low  108 Low   Low  119 Low    MPV  8.9 - 12.7 fL 10.8 10.6 10.8 10.3 10.7 10.7 10.5                                  Component  Ref Range & Units 1/27/24 10:08 AM 12/27/22  1:59 PM 9/11/21  5:10 AM 9/10/21  4:51 AM 9/9/21 10:53 PM 9/9/21 11:59 AM   Sodium  135 - 147 mmol/L 137 139 140 R 138 R 140 R 138 R   Potassium  3.5 - 5.3 mmol/L 3.3 Low  4.8 3.7 3.9 4.0 4.0   Chloride  96 - 108 mmol/L 102 104 107 R 109 High  R 112 High  R 109 High  R   CO2  21 - 32 mmol/L 29 30 29 24 23 23   ANION GAP  mmol/L 6 5 R 4 R 5 R 5 R 6 R   BUN  5 - 25 mg/dL 17 17 15 12 13 15   Creatinine  0.60 -  1.30 mg/dL 0.86 0.81 CM 0.69 CM 0.59 Low  CM 0.78 CM 0.71 CM   Comment: Standardized to IDMS reference method   Glucose, Fasting  65 - 99 mg/dL 92 93 CM       Calcium  8.4 - 10.2 mg/dL 9.4 9.3 8.5 R 8.3 R 8.8 R 9.1 R   AST  13 - 39 U/L 14 17 CM       ALT  7 - 52 U/L 14 18 CM       Comment: Specimen collection should occur prior to Sulfasalazine administration due to the potential for falsely depressed results.   Alkaline Phosphatase  34 - 104 U/L 74 80       Total Protein  6.4 - 8.4 g/dL 7.6 7.7       Albumin  3.5 - 5.0 g/dL 4.7 4.7       Total Bilirubin  0.20 - 1.00 mg/dL 0.64 0.28       Comment: Use of this assay is not recommended for patients undergoing treatment with eltrombopag due to the potential for falsely elevated results.  N-acetyl-p-benzoquinone imine (metabolite of Acetaminophen) will generate erroneously low results in samples for patients that have taken an overdose of Acetaminophen.   eGFR  ml/min/1.73sq m 121 125 135 144 128 133            Ordering Provider Authorizing Provider Ordering User Ordering Department   MD Christoph Melendez MD Sherry Bonner, MA  [facility]   Neurosurgery Neurosurgery Neurosurgery Neurosurgery    [phone]  [phone]   [phone]          APTT  Order: [id number]  Status: Final result         Visible to patient: Yes (seen)         Next appt: 02/21/2024 at 11:30 AM in Neurosurgery (Kartik Varghese PA-C)         Dx: Pre-procedural examination; Pseudoart...      0 Result Notes           Component  Ref Range & Units 1/27/24 10:08 AM 9/9/21  2:29 PM   PTT  23 - 37 seconds 31 29 CM   Comment: Therapeutic Heparin Range =  60-90 seconds                                     CBC and differential  Status: Final result                    Component  Ref Range & Units 1/27/24 10:08 AM 12/27/22  1:59 PM 9/11/21  5:10 AM 9/10/21  4:51 AM 9/9/21 10:53 PM 9/9/21 11:59 AM   Sodium  135 - 147 mmol/L 137 139 140 R 138 R 140 R 138 R   Potassium  3.5 - 5.3 mmol/L 3.3 Low   4.8 3.7 3.9 4.0 4.0   Chloride  96 - 108 mmol/L 102 104 107 R 109 High  R 112 High  R 109 High  R   CO2  21 - 32 mmol/L 29 30 29 24 23 23   ANION GAP  mmol/L 6 5 R 4 R 5 R 5 R 6 R   BUN  5 - 25 mg/dL 17 17 15 12 13 15   Creatinine  0.60 - 1.30 mg/dL 0.86 0.81 CM 0.69 CM 0.59 Low  CM 0.78 CM 0.71 CM   Comment: Standardized to IDMS reference method   Glucose, Fasting  65 - 99 mg/dL 92 93 CM       Calcium  8.4 - 10.2 mg/dL 9.4 9.3 8.5 R 8.3 R 8.8 R 9.1 R   AST  13 - 39 U/L 14 17 CM       ALT  7 - 52 U/L 14 18 CM       Comment: Specimen collection should occur prior to Sulfasalazine administration due to the potential for falsely depressed results.   Alkaline Phosphatase  34 - 104 U/L 74 80       Total Protein  6.4 - 8.4 g/dL 7.6 7.7       Albumin  3.5 - 5.0 g/dL 4.7 4.7       Total Bilirubin  0.20 - 1.00 mg/dL 0.64 0.28       Comment: Use of this assay is not recommended for patients undergoing treatment with eltrombopag due to the potential for falsely elevated results.  N-acetyl-p-benzoquinone imine (metabolite of Acetaminophen) will generate erroneously low results in samples for patients that have taken an overdose of Acetaminophen.   eGFR  ml/min/1.73sq m 121 125 135 144 128 133                 Component  Ref Range & Units 1/27/24 10:08 AM 9/9/21  2:29 PM   PTT  23 - 37 seconds 31 29 CM   Comment: Therapeutic Heparin Range =  60-90 seconds                                        Component  Ref Range & Units 1/27/24 10:08 AM 9/10/21  4:51 AM 9/9/21 10:53 PM 9/9/21  2:29 PM   Protime  11.6 - 14.5 seconds 13.4 14.5 14.0 13.8   INR  0.84 - 1.19 0.96 1.18 1.12 1.10                                            Imaging Studies: I have personally reviewed pertinent reports.    CT abdomen pelvis wo contrast  Status: Final result     PACS Images     Show images for CT abdomen pelvis wo contrast  Study Result    Narrative & Impression   CT ABDOMEN AND PELVIS WITHOUT IV CONTRAST     INDICATION:   N31.9: Neuromuscular  dysfunction of bladder, unspecified  R33.9: Retention of urine, unspecified.  History of fall in September 2021 with a cervical spine burst fracture resulting in difficulty with management of urinary and fecal continence.     COMPARISON:  Ultrasound, dated 12/27/2022.     TECHNIQUE:  CT examination of the abdomen and pelvis was performed without intravenous contrast. Multiplanar 2D reformatted images were created from the source data.     Radiation dose length product (DLP) for this visit:  382.13 mGy-cm .  This examination, like all CT scans performed in the [facility], was performed utilizing techniques to minimize radiation dose exposure, including the use of iterative   reconstruction and automated exposure control.      Enteric contrast was not administered.      FINDINGS:     ABDOMEN     LOWER CHEST:  No clinically significant abnormality identified in the visualized lower chest.     LIVER/BILIARY TREE:  Unremarkable.     GALLBLADDER:  No calcified gallstones. No pericholecystic inflammatory change.     SPLEEN:  Unremarkable.     PANCREAS:  Unremarkable.     ADRENAL GLANDS:  Unremarkable.     KIDNEYS/URETERS:  Unremarkable. No hydronephrosis.     STOMACH AND BOWEL:  Unremarkable.     APPENDIX:  No findings to suggest appendicitis.     ABDOMINOPELVIC CAVITY:  No ascites.  No pneumoperitoneum.  No lymphadenopathy.     VESSELS:  Unremarkable for patient's age.     PELVIS     REPRODUCTIVE ORGANS:  Unremarkable for patient's age.     URINARY BLADDER:  Unremarkable.     ABDOMINAL WALL/INGUINAL REGIONS:  Unremarkable.     OSSEOUS STRUCTURES:  No acute fracture or destructive osseous lesion.  Benign bone island in the right iliac wing (series 2, image 109).     IMPRESSION:     Normal unenhanced CT of the abdomen and pelvis.           Workstation performed: [id number]        Imaging    CT abdomen pelvis wo contrast (Order: [id number]) - 3/18/2023      IMPRESSION:     Interval fracture of the right T1  pedicle screw.     Stable lucency around the right C4 pedicle screw.     Stable moderate to marked compression fracture of C5 with associated reversal of the normal cervical lordosis at this level.     No new vertebral fractures.     The study was marked in EPIC for immediate notification.        Workstation performed: [id number]        Imaging    XR spine cervical complete 6+ vw flex/ext/obl (Order: [id number]) - 11/20/2023  Pathology, and Other Studies: I have personally reviewed pertinent reports.        Assessment and Plan:  See diagnoses, orders instructions below    Continuation of care for thrombocytopenia at least since 2021 and platelet count have remained above 100,000.  No history of bleeding or bruising.  He had neck surgery for fracture of cervical spine and there was no excessive bleeding or complication secondary to thrombocytopenia.  Much less neck pain.  He has residual weakness and numbness in the arms but better than before.  He gets migraine headaches.  No family history of bleeding disorder.  There is history of heart disease and diabetes in the family.    Physical examination and test results are as recorded and discussed.  Thrombocytopenia is chronic.  No splenomegaly.  Most likely has chronic mild ITP.  Normal PT and PTT and rest of CBCD.  Discussed with patient and his mother.  Questions answered.  Diet and activities per patient's primary physician and neurosurgeon.  Patient to avoid trauma and falls.  Patient to avoid NSAIDs and aspirin and blood thinners if possible unless medically necessary.  Discussed health screening test.  Discussed in detail.  Questions answered.  Goal is to monitor platelet count and to prevent bleeding.  Patient appears to be capable of self-care.  1. Thrombocytopenia (HCC)    - CBC and differential; Standing  Blood work every 6 months.  Visit in 1 year.     Patient will continue to follow with  primary physician and other consultants.  Patient voiced understanding  and agrees  Disclaimer: This document was prepared using a dictation device. If a word or phrase is confusing, or does not make sense, this is likely due to recognition error which was not discovered during the providers review. If you believe an error has occurred, please Contact me through [facility] Line service for deon?cation.    Counseling / Coordination of Care  ..  Provided counseling and support

## 2024-04-10 NOTE — TELEPHONE ENCOUNTER
Per message from Dr. Earnest Alejandra, Please order XR cervical spine 6 views flexion extension to be done before his next visit. Next visit should be in mid to late november. Thanks so much. XR has been ordered and message sent to Dr. Tyrell Goode MA to schedule appointment with patient. normal

## 2024-05-28 ENCOUNTER — APPOINTMENT (EMERGENCY)
Dept: RADIOLOGY | Facility: HOSPITAL | Age: 24
End: 2024-05-28
Payer: COMMERCIAL

## 2024-05-28 ENCOUNTER — HOSPITAL ENCOUNTER (EMERGENCY)
Facility: HOSPITAL | Age: 24
Discharge: HOME | End: 2024-05-28
Attending: EMERGENCY MEDICINE
Payer: COMMERCIAL

## 2024-05-28 ENCOUNTER — APPOINTMENT (EMERGENCY)
Dept: CT IMAGING | Facility: HOSPITAL | Age: 24
End: 2024-05-28
Payer: COMMERCIAL

## 2024-05-28 VITALS
RESPIRATION RATE: 18 BRPM | SYSTOLIC BLOOD PRESSURE: 125 MMHG | TEMPERATURE: 97.5 F | DIASTOLIC BLOOD PRESSURE: 59 MMHG | OXYGEN SATURATION: 100 % | HEART RATE: 67 BPM

## 2024-05-28 DIAGNOSIS — J93.83 SPONTANEOUS PNEUMOTHORAX: Primary | ICD-10-CM

## 2024-05-28 DIAGNOSIS — J43.9 BLEB, LUNG (HCC): ICD-10-CM

## 2024-05-28 LAB
ALBUMIN SERPL BCP-MCNC: 4.6 G/DL (ref 3.5–5)
ALP SERPL-CCNC: 74 U/L (ref 34–104)
ALT SERPL W P-5'-P-CCNC: 7 U/L (ref 7–52)
ANION GAP SERPL CALCULATED.3IONS-SCNC: 5 MMOL/L (ref 4–13)
AST SERPL W P-5'-P-CCNC: 11 U/L (ref 13–39)
ATRIAL RATE: 87 BPM
BASOPHILS # BLD AUTO: 0.04 THOUSANDS/ÂΜL (ref 0–0.1)
BASOPHILS NFR BLD AUTO: 1 % (ref 0–1)
BILIRUB SERPL-MCNC: 0.43 MG/DL (ref 0.2–1)
BUN SERPL-MCNC: 19 MG/DL (ref 5–25)
CALCIUM SERPL-MCNC: 9.2 MG/DL (ref 8.4–10.2)
CARDIAC TROPONIN I PNL SERPL HS: <2 NG/L
CHLORIDE SERPL-SCNC: 105 MMOL/L (ref 96–108)
CO2 SERPL-SCNC: 27 MMOL/L (ref 21–32)
CREAT SERPL-MCNC: 0.78 MG/DL (ref 0.6–1.3)
EOSINOPHIL # BLD AUTO: 0.34 THOUSAND/ÂΜL (ref 0–0.61)
EOSINOPHIL NFR BLD AUTO: 5 % (ref 0–6)
ERYTHROCYTE [DISTWIDTH] IN BLOOD BY AUTOMATED COUNT: 13 % (ref 11.6–15.1)
GFR SERPL CREATININE-BSD FRML MDRD: 125 ML/MIN/1.73SQ M
GLUCOSE SERPL-MCNC: 102 MG/DL (ref 65–140)
HCT VFR BLD AUTO: 45.7 % (ref 36.5–49.3)
HGB BLD-MCNC: 15.3 G/DL (ref 12–17)
IMM GRANULOCYTES # BLD AUTO: 0.03 THOUSAND/UL (ref 0–0.2)
IMM GRANULOCYTES NFR BLD AUTO: 1 % (ref 0–2)
LYMPHOCYTES # BLD AUTO: 2.79 THOUSANDS/ÂΜL (ref 0.6–4.47)
LYMPHOCYTES NFR BLD AUTO: 42 % (ref 14–44)
MCH RBC QN AUTO: 29.8 PG (ref 26.8–34.3)
MCHC RBC AUTO-ENTMCNC: 33.5 G/DL (ref 31.4–37.4)
MCV RBC AUTO: 89 FL (ref 82–98)
MONOCYTES # BLD AUTO: 0.6 THOUSAND/ÂΜL (ref 0.17–1.22)
MONOCYTES NFR BLD AUTO: 9 % (ref 4–12)
NEUTROPHILS # BLD AUTO: 2.81 THOUSANDS/ÂΜL (ref 1.85–7.62)
NEUTS SEG NFR BLD AUTO: 42 % (ref 43–75)
NRBC BLD AUTO-RTO: 0 /100 WBCS
P AXIS: 73 DEGREES
PLATELET # BLD AUTO: 130 THOUSANDS/UL (ref 149–390)
PMV BLD AUTO: 10.4 FL (ref 8.9–12.7)
POTASSIUM SERPL-SCNC: 3.7 MMOL/L (ref 3.5–5.3)
PR INTERVAL: 138 MS
PROT SERPL-MCNC: 7.5 G/DL (ref 6.4–8.4)
QRS AXIS: 100 DEGREES
QRSD INTERVAL: 110 MS
QT INTERVAL: 394 MS
QTC INTERVAL: 474 MS
RBC # BLD AUTO: 5.13 MILLION/UL (ref 3.88–5.62)
SODIUM SERPL-SCNC: 137 MMOL/L (ref 135–147)
T WAVE AXIS: 30 DEGREES
VENTRICULAR RATE: 87 BPM
WBC # BLD AUTO: 6.61 THOUSAND/UL (ref 4.31–10.16)

## 2024-05-28 PROCEDURE — 96374 THER/PROPH/DIAG INJ IV PUSH: CPT

## 2024-05-28 PROCEDURE — 93005 ELECTROCARDIOGRAM TRACING: CPT

## 2024-05-28 PROCEDURE — 99285 EMERGENCY DEPT VISIT HI MDM: CPT | Performed by: EMERGENCY MEDICINE

## 2024-05-28 PROCEDURE — 93010 ELECTROCARDIOGRAM REPORT: CPT | Performed by: INTERNAL MEDICINE

## 2024-05-28 PROCEDURE — 71045 X-RAY EXAM CHEST 1 VIEW: CPT

## 2024-05-28 PROCEDURE — 71275 CT ANGIOGRAPHY CHEST: CPT

## 2024-05-28 PROCEDURE — 36415 COLL VENOUS BLD VENIPUNCTURE: CPT

## 2024-05-28 PROCEDURE — 80053 COMPREHEN METABOLIC PANEL: CPT | Performed by: EMERGENCY MEDICINE

## 2024-05-28 PROCEDURE — 85025 COMPLETE CBC W/AUTO DIFF WBC: CPT | Performed by: EMERGENCY MEDICINE

## 2024-05-28 PROCEDURE — 84484 ASSAY OF TROPONIN QUANT: CPT | Performed by: EMERGENCY MEDICINE

## 2024-05-28 PROCEDURE — 99285 EMERGENCY DEPT VISIT HI MDM: CPT

## 2024-05-28 RX ORDER — METHOCARBAMOL 500 MG/1
500 TABLET, FILM COATED ORAL ONCE
Status: DISCONTINUED | OUTPATIENT
Start: 2024-05-28 | End: 2024-05-28

## 2024-05-28 RX ORDER — LIDOCAINE 50 MG/G
1 PATCH TOPICAL ONCE
Status: DISCONTINUED | OUTPATIENT
Start: 2024-05-28 | End: 2024-05-28 | Stop reason: HOSPADM

## 2024-05-28 RX ORDER — KETOROLAC TROMETHAMINE 30 MG/ML
15 INJECTION, SOLUTION INTRAMUSCULAR; INTRAVENOUS ONCE
Status: DISCONTINUED | OUTPATIENT
Start: 2024-05-28 | End: 2024-05-28

## 2024-05-28 RX ORDER — KETOROLAC TROMETHAMINE 30 MG/ML
15 INJECTION, SOLUTION INTRAMUSCULAR; INTRAVENOUS ONCE
Status: COMPLETED | OUTPATIENT
Start: 2024-05-28 | End: 2024-05-28

## 2024-05-28 RX ORDER — METHOCARBAMOL 500 MG/1
1000 TABLET, FILM COATED ORAL ONCE
Status: COMPLETED | OUTPATIENT
Start: 2024-05-28 | End: 2024-05-28

## 2024-05-28 RX ADMIN — IOHEXOL 85 ML: 350 INJECTION, SOLUTION INTRAVENOUS at 16:23

## 2024-05-28 RX ADMIN — KETOROLAC TROMETHAMINE 15 MG: 30 INJECTION, SOLUTION INTRAMUSCULAR; INTRAVENOUS at 13:50

## 2024-05-28 RX ADMIN — LIDOCAINE 5% 1 PATCH: 700 PATCH TOPICAL at 13:48

## 2024-05-28 RX ADMIN — METHOCARBAMOL 1000 MG: 500 TABLET ORAL at 13:49

## 2024-05-28 NOTE — Clinical Note
Ramin Sadler was seen and treated in our emergency department on 5/28/2024.                Diagnosis:     Ramin  .    He may return on this date: 06/03/2024         If you have any questions or concerns, please don't hesitate to call.      Poncho Witt, DO    ______________________________           _______________          _______________  Hospital Representative                              Date                                Time

## 2024-05-28 NOTE — ED ATTENDING ATTESTATION
5/28/2024  IPoncho DO, saw and evaluated the patient. I have discussed the patient with the resident/non-physician practitioner and agree with the resident's/non-physician practitioner's findings, Plan of Care, and MDM as documented in the resident's/non-physician practitioner's note, except where noted. All available labs and Radiology studies were reviewed.  I was present for key portions of any procedure(s) performed by the resident/non-physician practitioner and I was immediately available to provide assistance.       At this point I agree with the current assessment done in the Emergency Department.  I have conducted an independent evaluation of this patient a history and physical is as follows: 24-year-old male, past medical history of central cord syndrome with subsequent C-spine fixation, presenting to the emergency department with 2 days of slight pleuritic chest pain.  Patient denies shortness of breath, dyspnea on exertion, chest pain on exertion, trauma.  Father at bedside.    Vital signs stable.  Patient resting comfortably.  Lungs clear to auscultation bilaterally.  No increased work of breathing.  Heart regular rhythm without murmur, gallop, rub.  Abdomen soft nontender.  Bilateral lower extremities without calf tenderness, edema, dissymmetry.    24-year-old male presenting to the emergency department with mild pleuritic chest pain which he notes improved to resolved here status post Toradol.  Chest x-ray notable for the question of left apical pneumothorax.  Ultrasound with unclear results.  As such, CT PE performed without PE.  Small left apical bleb noted.  Incidentally, small right pneumo also identified.  Spoke with Dr. Boyd of thoracic surgery of pt clinical course and CT findings. Agreed with 02 NRB for gas exchance and repeat CXR in 3 hrs from initial. Without progression and with pt remaining comfortable, will DC to f/u with his office with which he agrees.     Repeat chest x-ray  largely unchanged and variation in the apices only secondary to patient's AP position.  Disconnected from oxygen and ambulated considerably around the emergency department without any shortness of breath, worsening chest pain, near-syncope.  Patient notes his symptoms continue to have improved.  Ambulatory referral provided to thoracic surgery.  Discussed with patient and placed in referral that he is to be seen this week for reevaluation and preferably within the next 48 hours.  Strict return precautions discussed with both patient and father.  Patient provided pneumothorax precautions as well. Reviewed all findings both relevant and incidental with the patient at bedside. Pt verbalized understanding of findings, neccesary follow up, return to ED precautions. Pt agreed to review today's findings with their primary care provider. Pt non-toxic appearing upon discharge.       ED Course         Critical Care Time  Procedures

## 2024-05-28 NOTE — ED PROVIDER NOTES
History  Chief Complaint   Patient presents with    Chest Pain     Chest pain described as cramping radiating to left shoulder and shoulder blade x2 days. Denies n/v     HPI    24-year-old male presents for evaluation of chest and back pain.  He has a history of central cord syndrome, s/p C-spine fixation and symptoms started 2 days ago with sharp midsternal chest pain radiating into his shoulder and upper back. He also reports pleuritic chest pain.  He has a history of scapular dysfunction, states that the back pain is similar to prior pain.  He denies dyspnea on exertion, SOB, trauma to the area.    Prior to Admission Medications   Prescriptions Last Dose Informant Patient Reported? Taking?   Diclofenac Sodium (VOLTAREN) 1 %  Self Yes No   Sig: as needed   Patient not taking: Reported on 11/20/2023   Galcanezumab-gnlm 120 MG/ML SOAJ   No No   Sig: Inject 120 mg under the skin every 30 (thirty) days   Patient not taking: Reported on 3/21/2024   SUMAtriptan (Imitrex) 50 mg tablet  Self No No   Sig: Take 1 tablet (50 mg total) by mouth once as needed for migraine Okay to repeat in 2 hours. Max 2 tablets in 24 hours.   Patient not taking: Reported on 3/21/2024   acetaminophen (TYLENOL) 500 mg tablet  Self Yes No   Sig: Take 500 mg by mouth every 6 (six) hours as needed for mild pain   buPROPion (WELLBUTRIN XL) 150 mg 24 hr tablet  Self Yes No   Sig: Take 150 mg by mouth daily   Patient not taking: Reported on 1/30/2024   docusate sodium (COLACE) 100 mg capsule   No No   Sig: Take 1 capsule (100 mg total) by mouth 2 (two) times a day for 7 days   Patient not taking: Reported on 5/29/2024   gabapentin (NEURONTIN) 300 mg capsule   No No   Sig: Take 1 capsule (300 mg total) by mouth daily at bedtime   Patient not taking: Reported on 3/21/2024   lifitegrast (Xiidra) 5 % op solution  Self Yes No   Patient not taking: Reported on 11/20/2023   methocarbamol (ROBAXIN) 750 mg tablet   No No   Sig: Take 1 tablet (750 mg total)  by mouth every 6 (six) hours as needed for muscle spasms   polyethylene glycol (MIRALAX) 17 g packet  Self No No   Sig: Take 17 g by mouth daily for 14 days   Patient not taking: Reported on 3/21/2024   potassium chloride (Klor-Con M10) 10 mEq tablet   No No   Sig: Take 1 tablet (10 mEq total) by mouth 3 (three) times a day   Patient not taking: Reported on 3/21/2024   potassium chloride (MICRO-K) 10 MEQ CR capsule   Yes No   Sig: Take 10 mEq by mouth 3 (three) times a day   Patient not taking: Reported on 2024   propranolol (INDERAL LA) 120 mg 24 hr capsule  Self Yes No   Sig: Take 120 mg by mouth daily   Patient not taking: Reported on 2024   tamsulosin (FLOMAX) 0.4 mg  Self Yes No   Sig: Take 0.4 mg by mouth daily at bedtime   Patient not taking: Reported on 3/21/2024      Facility-Administered Medications: None       Past Medical History:   Diagnosis Date    C5 cervical fracture (HCC) 2021    Cervicogenic headache     Depression     HL (hearing loss)     Migraine     Post concussion syndrome     Tetraparesis (HCC)     Weakness        Past Surgical History:   Procedure Laterality Date    DECOMPRESSION SPINE CERVICAL POSTERIOR Bilateral 2021    Procedure: C4-6 POSTERIOR CERVICAL SPINE DECOMPRESSION WITH C3-T1 FUSION  ;  Surgeon: Christoph Camacho MD;  Location: [facility];  Service: Neurosurgery    [facility] ARTHRD ANT INTERBODY MIN DSC CRV BELOW C2 Bilateral 2024    Procedure: Posterior removal of bilateral rods, anterior C5 corpectomy with C4-6 fixation, posterior revision fusion with extension to C2 and T3, with neuronavigation and neuromonitoring;  Surgeon: Christoph Camacho MD;  Location: [facility];  Service: Neurosurgery       Family History   Problem Relation Age of Onset    No Known Problems Mother     Heart disease Father     Heart attack Father     Diabetes Paternal Grandmother     Heart disease Paternal Grandfather     Stroke Paternal Grandfather             Heart disease Paternal Uncle       I have reviewed and agree with the history as documented.    E-Cigarette/Vaping    E-Cigarette Use Never User      E-Cigarette/Vaping Substances    Nicotine No     THC No     CBD No     Flavoring No     Other No     Unknown No      Social History     Tobacco Use    Smoking status: Never    Smokeless tobacco: Never   Vaping Use    Vaping status: Never Used   Substance Use Topics    Alcohol use: Never    Drug use: Never        Review of Systems   Constitutional:  Negative for fever.   Respiratory:  Negative for shortness of breath.    Cardiovascular:  Positive for chest pain. Negative for palpitations.   Gastrointestinal:  Negative for abdominal pain, nausea and vomiting.   Musculoskeletal:  Positive for arthralgias. Negative for neck pain.   Skin:  Negative for color change.   Neurological:  Negative for dizziness and headaches.       Physical Exam  ED Triage Vitals   Temperature Pulse Respirations Blood Pressure SpO2   05/28/24 1232 05/28/24 1232 05/28/24 1232 05/28/24 1232 05/28/24 1232   97.5 °F (36.4 °C) 65 18 145/65 98 %      Temp Source Heart Rate Source Patient Position - Orthostatic VS BP Location FiO2 (%)   05/28/24 1232 05/28/24 1232 05/28/24 1232 05/28/24 1232 --   Oral Monitor Sitting Right arm       Pain Score       05/28/24 1350       4             Orthostatic Vital Signs  Vitals:    05/28/24 1232 05/28/24 1547 05/28/24 1826   BP: 145/65 141/71 125/59   Pulse: 65 55 67   Patient Position - Orthostatic VS: Sitting Sitting        Physical Exam  Vitals and nursing note reviewed.   Constitutional:       General: He is not in acute distress.     Appearance: He is well-developed.   HENT:      Head: Normocephalic and atraumatic.   Eyes:      Conjunctiva/sclera: Conjunctivae normal.      Pupils: Pupils are equal, round, and reactive to light.   Cardiovascular:      Rate and Rhythm: Normal rate and regular rhythm.      Heart sounds: No murmur heard.  Pulmonary:      Effort: Pulmonary effort is normal. No  respiratory distress.      Breath sounds: Normal breath sounds.   Chest:      Chest wall: Tenderness present.   Abdominal:      Palpations: Abdomen is soft.      Tenderness: There is no abdominal tenderness.   Musculoskeletal:         General: No swelling.      Cervical back: Neck supple.      Comments: Tenderness to palpation over left scapula.  Full ROM of L shoulder, with  no chest pain with shoulder abduction.   Skin:     General: Skin is warm and dry.      Capillary Refill: Capillary refill takes less than 2 seconds.   Neurological:      Mental Status: He is alert.   Psychiatric:         Mood and Affect: Mood normal.         ED Medications  Medications   methocarbamol (ROBAXIN) tablet 1,000 mg (1,000 mg Oral Given 5/28/24 1349)   ketorolac (TORADOL) injection 15 mg (15 mg Intravenous Given 5/28/24 1350)   iohexol (OMNIPAQUE) 350 MG/ML injection (MULTI-DOSE) 100 mL (85 mL Intravenous Given 5/28/24 1623)       Diagnostic Studies  Results Reviewed       Procedure Component Value Units Date/Time    Comprehensive metabolic panel [[id number]]  (Abnormal) Collected: 05/28/24 1234    Lab Status: Final result Specimen: Blood from Arm, Left Updated: 05/28/24 1307     Sodium 137 mmol/L      Potassium 3.7 mmol/L      Chloride 105 mmol/L      CO2 27 mmol/L      ANION GAP 5 mmol/L      BUN 19 mg/dL      Creatinine 0.78 mg/dL      Glucose 102 mg/dL      Calcium 9.2 mg/dL      AST 11 U/L      ALT 7 U/L      Alkaline Phosphatase 74 U/L      Total Protein 7.5 g/dL      Albumin 4.6 g/dL      Total Bilirubin 0.43 mg/dL      eGFR 125 ml/min/1.73sq m     Narrative:      [employer] guidelines for Chronic Kidney Disease (CKD):     Stage 1 with normal or high GFR (GFR > 90 mL/min/1.73 square meters)    Stage 2 Mild CKD (GFR = 60-89 mL/min/1.73 square meters)    Stage 3A Moderate CKD (GFR = 45-59 mL/min/1.73 square meters)    Stage 3B Moderate CKD (GFR = 30-44 mL/min/1.73 square meters)    Stage 4 Severe CKD (GFR  = 15-29 mL/min/1.73 square meters)    Stage 5 End Stage CKD (GFR <15 mL/min/1.73 square meters)  Note: GFR calculation is accurate only with a steady state creatinine    HS Troponin 0hr (reflex protocol) [[id number]]  (Normal) Collected: 05/28/24 1234    Lab Status: Final result Specimen: Blood from Arm, Left Updated: 05/28/24 1306     hs TnI 0hr <2 ng/L     CBC and differential [[id number]]  (Abnormal) Collected: 05/28/24 1234    Lab Status: Final result Specimen: Blood from Arm, Left Updated: 05/28/24 1247     WBC 6.61 Thousand/uL      RBC 5.13 Million/uL      Hemoglobin 15.3 g/dL      Hematocrit 45.7 %      MCV 89 fL      MCH 29.8 pg      MCHC 33.5 g/dL      RDW 13.0 %      MPV 10.4 fL      Platelets 130 Thousands/uL      nRBC 0 /100 WBCs      Segmented % 42 %      Immature Grans % 1 %      Lymphocytes % 42 %      Monocytes % 9 %      Eosinophils Relative 5 %      Basophils Relative 1 %      Absolute Neutrophils 2.81 Thousands/µL      Absolute Immature Grans 0.03 Thousand/uL      Absolute Lymphocytes 2.79 Thousands/µL      Absolute Monocytes 0.60 Thousand/µL      Eosinophils Absolute 0.34 Thousand/µL      Basophils Absolute 0.04 Thousands/µL                    XR chest 1 view portable   ED Interpretation by Poncho Witt DO (05/28 1827)   Stable from prior given slight change in AP position. Pt clinically unchanged.       Final Result by Rik Cruz MD (05/29 1542)      No visible pneumothorax at this time            Workstation performed: [id number]         CTA ED chest PE Study   Final Result by Nayely Cummins MD (05/28 1638)      Small left pneumothorax.      Tiny left apical bleb suboptimally demonstrated due to motion.      Small air collection in or adjacent to the confluence of the right major and minor fissures, question loculated right pneumothorax versus perifissural bulla.            Workstation performed: [id number]         XR chest 1 view portable   Final Result by Jimmy Hawthorne MD  (05/28 1442)      Left apical small pneumothorax.      The study was marked in EPIC for immediate notification.            Workstation performed: [id number]               Procedures  ECG 12 Lead Documentation Only    Date/Time: 5/28/2024 2:26 PM    Performed by: Halie Ramirez MD  Authorized by: Halie Ramirez MD    Rate:     ECG rate:  87    ECG rate assessment: normal    Rhythm:     Rhythm: sinus rhythm    QRS:     QRS axis:  Right    QRS intervals:  Normal  Conduction:     Conduction: abnormal      Abnormal conduction: incomplete RBBB    T waves:     T waves: inverted      Inverted:  III and aVF  Comments:      NSR, 87 bpm.  Right axis deviation.  Incomplete right bundle branch block.  FL interval 1 3 8 ms,  ms, QTc 474 ms.  T wave inversions in leads III and aVF.  No ST elevations noted.          ED Course                                       Medical Decision Making  24-year-old male presents for evaluation of chest pain.    Differentials: ACS, musculoskeletal pain, PE, pneumothorax    On initial evaluation, patient in no acute distress, resting comfortably in bed.  VSS.  Tenderness to palpation over chest wall, pain in chest with L arm abduction.  Labs grossly unremarkable.  Chest x-ray showed small left pneumothorax.  CT  PE study showed small left pneumothorax and left apical bleb.  Patient continues to deny dyspnea, and reports improvement of chest pain and tenderness with Toradol and Robaxin in ED.  My attending reached out to cardiothoracic surgery for recommendations.  Patient care assumed by Dr. Witt at this time.     Amount and/or Complexity of Data Reviewed  Labs: ordered.  Radiology: ordered and independent interpretation performed.    Risk  Prescription drug management.          Disposition  Final diagnoses:   Spontaneous pneumothorax - left   Bleb, lung (HCC) - apical left and fissural right     Time reflects when diagnosis was documented in both MDM as applicable and the  Disposition within this note       Time User Action Codes Description Comment    5/28/2024  1:51 PM [place], Afopefoluwa Add [M89.9] Scapular dysfunction     5/28/2024  1:51 PM [place], Afopefoluwa Add [R07.89] Chest wall pain     5/28/2024  6:29 PM EduarPoncho nava Modify [R07.89] Chest wall pain     5/28/2024  6:29 PM EduarJameson navaon Remove [M89.9] Scapular dysfunction     5/28/2024  6:29 PM EduarJamesonon Remove [R07.89] Chest wall pain     5/28/2024  6:29 PM EduarJamesonon Add [J93.83] Spontaneous pneumothorax     5/28/2024  6:30 PM EduarJameson navaon Remove [J93.83] Spontaneous pneumothorax     5/28/2024  6:30 PM EduarJameson navaon Add [J93.83] Spontaneous pneumothorax     5/28/2024  6:30 PM EduarPoncho nava Modify [J93.83] Spontaneous pneumothorax left    5/28/2024  6:30 PM EduarPoncho nava Add [J43.9] Bleb, lung (HCC)     5/28/2024  6:31 PM EduarPoncho nava Modify [J43.9] Bleb, lung (HCC) apical left and fissural right          ED Disposition       ED Disposition   Discharge    Condition   Stable    Date/Time   Tue May 28, 2024  6:32 PM    Comment   Ramin Sadler discharge to home/self care.                   Follow-up Information       Follow up With Specialties Details Why Contact Info    Chapincito Boyd, DO Thoracic Surgery Call  For review of findings and symptoms. [address] [place] [id number]  [phone]              Discharge Medication List as of 5/28/2024  6:32 PM        CONTINUE these medications which have NOT CHANGED    Details   acetaminophen (TYLENOL) 500 mg tablet Take 500 mg by mouth every 6 (six) hours as needed for mild pain, Historical Med      buPROPion (WELLBUTRIN XL) 150 mg 24 hr tablet Take 150 mg by mouth daily, Starting Thu 12/15/2022, Historical Med      Diclofenac Sodium (VOLTAREN) 1 % as needed, Starting Mon 10/17/2022, Historical Med      docusate sodium (COLACE) 100 mg capsule Take 1 capsule (100 mg total) by mouth 2 (two) times a day for 7 days, Starting Sun 2/11/2024, Until  Sun 2/18/2024, Normal      gabapentin (NEURONTIN) 300 mg capsule Take 1 capsule (300 mg total) by mouth daily at bedtime, Starting Sun 2/11/2024, Normal      Galcanezumab-gnlm 120 MG/ML SOAJ Inject 120 mg under the skin every 30 (thirty) days, Starting Mon 11/20/2023, Normal      lifitegrast (Xiidra) 5 % op solution Starting Tue 11/1/2022, Historical Med      methocarbamol (ROBAXIN) 750 mg tablet Take 1 tablet (750 mg total) by mouth every 6 (six) hours as needed for muscle spasms, Starting Sun 2/11/2024, Normal      polyethylene glycol (MIRALAX) 17 g packet Take 17 g by mouth daily for 14 days, Starting Wed 9/15/2021, Until Tue 11/21/2023, No Print      potassium chloride (Klor-Con M10) 10 mEq tablet Take 1 tablet (10 mEq total) by mouth 3 (three) times a day, Starting Fri 2/2/2024, Normal      potassium chloride (MICRO-K) 10 MEQ CR capsule Take 10 mEq by mouth 3 (three) times a day, Historical Med      propranolol (INDERAL LA) 120 mg 24 hr capsule Take 120 mg by mouth daily, Starting Tue 3/21/2023, Historical Med      SUMAtriptan (Imitrex) 50 mg tablet Take 1 tablet (50 mg total) by mouth once as needed for migraine Okay to repeat in 2 hours. Max 2 tablets in 24 hours., Starting Thu 5/18/2023, Normal      tamsulosin (FLOMAX) 0.4 mg Take 0.4 mg by mouth daily at bedtime, Starting Fri 12/16/2022, Historical Med               PDMP Review         Value Time User    PDMP Reviewed  Yes 2/11/2024 11:42 AM Geraldine Bernardo PA-C             ED Provider  Attending physically available and evaluated Ramin Sadler. I managed the patient along with the ED Attending.    Electronically Signed by           Halie Ramirez MD  05/29/24 1950

## 2024-05-29 ENCOUNTER — OFFICE VISIT (OUTPATIENT)
Dept: CARDIAC SURGERY | Facility: CLINIC | Age: 24
End: 2024-05-29
Payer: COMMERCIAL

## 2024-05-29 ENCOUNTER — TELEPHONE (OUTPATIENT)
Dept: HEMATOLOGY ONCOLOGY | Facility: CLINIC | Age: 24
End: 2024-05-29

## 2024-05-29 VITALS
WEIGHT: 153 LBS | DIASTOLIC BLOOD PRESSURE: 67 MMHG | HEIGHT: 68 IN | BODY MASS INDEX: 23.19 KG/M2 | SYSTOLIC BLOOD PRESSURE: 110 MMHG | HEART RATE: 72 BPM | TEMPERATURE: 98.4 F | OXYGEN SATURATION: 98 % | RESPIRATION RATE: 14 BRPM

## 2024-05-29 DIAGNOSIS — J93.83 SPONTANEOUS PNEUMOTHORAX: ICD-10-CM

## 2024-05-29 PROCEDURE — 99245 OFF/OP CONSLTJ NEW/EST HI 55: CPT | Performed by: THORACIC SURGERY (CARDIOTHORACIC VASCULAR SURGERY)

## 2024-05-29 NOTE — PROGRESS NOTES
Thoracic Consult  Assessment/Plan:   24-year-old male with no major risk factors with a spontaneous left-sided pneumothorax that is resolved without chest tube management.    I had a good conversation with the patient and his mother today about his spontaneous left pneumothorax.  This is resolved without further management.  I do not recommend any further intervention at this time.  They understand he has about 30 to 40% lifetime risk for a second spontaneous pneumothorax on the left side.  Typically after a second occurrence we would recommend surgery.  I do not think it is necessary today.  We discussed presentation to an emergency room quickly should he experience any recurrent chest pain or other discomfort.  He should take this seriously and seek medical attention with any symptoms like this.  He voiced understanding and agreement.  No plans on scheduled thoracic surgical follow-up.  He will call us with any additional questions or concerns.    Amadeo Glenwood      Diagnoses and all orders for this visit:    Spontaneous pneumothorax  Comments:  left  Orders:  -     Ambulatory Referral to Thoracic Surgery            Thoracic History     Problem: Spontaneous left pneumothorax    Procedure:     Pathology:      Subjective:    Patient ID: Ramin Sadler is a 24 y.o. male.    TERRANCE Faustin is a 24-year-old male with a history of multiple cervical surgeries and chronic left back pain who we are seeing in consultation for a spontaneous left pneumothorax.  He had acute onset of left-sided back chest wall pain over the weekend.  This prompted an ER visit.  He was found to have a spontaneous mild left pneumothorax.  This was observed and remained stable.  He was discharged without any intervention.  He comes to our office today accompanied by his mother to discuss this further.    Currently he denies any major complaints.  No back pain or shortness of breath.  He has not had any major recent cough.  No recent infections  or trauma.  Continues to get physical therapy for his neck and back musculoskeletal issues.  He is a lifelong non-smoker.  No previous history of anything like this.  No family history of lung issues.      The following portions of the patient's history were reviewed and updated as appropriate: allergies, current medications, past family history, past medical history, past social history, past surgical history and problem list.    Past Medical History:   Diagnosis Date    C5 cervical fracture (HCC) 2021    Cervicogenic headache     Depression     HL (hearing loss)     Migraine     Post concussion syndrome     Tetraparesis (HCC)     Weakness       Past Surgical History:   Procedure Laterality Date    DECOMPRESSION SPINE CERVICAL POSTERIOR Bilateral 2021    Procedure: C4-6 POSTERIOR CERVICAL SPINE DECOMPRESSION WITH C3-T1 FUSION  ;  Surgeon: Christoph Camacho MD;  [facility];  Service: Neurosurgery    IN ARTHRD ANT INTERBODY MIN DSC CRV BELOW C2 Bilateral 2024    Procedure: Posterior removal of bilateral rods, anterior C5 corpectomy with C4-6 fixation, posterior revision fusion with extension to C2 and T3, with neuronavigation and neuromonitoring;  Surgeon: Christoph Camacho MD;  Location[facility];  Service: Neurosurgery      Family History   Problem Relation Age of Onset    No Known Problems Mother     Heart disease Father     Heart attack Father     Diabetes Paternal Grandmother     Heart disease Paternal Grandfather     Stroke Paternal Grandfather             Heart disease Paternal Uncle       Social History     Socioeconomic History    Marital status: Single     Spouse name: Not on file    Number of children: Not on file    Years of education: Not on file    Highest education level: Not on file   Occupational History    Not on file   Tobacco Use    Smoking status: Never    Smokeless tobacco: Never   Vaping Use    Vaping status: Never Used   Substance and Sexual Activity    Alcohol use:  Never    Drug use: Never    Sexual activity: Not Currently     Partners: Female     Birth control/protection: Abstinence   Other Topics Concern    Not on file   Social History Narrative    Not on file     Social Determinants of Health     Financial Resource Strain: Low Risk  (3/29/2022)    Overall Financial Resource Strain (CARDIA)     Difficulty of Paying Living Expenses: Not hard at all   Food Insecurity: No Food Insecurity (2/10/2024)    Hunger Vital Sign     Worried About Running Out of Food in the Last Year: Never true     Ran Out of Food in the Last Year: Never true   Transportation Needs: No Transportation Needs (2/10/2024)    PRAPARE - Transportation     Lack of Transportation (Medical): No     Lack of Transportation (Non-Medical): No   Physical Activity: Inactive (3/29/2022)    Exercise Vital Sign     Days of Exercise per Week: 0 days     Minutes of Exercise per Session: 0 min   Stress: No Stress Concern Present (3/29/2022)    [place] [employer] - Occupational Stress Questionnaire     Feeling of Stress : Not at all   Social Connections: Socially Isolated (3/29/2022)    Social Connection and Isolation Panel [NHANES]     Frequency of Communication with Friends and Family: More than three times a week     Frequency of Social Gatherings with Friends and Family: More than three times a week     Attends Latter day Services: Never     Active Member of Clubs or Organizations: No     Attends Club or Organization Meetings: Never     Marital Status: Never    Intimate Partner Violence: Not At Risk (3/29/2022)    Humiliation, Afraid, Rape, and Kick questionnaire     Fear of Current or Ex-Partner: No     Emotionally Abused: No     Physically Abused: No     Sexually Abused: No   Housing Stability: Low Risk  (2/10/2024)    Housing Stability Vital Sign     Unable to Pay for Housing in the Last Year: No     Number of Places Lived in the Last Year: 1     Unstable Housing in the Last Year: No       Review of Systems   Constitutional:  Negative for activity change, appetite change, chills, diaphoresis, fatigue, fever and unexpected weight change.   HENT: Negative.     Eyes: Negative.    Respiratory:  Positive for chest tightness and shortness of breath. Negative for apnea, cough, wheezing and stridor.    Cardiovascular:  Negative for chest pain, palpitations and leg swelling.   Gastrointestinal:  Negative for abdominal distention, abdominal pain, blood in stool, constipation, diarrhea, nausea and vomiting.   Endocrine: Negative.    Genitourinary: Negative.    Musculoskeletal:  Positive for myalgias, neck pain and neck stiffness.   Skin: Negative.    Allergic/Immunologic: Negative.    Neurological: Negative.    Hematological: Negative.    Psychiatric/Behavioral: Negative.           Objective:   Physical Exam  Vitals and nursing note reviewed.   Constitutional:       General: He is not in acute distress.     Appearance: He is well-developed. He is not diaphoretic.   HENT:      Head: Normocephalic and atraumatic.      Mouth/Throat:      Pharynx: No oropharyngeal exudate.   Eyes:      General: No scleral icterus.     Conjunctiva/sclera: Conjunctivae normal.      Pupils: Pupils are equal, round, and reactive to light.   Neck:      Thyroid: No thyromegaly.      Vascular: No JVD.      Trachea: No tracheal deviation.      Comments: Multiple cervical neck scar is well-healed.  Cardiovascular:      Rate and Rhythm: Normal rate and regular rhythm.      Heart sounds: Normal heart sounds. No murmur heard.     No friction rub. No gallop.   Pulmonary:      Effort: Pulmonary effort is normal. No respiratory distress.      Breath sounds: Normal breath sounds. No wheezing or rales.      Comments: Normal work of breathing on room air.  Lungs clear to auscultation bilaterally.  Chest:      Chest wall: No tenderness.   Abdominal:      General: Bowel sounds are normal. There is no distension.      Palpations: Abdomen is soft.  "There is no mass.      Tenderness: There is no abdominal tenderness. There is no guarding or rebound.   Musculoskeletal:         General: No tenderness or deformity. Normal range of motion.      Cervical back: Normal range of motion and neck supple.   Skin:     General: Skin is warm and dry.      Coloration: Skin is not pale.      Findings: No erythema or rash.   Neurological:      Mental Status: He is alert and oriented to person, place, and time.   Psychiatric:         Behavior: Behavior normal.         Thought Content: Thought content normal.         Judgment: Judgment normal.     /67 (BP Location: Left arm, Patient Position: Sitting, Cuff Size: Standard)   Pulse 72   Temp 98.4 °F (36.9 °C) (Temporal)   Resp 14   Ht 5' 8\" (1.727 m)   Wt 69.4 kg (153 lb)   SpO2 98%   BMI 23.26 kg/m²       I personally reviewed his imaging in PACS.  He has multiple chest x-rays and a CT scan from 5/28/2024.  He has other previous cervical films from February 2024.  His most recent x-rays show a approximate 10% spontaneous left pneumothorax.  There is no mediastinal shift.  No appreciable blebs.  This was stable for 5 hours.  This was not present on previous x-ray imaging earlier this year        Amadeo Miller MD  Thoracic Surgeon    (Available by Tiger Text)        "

## 2024-05-29 NOTE — TELEPHONE ENCOUNTER
Ramin's mother called in response to a referral that was received for patient to establish care with Thoracic Surgery.     Outreach was made to complete patient's intake questionnaire .    Patient's intake questionnaire was reviewed and complete. Patient's intake has been sent to the team for clinical review.

## 2024-06-21 ENCOUNTER — HOSPITAL ENCOUNTER (OUTPATIENT)
Dept: RADIOLOGY | Facility: HOSPITAL | Age: 24
Discharge: HOME | End: 2024-06-21
Payer: COMMERCIAL

## 2024-06-21 DIAGNOSIS — Z98.1 S/P CERVICAL SPINAL FUSION: ICD-10-CM

## 2024-06-21 PROCEDURE — 72052 X-RAY EXAM NECK SPINE 6/>VWS: CPT

## 2024-06-27 ENCOUNTER — TELEPHONE (OUTPATIENT)
Dept: NEUROLOGY | Facility: CLINIC | Age: 24
End: 2024-06-27

## 2024-06-27 NOTE — TELEPHONE ENCOUNTER
Spoke to pt and confirmed their   10a appt on  7/11   w/ Sherman  . Reminded pt to arrive 15 mins prior to appt to check in with .

## 2024-06-28 ENCOUNTER — OFFICE VISIT (OUTPATIENT)
Dept: NEUROSURGERY | Facility: CLINIC | Age: 24
End: 2024-06-28
Payer: COMMERCIAL

## 2024-06-28 VITALS
TEMPERATURE: 98.4 F | HEART RATE: 61 BPM | HEIGHT: 68 IN | BODY MASS INDEX: 23.19 KG/M2 | DIASTOLIC BLOOD PRESSURE: 60 MMHG | SYSTOLIC BLOOD PRESSURE: 136 MMHG | WEIGHT: 153 LBS | RESPIRATION RATE: 16 BRPM | OXYGEN SATURATION: 99 %

## 2024-06-28 DIAGNOSIS — S12.400A C5 VERTEBRAL FRACTURE (HCC): Primary | ICD-10-CM

## 2024-06-28 PROCEDURE — 99214 OFFICE O/P EST MOD 30 MIN: CPT | Performed by: NEUROLOGICAL SURGERY

## 2024-06-28 RX ORDER — TIZANIDINE 2 MG/1
2 TABLET ORAL 3 TIMES DAILY
COMMUNITY
Start: 2024-06-03

## 2024-06-28 NOTE — PROGRESS NOTES
Neurosurgery Office Note  Ramin Sadler 24 y.o. male MRN: [id number]      Assessment & Plan        Problem List Items Addressed This Visit       Visit Diagnoses       C5 vertebral fracture (HCC)    -  Primary    Relevant Orders    XR spine cervical complete 6+ vw flex/ext/obl              Discussion:  Patient was previously evaluated on 3/21/24. He is joined by his mother.    He is a 24-year-old male with h/o traumatic C5 teardrop fracture with diffuse cervical spinal cord edema and central cord syndrome, after slipping and falling in shower, s/p emergent posterior C4-6 decompression with C3-T1 fusion on 9/9/21 who developed pseudoarthrosis and worsening mechanical neck pain s/p posterior revision fusion C2-T3 and anterior C5 corpectomy with C4-6 ACDF for deformity correction on 2/7/24.     Of note, he went to the ED on 5/28/24 for pleuritic chest pain resolved with medications, work-up negative including CT PE (just small left apical bleb and right PTX). No longer experiencing chest pain or SOB.    Evaluated by [facility] Dr. Miller on 5/29/24: “I had a good conversation with the patient and his mother today about his spontaneous left pneumothorax. This is resolved without further management. I do not recommend any further intervention at this time. They understand he has about 30 to 40% lifetime risk for a second spontaneous pneumothorax on the left side. Typically after a second occurrence we would recommend surgery. I do not think it is necessary today.”     Today, he reports no new neurologic symptoms or deficits. No new numbness (has bilateral carpal tunnel symptoms at baseline), new weakness, fine motor dysfunction, gait imbalance, bowel/bladder incontinence, voice changes, swallowing difficulties, trauma/fall.    He is working hard with PT for muscle building and strengthening especially around left shoulder.     Incisions are healing well without erythema, edema, dehiscence, drainage, or underlying fluid  collections.      XR repeated on 6/21/24 showed intact, satisfactory instrumentation with significant improvement in alignment (adequate deformity correction) compared to preoperative imaging, no evidence of instability.     At this time, I have no acute postoperative or neurosurgical concerns. Continue aggressive PT and postoperative follow-up, next for 6-month POV with repeat XR flexion/extension.     All questions and concerns were addressed during this visit.             CHIEF COMPLAINT    Chief Complaint   Patient presents with    Follow-up       HISTORY    History of Present Illness     24 y.o. year old male     HPI    See Discussion    REVIEW OF SYSTEMS    Review of Systems   Musculoskeletal:         3 Mo P/O C2-T3 Cervical Fusion on 2/7/24  C/S Xray on 6/21     Pain 4/10- Intermittent, throbbing, dull/ache   Meds: tizanidine     PT twice weekly   Neurological:  Positive for weakness (bi/arms, wrose on left), numbness (bi/pointer fingers) and headaches.         Meds/Allergies     Current Outpatient Medications   Medication Sig Dispense Refill    acetaminophen (TYLENOL) 500 mg tablet Take 500 mg by mouth every 6 (six) hours as needed for mild pain      buPROPion (WELLBUTRIN XL) 150 mg 24 hr tablet Take 150 mg by mouth daily (Patient not taking: Reported on 1/30/2024)      Diclofenac Sodium (VOLTAREN) 1 % as needed (Patient not taking: Reported on 11/20/2023)      docusate sodium (COLACE) 100 mg capsule Take 1 capsule (100 mg total) by mouth 2 (two) times a day for 7 days (Patient not taking: Reported on 5/29/2024) 14 capsule 0    gabapentin (NEURONTIN) 300 mg capsule Take 1 capsule (300 mg total) by mouth daily at bedtime (Patient not taking: Reported on 3/21/2024) 30 capsule 0    Galcanezumab-gnlm 120 MG/ML SOAJ Inject 120 mg under the skin every 30 (thirty) days (Patient not taking: Reported on 3/21/2024) 3 mL 4    lifitegrast (Xiidra) 5 % op solution  (Patient not taking: Reported on 11/20/2023)       methocarbamol (ROBAXIN) 750 mg tablet Take 1 tablet (750 mg total) by mouth every 6 (six) hours as needed for muscle spasms 30 tablet 0    polyethylene glycol (MIRALAX) 17 g packet Take 17 g by mouth daily for 14 days (Patient not taking: Reported on 3/21/2024) 238 g 0    potassium chloride (Klor-Con M10) 10 mEq tablet Take 1 tablet (10 mEq total) by mouth 3 (three) times a day (Patient not taking: Reported on 3/21/2024) 10 tablet 0    potassium chloride (MICRO-K) 10 MEQ CR capsule Take 10 mEq by mouth 3 (three) times a day (Patient not taking: Reported on 2/21/2024)      propranolol (INDERAL LA) 120 mg 24 hr capsule Take 120 mg by mouth daily (Patient not taking: Reported on 2/21/2024)      SUMAtriptan (Imitrex) 50 mg tablet Take 1 tablet (50 mg total) by mouth once as needed for migraine Okay to repeat in 2 hours. Max 2 tablets in 24 hours. (Patient not taking: Reported on 3/21/2024) 10 tablet 6    tamsulosin (FLOMAX) 0.4 mg Take 0.4 mg by mouth daily at bedtime (Patient not taking: Reported on 3/21/2024)       No current facility-administered medications for this visit.       No Known Allergies    PAST HISTORY    Past Medical History:   Diagnosis Date    C5 cervical fracture (HCC) 09/09/2021    Cervicogenic headache     Depression     HL (hearing loss)     Migraine     Post concussion syndrome     Tetraparesis (HCC)     Weakness        Past Surgical History:   Procedure Laterality Date    DECOMPRESSION SPINE CERVICAL POSTERIOR Bilateral 9/9/2021    Procedure: C4-6 POSTERIOR CERVICAL SPINE DECOMPRESSION WITH C3-T1 FUSION  ;  Surgeon: Christoph Camacho MD;  Location: [facility];  Service: Neurosurgery    AR ARTHRD ANT INTERBODY MIN DSC CRV BELOW C2 Bilateral 2/7/2024    Procedure: Posterior removal of bilateral rods, anterior C5 corpectomy with C4-6 fixation, posterior revision fusion with extension to C2 and T3, with neuronavigation and neuromonitoring;  Surgeon: Christoph Camacho MD;  Location: [facility];  Service:  "Neurosurgery       Social History     Tobacco Use    Smoking status: Never    Smokeless tobacco: Never   Vaping Use    Vaping status: Never Used   Substance Use Topics    Alcohol use: Never    Drug use: Never       Family History   Problem Relation Age of Onset    No Known Problems Mother     Heart disease Father     Heart attack Father     Diabetes Paternal Grandmother     Heart disease Paternal Grandfather     Stroke Paternal Grandfather             Heart disease Paternal Uncle          The following portions of the patient's history were reviewed in this encounter and updated as appropriate: Past medical, surgical, family, and social history, as well as medications, allergies, and review of systems.        EXAM    Vitals:Blood pressure 136/60, pulse 61, temperature 98.4 °F (36.9 °C), temperature source Temporal, resp. rate 16, height 5' 8\" (1.727 m), weight 69.4 kg (153 lb), SpO2 99%.,Body mass index is 23.26 kg/m².     Physical Exam  Vitals and nursing note reviewed.   Constitutional:       Appearance: Normal appearance. He is normal weight.   HENT:      Head: Normocephalic and atraumatic.   Eyes:      Extraocular Movements: Extraocular movements intact and EOM normal.      Pupils: Pupils are equal, round, and reactive to light.   Cardiovascular:      Rate and Rhythm: Normal rate and regular rhythm.      Pulses: Normal pulses.      Heart sounds: Normal heart sounds.   Pulmonary:      Effort: Pulmonary effort is normal.      Breath sounds: Normal breath sounds.   Abdominal:      General: Abdomen is flat.      Palpations: Abdomen is soft.   Musculoskeletal:         General: Normal range of motion.      Cervical back: Normal range of motion.   Neurological:      General: No focal deficit present.      Mental Status: He is alert and oriented to person, place, and time. Mental status is at baseline.      GCS: GCS eye subscore is 4. GCS verbal subscore is 5. GCS motor subscore is 6.      Sensory: Sensation is " intact.      Motor: Motor strength is normal.Motor function is intact.      Coordination: Coordination is intact.      Gait: Gait is intact.      Deep Tendon Reflexes: Reflexes are normal and symmetric.   Psychiatric:         Mood and Affect: Mood normal.         Speech: Speech normal.         Behavior: Behavior normal.         Neurologic Exam     Mental Status   Oriented to person, place, and time.   Attention: normal.   Speech: speech is normal   Level of consciousness: alert    Cranial Nerves     CN II   Visual fields full to confrontation.   Visual acuity: normal  Right visual field deficit: none  Left visual field deficit: none     CN III, IV, VI   Pupils are equal, round, and reactive to light.  Extraocular motions are normal.   CN III: no CN III palsy  CN VI: no CN VI palsy  Nystagmus: none   Diplopia: none  Ophthalmoparesis: none  Upgaze: normal  Downgaze: normal  Conjugate gaze: present    CN V   Facial sensation intact.   Right facial sensation deficit: none  Left facial sensation deficit: none    CN VII   Facial expression full, symmetric.   Right facial weakness: none  Left facial weakness: none    CN VIII   CN VIII normal.     CN IX, X   CN IX normal.   CN X normal.   Palate: symmetric    CN XI   CN XI normal.   Right sternocleidomastoid strength: normal  Left sternocleidomastoid strength: normal  Right trapezius strength: normal  Left trapezius strength: normal    CN XII   CN XII normal.   Tongue deviation: none    Motor Exam   Muscle bulk: normal  Overall muscle tone: normal  Right arm pronator drift: absent  Left arm pronator drift: absent    Strength   Strength 5/5 throughout.     Sensory Exam   Light touch normal.     Gait, Coordination, and Reflexes     Gait  Gait: normal    Reflexes   Reflexes 2+ except as noted.   Incisions healed      MEDICAL DECISION MAKING    Imaging Studies:     XR spine cervical complete 6+ vw flex/ext/obl    Result Date: 6/21/2024  Narrative: CERVICAL SPINE INDICATION:    Arthrodesis status. COMPARISON:  None. VIEWS:  XR SPINE CERVICAL COMPLETE 6+ VW FLEX /EXT /OBL Images: 8 FINDINGS: Posterior fusion in the cervical spine extending from C2-T3. Anterior fusion at C4 C6 with corpectomy at C5. The hardware is intact. There is normal alignment. There is no fracture. Normal alignment without subluxation. The intervertebral disc spaces are preserved. No evidence of dynamic instability seen with flexion or extension. The prevertebral soft tissues are within normal limits. The lung apices are clear.     Impression: Postsurgical changes in the cervical spine without evidence of hardware failure or malalignment. Electronically signed: 06/21/2024 04:37 PM Jame Renee MD      I have personally reviewed pertinent reports.   and I have personally reviewed pertinent films in PACS      Christoph Camacho M.D.  Neurosurgeon

## 2024-07-11 ENCOUNTER — OFFICE VISIT (OUTPATIENT)
Dept: NEUROLOGY | Facility: CLINIC | Age: 24
End: 2024-07-11
Payer: COMMERCIAL

## 2024-07-11 VITALS
SYSTOLIC BLOOD PRESSURE: 120 MMHG | HEIGHT: 68 IN | TEMPERATURE: 98 F | OXYGEN SATURATION: 96 % | BODY MASS INDEX: 23.51 KG/M2 | WEIGHT: 155.1 LBS | DIASTOLIC BLOOD PRESSURE: 60 MMHG | HEART RATE: 52 BPM

## 2024-07-11 DIAGNOSIS — M54.2 CERVICALGIA: ICD-10-CM

## 2024-07-11 DIAGNOSIS — S14.129D CENTRAL CORD SYNDROME, SUBSEQUENT ENCOUNTER (HCC): ICD-10-CM

## 2024-07-11 DIAGNOSIS — G95.9 CERVICAL MYELOPATHY (HCC): ICD-10-CM

## 2024-07-11 DIAGNOSIS — G43.109 MIGRAINE WITH AURA AND WITHOUT STATUS MIGRAINOSUS, NOT INTRACTABLE: Primary | ICD-10-CM

## 2024-07-11 DIAGNOSIS — Z98.1 S/P CERVICAL SPINAL FUSION: ICD-10-CM

## 2024-07-11 PROCEDURE — 99214 OFFICE O/P EST MOD 30 MIN: CPT | Performed by: STUDENT IN AN ORGANIZED HEALTH CARE EDUCATION/TRAINING PROGRAM

## 2024-07-11 NOTE — PROGRESS NOTES
[facility] Consult - Follow up   PATIENT:  Ramin Sadler  MRN:  [id number]  :  2000  DATE OF SERVICE:  2024  REFERRED BY: No ref. provider found  PMD: Rony Funk MD    Assessment/Plan:   Ramin Sadler is a delightful 24 y.o. male with a past medical history that includes history of cervical fracture, central cord syndrome, depression here for f/u evaluation of headache.      At today's visit, he reports that he is generally doing well.  He reports about 3-4 headache days per week and is using over-the-counter medication as needed, but not more than 2 to 3 days/week.  He stopped using Emgality and sumatriptan after his recent spinal surgery because he wanted to see how he would do without medications.  He continues with PT and OT at this time.  He was not interested in starting any medications at today's visit and will keep me updated if he would like any treatment in the future.    Workup:  -MRI brain with and without contrast 2022: No acute intracranial findings.  No evidence of white matter changes.  No enhancement.  -MRI cervical spine with and without contrast 2022: Myelomalacia at the C5 vertebral level.  Related to prior trauma.  Chronic C5 compression fracture noted.  Postoperative changes.     Preventative:  - we discussed headache hygiene and lifestyle factors that may improve headaches  - Currently on through other providers:  Wellbutrin  - Past/ failed/contraindicated: Lexapro, Avoid Aimovig due to constipation, Propranolol, Gabapentin, Emgality (helped)  - future options: SNRI, Topamax, CGRP med, botox     Acute:  - discussed not taking over-the-counter or prescription pain medications more than 3 days per week to prevent medication overuse/rebound headache  - Currently on through other providers: Tizanidine  - Past/ failed/contraindicated: Sumatriptan (helped)  - future options:  Triptan, prochlorperazine, Toradol IM or p.o.,  could consider trial of 5 days of Depakote 500 mg nightly or dexamethasone 2 mg daily for prolonged migraine, ubrelvy, reyvow, nurtec, zavzpret  Patient instructions   Headache Calendar  Please maintain a headache calendar  Consider using phone applications such as Migraine Deshaun or [place] Migraine Tracker     Headache/migraine treatment:   Acute medications (for immediate treatment of a headache):   It is ok to take ibuprofen, acetaminophen or naproxen (Advil, Tylenol,  Aleve, Excedrin) if they help your headaches you should limit these to No more than 2-3 times a week to avoid medication overuse/rebound headaches.      Lifestyle Recommendations:  [x] SLEEP - Maintain a regular sleep schedule: Adults need at least 7-8 hours of uninterrupted a night. Maintain good sleep hygiene:  Going to bed and waking up at consistent times, avoiding excessive daytime naps, avoiding caffeinated beverages in the evening, avoid excessive stimulation in the evening and generally using bed primarily for sleeping.  One hour before bedtime would recommend turning lights down lower, decreasing your activity (may read quietly, listen to music at a low volume). When you get into bed, should eliminate all technology (no texting, emailing, playing with your phone, iPad or tablet in bed).  [x] HYDRATION - Maintain good hydration.  Drink  2L of fluid a day (4 typical small water bottles)  [x] DIET - Maintain good nutrition. In particular don't skip meals and try and eat healthy balanced meals regularly.  [x] TRIGGERS - Look for other triggers and avoid them: Limit caffeine to 1-2 cups a day or less. Avoid dietary triggers that you have noticed bring on your headaches (this could include aged cheese, peanuts, MSG, aspartame and nitrates).  [x] EXERCISE - physical exercise as we all know is good for you in many ways, and not only is good for your heart, but also is beneficial for your mental health, cognitive health and  chronic  pain/headaches. I would encourage at the least 5 days of physical exercise weekly for at least 30 minutes.      Education and Follow-up  [x] Please call with any questions or concerns. Of course if any new concerning symptoms go to the emergency department.  [x] Follow up as needed  Subjective:   7/11/24: Since our last visit, he underwent a revision cervical fusion.  His recovery was complicated by spontaneous pneumothorax for which she was seen by thoracic surgery. At today's visit, he reports overall improvement in terms of his headaches.  He is currently experiencing about 3-4 headache days per week and feels that they are behind his eyes. He is not taking Emgality or Sumatriptan because he did not want to go back to any medications after his procedure. He is using OTC medications as needed - about 2-3 times per week. Currently in PT and OT which is going well.    Previous History:  11/20/23: At today's visit, he reports improvement in terms of his headaches.  Currently experiencing about 10-12 headache days per month. He continues with Emgality monthly injections which he started around May following our last visit. From an abortive standpoint, he has not needed to use Sumatriptan in a few months.   5/18/23: Since his last visit, he feels that his headaches are the same as before. Continues to experience daily headaches. He was unable to get Emgality injections due to insurance issues. They did not get approved for the loading dose so he did not start the injections. From an abortive standpoint Sumatriptan works well for him. About 2 weeks ago he received new glasses. He feels as though he has plateaued a bit with hand  and has limited exercises at home.  1/5/23: Mr. Sadler reports a history of headaches that started last year.  He was referred over from the neurosurgery clinic who had been seeing him after he had a fall while getting out of the shower and suffered a cervical spine injury.  The description  of his headaches sound like migraines.  He reports some improvement in terms of his headaches with the start of propanolol last year, but continues to endorse nearly daily headaches, although less severe.  We discussed potential treatment options, but thought that Emgality monthly injections would be a good choice given his multitude of other medications already.  From an abortive standpoint, I have increased his sumatriptan to 50 mg to provide further relief.  I also clarified his ROS that reports he sees things that are not there.  Denies any hallucinations, either auditory or visual.  Rather he describes it as a distortion of some images with some difficulty focusing.  I have asked him to keep me updated throughout the process and let me know if he has any issues or concerns.   Past Medical History:     Past Medical History:   Diagnosis Date    C5 cervical fracture ([facility]) 09/09/2021    Cervicogenic headache     Depression     HL (hearing loss)     Migraine     Post concussion syndrome     Tetraparesis ([facility])     Weakness        Patient Active Problem List   Diagnosis    Central cord syndrome ([facility])    Family history of heart attack    Positive depression screening    H/O cervical fracture    S/P cervical spinal fusion    Cervical myelopathy ([facility])    Chronic pain syndrome    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, unspecified etiology ([facility])    Thrombocytopenia ([facility])    Pre-procedural examination    Hypokalemia    Spontaneous pneumothorax       Medications:      Current Outpatient Medications   Medication Sig Dispense Refill    acetaminophen (TYLENOL) 500 mg tablet Take 500 mg by mouth every 6 (six) hours as needed for mild pain      buPROPion (WELLBUTRIN XL) 150 mg 24 hr tablet Take 150 mg by mouth daily      polyethylene glycol (MIRALAX) 17 g packet Take 17 g by mouth daily for 14 days 238 g 0    tiZANidine (ZANAFLEX) 2 mg tablet Take 2 mg by mouth 3 (three) times a day      Diclofenac Sodium  (VOLTAREN) 1 % as needed (Patient not taking: Reported on 2023)      docusate sodium (COLACE) 100 mg capsule Take 1 capsule (100 mg total) by mouth 2 (two) times a day for 7 days (Patient not taking: Reported on 2024) 14 capsule 0    potassium chloride (Klor-Con M10) 10 mEq tablet Take 1 tablet (10 mEq total) by mouth 3 (three) times a day (Patient not taking: Reported on 3/21/2024) 10 tablet 0    potassium chloride (MICRO-K) 10 MEQ CR capsule Take 10 mEq by mouth 3 (three) times a day (Patient not taking: Reported on 2024)      tamsulosin (FLOMAX) 0.4 mg Take 0.4 mg by mouth daily at bedtime (Patient not taking: Reported on 3/21/2024)       No current facility-administered medications for this visit.        Allergies:    No Known Allergies    Family History:     Family History   Problem Relation Age of Onset    No Known Problems Mother     Heart disease Father     Heart attack Father     Diabetes Paternal Grandmother     Heart disease Paternal Grandfather     Stroke Paternal Grandfather             Heart disease Paternal Uncle        Social History:     Social History     Socioeconomic History    Marital status: Single     Spouse name: Not on file    Number of children: Not on file    Years of education: Not on file    Highest education level: Not on file   Occupational History    Not on file   Tobacco Use    Smoking status: Never    Smokeless tobacco: Never   Vaping Use    Vaping status: Never Used   Substance and Sexual Activity    Alcohol use: Never    Drug use: Never    Sexual activity: Not Currently     Partners: Female     Birth control/protection: Abstinence   Other Topics Concern    Not on file   Social History Narrative    Not on file     Social Determinants of Health     Financial Resource Strain: Low Risk  (3/29/2022)    Overall Financial Resource Strain (CARDIA)     Difficulty of Paying Living Expenses: Not hard at all   Food Insecurity: No Food Insecurity (2/10/2024)    Hunger  "Vital Sign     Worried About Running Out of Food in the Last Year: Never true     Ran Out of Food in the Last Year: Never true   Transportation Needs: No Transportation Needs (2/10/2024)    PRAPARE - Transportation     Lack of Transportation (Medical): No     Lack of Transportation (Non-Medical): No   Physical Activity: Inactive (3/29/2022)    Exercise Vital Sign     Days of Exercise per Week: 0 days     Minutes of Exercise per Session: 0 min   Stress: No Stress Concern Present (3/29/2022)    [employer] - Occupational Stress Questionnaire     Feeling of Stress : Not at all   Social Connections: Socially Isolated (3/29/2022)    Social Connection and Isolation Panel [NHANES]     Frequency of Communication with Friends and Family: More than three times a week     Frequency of Social Gatherings with Friends and Family: More than three times a week     Attends Faith Services: Never     Active Member of Clubs or Organizations: No     Attends Club or Organization Meetings: Never     Marital Status: Never    Intimate Partner Violence: Not At Risk (3/29/2022)    Humiliation, Afraid, Rape, and Kick questionnaire     Fear of Current or Ex-Partner: No     Emotionally Abused: No     Physically Abused: No     Sexually Abused: No   Housing Stability: Low Risk  (2/10/2024)    Housing Stability Vital Sign     Unable to Pay for Housing in the Last Year: No     Number of Times Moved in the Last Year: 1     Homeless in the Last Year: No         Objective:   Physical Exam:                                                               Vitals:            Constitutional:  /60 (BP Location: Left arm, Patient Position: Sitting, Cuff Size: Adult)   Pulse (!) 52   Temp 98 °F (36.7 °C) (Temporal)   Ht 5' 8\" (1.727 m)   Wt 70.4 kg (155 lb 1.6 oz)   SpO2 96%   BMI 23.58 kg/m²   BP Readings from Last 3 Encounters:   07/11/24 120/60   06/28/24 136/60   05/29/24 110/67     Pulse Readings from Last " 3 Encounters:   07/11/24 (!) 52   06/28/24 61   05/29/24 72         Well developed, well nourished, well groomed. No dysmorphic features.       HEENT:  Normocephalic atraumatic. See neuro exam   Chest:  Respirations appear regular and unlabored.    Cardiovascular:  no observed significant swelling.    Musculoskeletal:  (see below under neurologic exam for evaluation of motor function and gait)   Skin:  warm and dry, not diaphoretic.    Psychiatric:  Normal behavior and appropriate affect       Neurological Examination:     Mental status/cognitive function:   Orientated to time, place and person. Recent and remote memory intact. Attention span and concentration as well as fund of knowledge are appropriate for age. Normal language and spontaneous speech.     Cranial Nerves:  II-visual fields full.   Fundi poorly visualized due to pupillary constriction  III, IV, VI-Pupils were equal, round, and reactive to light and accomodation. Extraocular movements were full and conjugate without nystagmus.  Conjugate gaze, normal smooth pursuits, normal saccades   V-facial sensation symmetric.    VII-facial expression symmetric, intact forehead wrinkle, strong eye closure, symmetric smile    VIII-hearing grossly intact bilaterally   IX, X-palate elevation symmetric, no dysarthria.   XI-shoulder shrug strength slightly limited b/l  XII-tongue protrusion midline.    Motor Exam: symmetric bulk and tone throughout, no pronator drift. Power/strength 5/5 bilateral upper and lower extremities (except 5-/5 in the LUE and RLE), no atrophy, fasciculations or abnormal movements noted.   Sensory: grossly intact light touch in all extremities.   Reflexes: brachioradialis 2+, biceps 2+. Mildly brisker in the LE  Coordination: Finger nose finger intact bilaterally, no apparent dysmetria, ataxia or tremor noted  Gait: steady casual and tandem gait.   Review of Systems:   Constitutional:  Negative for appetite change, fatigue and fever.   HENT:  Negative.  Negative for hearing loss, tinnitus, trouble swallowing and voice change.    Eyes: Negative.  Negative for photophobia, pain and visual disturbance.   Respiratory: Negative.  Negative for shortness of breath.    Cardiovascular: Negative.  Negative for palpitations.   Gastrointestinal: Negative.  Negative for nausea and vomiting.   Endocrine: Negative.  Negative for cold intolerance.   Genitourinary: Negative.  Negative for dysuria, frequency and urgency.   Musculoskeletal:  Negative for back pain, gait problem, myalgias, neck pain and neck stiffness.   Skin: Negative.  Negative for rash.   Allergic/Immunologic: Negative.    Neurological:  Positive for headaches. Negative for dizziness, tremors, seizures, syncope, facial asymmetry, speech difficulty, weakness, light-headedness and numbness.   Hematological: Negative.  Does not bruise/bleed easily.   Psychiatric/Behavioral: Negative.  Negative for confusion, hallucinations and sleep disturbance.    All other systems reviewed and are negative.    I have spent 15 minutes with Patient and family today in which greater than 50% of this time was spent in counseling/coordination of care regarding Prognosis, Risks and benefits of tx options, Patient and family education, Impressions, Documenting in the medical record, Reviewing / ordering tests, medicine, procedures  , and Obtaining or reviewing history  . I also spent 15 minutes non face to face for this patient the same day.     Activity Minutes   Precharting/reviewing 10   Patient care/counseling 15   Postcharting/care coordination 5       Author:  Barron Whitaker DO 7/11/2024 10:17 AM

## 2024-07-11 NOTE — PROGRESS NOTES
Since your last visit are your headaches Improved    Any change to the headache type? More behind the eyes now     What is your current headache frequency:  3-4  times per week    Are you taking your current medications as prescribed? yes    If no, why not?     Do you have any side effects? no    How may days per week do you take an abortive medicine? 2-4/7      Review of Systems   Constitutional:  Negative for appetite change, fatigue and fever.   HENT: Negative.  Negative for hearing loss, tinnitus, trouble swallowing and voice change.    Eyes: Negative.  Negative for photophobia, pain and visual disturbance.   Respiratory: Negative.  Negative for shortness of breath.    Cardiovascular: Negative.  Negative for palpitations.   Gastrointestinal: Negative.  Negative for nausea and vomiting.   Endocrine: Negative.  Negative for cold intolerance.   Genitourinary: Negative.  Negative for dysuria, frequency and urgency.   Musculoskeletal:  Negative for back pain, gait problem, myalgias, neck pain and neck stiffness.   Skin: Negative.  Negative for rash.   Allergic/Immunologic: Negative.    Neurological:  Positive for headaches. Negative for dizziness, tremors, seizures, syncope, facial asymmetry, speech difficulty, weakness, light-headedness and numbness.   Hematological: Negative.  Does not bruise/bleed easily.   Psychiatric/Behavioral: Negative.  Negative for confusion, hallucinations and sleep disturbance.    All other systems reviewed and are negative.

## 2024-07-11 NOTE — PATIENT INSTRUCTIONS
Headache Calendar  Please maintain a headache calendar  Consider using phone applications such as Migraine Deshaun or [place] Migraine Tracker     Headache/migraine treatment:   Acute medications (for immediate treatment of a headache):   It is ok to take ibuprofen, acetaminophen or naproxen (Advil, Tylenol,  Aleve, Excedrin) if they help your headaches you should limit these to No more than 2-3 times a week to avoid medication overuse/rebound headaches.      Lifestyle Recommendations:  [x] SLEEP - Maintain a regular sleep schedule: Adults need at least 7-8 hours of uninterrupted a night. Maintain good sleep hygiene:  Going to bed and waking up at consistent times, avoiding excessive daytime naps, avoiding caffeinated beverages in the evening, avoid excessive stimulation in the evening and generally using bed primarily for sleeping.  One hour before bedtime would recommend turning lights down lower, decreasing your activity (may read quietly, listen to music at a low volume). When you get into bed, should eliminate all technology (no texting, emailing, playing with your phone, iPad or tablet in bed).  [x] HYDRATION - Maintain good hydration.  Drink  2L of fluid a day (4 typical small water bottles)  [x] DIET - Maintain good nutrition. In particular don't skip meals and try and eat healthy balanced meals regularly.  [x] TRIGGERS - Look for other triggers and avoid them: Limit caffeine to 1-2 cups a day or less. Avoid dietary triggers that you have noticed bring on your headaches (this could include aged cheese, peanuts, MSG, aspartame and nitrates).  [x] EXERCISE - physical exercise as we all know is good for you in many ways, and not only is good for your heart, but also is beneficial for your mental health, cognitive health and  chronic pain/headaches. I would encourage at the least 5 days of physical exercise weekly for at least 30 minutes.      Education and Follow-up  [x] Please call with any questions or  concerns. Of course if any new concerning symptoms go to the emergency department.  [x] Follow up as needed

## 2024-08-08 ENCOUNTER — TELEPHONE (OUTPATIENT)
Dept: NEUROSURGERY | Facility: CLINIC | Age: 24
End: 2024-08-08

## 2024-08-08 NOTE — TELEPHONE ENCOUNTER
8/8 LM stating appt for 8/9/24 canceled because images have not been obtained. Patient instructed to c/b to r/s once images are completed  8/6 CONF (mom)APPT/GOING FOR XR ON 8/7    Appt. For 8/9/24 is canceled

## 2024-08-13 ENCOUNTER — HOSPITAL ENCOUNTER (OUTPATIENT)
Dept: RADIOLOGY | Facility: HOSPITAL | Age: 24
Discharge: HOME | End: 2024-08-13
Payer: COMMERCIAL

## 2024-08-13 DIAGNOSIS — S12.400A C5 VERTEBRAL FRACTURE (HCC): ICD-10-CM

## 2024-08-13 PROCEDURE — 72052 X-RAY EXAM NECK SPINE 6/>VWS: CPT

## 2024-08-30 ENCOUNTER — TELEPHONE (OUTPATIENT)
Age: 24
End: 2024-08-30

## 2024-08-30 ENCOUNTER — OFFICE VISIT (OUTPATIENT)
Dept: NEUROSURGERY | Facility: CLINIC | Age: 24
End: 2024-08-30
Payer: COMMERCIAL

## 2024-08-30 VITALS
OXYGEN SATURATION: 98 % | HEIGHT: 68 IN | DIASTOLIC BLOOD PRESSURE: 70 MMHG | HEART RATE: 68 BPM | TEMPERATURE: 98.1 F | SYSTOLIC BLOOD PRESSURE: 110 MMHG | BODY MASS INDEX: 23.49 KG/M2 | WEIGHT: 155 LBS

## 2024-08-30 DIAGNOSIS — S12.400A C5 VERTEBRAL FRACTURE (HCC): Primary | ICD-10-CM

## 2024-08-30 PROCEDURE — 99214 OFFICE O/P EST MOD 30 MIN: CPT | Performed by: NEUROLOGICAL SURGERY

## 2024-08-30 NOTE — PROGRESS NOTES
Neurosurgery Office Note  Ramin Sadler 24 y.o. male MRN: [id number]      Assessment & Plan        Problem List Items Addressed This Visit       Visit Diagnoses       C5 vertebral fracture (HCC)    -  Primary    Relevant Orders    XR spine cervical complete 6+ vw flex/ext/obl              Discussion:  Patient was previously evaluated on 3/21/24 and 6/28/2024. He is always joined by his mother.     He is a 24-year-old male with h/o traumatic C5 teardrop fracture with diffuse cervical spinal cord edema and central cord syndrome, after slipping and falling in shower, s/p emergent posterior C4-6 decompression with C3-T1 fusion on 9/9/21 who developed pseudoarthrosis and worsening mechanical neck pain s/p posterior revision fusion C2-T3 and anterior C5 corpectomy with C4-6 ACDF for deformity correction on 2/7/24.     Of note, he went to the ED on 5/28/24 for pleuritic chest pain resolved with medications, work-up negative including CT PE (just small left apical bleb and right PTX). No longer experiencing chest pain or SOB.     Evaluated by [facility] Dr. Miller on 5/29/24: “I had a good conversation with the patient and his mother today about his spontaneous left pneumothorax. This is resolved without further management. I do not recommend any further intervention at this time. They understand he has about 30 to 40% lifetime risk for a second spontaneous pneumothorax on the left side. Typically, after a second occurrence we would recommend surgery. I do not think it is necessary today.”     Also evaluated by Neurology on 7/11/24 for headache.    Today, he reports no new neurologic symptoms or deficits. No new numbness (has bilateral carpal tunnel symptoms at baseline), new weakness, fine motor dysfunction, gait imbalance, bowel/bladder incontinence, voice changes, swallowing difficulties, trauma/fall.     He completed aggressive PT.    Incisions healed well without erythema, edema, dehiscence, drainage, or underlying  "fluid collections.      Repeat XR flexion/extension on 8/13/24 showed intact, satisfactory instrumentation with significant improvement in alignment (adequate deformity correction) compared to preoperative imaging, no evidence of instability.    At this time, I have no acute postoperative or neurosurgical concerns. Continue postoperative follow-up, next for 1-year POV. They also request formal physiatry referral (\"he never got it\") as well as nutritionist (\"I'm very picky and never have an appetite\").     All questions and concerns were addressed during this visit.          CHIEF COMPLAINT    No chief complaint on file.      HISTORY    History of Present Illness     24 y.o. year old male     HPI    See Discussion    REVIEW OF SYSTEMS    Review of Systems   Constitutional: Negative.    HENT: Negative.     Eyes: Negative.    Respiratory: Negative.     Cardiovascular: Negative.    Gastrointestinal: Negative.    Endocrine: Negative.    Genitourinary: Negative.    Musculoskeletal:  Positive for neck pain.        Ramin CACERES is w/ mom  6Mo Clinical P/O--C/S Xray on 8/13/24  S/p  C2-T3 Cervical Fusion on 2/7/24     Neck pain radiates into b/l shoulders and head. + N/T on fingers   Pain 8/10- Intermittent, throbbing, dull/ache   Meds: Robaxin, Oxycodone        Skin: Negative.    Allergic/Immunologic: Negative.    Neurological:  Positive for numbness.   Hematological: Negative.    Psychiatric/Behavioral: Negative.     All other systems reviewed and are negative.        Meds/Allergies     Current Outpatient Medications   Medication Sig Dispense Refill    acetaminophen (TYLENOL) 500 mg tablet Take 500 mg by mouth every 6 (six) hours as needed for mild pain      buPROPion (WELLBUTRIN XL) 150 mg 24 hr tablet Take 150 mg by mouth daily      Diclofenac Sodium (VOLTAREN) 1 % as needed (Patient not taking: Reported on 11/20/2023)      docusate sodium (COLACE) 100 mg capsule Take 1 capsule (100 mg total) by mouth 2 (two) times a " day for 7 days (Patient not taking: Reported on 5/29/2024) 14 capsule 0    polyethylene glycol (MIRALAX) 17 g packet Take 17 g by mouth daily for 14 days 238 g 0    potassium chloride (Klor-Con M10) 10 mEq tablet Take 1 tablet (10 mEq total) by mouth 3 (three) times a day (Patient not taking: Reported on 3/21/2024) 10 tablet 0    potassium chloride (MICRO-K) 10 MEQ CR capsule Take 10 mEq by mouth 3 (three) times a day (Patient not taking: Reported on 2/21/2024)      tamsulosin (FLOMAX) 0.4 mg Take 0.4 mg by mouth daily at bedtime (Patient not taking: Reported on 3/21/2024)      tiZANidine (ZANAFLEX) 2 mg tablet Take 2 mg by mouth 3 (three) times a day       No current facility-administered medications for this visit.       No Known Allergies    PAST HISTORY    Past Medical History:   Diagnosis Date    C5 cervical fracture (HCC) 09/09/2021    Cervicogenic headache     Depression     HL (hearing loss)     Migraine     Post concussion syndrome     Tetraparesis (HCC)     Weakness        Past Surgical History:   Procedure Laterality Date    DECOMPRESSION SPINE CERVICAL POSTERIOR Bilateral 9/9/2021    Procedure: C4-6 POSTERIOR CERVICAL SPINE DECOMPRESSION WITH C3-T1 FUSION  ;  Surgeon: Christoph Camacho MD;  Location: [facility];  Service: Neurosurgery    MD ARTHRD ANT INTERBODY MIN DSC CRV BELOW C2 Bilateral 2/7/2024    Procedure: Posterior removal of bilateral rods, anterior C5 corpectomy with C4-6 fixation, posterior revision fusion with extension to C2 and T3, with neuronavigation and neuromonitoring;  Surgeon: Christoph Camacho MD;  Location: [facility];  Service: Neurosurgery       Social History     Tobacco Use    Smoking status: Never    Smokeless tobacco: Never   Vaping Use    Vaping status: Never Used   Substance Use Topics    Alcohol use: Never    Drug use: Never       Family History   Problem Relation Age of Onset    No Known Problems Mother     Heart disease Father     Heart attack Father     Diabetes Paternal  "Grandmother     Heart disease Paternal Grandfather     Stroke Paternal Grandfather             Heart disease Paternal Uncle          The following portions of the patient's history were reviewed in this encounter and updated as appropriate: Past medical, surgical, family, and social history, as well as medications, allergies, and review of systems.        EXAM    Vitals:Blood pressure 110/70, pulse 68, temperature 98.1 °F (36.7 °C), temperature source Temporal, height 5' 8\" (1.727 m), weight 70.3 kg (155 lb), SpO2 98%.,There is no height or weight on file to calculate BMI.     Physical Exam  Vitals and nursing note reviewed.   Constitutional:       Appearance: Normal appearance. He is normal weight.   HENT:      Head: Normocephalic and atraumatic.   Eyes:      Extraocular Movements: Extraocular movements intact and EOM normal.      Pupils: Pupils are equal, round, and reactive to light.   Cardiovascular:      Rate and Rhythm: Normal rate and regular rhythm.      Pulses: Normal pulses.      Heart sounds: Normal heart sounds.   Pulmonary:      Effort: Pulmonary effort is normal.      Breath sounds: Normal breath sounds.   Abdominal:      General: Abdomen is flat.      Palpations: Abdomen is soft.   Musculoskeletal:         General: Normal range of motion.      Cervical back: Normal range of motion.   Neurological:      General: No focal deficit present.      Mental Status: He is alert and oriented to person, place, and time. Mental status is at baseline.      GCS: GCS eye subscore is 4. GCS verbal subscore is 5. GCS motor subscore is 6.      Sensory: Sensation is intact.      Motor: Motor strength is normal.Motor function is intact.      Coordination: Coordination is intact.      Gait: Gait is intact.      Deep Tendon Reflexes: Reflexes are normal and symmetric.   Psychiatric:         Mood and Affect: Mood normal.         Speech: Speech normal.         Behavior: Behavior normal.         Neurologic Exam "     Mental Status   Oriented to person, place, and time.   Attention: normal.   Speech: speech is normal   Level of consciousness: alert    Cranial Nerves     CN II   Visual fields full to confrontation.   Visual acuity: normal  Right visual field deficit: none  Left visual field deficit: none     CN III, IV, VI   Pupils are equal, round, and reactive to light.  Extraocular motions are normal.   CN III: no CN III palsy  CN VI: no CN VI palsy  Nystagmus: none   Diplopia: none  Ophthalmoparesis: none  Upgaze: normal  Downgaze: normal  Conjugate gaze: present    CN V   Facial sensation intact.   Right facial sensation deficit: none  Left facial sensation deficit: none    CN VII   Facial expression full, symmetric.   Right facial weakness: none  Left facial weakness: none    CN VIII   CN VIII normal.     CN IX, X   CN IX normal.   CN X normal.   Palate: symmetric    CN XI   CN XI normal.   Right sternocleidomastoid strength: normal  Left sternocleidomastoid strength: normal  Right trapezius strength: normal  Left trapezius strength: normal    CN XII   CN XII normal.   Tongue deviation: none    Motor Exam   Muscle bulk: normal  Overall muscle tone: normal  Right arm pronator drift: absent  Left arm pronator drift: absent    Strength   Strength 5/5 throughout.     Sensory Exam   Light touch normal.     Gait, Coordination, and Reflexes     Gait  Gait: normal    Reflexes   Reflexes 2+ except as noted.   Incisions healed      MEDICAL DECISION MAKING    Imaging Studies:     XR spine cervical complete 6+ vw flex/ext/obl    Result Date: 8/13/2024  Narrative: CERVICAL SPINE INDICATION:   Unspecified displaced fracture of fifth cervical vertebra, initial encounter for closed fracture. COMPARISON: 6/21/2024 VIEWS:  XR SPINE CERVICAL COMPLETE 6+ VW FLEX /EXT /OBL Images: 8 FINDINGS: No fracture. Stable overall straightening without significant listhesis. No dynamic instability. C4-C6 ACDF with C5 corpectomy without hardware  complication. Stable C2-T3 posterior fusion. Neural foramen are patent. The prevertebral soft tissues are within normal limits. The lung apices are clear.     Impression: Stable postsurgical changes. Electronically signed: 08/13/2024 02:44 PM Judah Smith, MPH,MD      I have personally reviewed pertinent reports.  , I have personally reviewed pertinent films in PACS, and I have personally reviewed pertinent films in PACS with a Radiologist.      Christoph Camacho M.D.  Neurosurgeon

## 2024-08-30 NOTE — TELEPHONE ENCOUNTER
PT MOTHER DORA, ON CONSENT, CB AFTER PT WAS SEEN TODAY 8/30/24  WITH SOME ADDITIONAL AFTER VISIT QUESTIONS AND ALSO ASKING TO DISCUSS REFERRALS IN GREATER DETAIL BEFORE PROCEEDING WITH SCHEDULING APPTS.    ATTEMPTED TO TRANSFER PT TO NURSE AND INSTRUCTED TO LEAVE A VM FOR CB IF NO ANSWER.

## 2024-08-30 NOTE — TELEPHONE ENCOUNTER
Patient mother reach out to office to have referral amended to reflect Functional Capacity Eval.  This RN assisted and Faxed to [phone] per request.

## 2024-09-06 ENCOUNTER — CLINICAL SUPPORT (OUTPATIENT)
Dept: NUTRITION | Facility: HOSPITAL | Age: 24
End: 2024-09-06
Attending: NEUROLOGICAL SURGERY
Payer: COMMERCIAL

## 2024-09-06 VITALS — BODY MASS INDEX: 23.69 KG/M2 | WEIGHT: 155.8 LBS

## 2024-09-06 DIAGNOSIS — S12.490G: Primary | ICD-10-CM

## 2024-09-06 DIAGNOSIS — S12.400A C5 VERTEBRAL FRACTURE (HCC): ICD-10-CM

## 2024-09-06 PROCEDURE — 97802 MEDICAL NUTRITION INDIV IN: CPT

## 2024-09-06 NOTE — PROGRESS NOTES
" Nutrition Assessment Form    Patient Name: Ramin Sadler    YOB: 2000    Sex: Male     Assessment Date: 9/6/2024  Start Time: 10 am Stop Time: 11 am Total Minutes: 60     Data:  Present at session: self and mother   Parent/Patient Concerns/reason for visit: \"Surgeon wanted me to get nutrition advice\"   Medical Dx/Reason for Referral: S12.400A (ICD-10-CM) - C5 vertebral fracture    Past Medical History:   Diagnosis Date    C5 cervical fracture (HCC) 09/09/2021    Cervicogenic headache     Depression     HL (hearing loss)     Migraine     Post concussion syndrome     Tetraparesis (HCC)     Weakness        Current Outpatient Medications   Medication Sig Dispense Refill    acetaminophen (TYLENOL) 500 mg tablet Take 500 mg by mouth every 6 (six) hours as needed for mild pain      buPROPion (WELLBUTRIN XL) 150 mg 24 hr tablet Take 150 mg by mouth daily      Diclofenac Sodium (VOLTAREN) 1 % as needed      docusate sodium (COLACE) 100 mg capsule Take 1 capsule (100 mg total) by mouth 2 (two) times a day for 7 days 14 capsule 0    polyethylene glycol (MIRALAX) 17 g packet Take 17 g by mouth daily for 14 days 238 g 0    potassium chloride (Klor-Con M10) 10 mEq tablet Take 1 tablet (10 mEq total) by mouth 3 (three) times a day 10 tablet 0    potassium chloride (MICRO-K) 10 MEQ CR capsule Take 10 mEq by mouth 3 (three) times a day      tamsulosin (FLOMAX) 0.4 mg Take 0.4 mg by mouth daily at bedtime      tiZANidine (ZANAFLEX) 2 mg tablet Take 2 mg by mouth 3 (three) times a day       No current facility-administered medications for this visit.        Additional Meds/Supplements:    Special Learning Needs/barriers to learning/any new barriers    Height: Ht Readings from Last 5 Encounters:   08/30/24 5' 8\" (1.727 m)   07/11/24 5' 8\" (1.727 m)   06/28/24 5' 8\" (1.727 m)   05/29/24 5' 8\" (1.727 m)   04/04/24 5' 8\" (1.727 m)      Weight: Wt Readings from Last 10 Encounters:   09/06/24 70.7 kg (155 lb 12.8 oz) " "  08/30/24 70.3 kg (155 lb)   07/11/24 70.4 kg (155 lb 1.6 oz)   06/28/24 69.4 kg (153 lb)   05/29/24 69.4 kg (153 lb)   04/04/24 73 kg (161 lb)   03/21/24 72.8 kg (160 lb 6.4 oz)   02/21/24 70.8 kg (156 lb)   02/10/24 71 kg (156 lb 8.4 oz)   02/02/24 71.7 kg (158 lb)     Estimated body mass index is 23.69 kg/m² as calculated from the following:    Height as of 8/30/24: 5' 8\" (1.727 m).    Weight as of this encounter: 70.7 kg (155 lb 12.8 oz).   Recent Weight Change: []Yes     []No  Amount:       Energy Needs: Gifford- St. Jeor Equation:   2000 kcal   No Known Allergies or intolerances    Social History     Substance and Sexual Activity   Alcohol Use Never    ETOH 1 drink every few months   Social History     Tobacco Use   Smoking Status Never   Smokeless Tobacco Never       Who shops? father   Who cooks/cooking methods/Eating out/take out habits   patient  Cooking methods: bake/ceballos/air ceballos/grill/boil/other________    Take out: ___ x/wk or month   Dining out ____ x/wk or month   Exercise: PT every Wednesday/ Friday  Walks daily outside when its warm but no set schedule- will walk 30-45 minutes     Other: ie: Sleep habits/ stress level/ work habits household-lives with ?/ food security Not working currently   Does not sleep well  Goes to bed midnight-4am  Wakes various times 9-10 am   Prior Nutritional Counseling? [x]Yes     []No  When: 3 years ago     Why: Inpatient at [facility]         Diet Hx:  Breakfast: Diet: 1-2 meals a day    Lunch: 4 16.9 oz bottles water per day    Ensure occasionally     Dinner: 5 pm 2 sandwiches- white bread turkey, chicken breast cheese ren no lettuce or tomato          Snacks: 12 am- 2 pm cereal- apple cinnamon cheerios (2 cups) with 1 cup 2$ milk  AM -   PM - fruit 2-3 x's a week- grapes, strawberries, bananas, apples   HS -    Other Notes/ Initial Assessment:  Patient stated he has had a low appetite for a \"long time\" but noted is also a picky eater     Discussed the importance of " adequate sleep, and ideal sleeping conditions, including a cool temperature 64-68 degrees F, and a dark and quiet room. Also discussed the importance of a regular and consistent sleep routine, including minimizing blue light exposure an hour before sleeping.  Weekend habits should include staying fairly consistent with weekday sleep habits to minimize disruption during the week.  A connection was made between getting adequate and good quality sleep and the ability to handle stress the next day, make healthy food choices, and be active.     Discussed the importance of eating 3 meals daily and not skipping, and how metabolism is affected.  Also discussed adding in small snacks or 5-6 small meals daily if desired vs 3 larger ones, and appropriate options and portions.  Appropriate timing of meals, including eating within 1 hr of waking and eating meals slowly 20mins/meals, minimizing mindless/boredom or habitual eating, etc was also mentioned.     Discussed the importance of trying new foods 12-16x, and retraining taste buds to like new foods.  Discussed how taste buds will change over the course of a lifetime.  Also included trying new foods at the beginning of a meal when you are the hungriest and more apt to like a new food and be more accepting of it.     RD provided 2000 kcal sample menu    Updated assessment (Follow up note only):           Nutrition Diagnosis:   Inadequate energy intake  related to decreased appetite as evidenced by  Estimated energy intake from diet less than needs based on estimated or measured resting metabolic rate       Any change or new dx since previous visit:     Nutrition Diagnosis:   N/a      Medical Nutrition Therapy Intervention:  [x]Individualized Meal Plan 2000 kcal/ 60-75 g protein per day []Understanding Lab Values   []Basic Pathophysiology of Disease []Food/Medication Interactions   []Food Diary []Exercise   [x]Lifestyle/Behavior Modification Techniques  []Medication, Mechanism  "of Action   []Label Reading: CHO/ Na/ Fat/ other_________ []Self Blood Glucose Monitoring   []Weight/BMI Goals: gain/lose/maintain []Other -           Comprehension: []Excellent  []Very Good  [x]Good  []Fair   []Poor    Receptivity: []Excellent  []Very Good  [x]Good  []Fair   []Poor    Expected Compliance: []Excellent  []Very Good  [x]Good  []Fair   []Poor        Goals (initial)/ Progress made on previous goals/new goals:  Patient will consume at least 60 g protein per day by next follow up   2. Patient will try 1 new food per week by next follow up   3.       No follow-ups on file.  Labs:  CMP  Lab Results   Component Value Date    K 3.7 05/28/2024     05/28/2024    CO2 27 05/28/2024    BUN 19 05/28/2024    CREATININE 0.78 05/28/2024    GLUCOSE 84 02/07/2024    GLUF 92 01/27/2024    CALCIUM 9.2 05/28/2024    AST 11 (L) 05/28/2024    ALT 7 05/28/2024    ALKPHOS 74 05/28/2024    EGFR 125 05/28/2024       BMP  Lab Results   Component Value Date    GLUCOSE 84 02/07/2024    CALCIUM 9.2 05/28/2024    K 3.7 05/28/2024    CO2 27 05/28/2024     05/28/2024    BUN 19 05/28/2024    CREATININE 0.78 05/28/2024       Lipids  No results found for: \"CHOL\"  Lab Results   Component Value Date    HDL 32 (L) 04/12/2022     Lab Results   Component Value Date    LDLCALC 56 04/12/2022     Lab Results   Component Value Date    TRIG 100 04/12/2022     No results found for: \"CHOLHDL\"    Hemoglobin A1C  Lab Results   Component Value Date    HGBA1C 5.4 01/27/2024       Fasting Glucose  Lab Results   Component Value Date    GLUF 92 01/27/2024       Insulin     Thyroid  No results found for: \"TSH\", \"H2FKYJO\", \"T2SFWUP\", \"THYROIDAB\"    Hepatic Function Panel  Lab Results   Component Value Date    ALT 7 05/28/2024    AST 11 (L) 05/28/2024    ALKPHOS 74 05/28/2024       Celiac Disease Antibody Panel  No results found for: \"ENDOMYSIAL IGA\", \"GLIADIN IGA\", \"GLIADIN IGG\", \"IGA\", \"TISSUE TRANSGLUT AB\", \"TTG IGA\"   Iron  No results found " "for: \"IRON\", \"TIBC\", \"FERRITIN\"         Raquel Camejo RD  [facility]  [address]  [place] [address]   Nutrition Assessment Form    Patient Name: Ramin Sadler    YOB: 2000    Sex: Male     Assessment Date: 9/6/2024  Start Time:  Stop Time:  Total Minutes:      Data:  Present at session: {PERSONS; PRESENT AT VISIT:19935}   Parent/Patient Concerns/reason for visit:    Medical Dx/Reason for Referral:    Past Medical History:   Diagnosis Date    C5 cervical fracture (HCC) 09/09/2021    Cervicogenic headache     Depression     HL (hearing loss)     Migraine     Post concussion syndrome     Tetraparesis (HCC)     Weakness        Current Outpatient Medications   Medication Sig Dispense Refill    acetaminophen (TYLENOL) 500 mg tablet Take 500 mg by mouth every 6 (six) hours as needed for mild pain      buPROPion (WELLBUTRIN XL) 150 mg 24 hr tablet Take 150 mg by mouth daily      Diclofenac Sodium (VOLTAREN) 1 % as needed      docusate sodium (COLACE) 100 mg capsule Take 1 capsule (100 mg total) by mouth 2 (two) times a day for 7 days 14 capsule 0    polyethylene glycol (MIRALAX) 17 g packet Take 17 g by mouth daily for 14 days 238 g 0    potassium chloride (Klor-Con M10) 10 mEq tablet Take 1 tablet (10 mEq total) by mouth 3 (three) times a day 10 tablet 0    potassium chloride (MICRO-K) 10 MEQ CR capsule Take 10 mEq by mouth 3 (three) times a day      tamsulosin (FLOMAX) 0.4 mg Take 0.4 mg by mouth daily at bedtime      tiZANidine (ZANAFLEX) 2 mg tablet Take 2 mg by mouth 3 (three) times a day       No current facility-administered medications for this visit.        Additional Meds/Supplements:    Special Learning Needs/barriers to learning/any new barriers    Height: Ht Readings from Last 5 Encounters:   08/30/24 5' 8\" (1.727 m)   07/11/24 5' 8\" (1.727 m)   06/28/24 5' 8\" (1.727 m)   05/29/24 5' 8\" " "(1.727 m)   04/04/24 5' 8\" (1.727 m)      Weight: Wt Readings from Last 10 Encounters:   09/06/24 70.7 kg (155 lb 12.8 oz)   08/30/24 70.3 kg (155 lb)   07/11/24 70.4 kg (155 lb 1.6 oz)   06/28/24 69.4 kg (153 lb)   05/29/24 69.4 kg (153 lb)   04/04/24 73 kg (161 lb)   03/21/24 72.8 kg (160 lb 6.4 oz)   02/21/24 70.8 kg (156 lb)   02/10/24 71 kg (156 lb 8.4 oz)   02/02/24 71.7 kg (158 lb)     Estimated body mass index is 23.69 kg/m² as calculated from the following:    Height as of 8/30/24: 5' 8\" (1.727 m).    Weight as of this encounter: 70.7 kg (155 lb 12.8 oz).   Recent Weight Change: []Yes     []No  Amount:       Energy Needs: {Energy Needs:42253}   No Known Allergies or intolerances    Social History     Substance and Sexual Activity   Alcohol Use Never    _______x/wk or month  1 or 2 or 3 or 4 or____ drinks/session   Mixed drinks/ wine/ beer   Social History     Tobacco Use   Smoking Status Never   Smokeless Tobacco Never       Who shops? {WHO SHOPS:2073947943}   Who cooks/cooking methods/Eating out/take out habits   {WHO COOKS:5332908535}  Cooking methods: bake/ceballos/air ceballos/grill/boil/other________    Take out: ___ x/wk or month   Dining out ____ x/wk or month   Exercise: Walk/ run/ bike/ gym/elliptical/other _________  ____ x/wk how many minutes:______   strength training       Other: ie: Sleep habits/ stress level/ work habits household-lives with ?/ food security    Prior Nutritional Counseling? []Yes     []No  When:      Why:         Diet Hx:  Breakfast: Diet:   Lunch:          Dinner:          Snacks: AM -   PM -   HS -    Other Notes/ Initial Assessment:      Updated assessment (Follow up note only):           Nutrition Diagnosis:   {NUTRITION DIAGNOSIS:1493619088}       Any change or new dx since previous visit:     Nutrition Diagnosis:   {NUTRITION DIAGNOSIS:1679710021}      Medical Nutrition Therapy Intervention:  []Individualized Meal Plan []Understanding Lab Values   []Basic Pathophysiology of " "Disease []Food/Medication Interactions   []Food Diary []Exercise   []Lifestyle/Behavior Modification Techniques []Medication, Mechanism of Action   []Label Reading: CHO/ Na/ Fat/ other_________ []Self Blood Glucose Monitoring   []Weight/BMI Goals: gain/lose/maintain []Other -           Comprehension: []Excellent  []Very Good  []Good  []Fair   []Poor    Receptivity: []Excellent  []Very Good  []Good  []Fair   []Poor    Expected Compliance: []Excellent  []Very Good  []Good  []Fair   []Poor        Goals (initial)/ Progress made on previous goals/new goals:  1.   2.   3.       No follow-ups on file.  Labs:  CMP  Lab Results   Component Value Date    K 3.7 05/28/2024     05/28/2024    CO2 27 05/28/2024    BUN 19 05/28/2024    CREATININE 0.78 05/28/2024    GLUCOSE 84 02/07/2024    GLUF 92 01/27/2024    CALCIUM 9.2 05/28/2024    AST 11 (L) 05/28/2024    ALT 7 05/28/2024    ALKPHOS 74 05/28/2024    EGFR 125 05/28/2024       BMP  Lab Results   Component Value Date    GLUCOSE 84 02/07/2024    CALCIUM 9.2 05/28/2024    K 3.7 05/28/2024    CO2 27 05/28/2024     05/28/2024    BUN 19 05/28/2024    CREATININE 0.78 05/28/2024       Lipids  No results found for: \"CHOL\"  Lab Results   Component Value Date    HDL 32 (L) 04/12/2022     Lab Results   Component Value Date    LDLCALC 56 04/12/2022     Lab Results   Component Value Date    TRIG 100 04/12/2022     No results found for: \"CHOLHDL\"    Hemoglobin A1C  Lab Results   Component Value Date    HGBA1C 5.4 01/27/2024       Fasting Glucose  Lab Results   Component Value Date    GLUF 92 01/27/2024       Insulin     Thyroid  No results found for: \"TSH\", \"U3EICED\", \"Q0XFTNA\", \"THYROIDAB\"    Hepatic Function Panel  Lab Results   Component Value Date    ALT 7 05/28/2024    AST 11 (L) 05/28/2024    ALKPHOS 74 05/28/2024       Celiac Disease Antibody Panel  No results found for: \"ENDOMYSIAL IGA\", \"GLIADIN IGA\", \"GLIADIN IGG\", \"IGA\", \"TISSUE TRANSGLUT AB\", \"TTG IGA\"   Iron  No " "results found for: \"IRON\", \"TIBC\", \"FERRITIN\"         Raquel Camejo RD  [facility] CLINICAL NUTRITION [facility]  [address]  [place] [address]    "

## 2024-09-06 NOTE — PROGRESS NOTES
" Nutrition Assessment Form    Patient Name: Ramin Sadler    YOB: 2000    Sex: Male     Assessment Date: 9/6/2024  Start Time: 10 am Stop Time: 11 am Total Minutes: 60     Data:  Present at session: self and mother   Parent/Patient Concerns/reason for visit: \"Surgeon wanted me to get nutrition advice\"   Medical Dx/Reason for Referral: S12.400A (ICD-10-CM) - C5 vertebral fracture    Past Medical History:   Diagnosis Date    C5 cervical fracture (HCC) 09/09/2021    Cervicogenic headache     Depression     HL (hearing loss)     Migraine     Post concussion syndrome     Tetraparesis (HCC)     Weakness        Current Outpatient Medications   Medication Sig Dispense Refill    acetaminophen (TYLENOL) 500 mg tablet Take 500 mg by mouth every 6 (six) hours as needed for mild pain      buPROPion (WELLBUTRIN XL) 150 mg 24 hr tablet Take 150 mg by mouth daily      Diclofenac Sodium (VOLTAREN) 1 % as needed      docusate sodium (COLACE) 100 mg capsule Take 1 capsule (100 mg total) by mouth 2 (two) times a day for 7 days 14 capsule 0    polyethylene glycol (MIRALAX) 17 g packet Take 17 g by mouth daily for 14 days 238 g 0    potassium chloride (Klor-Con M10) 10 mEq tablet Take 1 tablet (10 mEq total) by mouth 3 (three) times a day 10 tablet 0    potassium chloride (MICRO-K) 10 MEQ CR capsule Take 10 mEq by mouth 3 (three) times a day      tamsulosin (FLOMAX) 0.4 mg Take 0.4 mg by mouth daily at bedtime      tiZANidine (ZANAFLEX) 2 mg tablet Take 2 mg by mouth 3 (three) times a day       No current facility-administered medications for this visit.        Additional Meds/Supplements:    Special Learning Needs/barriers to learning/any new barriers    Height: Ht Readings from Last 5 Encounters:   08/30/24 5' 8\" (1.727 m)   07/11/24 5' 8\" (1.727 m)   06/28/24 5' 8\" (1.727 m)   05/29/24 5' 8\" (1.727 m)   04/04/24 5' 8\" (1.727 m)      Weight: Wt Readings from Last 10 Encounters:   09/06/24 70.7 kg (155 lb 12.8 oz) " "  08/30/24 70.3 kg (155 lb)   07/11/24 70.4 kg (155 lb 1.6 oz)   06/28/24 69.4 kg (153 lb)   05/29/24 69.4 kg (153 lb)   04/04/24 73 kg (161 lb)   03/21/24 72.8 kg (160 lb 6.4 oz)   02/21/24 70.8 kg (156 lb)   02/10/24 71 kg (156 lb 8.4 oz)   02/02/24 71.7 kg (158 lb)     Estimated body mass index is 23.69 kg/m² as calculated from the following:    Height as of 8/30/24: 5' 8\" (1.727 m).    Weight as of this encounter: 70.7 kg (155 lb 12.8 oz).   Recent Weight Change: []Yes     []No  Amount:       Energy Needs: [place]- St. Jeor Equation:   2000 kcal   No Known Allergies or intolerances    Social History     Substance and Sexual Activity   Alcohol Use Never    ETOH 1 drink every few months   Social History     Tobacco Use   Smoking Status Never   Smokeless Tobacco Never       Who shops? father   Who cooks/cooking methods/Eating out/take out habits   patient  Cooking methods: bake/ceballos/air ceballos/grill/boil/other________    Take out: ___ x/wk or month   Dining out ____ x/wk or month   Exercise: PT every Wednesday/ Friday  Walks daily outside when its warm but no set schedule- will walk 30-45 minutes     Other: ie: Sleep habits/ stress level/ work habits household-lives with ?/ food security Not working currently   Does not sleep well  Goes to bed midnight-4am  Wakes various times 9-10 am   Prior Nutritional Counseling? [x]Yes     []No  When: 3 years ago     Why: Inpatient at [facility]         Diet Hx:  Breakfast: Diet: 1-2 meals a day    Lunch: 4 16.9 oz bottles water per day    Ensure occasionally     Dinner: 5 pm 2 sandwiches- white bread turkey, chicken breast cheese ren no lettuce or tomato          Snacks: 12 am- 2 pm cereal- apple cinnamon cheerios (2 cups) with 1 cup 2$ milk  AM -   PM - fruit 2-3 x's a week- grapes, strawberries, bananas, apples   HS -    Other Notes/ Initial Assessment:  Patient stated he has had a low appetite for a \"long time\" but noted is also a picky eater     Discussed the importance of " adequate sleep, and ideal sleeping conditions, including a cool temperature 64-68 degrees F, and a dark and quiet room. Also discussed the importance of a regular and consistent sleep routine, including minimizing blue light exposure an hour before sleeping.  Weekend habits should include staying fairly consistent with weekday sleep habits to minimize disruption during the week.  A connection was made between getting adequate and good quality sleep and the ability to handle stress the next day, make healthy food choices, and be active.     Discussed the importance of eating 3 meals daily and not skipping, and how metabolism is affected.  Also discussed adding in small snacks or 5-6 small meals daily if desired vs 3 larger ones, and appropriate options and portions.  Appropriate timing of meals, including eating within 1 hr of waking and eating meals slowly 20mins/meals, minimizing mindless/boredom or habitual eating, etc was also mentioned.     Discussed the importance of trying new foods 12-16x, and retraining taste buds to like new foods.  Discussed how taste buds will change over the course of a lifetime.  Also included trying new foods at the beginning of a meal when you are the hungriest and more apt to like a new food and be more accepting of it.     RD provided 2000 kcal sample menu    Updated assessment (Follow up note only):           Nutrition Diagnosis:   Inadequate energy intake  related to decreased appetite as evidenced by  Estimated energy intake from diet less than needs based on estimated or measured resting metabolic rate       Any change or new dx since previous visit:     Nutrition Diagnosis:   N/a      Medical Nutrition Therapy Intervention:  [x]Individualized Meal Plan 2000 kcal/ 60-75 g protein per day []Understanding Lab Values   []Basic Pathophysiology of Disease []Food/Medication Interactions   []Food Diary []Exercise   [x]Lifestyle/Behavior Modification Techniques  []Medication, Mechanism  "of Action   []Label Reading: CHO/ Na/ Fat/ other_________ []Self Blood Glucose Monitoring   []Weight/BMI Goals: gain/lose/maintain []Other -           Comprehension: []Excellent  []Very Good  [x]Good  []Fair   []Poor    Receptivity: []Excellent  []Very Good  [x]Good  []Fair   []Poor    Expected Compliance: []Excellent  []Very Good  [x]Good  []Fair   []Poor        Goals (initial)/ Progress made on previous goals/new goals:  Patient will consume at least 60 g protein per day by next follow up   2. Patient will try 1 new food per week by next follow up   3.       No follow-ups on file.  Labs:  CMP  Lab Results   Component Value Date    K 3.7 05/28/2024     05/28/2024    CO2 27 05/28/2024    BUN 19 05/28/2024    CREATININE 0.78 05/28/2024    GLUCOSE 84 02/07/2024    GLUF 92 01/27/2024    CALCIUM 9.2 05/28/2024    AST 11 (L) 05/28/2024    ALT 7 05/28/2024    ALKPHOS 74 05/28/2024    EGFR 125 05/28/2024       BMP  Lab Results   Component Value Date    GLUCOSE 84 02/07/2024    CALCIUM 9.2 05/28/2024    K 3.7 05/28/2024    CO2 27 05/28/2024     05/28/2024    BUN 19 05/28/2024    CREATININE 0.78 05/28/2024       Lipids  No results found for: \"CHOL\"  Lab Results   Component Value Date    HDL 32 (L) 04/12/2022     Lab Results   Component Value Date    LDLCALC 56 04/12/2022     Lab Results   Component Value Date    TRIG 100 04/12/2022     No results found for: \"CHOLHDL\"    Hemoglobin A1C  Lab Results   Component Value Date    HGBA1C 5.4 01/27/2024       Fasting Glucose  Lab Results   Component Value Date    GLUF 92 01/27/2024       Insulin     Thyroid  No results found for: \"TSH\", \"C2ZVCYA\", \"W5WIOYH\", \"THYROIDAB\"    Hepatic Function Panel  Lab Results   Component Value Date    ALT 7 05/28/2024    AST 11 (L) 05/28/2024    ALKPHOS 74 05/28/2024       Celiac Disease Antibody Panel  No results found for: \"ENDOMYSIAL IGA\", \"GLIADIN IGA\", \"GLIADIN IGG\", \"IGA\", \"TISSUE TRANSGLUT AB\", \"TTG IGA\"   Iron  No results found " "for: \"IRON\", \"TIBC\", \"FERRITIN\"         Raquel Camejo RD  [facility]  [address] [facility] [address]  [place] [address]   Nutrition Assessment Form    Patient Name: Ramin Sadler    YOB: 2000    Sex: Male     Assessment Date: 9/6/2024  Start Time:  Stop Time:  Total Minutes:      Data:  Present at session: self   Parent/Patient Concerns/reason for visit:    Medical Dx/Reason for Referral:    Past Medical History:   Diagnosis Date    C5 cervical fracture (HCC) 09/09/2021    Cervicogenic headache     Depression     HL (hearing loss)     Migraine     Post concussion syndrome     Tetraparesis (HCC)     Weakness        Current Outpatient Medications   Medication Sig Dispense Refill    acetaminophen (TYLENOL) 500 mg tablet Take 500 mg by mouth every 6 (six) hours as needed for mild pain      buPROPion (WELLBUTRIN XL) 150 mg 24 hr tablet Take 150 mg by mouth daily      Diclofenac Sodium (VOLTAREN) 1 % as needed      docusate sodium (COLACE) 100 mg capsule Take 1 capsule (100 mg total) by mouth 2 (two) times a day for 7 days 14 capsule 0    polyethylene glycol (MIRALAX) 17 g packet Take 17 g by mouth daily for 14 days 238 g 0    potassium chloride (Klor-Con M10) 10 mEq tablet Take 1 tablet (10 mEq total) by mouth 3 (three) times a day 10 tablet 0    potassium chloride (MICRO-K) 10 MEQ CR capsule Take 10 mEq by mouth 3 (three) times a day      tamsulosin (FLOMAX) 0.4 mg Take 0.4 mg by mouth daily at bedtime      tiZANidine (ZANAFLEX) 2 mg tablet Take 2 mg by mouth 3 (three) times a day       No current facility-administered medications for this visit.        Additional Meds/Supplements:    Special Learning Needs/barriers to learning/any new barriers    Height: Ht Readings from Last 5 Encounters:   08/30/24 5' 8\" (1.727 m)   07/11/24 5' 8\" (1.727 m)   06/28/24 5' 8\" (1.727 m)   05/29/24 5' 8\" (1.727 m)   04/04/24 5' 8\" " "(1.727 m)      Weight: Wt Readings from Last 10 Encounters:   09/06/24 70.7 kg (155 lb 12.8 oz)   08/30/24 70.3 kg (155 lb)   07/11/24 70.4 kg (155 lb 1.6 oz)   06/28/24 69.4 kg (153 lb)   05/29/24 69.4 kg (153 lb)   04/04/24 73 kg (161 lb)   03/21/24 72.8 kg (160 lb 6.4 oz)   02/21/24 70.8 kg (156 lb)   02/10/24 71 kg (156 lb 8.4 oz)   02/02/24 71.7 kg (158 lb)     Estimated body mass index is 23.69 kg/m² as calculated from the following:    Height as of 8/30/24: 5' 8\" (1.727 m).    Weight as of this encounter: 70.7 kg (155 lb 12.8 oz).   Recent Weight Change: []Yes     []No  Amount:       Energy Needs: No calculation needed   No Known Allergies or intolerances    Social History     Substance and Sexual Activity   Alcohol Use Never    _______x/wk or month  1 or 2 or 3 or 4 or____ drinks/session   Mixed drinks/ wine/ beer   Social History     Tobacco Use   Smoking Status Never   Smokeless Tobacco Never       Who shops?     Who cooks/cooking methods/Eating out/take out habits      Cooking methods: bake/ceballos/air ceballos/grill/boil/other________    Take out: ___ x/wk or month   Dining out ____ x/wk or month   Exercise: Walk/ run/ bike/ gym/elliptical/other _________  ____ x/wk how many minutes:______   strength training       Other: ie: Sleep habits/ stress level/ work habits household-lives with ?/ food security    Prior Nutritional Counseling? []Yes     []No  When:      Why:         Diet Hx:  Breakfast: Diet:   Lunch:          Dinner:          Snacks: AM -   PM -   HS -    Other Notes/ Initial Assessment:      Updated assessment (Follow up note only):           Nutrition Diagnosis:          Any change or new dx since previous visit:     Nutrition Diagnosis:         Medical Nutrition Therapy Intervention:  []Individualized Meal Plan []Understanding Lab Values   []Basic Pathophysiology of Disease []Food/Medication Interactions   []Food Diary []Exercise   []Lifestyle/Behavior Modification Techniques []Medication, Mechanism " "of Action   []Label Reading: CHO/ Na/ Fat/ other_________ []Self Blood Glucose Monitoring   []Weight/BMI Goals: gain/lose/maintain []Other -           Comprehension: []Excellent  []Very Good  []Good  []Fair   []Poor    Receptivity: []Excellent  []Very Good  []Good  []Fair   []Poor    Expected Compliance: []Excellent  []Very Good  []Good  []Fair   []Poor        Goals (initial)/ Progress made on previous goals/new goals:  1.   2.   3.       No follow-ups on file.  Labs:  CMP  Lab Results   Component Value Date    K 3.7 05/28/2024     05/28/2024    CO2 27 05/28/2024    BUN 19 05/28/2024    CREATININE 0.78 05/28/2024    GLUCOSE 84 02/07/2024    GLUF 92 01/27/2024    CALCIUM 9.2 05/28/2024    AST 11 (L) 05/28/2024    ALT 7 05/28/2024    ALKPHOS 74 05/28/2024    EGFR 125 05/28/2024       BMP  Lab Results   Component Value Date    GLUCOSE 84 02/07/2024    CALCIUM 9.2 05/28/2024    K 3.7 05/28/2024    CO2 27 05/28/2024     05/28/2024    BUN 19 05/28/2024    CREATININE 0.78 05/28/2024       Lipids  No results found for: \"CHOL\"  Lab Results   Component Value Date    HDL 32 (L) 04/12/2022     Lab Results   Component Value Date    LDLCALC 56 04/12/2022     Lab Results   Component Value Date    TRIG 100 04/12/2022     No results found for: \"CHOLHDL\"    Hemoglobin A1C  Lab Results   Component Value Date    HGBA1C 5.4 01/27/2024       Fasting Glucose  Lab Results   Component Value Date    GLUF 92 01/27/2024       Insulin     Thyroid  No results found for: \"TSH\", \"C6DEVHB\", \"X0UDAMN\", \"THYROIDAB\"    Hepatic Function Panel  Lab Results   Component Value Date    ALT 7 05/28/2024    AST 11 (L) 05/28/2024    ALKPHOS 74 05/28/2024       Celiac Disease Antibody Panel  No results found for: \"ENDOMYSIAL IGA\", \"GLIADIN IGA\", \"GLIADIN IGG\", \"IGA\", \"TISSUE TRANSGLUT AB\", \"TTG IGA\"   Iron  No results found for: \"IRON\", \"TIBC\", \"FERRITIN\"         Raquel Camejo RD  [facility] " [facility]  [facility] [facility]  [place] [address]

## 2024-09-30 ENCOUNTER — TELEPHONE (OUTPATIENT)
Age: 24
End: 2024-09-30

## 2024-09-30 DIAGNOSIS — Z98.1 S/P CERVICAL SPINAL FUSION: Primary | ICD-10-CM

## 2024-09-30 NOTE — TELEPHONE ENCOUNTER
Juan Ramon can you update this referral for physiatry and fax to   attn: Agatha Mendez- cecilio is at [facility] now .  Referral should be to [facility], they wont take it as is with Dr Anderson.  Thanks

## 2025-02-03 ENCOUNTER — HOSPITAL ENCOUNTER (OUTPATIENT)
Dept: RADIOLOGY | Facility: HOSPITAL | Age: 25
Discharge: HOME | End: 2025-02-03
Payer: COMMERCIAL

## 2025-02-03 DIAGNOSIS — S12.400A C5 VERTEBRAL FRACTURE (HCC): ICD-10-CM

## 2025-02-03 PROCEDURE — 72052 X-RAY EXAM NECK SPINE 6/>VWS: CPT

## 2025-02-04 ENCOUNTER — OFFICE VISIT (OUTPATIENT)
Dept: NEUROSURGERY | Facility: CLINIC | Age: 25
End: 2025-02-04
Payer: COMMERCIAL

## 2025-02-04 VITALS
TEMPERATURE: 97.6 F | DIASTOLIC BLOOD PRESSURE: 60 MMHG | OXYGEN SATURATION: 99 % | SYSTOLIC BLOOD PRESSURE: 110 MMHG | HEART RATE: 56 BPM

## 2025-02-04 DIAGNOSIS — Z98.1 S/P CERVICAL SPINAL FUSION: Primary | ICD-10-CM

## 2025-02-04 DIAGNOSIS — S14.129D CENTRAL CORD SYNDROME, SUBSEQUENT ENCOUNTER (HCC): ICD-10-CM

## 2025-02-04 DIAGNOSIS — G83.22: ICD-10-CM

## 2025-02-04 DIAGNOSIS — S12.400A C5 VERTEBRAL FRACTURE (HCC): ICD-10-CM

## 2025-02-04 DIAGNOSIS — S12.9XXD PSEUDOARTHROSIS OF CERVICAL SPINE, SUBSEQUENT ENCOUNTER: ICD-10-CM

## 2025-02-04 DIAGNOSIS — G95.9 CERVICAL MYELOPATHY (HCC): ICD-10-CM

## 2025-02-04 PROCEDURE — 99214 OFFICE O/P EST MOD 30 MIN: CPT | Performed by: NEUROLOGICAL SURGERY

## 2025-02-04 RX ORDER — METHOCARBAMOL 750 MG/1
750 TABLET, FILM COATED ORAL EVERY 6 HOURS PRN
Qty: 60 TABLET | Refills: 3 | Status: SHIPPED | OUTPATIENT
Start: 2025-02-04

## 2025-02-04 NOTE — PROGRESS NOTES
Name: Ramin Sadler      : 2000      MRN: [id number]  Encounter Provider: Christoph Camacho MD  Encounter Date: 2025   Encounter department: [facility]  :  Assessment & Plan        History of Present Illness     Patient was previously evaluated on 3/21/24, 2024, 24. He is again joined by his mother.     He is a 24-year-old male (turning 25 tomorrow) with h/o traumatic C5 teardrop fracture with diffuse cervical spinal cord edema and central cord syndrome, after slipping and falling in shower, s/p emergent posterior C4-6 decompression with C3-T1 fusion on 21 who developed pseudoarthrosis and worsening mechanical neck pain s/p posterior revision fusion C2-T3 and anterior C5 corpectomy with C4-6 ACDF for deformity correction on 24.     Today, he reports no new neurologic symptoms or deficits. No new numbness (has bilateral carpal tunnel symptoms at baseline), new weakness, fine motor dysfunction, gait imbalance, bowel/bladder incontinence, voice changes, swallowing difficulties, trauma/fall. He still has intermittent neck and shoulder muscular pain, which sometimes wakes him up at night.     Taking tizanidine PRN, which helps somewhat.     He completed aggressive PT.     Incisions healed well without erythema, edema, dehiscence, drainage, or underlying fluid collections.      Repeat XR flexion/extension on 2/3/25 showed intact, satisfactory instrumentation with significant improvement in alignment (adequate deformity correction) compared to preoperative imaging, no evidence of instability.     At this time, I have no acute postoperative or neurosurgical concerns. Continue postoperative follow-up yearly with repeat XR. I will order Robaxin PRN if he wishes to switch muscle relaxants.      All questions and concerns were addressed during this visit.    HPI  Review of Systems   Constitutional:  Positive for fatigue.   HENT: Negative.     Eyes: Negative.     Respiratory: Negative.     Cardiovascular: Negative.    Gastrointestinal: Negative.    Endocrine: Negative.    Genitourinary:  Positive for urgency.   Musculoskeletal:  Positive for gait problem (occasionally), myalgias (occasionally), neck pain and neck stiffness.        FOLLOW UP w/ Cspine Xrs on 2/3/25  C5 vertebral fracture     NoAC/Non smoker    Posterior removal of bilateral rods, anterior C5 corpectomy with C4-6 fixation, posterior revision fusion with extension to C2 and T3, with neuronavigation and neuromonitoring 2/7/24     Skin: Negative.    Allergic/Immunologic: Negative.    Neurological:  Positive for weakness, numbness (Both hands, B.L feet, left scalpula) and headaches (occasionally). Negative for dizziness and light-headedness.   Hematological: Negative.    Psychiatric/Behavioral:  Positive for sleep disturbance (due to pain).    All other systems reviewed and are negative.   I have personally reviewed the MA's review of systems and made changes as necessary.       Objective   /60 (Patient Position: Sitting, Cuff Size: Standard)   Pulse 56   Temp 97.6 °F (36.4 °C) (Temporal)   SpO2 99%     Physical Exam  Vitals and nursing note reviewed.   Constitutional:       Appearance: Normal appearance. He is normal weight.   HENT:      Head: Normocephalic and atraumatic.   Eyes:      General: Lids are normal.      Extraocular Movements: Extraocular movements intact.      Pupils: Pupils are equal, round, and reactive to light.   Cardiovascular:      Rate and Rhythm: Normal rate and regular rhythm.      Pulses: Normal pulses.      Heart sounds: Normal heart sounds.   Pulmonary:      Effort: Pulmonary effort is normal.      Breath sounds: Normal breath sounds.   Abdominal:      General: Abdomen is flat.      Palpations: Abdomen is soft.   Musculoskeletal:         General: Normal range of motion.      Cervical back: Normal range of motion.   Neurological:      General: No focal deficit present.       Mental Status: He is alert and oriented to person, place, and time. Mental status is at baseline.      GCS: GCS eye subscore is 4. GCS verbal subscore is 5. GCS motor subscore is 6.      Sensory: Sensation is intact.      Motor: Motor strength is normal.Motor function is intact.      Coordination: Coordination is intact.      Gait: Gait is intact.      Deep Tendon Reflexes: Reflexes are normal and symmetric.   Psychiatric:         Mood and Affect: Mood normal.         Speech: Speech normal.         Behavior: Behavior normal.       Neurological Exam  Mental Status  Alert. Oriented to person, place, time and situation. Oriented to person, place, and time. Speech is normal. Language is fluent with no aphasia. Attention and concentration are normal.    Cranial Nerves  CN II: Visual acuity is normal. Visual fields full to confrontation.  CN III, IV, VI: Extraocular movements intact bilaterally. Normal lids and orbits bilaterally. Pupils equal round and reactive to light bilaterally.  CN V: Facial sensation is normal.  CN VII: Full and symmetric facial movement.  CN VIII: Hearing is normal.  CN IX, X: Palate elevates symmetrically. Normal gag reflex.  CN XI: Shoulder shrug strength is normal.  CN XII: Tongue midline without atrophy or fasciculations.    Motor  Normal muscle bulk throughout. Normal muscle tone. Strength is 5/5 throughout all four extremities.    Sensory  Normal sensation.Sensation is intact to light touch, pinprick, vibration and proprioception in all four extremities.    Reflexes  Deep tendon reflexes are 2+ and symmetric in all four extremities.    Coordination    Finger-to-nose, rapid alternating movements and heel-to-shin normal bilaterally without dysmetria.    Gait   Normal gait.  Incisions healed    Radiology Results Review: I have reviewed the following images/report studies in PACS: XR spine cervical complete 6+ vw flex/ext/obl  Result Date: 2/3/2025  Stable postsurgical changes. Electronically  signed: 02/03/2025 11:13 AM Judah Smith, MPH,MD       Administrative Statements   I have spent a total time of 30 minutes in caring for this patient on the day of the visit/encounter including Diagnostic results, Prognosis, Risks and benefits of tx options, Instructions for management, Patient and family education, Importance of tx compliance, Risk factor reductions, Impressions, Counseling / Coordination of care, Documenting in the medical record, Reviewing / ordering tests, medicine, procedures  , Obtaining or reviewing history  , and Communicating with other healthcare professionals . Topics discussed with the patient / family include symptom assessment and management, medication review, psychosocial support, advanced directives, goals of care, supportive listening, and anticipatory guidance.

## 2025-02-04 NOTE — PROGRESS NOTES
Name: Ramin Sadler      : 2000      MRN: [id number]  Encounter Provider: Christoph Camacho MD  Encounter Date: 2025   Encounter department: [facility] NEUROSURGICAL [facility]  :  Assessment & Plan        History of Present Illness   HPI  Review of Systems I have personally reviewed the MA's review of systems and made changes as necessary.         Objective   /60 (Patient Position: Sitting, Cuff Size: Standard)   Pulse 56   Temp 97.6 °F (36.4 °C) (Temporal)   SpO2 99%     Physical Exam  Neurological Exam

## 2025-04-10 ENCOUNTER — TELEPHONE (OUTPATIENT)
Dept: HEMATOLOGY ONCOLOGY | Facility: CLINIC | Age: 25
End: 2025-04-10

## 2025-04-10 NOTE — TELEPHONE ENCOUNTER
Spoke with patient in regards to obtaining labwork prior to upcoming appointment with Dr. Leyva on 4/14.  Advised patient labs are non fasting and in system.  Patient verbalized understanding.

## 2025-04-11 ENCOUNTER — APPOINTMENT (OUTPATIENT)
Dept: LAB | Facility: CLINIC | Age: 25
End: 2025-04-11
Attending: INTERNAL MEDICINE
Payer: COMMERCIAL

## 2025-04-11 DIAGNOSIS — D69.6 THROMBOCYTOPENIA (HCC): ICD-10-CM

## 2025-04-11 LAB
BASOPHILS # BLD AUTO: 0.04 THOUSANDS/ÂΜL (ref 0–0.1)
BASOPHILS NFR BLD AUTO: 1 % (ref 0–1)
EOSINOPHIL # BLD AUTO: 0.11 THOUSAND/ÂΜL (ref 0–0.61)
EOSINOPHIL NFR BLD AUTO: 2 % (ref 0–6)
ERYTHROCYTE [DISTWIDTH] IN BLOOD BY AUTOMATED COUNT: 13.5 % (ref 11.6–15.1)
HCT VFR BLD AUTO: 43.8 % (ref 36.5–49.3)
HGB BLD-MCNC: 14.7 G/DL (ref 12–17)
IMM GRANULOCYTES # BLD AUTO: 0.01 THOUSAND/UL (ref 0–0.2)
IMM GRANULOCYTES NFR BLD AUTO: 0 % (ref 0–2)
LYMPHOCYTES # BLD AUTO: 1.99 THOUSANDS/ÂΜL (ref 0.6–4.47)
LYMPHOCYTES NFR BLD AUTO: 42 % (ref 14–44)
MCH RBC QN AUTO: 30.7 PG (ref 26.8–34.3)
MCHC RBC AUTO-ENTMCNC: 33.6 G/DL (ref 31.4–37.4)
MCV RBC AUTO: 91 FL (ref 82–98)
MONOCYTES # BLD AUTO: 0.51 THOUSAND/ÂΜL (ref 0.17–1.22)
MONOCYTES NFR BLD AUTO: 11 % (ref 4–12)
NEUTROPHILS # BLD AUTO: 2.08 THOUSANDS/ÂΜL (ref 1.85–7.62)
NEUTS SEG NFR BLD AUTO: 44 % (ref 43–75)
NRBC BLD AUTO-RTO: 0 /100 WBCS
PLATELET # BLD AUTO: 126 THOUSANDS/UL (ref 149–390)
PMV BLD AUTO: 10.8 FL (ref 8.9–12.7)
RBC # BLD AUTO: 4.79 MILLION/UL (ref 3.88–5.62)
WBC # BLD AUTO: 4.74 THOUSAND/UL (ref 4.31–10.16)

## 2025-04-11 PROCEDURE — 36415 COLL VENOUS BLD VENIPUNCTURE: CPT

## 2025-04-11 PROCEDURE — 85025 COMPLETE CBC W/AUTO DIFF WBC: CPT

## 2025-04-14 ENCOUNTER — OFFICE VISIT (OUTPATIENT)
Dept: HEMATOLOGY ONCOLOGY | Facility: CLINIC | Age: 25
End: 2025-04-14
Payer: COMMERCIAL

## 2025-04-14 VITALS
TEMPERATURE: 98.3 F | RESPIRATION RATE: 18 BRPM | HEIGHT: 68 IN | BODY MASS INDEX: 23.42 KG/M2 | HEART RATE: 60 BPM | WEIGHT: 154.5 LBS | OXYGEN SATURATION: 98 % | DIASTOLIC BLOOD PRESSURE: 62 MMHG | SYSTOLIC BLOOD PRESSURE: 110 MMHG

## 2025-04-14 DIAGNOSIS — D69.6 THROMBOCYTOPENIA (HCC): Primary | ICD-10-CM

## 2025-04-14 PROCEDURE — 99213 OFFICE O/P EST LOW 20 MIN: CPT | Performed by: INTERNAL MEDICINE

## 2025-04-14 NOTE — PROGRESS NOTES
Name: Ramin Sadler      : 2000      MRN: [id number]  Encounter Provider: Christiano Leyva MD  Encounter Date: 2025   Encounter department: [facility] [place]  :  Assessment & Plan  Thrombocytopenia (HCC)    Orders:    CBC and differential; Future        Blood count prior to next visit in 1 year.  Please call if you will be going for any  procedure or surgery.  Please report to emergency room in case of bleeding and other medical emergencies.  Discussed various causes of thrombocytopenia.  He could have likely underproduction or could have chronic mild ITP.  There is no splenomegaly.  Usually no spontaneous bleeding unless platelet count is less than 30,000.  Minor surgeries can be done with platelet count above 50,000.  Major surgeries could be done with platelet counts above 100,000.  All discussed with patient and his mother in detail.  Questions answered.  Goal is to monitor his platelet counts.  Diagnosis, orders and instructions above.  Patient is capable of self-care.  I suggested eating healthy foods, staying active but to avoid falls and trauma.  Suggested health screening tests. Patient to continue to follow-up with primary physician and other consultants.  Provided counseling and support.  I used a dictation device to dictate this note and there could be mistakes in my note and for that patient may contact my office.  Patient wants to come back in 1 year    History of Present Illness   Chief Complaint   Patient presents with    Follow-up   Patient is here with his mother.  He has chronic mild thrombocytopenia at least since  and platelet counts have not dropped below 100,000.  This time platelet count is 126,000.  No history of bleeding or bruising.  When he had neck surgery for fracture of cervical spine and even then there was no excessive bleeding or complication secondary to thrombocytopenia.  Much less neck pain.  He has residual weakness and  "numbness in the arms but better than before.  Symptoms are keep getting better.  He infrequently he has migraine headaches.   No family history of bleeding disorder.  There is history of heart disease and diabetes in the family.   Pertinent Medical History   See details in HPI  04/14/25:      Review of Systems  Review of 12 systems.  Symptoms are as in HPI.  No fevers, chills, bleeding,  skin rash, weight loss, night sweats and no swelling of the ankles and no swollen glands.  Not unusually tired.  No claudication.  No other neurological, cardiac, pulmonary, GI and  symptoms other than listed in HPI.      Objective   /62 (BP Location: Left arm, Patient Position: Sitting, Cuff Size: Adult)   Pulse 60   Temp 98.3 °F (36.8 °C) (Temporal)   Resp 18   Ht 5' 8\" (1.727 m)   Wt 70.1 kg (154 lb 8 oz)   SpO2 98%   BMI 23.49 kg/m²     Physical Exam  Vitals reviewed.   Constitutional:       General: He is not in acute distress.     Appearance: Normal appearance. He is not ill-appearing.   HENT:      Head: Normocephalic and atraumatic.      Mouth/Throat:      Comments: No thrush.  Eyes:      General: No scleral icterus.     Conjunctiva/sclera: Conjunctivae normal.   Cardiovascular:      Rate and Rhythm: Normal rate and regular rhythm.      Heart sounds: Normal heart sounds. No murmur heard.  Pulmonary:      Effort: Pulmonary effort is normal. No respiratory distress.      Breath sounds: Normal breath sounds. No rhonchi or rales.   Abdominal:      General: Abdomen is flat. There is no distension.      Palpations: Abdomen is soft. There is no mass.      Tenderness: There is no abdominal tenderness.      Comments: No ascites.    Musculoskeletal:         General: No swelling, tenderness or deformity.      Cervical back: Normal range of motion.      Right lower leg: No edema.      Left lower leg: No edema.      Comments: No calf tenderness.  Range of motion at the neck  slightly restricted.   Lymphadenopathy:      " Cervical: No cervical adenopathy.      Upper Body:      Right upper body: No supraclavicular or axillary adenopathy.      Left upper body: No supraclavicular or axillary adenopathy.   Skin:     General: Skin is warm.      Coloration: Skin is not jaundiced or pale.      Findings: No bruising or rash.      Nails: There is no clubbing.   Neurological:      General: No focal deficit present.      Mental Status: He is alert and oriented to person, place, and time.      Motor: No weakness.      Coordination: Coordination normal.      Gait: Gait normal.   Psychiatric:         Mood and Affect: Mood normal.         Behavior: Behavior normal.         Thought Content: Thought content normal.     ECOG 1    Labs: I have reviewed the following labs:  Results for orders placed or performed in visit on 04/11/25   CBC and differential   Result Value Ref Range    WBC 4.74 4.31 - 10.16 Thousand/uL    RBC 4.79 3.88 - 5.62 Million/uL    Hemoglobin 14.7 12.0 - 17.0 g/dL    Hematocrit 43.8 36.5 - 49.3 %    MCV 91 82 - 98 fL    MCH 30.7 26.8 - 34.3 pg    MCHC 33.6 31.4 - 37.4 g/dL    RDW 13.5 11.6 - 15.1 %    MPV 10.8 8.9 - 12.7 fL    Platelets 126 (L) 149 - 390 Thousands/uL    nRBC 0 /100 WBCs    Segmented % 44 43 - 75 %    Immature Grans % 0 0 - 2 %    Lymphocytes % 42 14 - 44 %    Monocytes % 11 4 - 12 %    Eosinophils Relative 2 0 - 6 %    Basophils Relative 1 0 - 1 %    Absolute Neutrophils 2.08 1.85 - 7.62 Thousands/µL    Absolute Immature Grans 0.01 0.00 - 0.20 Thousand/uL    Absolute Lymphocytes 1.99 0.60 - 4.47 Thousands/µL    Absolute Monocytes 0.51 0.17 - 1.22 Thousand/µL    Eosinophils Absolute 0.11 0.00 - 0.61 Thousand/µL    Basophils Absolute 0.04 0.00 - 0.10 Thousands/µL     Comprehensive metabolic panel  Status: Final result      Contains abnormal data Comprehensive metabolic panel  Order: [id number]   Status: Final result       Next appt: 07/25/2025 at 10:00 AM in Otolaryngology (Cem Carrera MD)    Test Result  Released: Yes (seen)    0 Result Notes            Component  Ref Range & Units (hover) 5/28/24 12:34 PM 2/8/24  5:41 AM 1/27/24 10:08 AM 12/27/22  1:59 PM 9/11/21  5:10 AM 9/10/21  4:51 AM 9/9/21 10:53 PM   Sodium 137 139 137 139 140 R 138 R 140 R   Potassium 3.7 4.2 3.3 Low  4.8 3.7 3.9 4.0   Chloride 105 107 102 104 107 R 109 High  R 112 High  R   CO2 27 25 29 30 29 24 23   ANION GAP 5 7 R 6 R 5 4 5 5   BUN 19 7 17 17 15 12 13   Creatinine 0.78 0.65 CM 0.86 CM 0.81 CM 0.69 CM 0.59 Low  CM 0.78 CM   Comment: Standardized to IDMS reference method   Glucose 102 142 High  CM   88   CM   Comment: If the patient is fasting, the ADA then defines impaired fasting glucose as > 100 mg/dL and diabetes as > or equal to 123 mg/dL.   Calcium 9.2 9.2 9.4 9.3 8.5 R 8.3 R 8.8 R   AST 11 Low   14 17 CM      ALT 7  14 CM 18 CM      Comment: Specimen collection should occur prior to Sulfasalazine administration due to the potential for falsely depressed results.   Alkaline Phosphatase 74  74 80      Total Protein 7.5  7.6 7.7      Albumin 4.6  4.7 4.7      Total Bilirubin 0.43  0.64 CM 0.28      Comment: Use of this assay is not recommended for patients undergoing treatment with eltrombopag due to the potential for falsely elevated results.  N-acetyl-p-benzoquinone imine (metabolite of Acetaminophen) will generate erroneously low results in samples for patients that have taken an overdose of Acetaminophen.   eGFR 125 135 121 125 135 144 128        CT abdomen pelvis wo contrast  Status: Final result     PACS Images - GE     Show images for CT abdomen pelvis wo contrast  PACS Images - Sectra     Show images for CT abdomen pelvis wo contrast  Study Result    Narrative & Impression   CT ABDOMEN AND PELVIS WITHOUT IV CONTRAST     INDICATION:   N31.9: Neuromuscular dysfunction of bladder, unspecified  R33.9: Retention of urine, unspecified.  History of fall in September 2021 with a cervical spine burst fracture resulting in  difficulty with management of urinary and fecal continence.     COMPARISON:  Ultrasound, dated 12/27/2022.     TECHNIQUE:  CT examination of the abdomen and pelvis was performed without intravenous contrast. Multiplanar 2D reformatted images were created from the source data.     Radiation dose length product (DLP) for this visit:  382.13 mGy-cm .  This examination, like all CT scans performed in the [facility], was performed utilizing techniques to minimize radiation dose exposure, including the use of iterative   reconstruction and automated exposure control.      Enteric contrast was not administered.      FINDINGS:     ABDOMEN     LOWER CHEST:  No clinically significant abnormality identified in the visualized lower chest.     LIVER/BILIARY TREE:  Unremarkable.     GALLBLADDER:  No calcified gallstones. No pericholecystic inflammatory change.     SPLEEN:  Unremarkable.     PANCREAS:  Unremarkable.     ADRENAL GLANDS:  Unremarkable.     KIDNEYS/URETERS:  Unremarkable. No hydronephrosis.     STOMACH AND BOWEL:  Unremarkable.     APPENDIX:  No findings to suggest appendicitis.     ABDOMINOPELVIC CAVITY:  No ascites.  No pneumoperitoneum.  No lymphadenopathy.     VESSELS:  Unremarkable for patient's age.     PELVIS     REPRODUCTIVE ORGANS:  Unremarkable for patient's age.     URINARY BLADDER:  Unremarkable.     ABDOMINAL WALL/INGUINAL REGIONS:  Unremarkable.     OSSEOUS STRUCTURES:  No acute fracture or destructive osseous lesion.  Benign bone island in the right iliac wing (series 2, image 109).     IMPRESSION:     Normal unenhanced CT of the abdomen and pelvis.           Workstation performed: [id number]        Imaging    CT abdomen pelvis wo contrast (Order: [id number]) - 3/18/2023

## 2025-04-14 NOTE — PATIENT INSTRUCTIONS
Blood count prior to next visit in 1 year.  Please call if you will be going for any  procedure or surgery.  Please report to emergency room in case of bleeding and other medical emergencies.

## (undated) DEVICE — DRILL BIT G3606010 2.4MM

## (undated) DEVICE — DRAPE SHEET X-LG

## (undated) DEVICE — JP PERF DRN SIL FLT 7MM FULL: Brand: CARDINAL HEALTH

## (undated) DEVICE — SUPPLY FEE STD

## (undated) DEVICE — MINOR PROCEDURE DRAPE: Brand: CONVERTORS

## (undated) DEVICE — GLOVE SRG BIOGEL 6

## (undated) DEVICE — DRESSING MEPILEX AG BORDER 4 X 8 IN

## (undated) DEVICE — NEURO PATTIES 1/2 X 1/2

## (undated) DEVICE — PLUMEPEN PRO 10FT

## (undated) DEVICE — TRAY FOLEY 16FR URIMETER SURESTEP

## (undated) DEVICE — EVACUATOR BULB 100CC SILICONE

## (undated) DEVICE — ELECTRODE BLADE MOD E-Z CLEAN 4IN -0014AM

## (undated) DEVICE — BIPOLAR SEALER 23-113-1 AQM 2.3: Brand: AQUAMANTYS™

## (undated) DEVICE — DRAPE MICROSCOPE OPMI PENTERO

## (undated) DEVICE — GLOVE INDICATOR PI UNDERGLOVE SZ 6.5 BLUE

## (undated) DEVICE — TOOL 14MH30 LEGEND 14CM 3MM: Brand: MIDAS REX ™

## (undated) DEVICE — DRAPE SURGIKIT SADDLE BAG

## (undated) DEVICE — SURGIFOAM 8.5 X 12.5

## (undated) DEVICE — ANTIBACTERIAL VIOLET BRAIDED (POLYGLACTIN 910), SYNTHETIC ABSORBABLE SUTURE: Brand: COATED VICRYL

## (undated) DEVICE — HEMOSTATIC MATRIX SURGIFLO 8ML W/THROMBIN

## (undated) DEVICE — SUT SILK 2-0 18 IN A185H

## (undated) DEVICE — DRILL BIT NAVG3606010 NAVIGATED: Brand: NAVIGATED INFINITY™ OCCIPITOCERVICAL UPPER THORACIC SYSTEM

## (undated) DEVICE — POV-IOD LIQUID POUCH 0.75OZ

## (undated) DEVICE — BETHLEHEM UNIVERSAL SPINE, KIT: Brand: CARDINAL HEALTH

## (undated) DEVICE — PROXIMATE SKIN STAPLERS (35 WIDE) CONTAINS 35 STAINLESS STEEL STAPLES (FIXED HEAD): Brand: PROXIMATE

## (undated) DEVICE — 1840 FOAM BLOCK NEEDLE COUNTER: Brand: DEVON

## (undated) DEVICE — SPECIMEN CONTAINER STERILE PEEL PACK

## (undated) DEVICE — SNAP KOVER: Brand: UNBRANDED

## (undated) DEVICE — TOOL MR8-SD12MH25 MR8 12CM SD MH 2.5MM: Brand: MIDAS REX MR8

## (undated) DEVICE — INTENDED FOR TISSUE SEPARATION, AND OTHER PROCEDURES THAT REQUIRE A SHARP SURGICAL BLADE TO PUNCTURE OR CUT.: Brand: BARD-PARKER ® CARBON RIB-BACK BLADES

## (undated) DEVICE — MAYFIELD® DISPOSABLE ADULT SKULL PIN (PLASTIC BASE): Brand: MAYFIELD®

## (undated) DEVICE — INTENDED FOR TISSUE SEPARATION, AND OTHER PROCEDURES THAT REQUIRE A SHARP SURGICAL BLADE TO PUNCTURE OR CUT.: Brand: BARD-PARKER SAFETY BLADES SIZE 10, STERILE

## (undated) DEVICE — NEEDLE 22 G X 1 1/2 SAFETY

## (undated) DEVICE — MARKER REFLECTIVE RADIOPAQUE SPHERE

## (undated) DEVICE — BETADINE OINTMENT FOIL PACK

## (undated) DEVICE — DRESSING MEPILEX AG BORDER 4 X 4 IN

## (undated) DEVICE — SUT ETHILON 3-0 FS-1 18 IN 663G

## (undated) DEVICE — JACKSON TABLE FOAM POSITIONING KIT: Brand: CARDINAL HEALTH

## (undated) DEVICE — DRAPE TOWEL: Brand: CONVERTORS

## (undated) DEVICE — TUBING SUCTION 5MM X 12 FT

## (undated) DEVICE — LIGHT HANDLE COVER SLEEVE DISP BLUE STELLAR

## (undated) DEVICE — INTENDED FOR TISSUE SEPARATION, AND OTHER PROCEDURES THAT REQUIRE A SHARP SURGICAL BLADE TO PUNCTURE OR CUT.: Brand: BARD-PARKER SAFETY BLADES SIZE 15, STERILE

## (undated) DEVICE — TOOL MR8-15MH22 MR8 15CM MATCH 2.2MM: Brand: MIDAS REX MR8

## (undated) DEVICE — DRAPE SHEET THREE QUARTER

## (undated) DEVICE — ELECTRODE BLADE MOD E-Z CLEAN 2.5IN 6.4CM -0012M

## (undated) DEVICE — BIPOLAR CORD DISP

## (undated) DEVICE — DRAPE ADOLESCENT LAPAROTOMY

## (undated) DEVICE — CENTERPIECE 436013A 13MM A-SIZE
Type: IMPLANTABLE DEVICE | Site: SPINE CERVICAL | Status: NON-FUNCTIONAL
Brand: T2 STRATOSPHERE™ EXPANDABLE CORPECTOMY SYSTEM
Removed: 2024-02-07

## (undated) DEVICE — PREP SURGICAL PURPREP 26ML

## (undated) DEVICE — MAGNETIC INSTRUMENT PAD 16" X 20"; LARGE; DISPOSABLE: Brand: CARDINAL HEALTH

## (undated) DEVICE — MONITORING SPINAL IMPULSE CASE FEE

## (undated) DEVICE — DISPOSABLE EQUIPMENT COVER: Brand: SMALL TOWEL DRAPE

## (undated) DEVICE — SKN PRP WNG SPNGE PVP SCRB STR: Brand: MEDLINE INDUSTRIES, INC.

## (undated) DEVICE — NEEDLE SPINAL18G X 3.5 IN QUINCKE

## (undated) DEVICE — TUBING IRD875 MR8 IRIGTN CV LOW-PRFL 5PK: Brand: MIDAS REX MR8

## (undated) DEVICE — SPONGE PVP SCRUB WING STERILE

## (undated) DEVICE — SKIN MARKER DUAL TIP WITH RULER CAP, FLEXIBLE RULER AND LABELS: Brand: DEVON

## (undated) DEVICE — SUT MONOCRYL PLUS 4-0 PS-2 18 IN MCP496G

## (undated) DEVICE — SUT ETHILON 3-0 FSLX 30 IN 1673H

## (undated) DEVICE — NEEDLE 23G X 1 1/2 SAFETY-GLIDE THIN WALL

## (undated) DEVICE — BOWL ASSY BM210 DUAL BLADE DISPOSABLE: Brand: MIDAS REX™

## (undated) DEVICE — ELECTRODE BLADE E-Z CLEAN 4IN -0014A

## (undated) DEVICE — DISTRACTION SCREW 12MM DISP

## (undated) DEVICE — 3M™ IOBAN™ 2 ANTIMICROBIAL INCISE DRAPE 6650EZ: Brand: IOBAN™ 2

## (undated) DEVICE — ROSEBUD DISSECTORS: Brand: DEROYAL

## (undated) DEVICE — PENCIL ELECTROSURG E-Z CLEAN -0035H

## (undated) DEVICE — JACKSON-PRATT 100CC BULB RESERVOIR: Brand: CARDINAL HEALTH

## (undated) DEVICE — DRAPE C-ARMOUR

## (undated) DEVICE — NEURO PATTIES 1/2 X 3